# Patient Record
Sex: FEMALE | Race: WHITE | Employment: OTHER | URBAN - METROPOLITAN AREA
[De-identification: names, ages, dates, MRNs, and addresses within clinical notes are randomized per-mention and may not be internally consistent; named-entity substitution may affect disease eponyms.]

---

## 2017-08-28 ENCOUNTER — HOSPITAL ENCOUNTER (OUTPATIENT)
Dept: CT IMAGING | Age: 72
Discharge: HOME | End: 2017-08-28
Attending: INTERNAL MEDICINE | Admitting: INTERNAL MEDICINE

## 2017-08-28 DIAGNOSIS — R10.84 ABDOMINAL PAIN, GENERALIZED: ICD-10-CM

## 2017-08-28 DIAGNOSIS — R10.84 GENERALIZED ABDOMINAL PAIN: ICD-10-CM

## 2017-09-09 PROBLEM — D39.12 LEFT OVARIAN TUMOR OF BORDERLINE MALIGNANCY: Status: ACTIVE | Noted: 2017-09-09

## 2017-09-09 PROBLEM — D62 ACUTE BLOOD LOSS AS CAUSE OF POSTOPERATIVE ANEMIA: Status: ACTIVE | Noted: 2017-09-09

## 2017-09-09 PROBLEM — G89.4 CHRONIC PAIN SYNDROME: Status: ACTIVE | Noted: 2017-09-09

## 2017-10-06 ENCOUNTER — OFFICE VISIT (OUTPATIENT)
Dept: FAMILY MEDICINE CLINIC | Age: 72
End: 2017-10-06

## 2017-10-06 VITALS
SYSTOLIC BLOOD PRESSURE: 120 MMHG | WEIGHT: 172 LBS | BODY MASS INDEX: 33.77 KG/M2 | DIASTOLIC BLOOD PRESSURE: 84 MMHG | HEIGHT: 60 IN

## 2017-10-06 DIAGNOSIS — E53.8 VITAMIN B12 DEFICIENCY: ICD-10-CM

## 2017-10-06 DIAGNOSIS — N95.1 POST MENOPAUSAL SYNDROME: ICD-10-CM

## 2017-10-06 DIAGNOSIS — I05.0 MITRAL VALVE STENOSIS, UNSPECIFIED ETIOLOGY: ICD-10-CM

## 2017-10-06 DIAGNOSIS — E55.9 VITAMIN D DEFICIENCY: ICD-10-CM

## 2017-10-06 DIAGNOSIS — E46 MALNUTRITION (HCC): Primary | ICD-10-CM

## 2017-10-06 DIAGNOSIS — R53.83 FATIGUE, UNSPECIFIED TYPE: ICD-10-CM

## 2017-10-06 DIAGNOSIS — G89.4 CHRONIC PAIN SYNDROME: ICD-10-CM

## 2017-10-06 DIAGNOSIS — I10 ESSENTIAL HYPERTENSION: ICD-10-CM

## 2017-10-06 DIAGNOSIS — Z13.220 LIPID SCREENING: ICD-10-CM

## 2017-10-06 DIAGNOSIS — F32.89 DEPRESSIVE DISORDER, NOT ELSEWHERE CLASSIFIED: ICD-10-CM

## 2017-10-06 PROCEDURE — 99204 OFFICE O/P NEW MOD 45 MIN: CPT | Performed by: FAMILY MEDICINE

## 2017-10-06 RX ORDER — BETHANECHOL CHLORIDE 25 MG/1
25 TABLET ORAL 3 TIMES DAILY
COMMUNITY
End: 2018-07-03 | Stop reason: SDUPTHER

## 2017-10-06 RX ORDER — SERTRALINE HYDROCHLORIDE 100 MG/1
100 TABLET, FILM COATED ORAL DAILY
COMMUNITY
End: 2018-01-30

## 2017-10-06 RX ORDER — GABAPENTIN 800 MG/1
800 TABLET ORAL 3 TIMES DAILY
COMMUNITY
End: 2019-01-04

## 2017-10-06 RX ORDER — ASPIRIN 81 MG/1
81 TABLET, CHEWABLE ORAL DAILY
COMMUNITY
End: 2019-05-08

## 2017-10-06 RX ORDER — TIZANIDINE 4 MG/1
4 TABLET ORAL 2 TIMES DAILY
Status: ON HOLD | COMMUNITY
End: 2019-08-31 | Stop reason: HOSPADM

## 2017-10-06 ASSESSMENT — ENCOUNTER SYMPTOMS
DIARRHEA: 0
CONSTIPATION: 0
VOMITING: 0
ABDOMINAL PAIN: 0

## 2017-10-06 NOTE — MR AVS SNAPSHOT
After Visit Summary             Kate Fulling   10/6/2017 9:00 AM   Office Visit    Description:  Female : 1945   Provider:  Ada Cabello MD   Department:  [place] [address] and Future Appointments         Below is a list of your follow-up and future appointments. This may not be a complete list as you may have made appointments directly with providers that we are not aware of or your providers may have made some for you. Please call your providers to confirm appointments. It is important to keep your appointments. Please bring your current insurance card, photo ID, co-pay, and all medication bottles to your appointment. If self-pay, payment is expected at the time of service. Your To-Do List     Future Orders Complete By Expires    Comprehensive Metabolic Panel [SAN43 Custom]  10/6/2017 10/6/2018    DEXA Bone Density Axial Skeleton [27366 Custom]  10/6/2017 10/6/2018    Lipid Panel [LAB18 Custom]  10/6/2017 10/6/2018    CBC with Differential [BYN5092 Custom]  2017 10/6/2018    TSH without Reflex [WHA134 Custom]  2017 10/6/2018    Vitamin B12 [LAB67 Custom]  2017 10/6/2018    Vitamin D 25 Hydroxy [UCB030 Custom]  2017 10/6/2018         Information from Your Visit        Department     Name Address Phone Fax    [address] [phone]      You Were Seen for:         Comments    Malnutrition [facility])   [[id number]]         Vital Signs     Blood Pressure Height Weight Body Mass Index Smoking Status       120/84 5' (1.524 m) 172 lb (78 kg) 33.59 kg/m2 Never Smoker       Additional Information about your Body Mass Index (BMI)           Your BMI as listed above is considered obese (30 or more). BMI is an estimate of body fat, calculated from your height and weight.   The higher your BMI, the greater your risk of heart disease, high blood pressure, type 2 diabetes, stroke, gallstones, arthritis, sleep apnea, and certain cancers. BMI is not perfect. It may overestimate body fat in athletes and people who are more muscular. Even a small weight loss (between 5 and 10 percent of your current weight) by decreasing your calorie intake and becoming more physically active will help lower your risk of developing or worsening diseases associated with obesity. Learn more at: [employer]             Today's Medication Changes          These changes are accurate as of: 10/6/17 10:01 AM.  If you have any questions, ask your nurse or doctor. START taking these medications           mupirocin 2 % ointment   Commonly known as:  BACTROBAN   Instructions:  Apply 3 times daily. Quantity:  22 g   Refills:  1   Started by:  Benjie Christina MD         CHANGE how you take these medications           furosemide 20 MG tablet   Commonly known as:  LASIX   Instructions: Take  by mouth daily. Refills:  0   What changed:  Another medication with the same name was removed. Continue taking this medication, and follow the directions you see here. Changed by:  Jas Drake PA-C       gabapentin 800 MG tablet   Commonly known as:  NEURONTIN   Instructions: Take 800 mg by mouth 3 times daily   Refills:  0   What changed:  Another medication with the same name was removed. Continue taking this medication, and follow the directions you see here.    Changed by:  Benjie Christina MD         STOP taking these medications           acetaminophen 325 MG tablet   Commonly known as:  TYLENOL   Stopped by:  Benjie Christina MD       amLODIPine 5 MG tablet   Commonly known as:  NORVASC   Stopped by:  Benjie Christina MD       ibuprofen 400 MG tablet   Commonly known as:  ADVIL;MOTRIN   Stopped by:  Benjie Christina MD       metoprolol succinate 200 MG extended release tablet   Commonly known as:  TOPROL XL Stopped by:  Khadar Cueto MD       rOPINIRole 0.25 MG tablet   Commonly known as:  REQUIP   Stopped by:  Khadar Cueto MD       senna 8.6 MG tablet   Commonly known as:  [address] by:  Khadar Cueto MD       simvastatin 20 MG tablet   Commonly known as:  ZOCOR   Stopped by:  Khadar Cueto MD       venlafaxine 150 MG extended release capsule   Commonly known as:  EFFEXOR XR   Stopped by:  Khadar Cueto MD            Where to Get Your Medications      These medications were sent to [facility] [address], [address] [phone] Ayden Jennings [phone]  [address] [facility], [address] [place] [address]     Phone:  [phone]     mupirocin 2 % ointment               Your Current Medications Are              bethanechol (URECHOLINE) 25 MG tablet Take 25 mg by mouth 3 times daily    tiZANidine (ZANAFLEX) 4 MG tablet Take 4 mg by mouth 2 times daily    sertraline (ZOLOFT) 100 MG tablet Take 100 mg by mouth daily    aspirin 81 MG chewable tablet Take 81 mg by mouth daily    gabapentin (NEURONTIN) 800 MG tablet Take 800 mg by mouth 3 times daily    mupirocin (BACTROBAN) 2 % ointment Apply 3 times daily. morphine (MSIR) 15 MG tablet Take 15 mg by mouth every 6 hours as needed for Pain . spironolactone (ALDACTONE) 50 MG tablet Take  by mouth daily. RANEXA 500 MG SR tablet Take  by mouth 2 times daily. furosemide (LASIX) 20 MG tablet Take  by mouth daily. buPROPion (WELLBUTRIN XL) 300 MG XL tablet Take 1 tablet by mouth every morning. Allergies           No Known Allergies      We Ordered/Performed the following           Anthony Paredes MD     Scheduling Instructions:    [facility] - Selma Keenan MD  [address] [place] [address], [facility] [facility], [address]  Ph: [phone]    One of the many advantages of the [address] [place] [address] is that we all work together closely to make healthcare easy for you.  We have at age 73. Based upon the results and risk factors for bone loss, your provider will recommend whether this needs to be repeated. 10/16/2011    Pneumococcal Vaccines (two) for all adults aged 72 and over (1 of 2 - PCV13) 10/16/2011    Yearly Flu Vaccine (1) 9/1/2017            MyChart Signup           [employer] allows you to send messages to your doctor, view your test results, renew your prescriptions, schedule appointments, view visit notes, and more. How Do I Sign Up? 1. In your Internet browser, go to [email or web address]  2. Click on the Sign Up Now link in the Sign In box. You will see the New Member Sign Up page. 3. Enter your [employer] Access Code exactly as it appears below. You will not need to use this code after youve completed the sign-up process. If you do not sign up before the expiration date, you must request a new code. [employer] Access Code: [email or web address]  Expires: 11/10/2017  5:06 PM    4. Enter your Social Security Number ([id number]) and Date of Birth (mm/dd/yyyy) as indicated and click Submit. You will be taken to the next sign-up page. 5. Create a [employer] ID. This will be your [employer] login ID and cannot be changed, so think of one that is secure and easy to remember. 6. Create a [employer] password. You can change your password at any time. 7. Enter your Password Reset Question and Answer. This can be used at a later time if you forget your password. 8. Enter your e-mail address. You will receive e-mail notification when new information is available in [address] 9. Click Sign Up. You can now view your medical record. Additional Information  If you have questions, please contact the physician practice where you receive care. Remember, [employer] is NOT to be used for urgent needs. For medical emergencies, dial 911. For questions regarding your [employer] account call [phone]. If you have a clinical question, please call your doctor's office.

## 2017-11-16 ENCOUNTER — OFFICE VISIT (OUTPATIENT)
Dept: CARDIOLOGY CLINIC | Age: 72
End: 2017-11-16

## 2017-11-16 VITALS
HEIGHT: 60 IN | HEART RATE: 80 BPM | WEIGHT: 175.12 LBS | DIASTOLIC BLOOD PRESSURE: 84 MMHG | BODY MASS INDEX: 34.38 KG/M2 | SYSTOLIC BLOOD PRESSURE: 132 MMHG

## 2017-11-16 DIAGNOSIS — R07.89 OTHER CHEST PAIN: Primary | ICD-10-CM

## 2017-11-16 DIAGNOSIS — I10 ESSENTIAL HYPERTENSION: ICD-10-CM

## 2017-11-16 DIAGNOSIS — I10 HYPERTENSION, MALIGNANT: ICD-10-CM

## 2017-11-16 DIAGNOSIS — I45.10 RIGHT BUNDLE BRANCH BLOCK (RBBB): ICD-10-CM

## 2017-11-16 PROCEDURE — 93000 ELECTROCARDIOGRAM COMPLETE: CPT | Performed by: INTERNAL MEDICINE

## 2017-11-16 PROCEDURE — 1123F ACP DISCUSS/DSCN MKR DOCD: CPT | Performed by: INTERNAL MEDICINE

## 2017-11-16 PROCEDURE — 3014F SCREEN MAMMO DOC REV: CPT | Performed by: INTERNAL MEDICINE

## 2017-11-16 PROCEDURE — 1036F TOBACCO NON-USER: CPT | Performed by: INTERNAL MEDICINE

## 2017-11-16 PROCEDURE — G8427 DOCREV CUR MEDS BY ELIG CLIN: HCPCS | Performed by: INTERNAL MEDICINE

## 2017-11-16 PROCEDURE — 99204 OFFICE O/P NEW MOD 45 MIN: CPT | Performed by: INTERNAL MEDICINE

## 2017-11-16 PROCEDURE — 4040F PNEUMOC VAC/ADMIN/RCVD: CPT | Performed by: INTERNAL MEDICINE

## 2017-11-16 PROCEDURE — 3017F COLORECTAL CA SCREEN DOC REV: CPT | Performed by: INTERNAL MEDICINE

## 2017-11-16 PROCEDURE — 1090F PRES/ABSN URINE INCON ASSESS: CPT | Performed by: INTERNAL MEDICINE

## 2017-11-16 PROCEDURE — G8400 PT W/DXA NO RESULTS DOC: HCPCS | Performed by: INTERNAL MEDICINE

## 2017-11-16 PROCEDURE — G8484 FLU IMMUNIZE NO ADMIN: HCPCS | Performed by: INTERNAL MEDICINE

## 2017-11-16 PROCEDURE — G8417 CALC BMI ABV UP PARAM F/U: HCPCS | Performed by: INTERNAL MEDICINE

## 2017-11-16 NOTE — PROGRESS NOTES
coordination  · No abnormalities of mood, affect, memory, mentation, or behavior are noted    Echo [address] Us 27 3/31/16:  Adenike Orozco     Left Ventricular ejection fraction is estimated at 65%.         No obvious regional wall motion abnormalities.     Left ventricular cavity size normal.      Left ventricular wall thickness is normal.      Calcified posterior leaflet and mitral annulus.      Carotid artery duplex [facility] 3/15/16:  CONCLUSIONS    Stenosis in Right Proximal ICA at 1-39%. RVA is antegrade. Stenosis in Left Proximal ICA at 1-39%.     LVA is antegrade. Nuclear stress test [facility] 4/8/14: IMPRESSIONS    No evidence of myocardial ischemia or prior myocardial infarction.     Normal left ventricular size and function.      Assessment:     1. Other chest pain    2. Essential hypertension    3. Hypertension, malignant    4. Right bundle branch block (RBBB)      EKG today 11/16/17> NSR, RBBB    Plan:  Would like to update testing including nuclear stress (GXT, but can switch to Lexiscan) and ECHO. Routine fasting lab orders already in EPIC per PCP. Encouraged to have them drawn soon. Return for follow up after testing with my CNP. Thank you for allowing me to participate in the care of this individual.      Sury Fournier.  Ann Marie Heath M.D., [facility]

## 2017-11-29 ENCOUNTER — HOSPITAL ENCOUNTER (OUTPATIENT)
Dept: NON INVASIVE DIAGNOSTICS | Age: 72
Discharge: HOME | End: 2017-11-29
Attending: INTERNAL MEDICINE | Admitting: INTERNAL MEDICINE

## 2017-11-29 DIAGNOSIS — R07.89 OTHER CHEST PAIN: ICD-10-CM

## 2017-11-29 LAB
LV EF: 60 %
LV EF: 76 %
LVEF MODALITY: NORMAL
LVEF MODALITY: NORMAL

## 2017-12-07 ENCOUNTER — TELEPHONE (OUTPATIENT)
Dept: CARDIOLOGY CLINIC | Age: 72
End: 2017-12-07

## 2018-01-09 ENCOUNTER — HOSPITAL ENCOUNTER (OUTPATIENT)
Dept: OTHER | Age: 73
Discharge: HOME | End: 2018-01-09
Attending: FAMILY MEDICINE | Admitting: FAMILY MEDICINE

## 2018-01-09 DIAGNOSIS — E55.9 VITAMIN D DEFICIENCY: ICD-10-CM

## 2018-01-09 DIAGNOSIS — E53.8 VITAMIN B12 DEFICIENCY: ICD-10-CM

## 2018-01-09 DIAGNOSIS — R53.83 FATIGUE, UNSPECIFIED TYPE: ICD-10-CM

## 2018-01-09 DIAGNOSIS — Z13.220 LIPID SCREENING: ICD-10-CM

## 2018-01-09 DIAGNOSIS — I10 ESSENTIAL HYPERTENSION: ICD-10-CM

## 2018-01-09 LAB
A/G RATIO: 1.4 (ref 1.1–2.2)
ALBUMIN SERPL-MCNC: 4.3 G/DL (ref 3.4–5)
ALP BLD-CCNC: 106 U/L (ref 40–129)
ALT SERPL-CCNC: 13 U/L (ref 10–40)
ANION GAP SERPL CALCULATED.3IONS-SCNC: 15 MMOL/L (ref 3–16)
AST SERPL-CCNC: 17 U/L (ref 15–37)
BASOPHILS ABSOLUTE: 0.1 K/UL (ref 0–0.2)
BASOPHILS RELATIVE PERCENT: 0.9 %
BILIRUB SERPL-MCNC: <0.2 MG/DL (ref 0–1)
BUN BLDV-MCNC: 24 MG/DL (ref 7–20)
CALCIUM SERPL-MCNC: 9.9 MG/DL (ref 8.3–10.6)
CHLORIDE BLD-SCNC: 101 MMOL/L (ref 99–110)
CHOLESTEROL, TOTAL: 221 MG/DL (ref 0–199)
CO2: 28 MMOL/L (ref 21–32)
CREAT SERPL-MCNC: 0.9 MG/DL (ref 0.6–1.2)
EOSINOPHILS ABSOLUTE: 0.3 K/UL (ref 0–0.6)
EOSINOPHILS RELATIVE PERCENT: 5.3 %
GFR AFRICAN AMERICAN: >60
GFR NON-AFRICAN AMERICAN: >60
GLOBULIN: 3.1 G/DL
GLUCOSE BLD-MCNC: 73 MG/DL (ref 70–99)
HCT VFR BLD CALC: 42.5 % (ref 36–48)
HDLC SERPL-MCNC: 68 MG/DL (ref 40–60)
HEMOGLOBIN: 13.8 G/DL (ref 12–16)
LDL CHOLESTEROL CALCULATED: 119 MG/DL
LYMPHOCYTES ABSOLUTE: 1.7 K/UL (ref 1–5.1)
LYMPHOCYTES RELATIVE PERCENT: 29.4 %
MCH RBC QN AUTO: 30.7 PG (ref 26–34)
MCHC RBC AUTO-ENTMCNC: 32.4 G/DL (ref 31–36)
MCV RBC AUTO: 94.7 FL (ref 80–100)
MONOCYTES ABSOLUTE: 0.5 K/UL (ref 0–1.3)
MONOCYTES RELATIVE PERCENT: 9 %
NEUTROPHILS ABSOLUTE: 3.2 K/UL (ref 1.7–7.7)
NEUTROPHILS RELATIVE PERCENT: 55.4 %
PDW BLD-RTO: 15.6 % (ref 12.4–15.4)
PLATELET # BLD: 204 K/UL (ref 135–450)
PMV BLD AUTO: 9.2 FL (ref 5–10.5)
POTASSIUM SERPL-SCNC: 4.7 MMOL/L (ref 3.5–5.1)
RBC # BLD: 4.49 M/UL (ref 4–5.2)
SODIUM BLD-SCNC: 144 MMOL/L (ref 136–145)
TOTAL PROTEIN: 7.4 G/DL (ref 6.4–8.2)
TRIGL SERPL-MCNC: 169 MG/DL (ref 0–150)
TSH SERPL DL<=0.05 MIU/L-ACNC: 5.39 UIU/ML (ref 0.27–4.2)
VITAMIN B-12: 181 PG/ML (ref 211–911)
VITAMIN D 25-HYDROXY: 11.2 NG/ML
VLDLC SERPL CALC-MCNC: 34 MG/DL
WBC # BLD: 5.7 K/UL (ref 4–11)

## 2018-01-11 RX ORDER — ERGOCALCIFEROL 1.25 MG/1
50000 CAPSULE ORAL WEEKLY
Qty: 30 CAPSULE | Refills: 5 | Status: SHIPPED | OUTPATIENT
Start: 2018-01-11 | End: 2018-04-18

## 2018-01-16 ENCOUNTER — OFFICE VISIT (OUTPATIENT)
Dept: FAMILY MEDICINE CLINIC | Age: 73
End: 2018-01-16

## 2018-01-16 VITALS
HEART RATE: 86 BPM | OXYGEN SATURATION: 94 % | BODY MASS INDEX: 33.2 KG/M2 | WEIGHT: 170 LBS | DIASTOLIC BLOOD PRESSURE: 86 MMHG | SYSTOLIC BLOOD PRESSURE: 128 MMHG

## 2018-01-16 DIAGNOSIS — Z23 NEED FOR INFLUENZA VACCINATION: Primary | ICD-10-CM

## 2018-01-16 DIAGNOSIS — E53.8 VITAMIN B12 DEFICIENCY: ICD-10-CM

## 2018-01-16 DIAGNOSIS — E03.9 ACQUIRED HYPOTHYROIDISM: ICD-10-CM

## 2018-01-16 PROCEDURE — 4040F PNEUMOC VAC/ADMIN/RCVD: CPT | Performed by: FAMILY MEDICINE

## 2018-01-16 PROCEDURE — 96372 THER/PROPH/DIAG INJ SC/IM: CPT | Performed by: FAMILY MEDICINE

## 2018-01-16 PROCEDURE — G8400 PT W/DXA NO RESULTS DOC: HCPCS | Performed by: FAMILY MEDICINE

## 2018-01-16 PROCEDURE — 99213 OFFICE O/P EST LOW 20 MIN: CPT | Performed by: FAMILY MEDICINE

## 2018-01-16 PROCEDURE — G8417 CALC BMI ABV UP PARAM F/U: HCPCS | Performed by: FAMILY MEDICINE

## 2018-01-16 PROCEDURE — 1036F TOBACCO NON-USER: CPT | Performed by: FAMILY MEDICINE

## 2018-01-16 PROCEDURE — 1123F ACP DISCUSS/DSCN MKR DOCD: CPT | Performed by: FAMILY MEDICINE

## 2018-01-16 PROCEDURE — 1090F PRES/ABSN URINE INCON ASSESS: CPT | Performed by: FAMILY MEDICINE

## 2018-01-16 PROCEDURE — 3017F COLORECTAL CA SCREEN DOC REV: CPT | Performed by: FAMILY MEDICINE

## 2018-01-16 PROCEDURE — G8484 FLU IMMUNIZE NO ADMIN: HCPCS | Performed by: FAMILY MEDICINE

## 2018-01-16 PROCEDURE — 3014F SCREEN MAMMO DOC REV: CPT | Performed by: FAMILY MEDICINE

## 2018-01-16 PROCEDURE — G8427 DOCREV CUR MEDS BY ELIG CLIN: HCPCS | Performed by: FAMILY MEDICINE

## 2018-01-16 RX ORDER — ROPINIROLE 0.25 MG/1
0.25 TABLET, FILM COATED ORAL
COMMUNITY
Start: 2017-06-29 | End: 2018-01-16 | Stop reason: SDUPTHER

## 2018-01-16 RX ORDER — LEVOTHYROXINE SODIUM 0.03 MG/1
25 TABLET ORAL DAILY
Qty: 30 TABLET | Refills: 2 | Status: SHIPPED | OUTPATIENT
Start: 2018-01-16 | End: 2018-03-19 | Stop reason: SDUPTHER

## 2018-01-16 RX ORDER — ROPINIROLE 0.25 MG/1
0.25 TABLET, FILM COATED ORAL NIGHTLY
Qty: 90 TABLET | Refills: 1 | Status: SHIPPED | OUTPATIENT
Start: 2018-01-16 | End: 2018-06-10 | Stop reason: SDUPTHER

## 2018-01-16 RX ORDER — CYANOCOBALAMIN 1000 UG/ML
1000 INJECTION INTRAMUSCULAR; SUBCUTANEOUS ONCE
Status: COMPLETED | OUTPATIENT
Start: 2018-01-16 | End: 2018-01-16

## 2018-01-16 RX ADMIN — CYANOCOBALAMIN 1000 MCG: 1000 INJECTION INTRAMUSCULAR; SUBCUTANEOUS at 11:39

## 2018-01-16 ASSESSMENT — ENCOUNTER SYMPTOMS
SHORTNESS OF BREATH: 0
COUGH: 0

## 2018-01-16 NOTE — PROGRESS NOTES
Blaine White  : 1945  Encounter date: 2018    This is a 71 y.o. female who presents with  Chief Complaint   Patient presents with   [address] [place] [address]       History of present illness:    Here to review bloodwork. There were a few abnormalities so she decided to come in to review in person. No Known Allergies  Outpatient Prescriptions Marked as Taking for the 18 encounter (Office Visit) with Melissa Londono MD   Medication Sig Dispense Refill    rOPINIRole (REQUIP) 0.25 MG tablet Take 1 tablet by mouth nightly 90 tablet 1    levothyroxine (SYNTHROID) 25 MCG tablet Take 1 tablet by mouth Daily 30 tablet 2    vitamin D (ERGOCALCIFEROL) 26976 units CAPS capsule Take 1 capsule by mouth once a week 30 capsule 5    bethanechol (URECHOLINE) 25 MG tablet Take 25 mg by mouth 3 times daily      tiZANidine (ZANAFLEX) 4 MG tablet Take 4 mg by mouth 2 times daily      sertraline (ZOLOFT) 100 MG tablet Take 100 mg by mouth daily      aspirin 81 MG chewable tablet Take 81 mg by mouth daily      gabapentin (NEURONTIN) 800 MG tablet Take 800 mg by mouth 3 times daily      morphine (MSIR) 15 MG tablet Take 15 mg by mouth every 6 hours as needed for Pain .  spironolactone (ALDACTONE) 50 MG tablet Take  by mouth daily.  furosemide (LASIX) 20 MG tablet Take  by mouth daily.  buPROPion (WELLBUTRIN XL) 300 MG XL tablet Take 1 tablet by mouth every morning. 30 tablet 2       Review of Systems   Constitutional: Positive for fatigue (feeling lack of energy. Hard to feel motivated. Feels more energy than depression. ). Respiratory: Negative for cough and shortness of breath. Cardiovascular: Negative for chest pain and leg swelling.        Objective:    /86   Pulse 86   Wt 170 lb (77.1 kg)   SpO2 94%   BMI 33.20 kg/m²   Weight: 170 lb (77.1 kg)     BP Readings from Last 3 Encounters:   18 128/86   17 132/84   10/06/17 120/84     Wt Readings from Last 3 Encounters:

## 2018-01-16 NOTE — PATIENT INSTRUCTIONS
Take the prescription vitamin D as directed; then you can take 2000 units daily after prescription is done. Return in 3 weeks for another B12 injection    Look over the high iron diet to help replenish iron stores. If you are not feeling significant improvement in fatigue when we recheck blood in 6 weeks; call me and we will add on bloodwork including iron levels.

## 2018-01-16 NOTE — PROGRESS NOTES
Vaccine Information Sheet, \"Influenza - Inactivated\"  given to Beryle Bath, or parent/legal guardian of  Beryle Bath and verbalized understanding. Patient responses:    Have you ever had a reaction to a flu vaccine? No  Are you able to eat eggs without adverse effects? Yes  Do you have any current illness? No  Have you ever had Guillian Chester Syndrome? No    Flu vaccine given per order. Please see immunization tab.

## 2018-01-30 ENCOUNTER — OFFICE VISIT (OUTPATIENT)
Dept: FAMILY MEDICINE CLINIC | Age: 73
End: 2018-01-30

## 2018-01-30 VITALS
HEART RATE: 99 BPM | DIASTOLIC BLOOD PRESSURE: 81 MMHG | WEIGHT: 168 LBS | OXYGEN SATURATION: 95 % | SYSTOLIC BLOOD PRESSURE: 103 MMHG | BODY MASS INDEX: 32.81 KG/M2

## 2018-01-30 DIAGNOSIS — I45.10 RIGHT BUNDLE BRANCH BLOCK (RBBB): ICD-10-CM

## 2018-01-30 DIAGNOSIS — E53.8 B12 DEFICIENCY: ICD-10-CM

## 2018-01-30 DIAGNOSIS — R42 VERTIGO: Primary | ICD-10-CM

## 2018-01-30 DIAGNOSIS — I10 ESSENTIAL HYPERTENSION: ICD-10-CM

## 2018-01-30 DIAGNOSIS — E03.9 ACQUIRED HYPOTHYROIDISM: ICD-10-CM

## 2018-01-30 PROCEDURE — G8417 CALC BMI ABV UP PARAM F/U: HCPCS | Performed by: FAMILY MEDICINE

## 2018-01-30 PROCEDURE — 4040F PNEUMOC VAC/ADMIN/RCVD: CPT | Performed by: FAMILY MEDICINE

## 2018-01-30 PROCEDURE — 1036F TOBACCO NON-USER: CPT | Performed by: FAMILY MEDICINE

## 2018-01-30 PROCEDURE — 1090F PRES/ABSN URINE INCON ASSESS: CPT | Performed by: FAMILY MEDICINE

## 2018-01-30 PROCEDURE — 99214 OFFICE O/P EST MOD 30 MIN: CPT | Performed by: FAMILY MEDICINE

## 2018-01-30 PROCEDURE — G8427 DOCREV CUR MEDS BY ELIG CLIN: HCPCS | Performed by: FAMILY MEDICINE

## 2018-01-30 PROCEDURE — G8484 FLU IMMUNIZE NO ADMIN: HCPCS | Performed by: FAMILY MEDICINE

## 2018-01-30 PROCEDURE — 3017F COLORECTAL CA SCREEN DOC REV: CPT | Performed by: FAMILY MEDICINE

## 2018-01-30 PROCEDURE — G8400 PT W/DXA NO RESULTS DOC: HCPCS | Performed by: FAMILY MEDICINE

## 2018-01-30 PROCEDURE — 3014F SCREEN MAMMO DOC REV: CPT | Performed by: FAMILY MEDICINE

## 2018-01-30 PROCEDURE — G0444 DEPRESSION SCREEN ANNUAL: HCPCS | Performed by: FAMILY MEDICINE

## 2018-01-30 PROCEDURE — 1123F ACP DISCUSS/DSCN MKR DOCD: CPT | Performed by: FAMILY MEDICINE

## 2018-01-30 RX ORDER — DULOXETIN HYDROCHLORIDE 30 MG/1
CAPSULE, DELAYED RELEASE ORAL
Qty: 60 CAPSULE | Refills: 2 | Status: SHIPPED | OUTPATIENT
Start: 2018-01-30 | End: 2018-05-11 | Stop reason: SDUPTHER

## 2018-01-30 RX ORDER — DULOXETIN HYDROCHLORIDE 30 MG/1
CAPSULE, DELAYED RELEASE ORAL
Qty: 30 CAPSULE | Refills: 2 | Status: CANCELLED | OUTPATIENT
Start: 2018-01-30

## 2018-01-30 ASSESSMENT — ENCOUNTER SYMPTOMS
CONSTIPATION: 0
ABDOMINAL PAIN: 0
SHORTNESS OF BREATH: 0
SORE THROAT: 0
SINUS PRESSURE: 0
COUGH: 0
DIARRHEA: 0
SINUS PAIN: 0
BACK PAIN: 1

## 2018-01-30 ASSESSMENT — PATIENT HEALTH QUESTIONNAIRE - PHQ9
SUM OF ALL RESPONSES TO PHQ9 QUESTIONS 1 & 2: 6
9. THOUGHTS THAT YOU WOULD BE BETTER OFF DEAD, OR OF HURTING YOURSELF: 0
6. FEELING BAD ABOUT YOURSELF - OR THAT YOU ARE A FAILURE OR HAVE LET YOURSELF OR YOUR FAMILY DOWN: 3
5. POOR APPETITE OR OVEREATING: 2
8. MOVING OR SPEAKING SO SLOWLY THAT OTHER PEOPLE COULD HAVE NOTICED. OR THE OPPOSITE, BEING SO FIGETY OR RESTLESS THAT YOU HAVE BEEN MOVING AROUND A LOT MORE THAN USUAL: 0
7. TROUBLE CONCENTRATING ON THINGS, SUCH AS READING THE NEWSPAPER OR WATCHING TELEVISION: 0
SUM OF ALL RESPONSES TO PHQ QUESTIONS 1-9: 17
1. LITTLE INTEREST OR PLEASURE IN DOING THINGS: 3
10. IF YOU CHECKED OFF ANY PROBLEMS, HOW DIFFICULT HAVE THESE PROBLEMS MADE IT FOR YOU TO DO YOUR WORK, TAKE CARE OF THINGS AT HOME, OR GET ALONG WITH OTHER PEOPLE: 2
2. FEELING DOWN, DEPRESSED OR HOPELESS: 3
3. TROUBLE FALLING OR STAYING ASLEEP: 3
4. FEELING TIRED OR HAVING LITTLE ENERGY: 3

## 2018-01-30 NOTE — PROGRESS NOTES
[facility]  : 1945  Encounter date: 2018    This is a 71 y.o. female who presents with  Chief Complaint   Patient presents with    Dizziness     Patient complaines of dizziness, followed by a burning feeling that starts in her stomach and moves up into throat. Patient states her face turns red and she feels like she will pass out. Has been occurring for past year. History of present illness:    Started over a year ago - heating sensation, gets very dizzy, dry heaves. Feels like burning in stomach and moves up. No ear ache. Was occasional, but now happening more - 4 times in last 6 weeks. Makes her afraid to go out because of having episode when she is out. From time of start to when she can function again lasts 20 minutes. Breaks in cold sweat; face goes from red to white. Fatigued, has to lay down. Feels tired, but better. Not noting any specific triggers for this. First time it occurred she was driving but episode was not as severe. Not short of breath, no palpitations. No ringing in ears. Gets a little \"blurry eyed\"; does feel like she will pass out. Puts head down; does see some spots. Lays head down. Cold water on neck seems to shorten episode. Nothing similar prior to the start of this. Not related to changes in position. Hasn't woken with symptoms in past.     Supposed to have stimulator in back replaced next week. Back chronic source of pain. Has some neuropathy that has been present for years which she attributes to back; more left sided/leg in nature. She completed f/u with cardiology and had normal stress testing in December. Recent bloodwork was reviewed at previous visit. She has not noted improvement with B12, thyroid medication she has just started. Feels like mood isn't quite where it was before. Did better on cymbalta; has been on the zoloft and the wellbutrin but just not doing quite as well with this. Not sure what previous dose of the cymbalta was. No energy.  No motivation. Not wanting to get up or do anything. No Known Allergies  Outpatient Prescriptions Marked as Taking for the 1/30/18 encounter (Office Visit) with Nehemiah Almonte MD   Medication Sig Dispense Refill    rOPINIRole (REQUIP) 0.25 MG tablet Take 1 tablet by mouth nightly 90 tablet 1    levothyroxine (SYNTHROID) 25 MCG tablet Take 1 tablet by mouth Daily 30 tablet 2    vitamin D (ERGOCALCIFEROL) 75275 units CAPS capsule Take 1 capsule by mouth once a week 30 capsule 5    bethanechol (URECHOLINE) 25 MG tablet Take 25 mg by mouth 3 times daily      tiZANidine (ZANAFLEX) 4 MG tablet Take 4 mg by mouth 2 times daily      sertraline (ZOLOFT) 100 MG tablet Take 100 mg by mouth daily      aspirin 81 MG chewable tablet Take 81 mg by mouth daily      gabapentin (NEURONTIN) 800 MG tablet Take 800 mg by mouth 3 times daily      morphine (MSIR) 15 MG tablet Take 15 mg by mouth every 6 hours as needed for Pain .  spironolactone (ALDACTONE) 50 MG tablet Take  by mouth daily.  furosemide (LASIX) 20 MG tablet Take  by mouth daily.  buPROPion (WELLBUTRIN XL) 300 MG XL tablet Take 1 tablet by mouth every morning. 30 tablet 2       Review of Systems   Constitutional: Negative for chills and fever. HENT: Negative for congestion, ear discharge, ear pain, sinus pain, sinus pressure and sore throat. Respiratory: Negative for cough and shortness of breath. Cardiovascular: Negative for chest pain and palpitations. Gastrointestinal: Negative for abdominal pain, constipation and diarrhea. Musculoskeletal: Positive for back pain. Psychiatric/Behavioral: Negative for suicidal ideas.         Depressed; see above         Objective:    /81 (Position: Standing)   Pulse 99   Wt 168 lb (76.2 kg)   SpO2 95%   BMI 32.81 kg/m²   Weight: 168 lb (76.2 kg)     BP Readings from Last 3 Encounters:   01/30/18 103/81   01/16/18 128/86   11/16/17 132/84     Wt Readings from Last 3 Encounters: 01/30/18 168 lb (76.2 kg)   01/16/18 170 lb (77.1 kg)   11/16/17 175 lb 1.9 oz (79.4 kg)       Physical Exam   Constitutional: She is oriented to person, place, and time. She appears well-developed and well-nourished. No distress. HENT:   Right Ear: Tympanic membrane and ear canal normal.   Left Ear: Tympanic membrane and ear canal normal.   Nose: Mucosal edema present. Mouth/Throat: Uvula is midline and oropharynx is clear and moist. No posterior oropharyngeal erythema. Eyes: EOM are normal. Pupils are equal, round, and reactive to light. Neck: Neck supple. Cardiovascular: Normal rate, regular rhythm and normal heart sounds. Pulmonary/Chest: Effort normal and breath sounds normal.   Abdominal: Soft. Bowel sounds are normal. She exhibits no distension. Musculoskeletal: Normal range of motion. Neurological: She is alert and oriented to person, place, and time. She has normal strength and normal reflexes. No cranial nerve deficit. She displays a negative Romberg sign. Coordination normal.   Reflex Scores:       Bicep reflexes are 2+ on the left side. Brachioradialis reflexes are 2+ on the right side and 2+ on the left side. Patellar reflexes are 2+ on the right side and 2+ on the left side. Achilles reflexes are 2+ on the left side. Psychiatric: Her speech is normal and behavior is normal. Thought content normal. Cognition and memory are normal. She exhibits a depressed mood. PHQ-9 Total Score: 17 (1/30/2018 11:49 AM)      Assessment/Plan    1. Vertigo  Unable to reproduce sx in office. Would like ENT evaluation in case inner ear causing problems. Has discussed with cardiology and they did not feel cardiac in nature, which is reassuring. Normal stress testing that did not cause any of concerning sx. Jade Resendez MD    2. Essential hypertension  stable    3. Acquired hypothyroidism  Will recheck tsh in march    4.  B12 deficiency  Continuing with B12

## 2018-02-06 ENCOUNTER — NURSE ONLY (OUTPATIENT)
Dept: FAMILY MEDICINE CLINIC | Age: 73
End: 2018-02-06

## 2018-02-06 DIAGNOSIS — E53.8 B12 DEFICIENCY: Primary | ICD-10-CM

## 2018-02-06 PROCEDURE — 96372 THER/PROPH/DIAG INJ SC/IM: CPT | Performed by: FAMILY MEDICINE

## 2018-02-06 RX ORDER — CYANOCOBALAMIN 1000 UG/ML
1000 INJECTION INTRAMUSCULAR; SUBCUTANEOUS ONCE
Status: COMPLETED | OUTPATIENT
Start: 2018-02-06 | End: 2018-02-06

## 2018-02-06 RX ADMIN — CYANOCOBALAMIN 1000 MCG: 1000 INJECTION INTRAMUSCULAR; SUBCUTANEOUS at 11:21

## 2018-02-27 ENCOUNTER — OFFICE VISIT (OUTPATIENT)
Dept: FAMILY MEDICINE CLINIC | Age: 73
End: 2018-02-27

## 2018-02-27 VITALS
OXYGEN SATURATION: 94 % | BODY MASS INDEX: 34.37 KG/M2 | HEART RATE: 82 BPM | DIASTOLIC BLOOD PRESSURE: 108 MMHG | SYSTOLIC BLOOD PRESSURE: 148 MMHG | WEIGHT: 176 LBS

## 2018-02-27 DIAGNOSIS — M51.37 DDD (DEGENERATIVE DISC DISEASE), LUMBOSACRAL: ICD-10-CM

## 2018-02-27 DIAGNOSIS — E03.9 ACQUIRED HYPOTHYROIDISM: ICD-10-CM

## 2018-02-27 DIAGNOSIS — F33.1 MAJOR DEPRESSIVE DISORDER, RECURRENT, MODERATE (HCC): ICD-10-CM

## 2018-02-27 DIAGNOSIS — E53.8 B12 DEFICIENCY: Primary | ICD-10-CM

## 2018-02-27 DIAGNOSIS — Z01.818 PREOPERATIVE EXAMINATION: ICD-10-CM

## 2018-02-27 DIAGNOSIS — F33.1 MODERATE EPISODE OF RECURRENT MAJOR DEPRESSIVE DISORDER (HCC): ICD-10-CM

## 2018-02-27 DIAGNOSIS — I10 ESSENTIAL HYPERTENSION: ICD-10-CM

## 2018-02-27 DIAGNOSIS — M96.1 POST LAMINECTOMY SYNDROME: ICD-10-CM

## 2018-02-27 PROCEDURE — 93000 ELECTROCARDIOGRAM COMPLETE: CPT | Performed by: FAMILY MEDICINE

## 2018-02-27 PROCEDURE — 96372 THER/PROPH/DIAG INJ SC/IM: CPT | Performed by: FAMILY MEDICINE

## 2018-02-27 PROCEDURE — 3017F COLORECTAL CA SCREEN DOC REV: CPT | Performed by: FAMILY MEDICINE

## 2018-02-27 PROCEDURE — 1090F PRES/ABSN URINE INCON ASSESS: CPT | Performed by: FAMILY MEDICINE

## 2018-02-27 PROCEDURE — 4040F PNEUMOC VAC/ADMIN/RCVD: CPT | Performed by: FAMILY MEDICINE

## 2018-02-27 PROCEDURE — G0444 DEPRESSION SCREEN ANNUAL: HCPCS | Performed by: FAMILY MEDICINE

## 2018-02-27 PROCEDURE — 3014F SCREEN MAMMO DOC REV: CPT | Performed by: FAMILY MEDICINE

## 2018-02-27 PROCEDURE — G8427 DOCREV CUR MEDS BY ELIG CLIN: HCPCS | Performed by: FAMILY MEDICINE

## 2018-02-27 PROCEDURE — 99214 OFFICE O/P EST MOD 30 MIN: CPT | Performed by: FAMILY MEDICINE

## 2018-02-27 PROCEDURE — G8484 FLU IMMUNIZE NO ADMIN: HCPCS | Performed by: FAMILY MEDICINE

## 2018-02-27 PROCEDURE — G8417 CALC BMI ABV UP PARAM F/U: HCPCS | Performed by: FAMILY MEDICINE

## 2018-02-27 RX ORDER — BUPROPION HYDROCHLORIDE 300 MG/1
300 TABLET ORAL EVERY MORNING
Qty: 90 TABLET | Refills: 1 | Status: SHIPPED | OUTPATIENT
Start: 2018-02-27 | End: 2018-08-27 | Stop reason: SDUPTHER

## 2018-02-27 RX ORDER — CYANOCOBALAMIN 1000 UG/ML
1000 INJECTION INTRAMUSCULAR; SUBCUTANEOUS ONCE
Status: COMPLETED | OUTPATIENT
Start: 2018-02-27 | End: 2018-02-27

## 2018-02-27 RX ORDER — FUROSEMIDE 20 MG/1
20 TABLET ORAL DAILY
Qty: 90 TABLET | Refills: 1 | Status: SHIPPED | OUTPATIENT
Start: 2018-02-27 | End: 2018-09-04 | Stop reason: SDUPTHER

## 2018-02-27 RX ADMIN — CYANOCOBALAMIN 1000 MCG: 1000 INJECTION INTRAMUSCULAR; SUBCUTANEOUS at 17:45

## 2018-02-27 ASSESSMENT — PATIENT HEALTH QUESTIONNAIRE - PHQ9
SUM OF ALL RESPONSES TO PHQ9 QUESTIONS 1 & 2: 5
9. THOUGHTS THAT YOU WOULD BE BETTER OFF DEAD, OR OF HURTING YOURSELF: 0
7. TROUBLE CONCENTRATING ON THINGS, SUCH AS READING THE NEWSPAPER OR WATCHING TELEVISION: 3
6. FEELING BAD ABOUT YOURSELF - OR THAT YOU ARE A FAILURE OR HAVE LET YOURSELF OR YOUR FAMILY DOWN: 3
5. POOR APPETITE OR OVEREATING: 0
SUM OF ALL RESPONSES TO PHQ QUESTIONS 1-9: 14
8. MOVING OR SPEAKING SO SLOWLY THAT OTHER PEOPLE COULD HAVE NOTICED. OR THE OPPOSITE, BEING SO FIGETY OR RESTLESS THAT YOU HAVE BEEN MOVING AROUND A LOT MORE THAN USUAL: 0
4. FEELING TIRED OR HAVING LITTLE ENERGY: 3
3. TROUBLE FALLING OR STAYING ASLEEP: 0
2. FEELING DOWN, DEPRESSED OR HOPELESS: 2
10. IF YOU CHECKED OFF ANY PROBLEMS, HOW DIFFICULT HAVE THESE PROBLEMS MADE IT FOR YOU TO DO YOUR WORK, TAKE CARE OF THINGS AT HOME, OR GET ALONG WITH OTHER PEOPLE: 1
1. LITTLE INTEREST OR PLEASURE IN DOING THINGS: 3

## 2018-02-28 NOTE — PROGRESS NOTES
Weight: 176 lb (79.8 kg)     BP Readings from Last 3 Encounters:   02/27/18 (!) 138/94   01/30/18 103/81   01/16/18 128/86     Wt Readings from Last 3 Encounters:   02/27/18 176 lb (79.8 kg)   01/30/18 168 lb (76.2 kg)   01/16/18 170 lb (77.1 kg)       Physical Exam  PHQ-9 Total Score: 14 (2/27/2018  4:52 PM)    Assessment/Plan    1. B12 deficiency  ***  - cyanocobalamin injection 1,000 mcg; Inject 1 mL into the muscle once    2. Moderate episode of recurrent major depressive disorder (HCC)  ***      No Follow-up on file.
recommendations as well as CT scan results. Note electronically signed by provider.

## 2018-03-06 ENCOUNTER — OFFICE VISIT (OUTPATIENT)
Dept: ENT CLINIC | Age: 73
End: 2018-03-06

## 2018-03-06 VITALS
DIASTOLIC BLOOD PRESSURE: 88 MMHG | SYSTOLIC BLOOD PRESSURE: 128 MMHG | BODY MASS INDEX: 34.34 KG/M2 | HEART RATE: 89 BPM | HEIGHT: 60 IN | WEIGHT: 174.9 LBS

## 2018-03-06 DIAGNOSIS — R42 VERTIGO: Primary | ICD-10-CM

## 2018-03-06 PROCEDURE — G8427 DOCREV CUR MEDS BY ELIG CLIN: HCPCS | Performed by: OTOLARYNGOLOGY

## 2018-03-06 PROCEDURE — 1090F PRES/ABSN URINE INCON ASSESS: CPT | Performed by: OTOLARYNGOLOGY

## 2018-03-06 PROCEDURE — G8400 PT W/DXA NO RESULTS DOC: HCPCS | Performed by: OTOLARYNGOLOGY

## 2018-03-06 PROCEDURE — G8417 CALC BMI ABV UP PARAM F/U: HCPCS | Performed by: OTOLARYNGOLOGY

## 2018-03-06 PROCEDURE — 1036F TOBACCO NON-USER: CPT | Performed by: OTOLARYNGOLOGY

## 2018-03-06 PROCEDURE — 3017F COLORECTAL CA SCREEN DOC REV: CPT | Performed by: OTOLARYNGOLOGY

## 2018-03-06 PROCEDURE — 4040F PNEUMOC VAC/ADMIN/RCVD: CPT | Performed by: OTOLARYNGOLOGY

## 2018-03-06 PROCEDURE — G8482 FLU IMMUNIZE ORDER/ADMIN: HCPCS | Performed by: OTOLARYNGOLOGY

## 2018-03-06 PROCEDURE — 99204 OFFICE O/P NEW MOD 45 MIN: CPT | Performed by: OTOLARYNGOLOGY

## 2018-03-06 PROCEDURE — 3014F SCREEN MAMMO DOC REV: CPT | Performed by: OTOLARYNGOLOGY

## 2018-03-06 PROCEDURE — 1123F ACP DISCUSS/DSCN MKR DOCD: CPT | Performed by: OTOLARYNGOLOGY

## 2018-03-06 NOTE — PROGRESS NOTES
Smokeless tobacco: Never Used    Alcohol use No    Drug use: No    Sexual activity: Not on file     Other Topics Concern    Not on file     Social History Narrative    No narrative on file          Current Outpatient Prescriptions   Medication Sig Dispense Refill    furosemide (LASIX) 20 MG tablet Take 1 tablet by mouth daily 90 tablet 1    buPROPion (WELLBUTRIN XL) 300 MG extended release tablet Take 1 tablet by mouth every morning 90 tablet 1    DULoxetine (CYMBALTA) 30 MG extended release capsule Take 1 capsule daily x 5 days, then increase to 2 capsules daily. 60 capsule 2    rOPINIRole (REQUIP) 0.25 MG tablet Take 1 tablet by mouth nightly 90 tablet 1    vitamin D (ERGOCALCIFEROL) 31477 units CAPS capsule Take 1 capsule by mouth once a week 30 capsule 5    bethanechol (URECHOLINE) 25 MG tablet Take 25 mg by mouth 3 times daily      tiZANidine (ZANAFLEX) 4 MG tablet Take 4 mg by mouth 2 times daily      aspirin 81 MG chewable tablet Take 81 mg by mouth daily      gabapentin (NEURONTIN) 800 MG tablet Take 800 mg by mouth 3 times daily      morphine (MSIR) 15 MG tablet Take 15 mg by mouth every 6 hours as needed for Pain .  spironolactone (ALDACTONE) 50 MG tablet Take  by mouth daily.  levothyroxine (SYNTHROID) 25 MCG tablet TAKE ONE TABLET BY MOUTH DAILY 30 tablet 1     No current facility-administered medications for this visit. EXAMINATION, COMPREHENSIVE:       Constitutional:  · VITALS SIGNS reviewed. Vitals:    03/06/18 1342 03/06/18 1347   BP: (!) 129/92 128/88   Site: Left Arm Left Arm   Position: Sitting Sitting   Cuff Size: Medium Adult Medium Adult   Pulse: 89 89   Weight: 174 lb 14.4 oz (79.3 kg)    Height: 5' (1.524 m)       · GENERAL APPEARANCE:  Well developed, well nourished, no apparent distress, no apparent deformities. · ABILITY TO COMMUNICATE/QUALITY OF VOICE:  Communicated without difficulty. Normal voice.      Head and Face:  · INSPECTION:

## 2018-03-07 ENCOUNTER — HOSPITAL ENCOUNTER (OUTPATIENT)
Dept: CT IMAGING | Age: 73
Discharge: HOME | End: 2018-03-07
Attending: OBSTETRICS & GYNECOLOGY | Admitting: OBSTETRICS & GYNECOLOGY

## 2018-03-07 DIAGNOSIS — R11.0 NAUSEA: ICD-10-CM

## 2018-03-07 DIAGNOSIS — D39.12 NEOPLASM OF UNCERTAIN BEHAVIOR OF LEFT OVARY: ICD-10-CM

## 2018-03-15 ENCOUNTER — OFFICE VISIT (OUTPATIENT)
Dept: ENT CLINIC | Age: 73
End: 2018-03-15

## 2018-03-15 DIAGNOSIS — R42 DIZZINESS AND GIDDINESS: Primary | ICD-10-CM

## 2018-03-15 PROCEDURE — 92534 OPTOKINETIC NYSTAGMUS TEST: CPT | Performed by: AUDIOLOGIST

## 2018-03-15 PROCEDURE — 92547 SUPPLEMENTAL ELECTRICAL TEST: CPT | Performed by: AUDIOLOGIST

## 2018-03-15 PROCEDURE — 92550 TYMPANOMETRY & REFLEX THRESH: CPT | Performed by: AUDIOLOGIST

## 2018-03-15 PROCEDURE — 92557 COMPREHENSIVE HEARING TEST: CPT | Performed by: AUDIOLOGIST

## 2018-03-15 PROCEDURE — 92540 BASIC VESTIBULAR EVALUATION: CPT | Performed by: AUDIOLOGIST

## 2018-03-19 DIAGNOSIS — E03.9 ACQUIRED HYPOTHYROIDISM: ICD-10-CM

## 2018-03-20 RX ORDER — LEVOTHYROXINE SODIUM 0.03 MG/1
TABLET ORAL
Qty: 30 TABLET | Refills: 1 | Status: SHIPPED | OUTPATIENT
Start: 2018-03-20 | End: 2019-05-08

## 2018-04-18 ENCOUNTER — OFFICE VISIT (OUTPATIENT)
Dept: ENT CLINIC | Age: 73
End: 2018-04-18

## 2018-04-18 VITALS — HEART RATE: 85 BPM | SYSTOLIC BLOOD PRESSURE: 138 MMHG | DIASTOLIC BLOOD PRESSURE: 94 MMHG

## 2018-04-18 DIAGNOSIS — R94.113 ABNORMAL ENG (ELECTRONYSTAGMOGRAM): ICD-10-CM

## 2018-04-18 DIAGNOSIS — R42 VERTIGO: Primary | ICD-10-CM

## 2018-04-18 DIAGNOSIS — H90.3 BILATERAL HIGH FREQUENCY SENSORINEURAL HEARING LOSS: ICD-10-CM

## 2018-04-18 PROCEDURE — G8417 CALC BMI ABV UP PARAM F/U: HCPCS | Performed by: OTOLARYNGOLOGY

## 2018-04-18 PROCEDURE — G8427 DOCREV CUR MEDS BY ELIG CLIN: HCPCS | Performed by: OTOLARYNGOLOGY

## 2018-04-18 PROCEDURE — 1036F TOBACCO NON-USER: CPT | Performed by: OTOLARYNGOLOGY

## 2018-04-18 PROCEDURE — 3017F COLORECTAL CA SCREEN DOC REV: CPT | Performed by: OTOLARYNGOLOGY

## 2018-04-18 PROCEDURE — 4040F PNEUMOC VAC/ADMIN/RCVD: CPT | Performed by: OTOLARYNGOLOGY

## 2018-04-18 PROCEDURE — 99214 OFFICE O/P EST MOD 30 MIN: CPT | Performed by: OTOLARYNGOLOGY

## 2018-04-18 PROCEDURE — G8400 PT W/DXA NO RESULTS DOC: HCPCS | Performed by: OTOLARYNGOLOGY

## 2018-04-18 PROCEDURE — 1090F PRES/ABSN URINE INCON ASSESS: CPT | Performed by: OTOLARYNGOLOGY

## 2018-04-18 PROCEDURE — 1123F ACP DISCUSS/DSCN MKR DOCD: CPT | Performed by: OTOLARYNGOLOGY

## 2018-04-20 ENCOUNTER — OFFICE VISIT (OUTPATIENT)
Dept: FAMILY MEDICINE CLINIC | Age: 73
End: 2018-04-20

## 2018-04-20 VITALS
WEIGHT: 182 LBS | HEART RATE: 101 BPM | DIASTOLIC BLOOD PRESSURE: 84 MMHG | OXYGEN SATURATION: 98 % | HEIGHT: 59 IN | TEMPERATURE: 97 F | SYSTOLIC BLOOD PRESSURE: 122 MMHG | BODY MASS INDEX: 36.69 KG/M2

## 2018-04-20 DIAGNOSIS — M51.37 DDD (DEGENERATIVE DISC DISEASE), LUMBOSACRAL: ICD-10-CM

## 2018-04-20 DIAGNOSIS — Z01.818 PREOP EXAMINATION: Primary | ICD-10-CM

## 2018-04-20 DIAGNOSIS — I10 ESSENTIAL HYPERTENSION: ICD-10-CM

## 2018-04-20 PROCEDURE — 1123F ACP DISCUSS/DSCN MKR DOCD: CPT | Performed by: PHYSICIAN ASSISTANT

## 2018-04-20 PROCEDURE — G8427 DOCREV CUR MEDS BY ELIG CLIN: HCPCS | Performed by: PHYSICIAN ASSISTANT

## 2018-04-20 PROCEDURE — 1090F PRES/ABSN URINE INCON ASSESS: CPT | Performed by: PHYSICIAN ASSISTANT

## 2018-04-20 PROCEDURE — G8400 PT W/DXA NO RESULTS DOC: HCPCS | Performed by: PHYSICIAN ASSISTANT

## 2018-04-20 PROCEDURE — 3014F SCREEN MAMMO DOC REV: CPT | Performed by: PHYSICIAN ASSISTANT

## 2018-04-20 PROCEDURE — 4040F PNEUMOC VAC/ADMIN/RCVD: CPT | Performed by: PHYSICIAN ASSISTANT

## 2018-04-20 PROCEDURE — 99214 OFFICE O/P EST MOD 30 MIN: CPT | Performed by: PHYSICIAN ASSISTANT

## 2018-04-20 PROCEDURE — G8417 CALC BMI ABV UP PARAM F/U: HCPCS | Performed by: PHYSICIAN ASSISTANT

## 2018-04-20 PROCEDURE — 1036F TOBACCO NON-USER: CPT | Performed by: PHYSICIAN ASSISTANT

## 2018-04-20 PROCEDURE — 93000 ELECTROCARDIOGRAM COMPLETE: CPT | Performed by: PHYSICIAN ASSISTANT

## 2018-04-20 PROCEDURE — 3017F COLORECTAL CA SCREEN DOC REV: CPT | Performed by: PHYSICIAN ASSISTANT

## 2018-04-24 ENCOUNTER — TELEPHONE (OUTPATIENT)
Dept: FAMILY MEDICINE CLINIC | Age: 73
End: 2018-04-24

## 2018-04-24 ENCOUNTER — HOSPITAL ENCOUNTER (OUTPATIENT)
Dept: OTHER | Age: 73
Discharge: HOME | End: 2018-04-24
Attending: NEUROLOGICAL SURGERY | Admitting: NEUROLOGICAL SURGERY

## 2018-04-24 LAB
ANION GAP SERPL CALCULATED.3IONS-SCNC: 13 MMOL/L (ref 3–16)
BUN BLDV-MCNC: 11 MG/DL (ref 7–20)
CALCIUM SERPL-MCNC: 9.7 MG/DL (ref 8.3–10.6)
CHLORIDE BLD-SCNC: 105 MMOL/L (ref 99–110)
CO2: 28 MMOL/L (ref 21–32)
CREAT SERPL-MCNC: 0.7 MG/DL (ref 0.6–1.2)
GFR AFRICAN AMERICAN: >60
GFR NON-AFRICAN AMERICAN: >60
GLUCOSE BLD-MCNC: 91 MG/DL (ref 70–99)
HCT VFR BLD CALC: 40.7 % (ref 36–48)
HEMOGLOBIN: 13.2 G/DL (ref 12–16)
MCH RBC QN AUTO: 30.8 PG (ref 26–34)
MCHC RBC AUTO-ENTMCNC: 32.5 G/DL (ref 31–36)
MCV RBC AUTO: 94.9 FL (ref 80–100)
PDW BLD-RTO: 12.7 % (ref 12.4–15.4)
PLATELET # BLD: 258 K/UL (ref 135–450)
PMV BLD AUTO: 9.1 FL (ref 5–10.5)
POTASSIUM SERPL-SCNC: 4.5 MMOL/L (ref 3.5–5.1)
RBC # BLD: 4.29 M/UL (ref 4–5.2)
SODIUM BLD-SCNC: 146 MMOL/L (ref 136–145)
WBC # BLD: 3.5 K/UL (ref 4–11)

## 2018-04-25 ENCOUNTER — TELEPHONE (OUTPATIENT)
Dept: FAMILY MEDICINE CLINIC | Age: 73
End: 2018-04-25

## 2018-05-11 RX ORDER — DULOXETIN HYDROCHLORIDE 30 MG/1
CAPSULE, DELAYED RELEASE ORAL
Qty: 60 CAPSULE | Refills: 1 | Status: SHIPPED | OUTPATIENT
Start: 2018-05-11 | End: 2018-07-07 | Stop reason: SDUPTHER

## 2018-05-22 ENCOUNTER — TELEPHONE (OUTPATIENT)
Dept: ENT CLINIC | Age: 73
End: 2018-05-22

## 2018-06-06 ENCOUNTER — HOSPITAL ENCOUNTER (OUTPATIENT)
Dept: CT IMAGING | Age: 73
Discharge: HOME | End: 2018-06-06
Attending: OBSTETRICS & GYNECOLOGY | Admitting: OBSTETRICS & GYNECOLOGY

## 2018-06-06 DIAGNOSIS — R11.0 NAUSEA: ICD-10-CM

## 2018-06-06 DIAGNOSIS — D39.12 NEOPLASM OF UNCERTAIN BEHAVIOR OF LEFT OVARY: ICD-10-CM

## 2018-06-09 ENCOUNTER — HOSPITAL ENCOUNTER (OUTPATIENT)
Dept: MRI IMAGING | Age: 73
Discharge: HOME | End: 2018-06-09
Attending: OTOLARYNGOLOGY | Admitting: OTOLARYNGOLOGY

## 2018-06-09 DIAGNOSIS — R42 DIZZINESS AND GIDDINESS: ICD-10-CM

## 2018-06-09 DIAGNOSIS — R42 VERTIGO: ICD-10-CM

## 2018-06-09 DIAGNOSIS — R94.113 ABNORMAL ENG (ELECTRONYSTAGMOGRAM): ICD-10-CM

## 2018-06-12 RX ORDER — ROPINIROLE 0.25 MG/1
TABLET, FILM COATED ORAL
Qty: 90 TABLET | Refills: 0 | Status: SHIPPED | OUTPATIENT
Start: 2018-06-12 | End: 2018-09-19 | Stop reason: SDUPTHER

## 2018-06-26 ENCOUNTER — HOSPITAL ENCOUNTER (OUTPATIENT)
Dept: MRI IMAGING | Age: 73
Discharge: HOME | End: 2018-06-26
Attending: OTOLARYNGOLOGY | Admitting: OTOLARYNGOLOGY

## 2018-06-26 DIAGNOSIS — R42 DIZZINESS AND GIDDINESS: ICD-10-CM

## 2018-06-26 RX ORDER — SODIUM CHLORIDE 0.9 % (FLUSH) 0.9 %
10 SYRINGE (ML) INJECTION ONCE
Status: COMPLETED | OUTPATIENT
Start: 2018-06-26 | End: 2018-06-26

## 2018-06-26 RX ADMIN — Medication 10 ML: at 13:13

## 2018-07-03 RX ORDER — ROPINIROLE 0.25 MG/1
TABLET, FILM COATED ORAL
Qty: 90 TABLET | Refills: 0 | OUTPATIENT
Start: 2018-07-03

## 2018-07-03 RX ORDER — BETHANECHOL CHLORIDE 25 MG/1
25 TABLET ORAL 3 TIMES DAILY
Qty: 90 TABLET | Refills: 1 | Status: SHIPPED | OUTPATIENT
Start: 2018-07-03 | End: 2018-09-04 | Stop reason: SDUPTHER

## 2018-07-07 RX ORDER — DULOXETIN HYDROCHLORIDE 30 MG/1
CAPSULE, DELAYED RELEASE ORAL
Qty: 60 CAPSULE | Refills: 0 | Status: SHIPPED | OUTPATIENT
Start: 2018-07-07 | End: 2018-08-05 | Stop reason: SDUPTHER

## 2018-07-09 ENCOUNTER — OFFICE VISIT (OUTPATIENT)
Dept: ENT CLINIC | Age: 73
End: 2018-07-09

## 2018-07-09 VITALS
WEIGHT: 180 LBS | BODY MASS INDEX: 35.34 KG/M2 | HEIGHT: 60 IN | SYSTOLIC BLOOD PRESSURE: 120 MMHG | DIASTOLIC BLOOD PRESSURE: 90 MMHG | HEART RATE: 93 BPM

## 2018-07-09 DIAGNOSIS — R42 VERTIGO: Primary | ICD-10-CM

## 2018-07-09 DIAGNOSIS — H90.3 BILATERAL HIGH FREQUENCY SENSORINEURAL HEARING LOSS: ICD-10-CM

## 2018-07-09 PROCEDURE — 1036F TOBACCO NON-USER: CPT | Performed by: OTOLARYNGOLOGY

## 2018-07-09 PROCEDURE — G8400 PT W/DXA NO RESULTS DOC: HCPCS | Performed by: OTOLARYNGOLOGY

## 2018-07-09 PROCEDURE — 1090F PRES/ABSN URINE INCON ASSESS: CPT | Performed by: OTOLARYNGOLOGY

## 2018-07-09 PROCEDURE — G8427 DOCREV CUR MEDS BY ELIG CLIN: HCPCS | Performed by: OTOLARYNGOLOGY

## 2018-07-09 PROCEDURE — 4040F PNEUMOC VAC/ADMIN/RCVD: CPT | Performed by: OTOLARYNGOLOGY

## 2018-07-09 PROCEDURE — 1101F PT FALLS ASSESS-DOCD LE1/YR: CPT | Performed by: OTOLARYNGOLOGY

## 2018-07-09 PROCEDURE — G8417 CALC BMI ABV UP PARAM F/U: HCPCS | Performed by: OTOLARYNGOLOGY

## 2018-07-09 PROCEDURE — 1123F ACP DISCUSS/DSCN MKR DOCD: CPT | Performed by: OTOLARYNGOLOGY

## 2018-07-09 PROCEDURE — 99214 OFFICE O/P EST MOD 30 MIN: CPT | Performed by: OTOLARYNGOLOGY

## 2018-07-09 PROCEDURE — 3017F COLORECTAL CA SCREEN DOC REV: CPT | Performed by: OTOLARYNGOLOGY

## 2018-07-09 RX ORDER — ALPRAZOLAM 0.25 MG/1
0.25 TABLET ORAL 3 TIMES DAILY PRN
Qty: 28 TABLET | Refills: 0 | Status: SHIPPED | OUTPATIENT
Start: 2018-07-09 | End: 2018-08-04 | Stop reason: SDUPTHER

## 2018-07-09 NOTE — PROGRESS NOTES
Pulse: 93      GENERAL APPEARANCE:  Well developed, well nourished, no apparent distress. EXTERNAL EAR/NOSE:  Normal pinnae and mastoids. Normal external nose. EARS, OTOSCOPY: The TMs and EACs appeared to be normal bilaterally. NOSE:  The nasal septum was midline. The inferior turbinates were normal.  The nasal mucosa and secretions were normal.  No pus or polyps were seen. OROPHARYNX/ORAL CAVITY:  Oral mucosa, hard and soft palates, tongue, and pharynx were normal.      NECK:  No masses or tenderness. The laryngeal cartilages and hyoid bone were normal.  No abnormal appearance, asymmetry or abnormal tracheal position. PALPATION OF LYMPH NODES, CERVICAL, FACIAL AND SUPRACLAVICULAR:  No lymphadenopathy. Narrative   EXAMINATION:   MRI OF THE BRAIN AND INTERNAL AUDITORY CANALS WITH AND WITHOUT CONTRAST   6/26/2018 1:14 pm       TECHNIQUE:   Multiplanar multisequence MRI of the brain focused on the internal auditory   canals was performed with and without the administration of intravenous   contrast.       COMPARISON:   None.       HISTORY:   ORDERING PHYSICIAN PROVIDED HISTORY: Vertigo   TECHNOLOGIST PROVIDED HISTORY:       Acute.  Initial evaluation.       FINDINGS:   INTERNAL AUDITORY CANALS: The 7th and 8th cranial nerves are normal in   appearance.  The cochlea, vestibule, and semicircular canals are   unremarkable.  The trigeminal nerves are normal in appearance. No areas of   abnormal contrast enhancement are identified.  The cavernous sinuses are   unremarkable.       INTRACRANIAL STRUCTURES/VENTRICLES: Aviva Canavan are no areas of restricted   diffusion to suggest an acute infarct. The cerebral and cerebellar parenchyma   demonstrate normal volume for age. Aviva Canavan are several scattered areas of   increased T2 signal noted supratentorially which are compatible with moderate   chronic microvascular white matter ischemic disease. Aviva Canavan is a chronic   lacunar infarct noted in the right putamen.  Small chronic infarcts are also   noted in the left cerebellar hemisphere.       There are no areas of blooming artifact noted on the gradient echo sequences   to suggest sequela of acute or chronic hemorrhage.       There are no areas of abnormal contrast enhancement in the cerebral or   cerebellar parenchyma to suggest an underlying mass.       This is a motion degraded examination.       ORBITS: Lens implants from prior cataract surgery are noted.  The orbits are   otherwise unremarkable.       SINUSES: There is scattered trace paranasal sinus disease.  The mastoid air   cells are clear.       BONES/SOFT TISSUES: Postsurgical changes are noted in the upper cervical   spine.  The bone marrow signal intensity and craniocervical junction are   unremarkable.           Impression   1. Unremarkable MRI imaging of the internal auditory canals. 2. 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 3. Moderate chronic microvascular white matter ischemic disease is noted   supratentorially.           COUNSELING:  Wally Amador  was counseled for dizziness and there result of the MRI scan of the brain and internal auditory canals, showing no evidence of acoustic neuroma tumor or other intracranial abnormalities which would be etiologic for dizziness. I further discussed dizziness and activity precautions and restrictions related to dizziness. Reviewed the vestibular exercises. I suggested Xanax for symptomatic treatment. Discussed need for continued follow-up. I discussed possible need for vestibular rehabilitation/physical therapy. IMPRESSION / La Block / Earl Velazquezie / PROCEDURES:       Wally Amador was seen today for dizziness. Diagnoses and all orders for this visit:    Vertigo  -     ALPRAZolam (XANAX) 0.25 MG tablet; Take 1 tablet by mouth 3 times daily as needed (for dizziness) for up to 60 days. .    Bilateral high frequency sensorineural hearing loss         RECOMMENDATIONS /

## 2018-08-04 DIAGNOSIS — R42 VERTIGO: ICD-10-CM

## 2018-08-06 RX ORDER — DULOXETIN HYDROCHLORIDE 30 MG/1
CAPSULE, DELAYED RELEASE ORAL
Qty: 60 CAPSULE | Refills: 0 | Status: SHIPPED | OUTPATIENT
Start: 2018-08-06 | End: 2018-09-04 | Stop reason: SDUPTHER

## 2018-08-09 ENCOUNTER — TELEPHONE (OUTPATIENT)
Dept: RHEUMATOLOGY | Age: 73
End: 2018-08-09

## 2018-08-10 RX ORDER — ALPRAZOLAM 0.25 MG/1
TABLET ORAL
Qty: 28 TABLET | Refills: 0 | OUTPATIENT
Start: 2018-08-10 | End: 2018-09-20 | Stop reason: SDUPTHER

## 2018-08-14 ENCOUNTER — HOSPITAL ENCOUNTER (OUTPATIENT)
Dept: OTHER | Age: 73
Discharge: HOME | End: 2018-08-14
Attending: PHYSICIAN ASSISTANT | Admitting: PHYSICIAN ASSISTANT

## 2018-08-14 DIAGNOSIS — M25.551 RIGHT HIP PAIN: ICD-10-CM

## 2018-08-14 NOTE — TELEPHONE ENCOUNTER
Received a fax back from [facility] [employer] stating that this medication is covered for a quantity of 60 for 30 days. But the pharmacy is running it for 60 for 20 days. Fax attached. Please advise pharmacy with the correct quantity and dosage then advise the patient.

## 2018-08-15 ENCOUNTER — OFFICE VISIT (OUTPATIENT)
Dept: FAMILY MEDICINE CLINIC | Age: 73
End: 2018-08-15

## 2018-08-15 VITALS
DIASTOLIC BLOOD PRESSURE: 89 MMHG | SYSTOLIC BLOOD PRESSURE: 133 MMHG | WEIGHT: 186.2 LBS | HEART RATE: 88 BPM | OXYGEN SATURATION: 92 % | BODY MASS INDEX: 36.36 KG/M2

## 2018-08-15 DIAGNOSIS — M54.16 LUMBAR BACK PAIN WITH RADICULOPATHY AFFECTING RIGHT LOWER EXTREMITY: Primary | ICD-10-CM

## 2018-08-15 PROCEDURE — 3017F COLORECTAL CA SCREEN DOC REV: CPT | Performed by: FAMILY MEDICINE

## 2018-08-15 PROCEDURE — 1101F PT FALLS ASSESS-DOCD LE1/YR: CPT | Performed by: FAMILY MEDICINE

## 2018-08-15 PROCEDURE — 1090F PRES/ABSN URINE INCON ASSESS: CPT | Performed by: FAMILY MEDICINE

## 2018-08-15 PROCEDURE — G8400 PT W/DXA NO RESULTS DOC: HCPCS | Performed by: FAMILY MEDICINE

## 2018-08-15 PROCEDURE — 99213 OFFICE O/P EST LOW 20 MIN: CPT | Performed by: FAMILY MEDICINE

## 2018-08-15 PROCEDURE — 4040F PNEUMOC VAC/ADMIN/RCVD: CPT | Performed by: FAMILY MEDICINE

## 2018-08-15 PROCEDURE — G8427 DOCREV CUR MEDS BY ELIG CLIN: HCPCS | Performed by: FAMILY MEDICINE

## 2018-08-15 PROCEDURE — G8417 CALC BMI ABV UP PARAM F/U: HCPCS | Performed by: FAMILY MEDICINE

## 2018-08-15 PROCEDURE — 1123F ACP DISCUSS/DSCN MKR DOCD: CPT | Performed by: FAMILY MEDICINE

## 2018-08-15 PROCEDURE — 1036F TOBACCO NON-USER: CPT | Performed by: FAMILY MEDICINE

## 2018-08-15 RX ORDER — PREDNISONE 20 MG/1
TABLET ORAL
Qty: 16 TABLET | Refills: 0 | Status: SHIPPED | OUTPATIENT
Start: 2018-08-15 | End: 2019-01-04

## 2018-08-15 NOTE — PATIENT INSTRUCTIONS
click on the \"Sign Up Now\" link. Current as of: November 29, 2017  Content Version: 11.7  © 0173-2783 [employer]. Care instructions adapted under license by [facility] ([facility]). If you have questions about a medical condition or this instruction, always ask your healthcare professional. [address] any warranty or liability for your use of this information.

## 2018-08-27 ENCOUNTER — TELEPHONE (OUTPATIENT)
Dept: FAMILY MEDICINE CLINIC | Age: 73
End: 2018-08-27

## 2018-08-27 DIAGNOSIS — F33.1 MODERATE EPISODE OF RECURRENT MAJOR DEPRESSIVE DISORDER (HCC): ICD-10-CM

## 2018-08-27 DIAGNOSIS — M54.16 LUMBAR BACK PAIN WITH RADICULOPATHY AFFECTING RIGHT LOWER EXTREMITY: Primary | ICD-10-CM

## 2018-08-27 NOTE — TELEPHONE ENCOUNTER
Patient notified referral to PT has been placed in EPIC and faxed to [facility], they will call the patient to schedule.

## 2018-08-28 RX ORDER — BUPROPION HYDROCHLORIDE 300 MG/1
TABLET ORAL
Qty: 90 TABLET | Refills: 0 | Status: SHIPPED | OUTPATIENT
Start: 2018-08-28 | End: 2018-12-05 | Stop reason: SDUPTHER

## 2018-09-04 DIAGNOSIS — I10 ESSENTIAL HYPERTENSION: ICD-10-CM

## 2018-09-04 RX ORDER — DULOXETIN HYDROCHLORIDE 30 MG/1
60 CAPSULE, DELAYED RELEASE ORAL DAILY
Qty: 60 CAPSULE | Refills: 1 | Status: SHIPPED | OUTPATIENT
Start: 2018-09-04 | End: 2018-11-12 | Stop reason: SDUPTHER

## 2018-09-04 RX ORDER — BETHANECHOL CHLORIDE 25 MG/1
TABLET ORAL
Qty: 90 TABLET | Refills: 1 | Status: SHIPPED | OUTPATIENT
Start: 2018-09-04 | End: 2018-12-20 | Stop reason: SDUPTHER

## 2018-09-04 RX ORDER — FUROSEMIDE 20 MG/1
TABLET ORAL
Qty: 90 TABLET | Refills: 1 | Status: SHIPPED | OUTPATIENT
Start: 2018-09-04 | End: 2019-04-22 | Stop reason: SDUPTHER

## 2018-09-19 RX ORDER — ROPINIROLE 0.25 MG/1
TABLET, FILM COATED ORAL
Qty: 90 TABLET | Refills: 0 | Status: SHIPPED | OUTPATIENT
Start: 2018-09-19 | End: 2018-12-20 | Stop reason: SDUPTHER

## 2018-09-20 DIAGNOSIS — R42 VERTIGO: ICD-10-CM

## 2018-09-25 RX ORDER — ALPRAZOLAM 0.25 MG/1
TABLET ORAL
Qty: 28 TABLET | Refills: 0 | OUTPATIENT
Start: 2018-09-25 | End: 2018-12-24

## 2018-11-12 DIAGNOSIS — R42 VERTIGO: ICD-10-CM

## 2018-11-12 RX ORDER — ALPRAZOLAM 0.25 MG/1
TABLET ORAL
Qty: 28 TABLET | Refills: 0 | OUTPATIENT
Start: 2018-11-12

## 2018-11-14 RX ORDER — DULOXETIN HYDROCHLORIDE 30 MG/1
CAPSULE, DELAYED RELEASE ORAL
Qty: 60 CAPSULE | Refills: 0 | Status: SHIPPED | OUTPATIENT
Start: 2018-11-14 | End: 2018-12-13 | Stop reason: SDUPTHER

## 2018-12-05 DIAGNOSIS — F33.1 MODERATE EPISODE OF RECURRENT MAJOR DEPRESSIVE DISORDER (HCC): ICD-10-CM

## 2018-12-05 RX ORDER — BUPROPION HYDROCHLORIDE 300 MG/1
TABLET ORAL
Qty: 90 TABLET | Refills: 0 | Status: SHIPPED | OUTPATIENT
Start: 2018-12-05 | End: 2019-03-08 | Stop reason: SDUPTHER

## 2018-12-11 ENCOUNTER — TELEPHONE (OUTPATIENT)
Dept: ENT CLINIC | Age: 73
End: 2018-12-11

## 2018-12-13 RX ORDER — DULOXETIN HYDROCHLORIDE 30 MG/1
CAPSULE, DELAYED RELEASE ORAL
Qty: 60 CAPSULE | Refills: 0 | Status: SHIPPED | OUTPATIENT
Start: 2018-12-13 | End: 2019-01-04

## 2018-12-24 RX ORDER — ROPINIROLE 0.25 MG/1
TABLET, FILM COATED ORAL
Qty: 30 TABLET | Refills: 0 | Status: SHIPPED | OUTPATIENT
Start: 2018-12-24 | End: 2019-02-01 | Stop reason: SDUPTHER

## 2018-12-24 RX ORDER — BETHANECHOL CHLORIDE 25 MG/1
TABLET ORAL
Qty: 90 TABLET | Refills: 0 | Status: SHIPPED | OUTPATIENT
Start: 2018-12-24 | End: 2019-02-01 | Stop reason: SDUPTHER

## 2019-01-04 ENCOUNTER — OFFICE VISIT (OUTPATIENT)
Dept: FAMILY MEDICINE CLINIC | Age: 74
End: 2019-01-04
Payer: MEDICARE

## 2019-01-04 VITALS
SYSTOLIC BLOOD PRESSURE: 124 MMHG | HEART RATE: 91 BPM | DIASTOLIC BLOOD PRESSURE: 86 MMHG | OXYGEN SATURATION: 97 % | BODY MASS INDEX: 36.52 KG/M2 | WEIGHT: 187 LBS

## 2019-01-04 DIAGNOSIS — D50.9 IRON DEFICIENCY ANEMIA, UNSPECIFIED IRON DEFICIENCY ANEMIA TYPE: ICD-10-CM

## 2019-01-04 DIAGNOSIS — R42 VERTIGO: ICD-10-CM

## 2019-01-04 DIAGNOSIS — E78.49 OTHER HYPERLIPIDEMIA: ICD-10-CM

## 2019-01-04 DIAGNOSIS — R30.9 PAINFUL URINATION: Primary | ICD-10-CM

## 2019-01-04 DIAGNOSIS — E53.8 B12 DEFICIENCY: ICD-10-CM

## 2019-01-04 DIAGNOSIS — Z82.49 FAMILY HISTORY OF ABDOMINAL AORTIC ANEURYSM: ICD-10-CM

## 2019-01-04 DIAGNOSIS — I10 ESSENTIAL HYPERTENSION: ICD-10-CM

## 2019-01-04 DIAGNOSIS — F33.1 MODERATE EPISODE OF RECURRENT MAJOR DEPRESSIVE DISORDER (HCC): ICD-10-CM

## 2019-01-04 DIAGNOSIS — Z23 FLU VACCINE NEED: ICD-10-CM

## 2019-01-04 DIAGNOSIS — E03.9 ACQUIRED HYPOTHYROIDISM: ICD-10-CM

## 2019-01-04 DIAGNOSIS — E55.9 VITAMIN D DEFICIENCY: ICD-10-CM

## 2019-01-04 LAB
BACTERIA URINE, POC: 0
BILIRUBIN URINE: 0 MG/DL
BLOOD, URINE: POSITIVE
CASTS URINE, POC: 0
CLARITY: CLEAR
COLOR: YELLOW
CRYSTALS URINE, POC: 0
EPI CELLS URINE, POC: 0
GLUCOSE URINE: NEGATIVE
KETONES, URINE: NEGATIVE
LEUKOCYTE EST, POC: ABNORMAL
NITRITE, URINE: NEGATIVE
PH UA: 5.5 (ref 4.5–8)
PROTEIN UA: POSITIVE
RBC URINE, POC: ABNORMAL
SPECIFIC GRAVITY UA: 1.02 (ref 1–1.03)
UROBILINOGEN, URINE: NORMAL
WBC URINE, POC: 0
YEAST URINE, POC: 0

## 2019-01-04 PROCEDURE — 81000 URINALYSIS NONAUTO W/SCOPE: CPT | Performed by: FAMILY MEDICINE

## 2019-01-04 PROCEDURE — 99214 OFFICE O/P EST MOD 30 MIN: CPT | Performed by: FAMILY MEDICINE

## 2019-01-04 PROCEDURE — G0444 DEPRESSION SCREEN ANNUAL: HCPCS | Performed by: FAMILY MEDICINE

## 2019-01-04 RX ORDER — SULFAMETHOXAZOLE AND TRIMETHOPRIM 800; 160 MG/1; MG/1
1 TABLET ORAL 2 TIMES DAILY
Qty: 6 TABLET | Refills: 0 | Status: SHIPPED | OUTPATIENT
Start: 2019-01-04 | End: 2019-01-07

## 2019-01-04 RX ORDER — DULOXETIN HYDROCHLORIDE 30 MG/1
90 CAPSULE, DELAYED RELEASE ORAL DAILY
Qty: 90 CAPSULE | Refills: 2 | Status: SHIPPED | OUTPATIENT
Start: 2019-01-04 | End: 2019-05-03 | Stop reason: SDUPTHER

## 2019-01-04 RX ORDER — MECLIZINE HYDROCHLORIDE 25 MG/1
25 TABLET ORAL 3 TIMES DAILY PRN
Qty: 90 TABLET | Refills: 1 | Status: SHIPPED | OUTPATIENT
Start: 2019-01-04 | End: 2019-02-03

## 2019-01-04 RX ORDER — PREGABALIN 100 MG/1
100 CAPSULE ORAL 2 TIMES DAILY
COMMUNITY
End: 2019-08-29 | Stop reason: DRUGHIGH

## 2019-01-04 ASSESSMENT — PATIENT HEALTH QUESTIONNAIRE - PHQ9
SUM OF ALL RESPONSES TO PHQ QUESTIONS 1-9: 18
5. POOR APPETITE OR OVEREATING: 1
1. LITTLE INTEREST OR PLEASURE IN DOING THINGS: 2
9. THOUGHTS THAT YOU WOULD BE BETTER OFF DEAD, OR OF HURTING YOURSELF: 0
7. TROUBLE CONCENTRATING ON THINGS, SUCH AS READING THE NEWSPAPER OR WATCHING TELEVISION: 3
2. FEELING DOWN, DEPRESSED OR HOPELESS: 3
3. TROUBLE FALLING OR STAYING ASLEEP: 3
8. MOVING OR SPEAKING SO SLOWLY THAT OTHER PEOPLE COULD HAVE NOTICED. OR THE OPPOSITE, BEING SO FIGETY OR RESTLESS THAT YOU HAVE BEEN MOVING AROUND A LOT MORE THAN USUAL: 0
4. FEELING TIRED OR HAVING LITTLE ENERGY: 3
6. FEELING BAD ABOUT YOURSELF - OR THAT YOU ARE A FAILURE OR HAVE LET YOURSELF OR YOUR FAMILY DOWN: 3
10. IF YOU CHECKED OFF ANY PROBLEMS, HOW DIFFICULT HAVE THESE PROBLEMS MADE IT FOR YOU TO DO YOUR WORK, TAKE CARE OF THINGS AT HOME, OR GET ALONG WITH OTHER PEOPLE: 0
SUM OF ALL RESPONSES TO PHQ9 QUESTIONS 1 & 2: 5
SUM OF ALL RESPONSES TO PHQ QUESTIONS 1-9: 18

## 2019-01-06 LAB
ORGANISM: ABNORMAL
URINE CULTURE, ROUTINE: ABNORMAL
URINE CULTURE, ROUTINE: ABNORMAL

## 2019-01-06 RX ORDER — CIPROFLOXACIN 500 MG/1
500 TABLET, FILM COATED ORAL 2 TIMES DAILY
Qty: 10 TABLET | Refills: 0 | Status: SHIPPED | OUTPATIENT
Start: 2019-01-06 | End: 2019-01-16

## 2019-01-06 ASSESSMENT — ENCOUNTER SYMPTOMS
SHORTNESS OF BREATH: 0
COUGH: 0
BACK PAIN: 1

## 2019-01-08 ENCOUNTER — TELEPHONE (OUTPATIENT)
Dept: FAMILY MEDICINE CLINIC | Age: 74
End: 2019-01-08

## 2019-01-24 ENCOUNTER — TELEPHONE (OUTPATIENT)
Dept: FAMILY MEDICINE CLINIC | Age: 74
End: 2019-01-24

## 2019-02-02 RX ORDER — BETHANECHOL CHLORIDE 25 MG/1
TABLET ORAL
Qty: 90 TABLET | Refills: 0 | Status: SHIPPED | OUTPATIENT
Start: 2019-02-02 | End: 2019-03-12 | Stop reason: SDUPTHER

## 2019-02-02 RX ORDER — ROPINIROLE 0.25 MG/1
TABLET, FILM COATED ORAL
Qty: 30 TABLET | Refills: 0 | Status: SHIPPED | OUTPATIENT
Start: 2019-02-02 | End: 2019-03-12 | Stop reason: SDUPTHER

## 2019-03-08 DIAGNOSIS — F33.1 MODERATE EPISODE OF RECURRENT MAJOR DEPRESSIVE DISORDER (HCC): ICD-10-CM

## 2019-03-08 RX ORDER — BUPROPION HYDROCHLORIDE 300 MG/1
TABLET ORAL
Qty: 90 TABLET | Refills: 0 | Status: SHIPPED | OUTPATIENT
Start: 2019-03-08 | End: 2019-06-13 | Stop reason: SDUPTHER

## 2019-03-11 ENCOUNTER — TELEPHONE (OUTPATIENT)
Dept: FAMILY MEDICINE CLINIC | Age: 74
End: 2019-03-11

## 2019-03-13 RX ORDER — ROPINIROLE 0.25 MG/1
TABLET, FILM COATED ORAL
Qty: 30 TABLET | Refills: 0 | Status: SHIPPED | OUTPATIENT
Start: 2019-03-13 | End: 2019-04-16 | Stop reason: SDUPTHER

## 2019-03-13 RX ORDER — BETHANECHOL CHLORIDE 25 MG/1
TABLET ORAL
Qty: 90 TABLET | Refills: 0 | Status: SHIPPED | OUTPATIENT
Start: 2019-03-13 | End: 2019-04-29 | Stop reason: SDUPTHER

## 2019-03-14 ENCOUNTER — OFFICE VISIT (OUTPATIENT)
Dept: FAMILY MEDICINE CLINIC | Age: 74
End: 2019-03-14
Payer: MEDICARE

## 2019-03-14 VITALS
TEMPERATURE: 96.7 F | OXYGEN SATURATION: 98 % | BODY MASS INDEX: 36.91 KG/M2 | DIASTOLIC BLOOD PRESSURE: 82 MMHG | WEIGHT: 189 LBS | HEART RATE: 102 BPM | SYSTOLIC BLOOD PRESSURE: 132 MMHG

## 2019-03-14 DIAGNOSIS — K04.7 DENTAL INFECTION: Primary | ICD-10-CM

## 2019-03-14 PROCEDURE — 99212 OFFICE O/P EST SF 10 MIN: CPT | Performed by: PHYSICIAN ASSISTANT

## 2019-03-14 ASSESSMENT — ENCOUNTER SYMPTOMS
ABDOMINAL PAIN: 0
BACK PAIN: 0
SHORTNESS OF BREATH: 0
COUGH: 0

## 2019-04-16 RX ORDER — ROPINIROLE 0.25 MG/1
TABLET, FILM COATED ORAL
Qty: 30 TABLET | Refills: 2 | Status: SHIPPED | OUTPATIENT
Start: 2019-04-16 | End: 2019-08-14 | Stop reason: SDUPTHER

## 2019-04-22 DIAGNOSIS — I10 ESSENTIAL HYPERTENSION: ICD-10-CM

## 2019-04-22 RX ORDER — FUROSEMIDE 20 MG/1
TABLET ORAL
Qty: 90 TABLET | Refills: 1 | Status: SHIPPED | OUTPATIENT
Start: 2019-04-22 | End: 2019-08-27 | Stop reason: SDUPTHER

## 2019-04-29 RX ORDER — BETHANECHOL CHLORIDE 25 MG/1
TABLET ORAL
Qty: 90 TABLET | Refills: 2 | Status: SHIPPED | OUTPATIENT
Start: 2019-04-29 | End: 2019-08-14 | Stop reason: SDUPTHER

## 2019-05-03 DIAGNOSIS — F33.1 MODERATE EPISODE OF RECURRENT MAJOR DEPRESSIVE DISORDER (HCC): ICD-10-CM

## 2019-05-03 RX ORDER — DULOXETIN HYDROCHLORIDE 30 MG/1
90 CAPSULE, DELAYED RELEASE ORAL DAILY
Qty: 30 CAPSULE | Refills: 0 | Status: SHIPPED | OUTPATIENT
Start: 2019-05-03 | End: 2019-05-22 | Stop reason: SDUPTHER

## 2019-05-08 ENCOUNTER — OFFICE VISIT (OUTPATIENT)
Dept: FAMILY MEDICINE CLINIC | Age: 74
End: 2019-05-08
Payer: MEDICARE

## 2019-05-08 ENCOUNTER — HOSPITAL ENCOUNTER (EMERGENCY)
Age: 74
Discharge: HOME | End: 2019-05-08
Attending: EMERGENCY MEDICINE
Payer: MEDICARE

## 2019-05-08 ENCOUNTER — APPOINTMENT (OUTPATIENT)
Dept: CT IMAGING | Age: 74
End: 2019-05-08
Payer: MEDICARE

## 2019-05-08 ENCOUNTER — NURSE TRIAGE (OUTPATIENT)
Dept: OTHER | Facility: CLINIC | Age: 74
End: 2019-05-08

## 2019-05-08 VITALS
HEART RATE: 87 BPM | HEIGHT: 60 IN | SYSTOLIC BLOOD PRESSURE: 163 MMHG | TEMPERATURE: 98.6 F | WEIGHT: 186 LBS | OXYGEN SATURATION: 94 % | BODY MASS INDEX: 36.52 KG/M2 | DIASTOLIC BLOOD PRESSURE: 101 MMHG | RESPIRATION RATE: 18 BRPM

## 2019-05-08 VITALS
DIASTOLIC BLOOD PRESSURE: 100 MMHG | HEART RATE: 93 BPM | BODY MASS INDEX: 36.72 KG/M2 | SYSTOLIC BLOOD PRESSURE: 160 MMHG | WEIGHT: 188 LBS | TEMPERATURE: 98.9 F | OXYGEN SATURATION: 95 %

## 2019-05-08 DIAGNOSIS — R10.9 ABDOMINAL CRAMPING: ICD-10-CM

## 2019-05-08 DIAGNOSIS — K57.32 DIVERTICULITIS OF COLON: ICD-10-CM

## 2019-05-08 DIAGNOSIS — R19.7 DIARRHEA OF PRESUMED INFECTIOUS ORIGIN: Primary | ICD-10-CM

## 2019-05-08 DIAGNOSIS — R19.7 DIARRHEA, UNSPECIFIED TYPE: Primary | ICD-10-CM

## 2019-05-08 DIAGNOSIS — I10 ESSENTIAL HYPERTENSION: ICD-10-CM

## 2019-05-08 LAB
A/G RATIO: 1.2 (ref 1.1–2.2)
ALBUMIN SERPL-MCNC: 3.9 G/DL (ref 3.4–5)
ALP BLD-CCNC: 115 U/L (ref 40–129)
ALT SERPL-CCNC: 15 U/L (ref 10–40)
ANION GAP SERPL CALCULATED.3IONS-SCNC: 12 MMOL/L (ref 3–16)
AST SERPL-CCNC: 19 U/L (ref 15–37)
BASOPHILS ABSOLUTE: 0 K/UL (ref 0–0.2)
BASOPHILS RELATIVE PERCENT: 0.3 %
BILIRUB SERPL-MCNC: 0.4 MG/DL (ref 0–1)
BILIRUBIN URINE: ABNORMAL
BLOOD, URINE: NEGATIVE
BUN BLDV-MCNC: 10 MG/DL (ref 7–20)
CALCIUM SERPL-MCNC: 10 MG/DL (ref 8.3–10.6)
CHLORIDE BLD-SCNC: 106 MMOL/L (ref 99–110)
CLARITY: ABNORMAL
CO2: 26 MMOL/L (ref 21–32)
COLOR: YELLOW
CREAT SERPL-MCNC: 0.8 MG/DL (ref 0.6–1.2)
EOSINOPHILS ABSOLUTE: 0.1 K/UL (ref 0–0.6)
EOSINOPHILS RELATIVE PERCENT: 1.5 %
EPITHELIAL CELLS, UA: 13 /HPF (ref 0–5)
GFR AFRICAN AMERICAN: >60
GFR NON-AFRICAN AMERICAN: >60
GLOBULIN: 3.3 G/DL
GLUCOSE BLD-MCNC: 103 MG/DL (ref 70–99)
GLUCOSE URINE: NEGATIVE MG/DL
HCT VFR BLD CALC: 38.6 % (ref 36–48)
HEMOGLOBIN: 12.5 G/DL (ref 12–16)
HYALINE CASTS: 11 /LPF (ref 0–8)
KETONES, URINE: 40 MG/DL
LACTIC ACID: 1.3 MMOL/L (ref 0.4–2)
LEUKOCYTE ESTERASE, URINE: ABNORMAL
LIPASE: 9 U/L (ref 13–60)
LYMPHOCYTES ABSOLUTE: 0.6 K/UL (ref 1–5.1)
LYMPHOCYTES RELATIVE PERCENT: 16.5 %
MCH RBC QN AUTO: 29.4 PG (ref 26–34)
MCHC RBC AUTO-ENTMCNC: 32.5 G/DL (ref 31–36)
MCV RBC AUTO: 90.6 FL (ref 80–100)
MICROSCOPIC EXAMINATION: YES
MONOCYTES ABSOLUTE: 0.3 K/UL (ref 0–1.3)
MONOCYTES RELATIVE PERCENT: 7 %
NEUTROPHILS ABSOLUTE: 2.7 K/UL (ref 1.7–7.7)
NEUTROPHILS RELATIVE PERCENT: 74.7 %
NITRITE, URINE: NEGATIVE
PDW BLD-RTO: 14.9 % (ref 12.4–15.4)
PH UA: 6 (ref 5–8)
PLATELET # BLD: 212 K/UL (ref 135–450)
PMV BLD AUTO: 8.5 FL (ref 5–10.5)
POTASSIUM SERPL-SCNC: 4.1 MMOL/L (ref 3.5–5.1)
PROTEIN UA: 30 MG/DL
RBC # BLD: 4.26 M/UL (ref 4–5.2)
RBC UA: 2 /HPF (ref 0–4)
SODIUM BLD-SCNC: 144 MMOL/L (ref 136–145)
SPECIFIC GRAVITY UA: 1.03 (ref 1–1.03)
TOTAL PROTEIN: 7.2 G/DL (ref 6.4–8.2)
URINE REFLEX TO CULTURE: YES
URINE TYPE: ABNORMAL
UROBILINOGEN, URINE: 1 E.U./DL
WBC # BLD: 3.7 K/UL (ref 4–11)
WBC UA: 12 /HPF (ref 0–5)

## 2019-05-08 PROCEDURE — 6360000002 HC RX W HCPCS: Performed by: PHYSICIAN ASSISTANT

## 2019-05-08 PROCEDURE — 80053 COMPREHEN METABOLIC PANEL: CPT

## 2019-05-08 PROCEDURE — 85025 COMPLETE CBC W/AUTO DIFF WBC: CPT

## 2019-05-08 PROCEDURE — 2580000003 HC RX 258: Performed by: PHYSICIAN ASSISTANT

## 2019-05-08 PROCEDURE — 87086 URINE CULTURE/COLONY COUNT: CPT

## 2019-05-08 PROCEDURE — 83690 ASSAY OF LIPASE: CPT

## 2019-05-08 PROCEDURE — 96361 HYDRATE IV INFUSION ADD-ON: CPT

## 2019-05-08 PROCEDURE — 83605 ASSAY OF LACTIC ACID: CPT

## 2019-05-08 PROCEDURE — 96375 TX/PRO/DX INJ NEW DRUG ADDON: CPT

## 2019-05-08 PROCEDURE — 36415 COLL VENOUS BLD VENIPUNCTURE: CPT

## 2019-05-08 PROCEDURE — 99284 EMERGENCY DEPT VISIT MOD MDM: CPT

## 2019-05-08 PROCEDURE — 87040 BLOOD CULTURE FOR BACTERIA: CPT

## 2019-05-08 PROCEDURE — 81001 URINALYSIS AUTO W/SCOPE: CPT

## 2019-05-08 PROCEDURE — 99212 OFFICE O/P EST SF 10 MIN: CPT | Performed by: INTERNAL MEDICINE

## 2019-05-08 PROCEDURE — 6360000004 HC RX CONTRAST MEDICATION: Performed by: EMERGENCY MEDICINE

## 2019-05-08 PROCEDURE — 87077 CULTURE AEROBIC IDENTIFY: CPT

## 2019-05-08 PROCEDURE — 74177 CT ABD & PELVIS W/CONTRAST: CPT

## 2019-05-08 PROCEDURE — 87186 SC STD MICRODIL/AGAR DIL: CPT

## 2019-05-08 PROCEDURE — 96374 THER/PROPH/DIAG INJ IV PUSH: CPT

## 2019-05-08 RX ORDER — MORPHINE SULFATE 4 MG/ML
4 INJECTION, SOLUTION INTRAMUSCULAR; INTRAVENOUS ONCE
Status: COMPLETED | OUTPATIENT
Start: 2019-05-08 | End: 2019-05-08

## 2019-05-08 RX ORDER — METRONIDAZOLE 500 MG/1
500 TABLET ORAL 3 TIMES DAILY
Qty: 21 TABLET | Refills: 0 | Status: SHIPPED | OUTPATIENT
Start: 2019-05-08 | End: 2019-05-09

## 2019-05-08 RX ORDER — CIPROFLOXACIN 500 MG/1
500 TABLET, FILM COATED ORAL 2 TIMES DAILY
Qty: 14 TABLET | Refills: 0 | Status: SHIPPED | OUTPATIENT
Start: 2019-05-08 | End: 2019-05-09

## 2019-05-08 RX ORDER — 0.9 % SODIUM CHLORIDE 0.9 %
1000 INTRAVENOUS SOLUTION INTRAVENOUS ONCE
Status: COMPLETED | OUTPATIENT
Start: 2019-05-08 | End: 2019-05-08

## 2019-05-08 RX ORDER — ONDANSETRON 2 MG/ML
4 INJECTION INTRAMUSCULAR; INTRAVENOUS ONCE
Status: COMPLETED | OUTPATIENT
Start: 2019-05-08 | End: 2019-05-08

## 2019-05-08 RX ADMIN — ONDANSETRON 4 MG: 2 INJECTION INTRAMUSCULAR; INTRAVENOUS at 19:18

## 2019-05-08 RX ADMIN — MORPHINE SULFATE 4 MG: 4 INJECTION INTRAVENOUS at 19:18

## 2019-05-08 RX ADMIN — SODIUM CHLORIDE 1000 ML: 9 INJECTION, SOLUTION INTRAVENOUS at 19:18

## 2019-05-08 RX ADMIN — IOPAMIDOL 75 ML: 755 INJECTION, SOLUTION INTRAVENOUS at 20:13

## 2019-05-08 ASSESSMENT — ENCOUNTER SYMPTOMS
COLOR CHANGE: 0
ABDOMINAL DISTENTION: 1
WHEEZING: 0
SHORTNESS OF BREATH: 0
CONSTIPATION: 0
SHORTNESS OF BREATH: 1
VOMITING: 0
CONSTIPATION: 0
FACIAL SWELLING: 0
COUGH: 0
ABDOMINAL DISTENTION: 0
ANAL BLEEDING: 0
STRIDOR: 0
BLOOD IN STOOL: 0
ALLERGIC/IMMUNOLOGIC NEGATIVE: 1
VOMITING: 1
ABDOMINAL PAIN: 1
ABDOMINAL PAIN: 1
COUGH: 0
DIARRHEA: 1
DIARRHEA: 0
BACK PAIN: 0
NAUSEA: 1
WHEEZING: 0
RECTAL PAIN: 0
CHOKING: 0
RECTAL PAIN: 1
NAUSEA: 1
BACK PAIN: 0

## 2019-05-08 ASSESSMENT — PAIN SCALES - GENERAL: PAINLEVEL_OUTOF10: 6

## 2019-05-08 NOTE — PROGRESS NOTES
Ramo Redd  : 1945  Encounter date: 2019    This palmer 68 y.o. female who presents with  Chief Complaint   Patient presents with    Diarrhea       History of present illness:    HPI Patient presents today for diarrhea x2 weeks. States bowel movements are watery and she is going to the bathroom about every 5 minutes. Patient states that she is having lower abdominal pain/cramping, worse with BMs in addition to rectal pain from wiping. She reports increasing frequency of near syncopal episodes. She reports decreased appetite, nausea and dry heaves but no bloody BMs. She reports she feels very tired. She denies medication changes. She reports most recent ABX use was March. Diarrhea does wake her from sleep. No Known Allergies  Current Outpatient Medications   Medication Sig Dispense Refill    DULoxetine (CYMBALTA) 30 MG extended release capsule Take 3 capsules by mouth daily 30 capsule 0    bethanechol (URECHOLINE) 25 MG tablet TAKE ONE TABLET BY MOUTH THREE TIMES A DAY 90 tablet 2    furosemide (LASIX) 20 MG tablet TAKE ONE TABLET BY MOUTH DAILY 90 tablet 1    rOPINIRole (REQUIP) 0.25 MG tablet TAKE ONE TABLET BY MOUTH ONCE NIGHTLY 30 tablet 2    pregabalin (LYRICA) 100 MG capsule Take 100 mg by mouth 2 times daily. Ayana Loredo  tiZANidine (ZANAFLEX) 4 MG tablet Take 4 mg by mouth 2 times daily      morphine (MSIR) 15 MG tablet Take 15 mg by mouth every 6 hours as needed for Pain .  spironolactone (ALDACTONE) 50 MG tablet Take  by mouth daily.  buPROPion (WELLBUTRIN XL) 300 MG extended release tablet TAKE ONE TABLET BY MOUTH EVERY MORNING 90 tablet 0     No current facility-administered medications for this visit. Review of Systems   Constitutional: Positive for activity change, appetite change, chills, diaphoresis, fatigue and fever. HENT: Negative for facial swelling. Respiratory: Positive for shortness of breath. Negative for cough, choking and wheezing. Cardiovascular: Positive for palpitations. Negative for leg swelling. Gastrointestinal: Positive for abdominal distention, abdominal pain, diarrhea, nausea, rectal pain and vomiting. Negative for anal bleeding, blood in stool and constipation. Endocrine: Positive for polyuria. Genitourinary: Positive for dysuria and frequency. Negative for flank pain. Musculoskeletal: Negative for back pain. Neurological: Positive for dizziness, tremors, weakness and light-headedness. Past medical, surgical, family and social history were reviewed and updated with the patient. Objective:    BP (!) 160/100 (Site: Left Upper Arm, Position: Sitting, Cuff Size: Large Adult)   Pulse 93   Temp 98.9 °F (37.2 °C) (Tympanic)   Wt 188 lb (85.3 kg)   SpO2 95%   BMI 36.72 kg/m²   Weight: 188 lb (85.3 kg)     BP Readings from Last 3 Encounters:   05/08/19 (!) 160/100   03/14/19 132/82   01/04/19 124/86     Wt Readings from Last 3 Encounters:   05/08/19 188 lb (85.3 kg)   03/14/19 189 lb (85.7 kg)   01/04/19 187 lb (84.8 kg)       Physical Exam   Constitutional: She is oriented to person, place, and time. She appears well-developed and well-nourished. She appears distressed. HENT:   Head: Normocephalic and atraumatic. Eyes: Pupils are equal, round, and reactive to light. Conjunctivae and EOM are normal.   Cardiovascular: Normal rate, regular rhythm, normal heart sounds and intact distal pulses. Exam reveals no gallop and no friction rub. No murmur heard. Pulmonary/Chest: Effort normal and breath sounds normal. No respiratory distress. She has no wheezes. She has no rales. She exhibits no tenderness. Abdominal: She exhibits distension. She exhibits no mass. There is tenderness. There is guarding. There is no rebound. No hernia. Hypoactive BS   Neurological: She is alert and oriented to person, place, and time. Skin: Skin is warm. She is diaphoretic. There is pallor.    Psychiatric: She has a normal mood and affect. Her behavior is normal. Judgment and thought content normal.   Vitals reviewed. Assessment/Plan    1. Diarrhea of presumed infectious origin  Duration of sx too long for viral etiology, and her most recent ABX use was in March so C diff is on differential, which also includes ischemic colitis given her comorbidities. However, the absence of blood raises the concern of a secretory process. Regardless, at this point she appears very volume depleted and would benefit from repletion and laboratory studies in a more urgent time frame than we are able to provide as an outpatient. Patient and her  will go to emergency room in their own vehicle after declining ambulance. No follow-ups on file.

## 2019-05-08 NOTE — TELEPHONE ENCOUNTER
Reason for Disposition   SEVERE diarrhea (e.g., 7 or more times / day more than normal) and present > 24 hours (1 day)    Protocols used: DIARRHEA-ADULT-OH        Call (VM) rec'd from [facility]  Diarrhea    Caller reports diarrhea x 2 weeks associated with fatigue  Denies sick contacts, fever but has had chills  Some abd cramping  Watery diarrhea    Hydrating and eating ok  Urinating ok    Hx of constipation and took senekot 2 tabs bid but has stopped when this started  imodium helped  For 24 hours but then symptoms restarted.     5 or more episodes a day    Transferred to [facility] for appointment

## 2019-05-08 NOTE — ED NOTES
PA bedside to evaluate pt and update on plan of care. Pt sitting up in bed with bed in low position, side rails up x 2, monitor in place, call light in reach. Pt IV established and pt updated on plan of care.       Keya Villalpando RN  05/08/19 7038

## 2019-05-09 RX ORDER — AMOXICILLIN AND CLAVULANATE POTASSIUM 875; 125 MG/1; MG/1
1 TABLET, FILM COATED ORAL 2 TIMES DAILY
Qty: 20 TABLET | Refills: 0
Start: 2019-05-09 | End: 2019-05-19

## 2019-05-09 NOTE — ED PROVIDER NOTES
[facility]        Pt Name: Denzel Seip  MRN: [id number]  [place] 1945  Date of evaluation: 5/8/2019  Provider: Troy Graham PA-C  PCP: Dick Owens MD    This patient was seen and evaluated by the attending physician Brian Botello MD.      [address] [place] [address]       Chief Complaint   Patient presents with    Diarrhea     diarrea for 2 weeks. Sent by Dr. Urszula Ricci   (Location/Symptom, Timing/Onset, Context/Setting, Quality, Duration, Modifying Factors, Severity)  Note limiting factors. Denzel Seip is a 68 y.o. female who presents to the emergency department complaining of watery diarrhea for 2 weeks. Reports nausea and dry heaving but no active vomiting, bloody or black tarry stool, constipation, fever or chills. She reports feeling very weak and fatigued and dehydrated. She is not producing much urine. When she does, denies dysuria, hematuria, frequency or urgency. She denies recent travel, surgery, hospitalization, sick exposures or recent antibiotic use. Has no history of C. diff colitis. She was sent here by primary care doctor for further evaluation. Nursing Notes were all reviewed and agreed with or any disagreements were addressed  in the HPI. REVIEW OF SYSTEMS    (2-9 systems for level 4, 10 or more for level 5)     Review of Systems   Constitutional: Positive for appetite change and fatigue. Negative for chills and fever. HENT: Negative. Eyes: Negative for visual disturbance. Respiratory: Negative for cough, shortness of breath, wheezing and stridor. Cardiovascular: Negative for chest pain, palpitations and leg swelling. Gastrointestinal: Positive for abdominal pain and nausea. Negative for abdominal distention, constipation, diarrhea, rectal pain and vomiting. Endocrine: Negative. Genitourinary: Positive for decreased urine volume. Negative for difficulty urinating, dysuria, flank pain, frequency, hematuria, pelvic pain, urgency, vaginal bleeding and vaginal discharge. Musculoskeletal: Negative for back pain, neck pain and neck stiffness. Skin: Negative for color change, pallor, rash and wound. Allergic/Immunologic: Negative. Neurological: Positive for weakness. Negative for dizziness, tremors, seizures, syncope, facial asymmetry, speech difficulty, light-headedness, numbness and headaches. Hematological: Negative. Psychiatric/Behavioral: Negative for confusion. All other systems reviewed and are negative. Positives and Pertinent negatives as per HPI. Except as noted abovein the ROS, all other systems were reviewed and negative.        PAST MEDICAL HISTORY     Past Medical History:   Diagnosis Date    AVM     Chronic back pain     Depression     Gastritis     History of blood transfusion     secondary to GI bleed    History of GI bleed     Hypertension     Personal history of MRSA (methicillin resistant Staphylococcus aureus)     recurrent, last 2-3 years ago finger (doesnt remember which one)         SURGICAL HISTORY     Past Surgical History:   Procedure Laterality Date    ABDOMINAL EXPLORATION SURGERY  09/07/2017    Lysis of adhesions, removal of pelvic mass, total abdominal hysterectomy with BSO, partial omentectomy, appendectomy, and pelvic lymphadenectomy    BACK SURGERY      multiple lumbar spine surgeries including laminectomy and fusion    CERVICAL FUSION      COLONOSCOPY      ENDOSCOPY, COLON, DIAGNOSTIC      HIATAL HERNIA REPAIR  1999    OTHER SURGICAL HISTORY Left [id number]    SPINAL CORD STIMULATOR BATTERY REPLACMENT         CURRENTMEDICATIONS       Previous Medications    BETHANECHOL (URECHOLINE) 25 MG TABLET    TAKE ONE TABLET BY MOUTH THREE TIMES A DAY    BUPROPION (WELLBUTRIN XL) 300 MG EXTENDED RELEASE TABLET    TAKE ONE TABLET BY MOUTH EVERY MORNING    DULOXETINE (CYMBALTA) 30 MG EXTENDED RELEASE CAPSULE    Take 3 capsules by mouth daily    FUROSEMIDE (LASIX) 20 MG TABLET    TAKE ONE TABLET BY MOUTH DAILY    MORPHINE (MSIR) 15 MG TABLET    Take 15 mg by mouth every 6 hours as needed for Pain . PREGABALIN (LYRICA) 100 MG CAPSULE    Take 100 mg by mouth 2 times daily. Ginny Walter ROPINIROLE (REQUIP) 0.25 MG TABLET    TAKE ONE TABLET BY MOUTH ONCE NIGHTLY    SPIRONOLACTONE (ALDACTONE) 50 MG TABLET    Take  by mouth daily. TIZANIDINE (ZANAFLEX) 4 MG TABLET    Take 4 mg by mouth 2 times daily         ALLERGIES     Patient has no known allergies.     FAMILYHISTORY       Family History   Problem Relation Age of Onset    Diabetes Mother     Heart Disease Mother         cad, aortic aneurysm    Diabetes Father     Heart Disease Father         heart attack, chf    Other Sister         abdominal aneurysm          SOCIAL HISTORY       Social History     Socioeconomic History    Marital status:      Spouse name: Not on file    Number of children: Not on file    Years of education: Not on file    Highest education level: Not on file   Occupational History    Not on file   Social Needs    Financial resource strain: Not on file    Food insecurity:     Worry: Not on file     Inability: Not on file    Transportation needs:     Medical: Not on file     Non-medical: Not on file   Tobacco Use    Smoking status: Never Smoker    Smokeless tobacco: Never Used   Substance and Sexual Activity    Alcohol use: No    Drug use: No    Sexual activity: Not on file   Lifestyle    Physical activity:     Days per week: Not on file     Minutes per session: Not on file    Stress: Not on file   Relationships    Social connections:     Talks on phone: Not on file     Gets together: Not on file     Attends Congregational service: Not on file     Active member of club or organization: Not on file     Attends meetings of clubs or organizations: Not on file     Relationship status: Not on file    Intimate partner violence: Fear of current or ex partner: Not on file     Emotionally abused: Not on file     Physically abused: Not on file     Forced sexual activity: Not on file   Other Topics Concern    Not on file   Social History Narrative    Not on file       SCREENINGS             PHYSICAL EXAM    (up to 7 for level 4, 8 or more for level 5)     ED Triage Vitals [05/08/19 1746]   BP Temp Temp Source Pulse Resp SpO2 Height Weight   (!) 181/114 98.5 °F (36.9 °C) Infrared 89 16 95 % 5' (1.524 m) 186 lb (84.4 kg)       Physical Exam   Constitutional: She is oriented to person, place, and time. She appears well-developed and well-nourished. No distress. HENT:   Head: Normocephalic and atraumatic. Right Ear: External ear normal.   Left Ear: External ear normal.   Nose: Nose normal.   Mouth/Throat: Oropharynx is clear and moist.   Eyes: Pupils are equal, round, and reactive to light. Conjunctivae and EOM are normal. Right eye exhibits no discharge. Left eye exhibits no discharge. No scleral icterus. Neck: Normal range of motion. No JVD present. No Brudzinski's sign and no Kernig's sign noted. Cardiovascular: Normal rate. Pulmonary/Chest: Effort normal and breath sounds normal.   Abdominal: Soft. Bowel sounds are normal. She exhibits no abdominal bruit and no pulsatile midline mass. There is generalized tenderness (minimal to deep palpation). There is no rigidity, no rebound, no guarding, no CVA tenderness, no tenderness at McBurney's point and negative Albright's sign. Musculoskeletal: Normal range of motion. Lymphadenopathy:     She has no cervical adenopathy. Neurological: She is alert and oriented to person, place, and time. Skin: Skin is warm and dry. Capillary refill takes less than 2 seconds. No rash noted. She is not diaphoretic. No erythema. No pallor. Psychiatric: She has a normal mood and affect. Her behavior is normal.   Nursing note and vitals reviewed.       DIAGNOSTIC RESULTS   LABS:    Labs Reviewed CBC WITH AUTO DIFFERENTIAL - Abnormal; Notable for the following components:       Result Value    WBC 3.7 (*)     Lymphocytes # 0.6 (*)     All other components within normal limits    Narrative:     Performed at:  [facility] [address] [facility] [employer]   Phone [phone]   COMPREHENSIVE METABOLIC PANEL - Abnormal; Notable for the following components:    Glucose 103 (*)     All other components within normal limits    Narrative:     Performed at:  [facility] [address] [facility] [employer]   Phone [phone]   LIPASE - Abnormal; Notable for the following components:    Lipase 9.0 (*)     All other components within normal limits    Narrative:     Performed at:  [facility] [address] [facility] [employer]   Phone [phone]   URINE RT REFLEX TO CULTURE - Abnormal; Notable for the following components:    Clarity, UA CLOUDY (*)     Bilirubin Urine SMALL (*)     Ketones, Urine 40 (*)     Protein, UA 30 (*)     Leukocyte Esterase, Urine MODERATE (*)     All other components within normal limits    Narrative:     Performed at:  [facility] [address] [facility] [place] [facility] [address] [address]to   Phone [phone]   MICROSCOPIC URINALYSIS - Abnormal; Notable for the following components:    Hyaline Casts, UA 11 (*)     WBC, UA 12 (*)     Epi Cells 13 (*)     All other components within normal limits    Narrative:     Performed at:  [facility] [address] [facility] [employer]   Phone [phone]   C DIFF TOXIN/ANTIGEN   GASTROINTESTINAL PANEL BY DNA   CULTURE BLOOD #2   CULTURE BLOOD #1   URINE CULTURE   LACTIC ACID, PLASMA    Narrative:     Performed at:  [facility] [address] [facility] [employer]to   Phone [phone]   LACTIC ACID, PLASMA       All other labs were within normal range or not returned as of this dictation. EKG: All EKG's are interpreted by the Emergency Department Physician who either signs orCo-signs this chart in the absence of a cardiologist.  Please see their note for interpretation of EKG. RADIOLOGY:   Non-plain film images such as CT, Ultrasound and MRI are read by the radiologist. Plain radiographic images are visualized andpreliminarily interpreted by the  ED Provider with the below findings:        Interpretation perthe Radiologist below, if available at the time of this note:    CT ABDOMEN PELVIS W IV CONTRAST Additional Contrast? None    (Results Pending)     See attending note for results      PROCEDURES   Unless otherwise noted below, none     Procedures    CRITICAL CARE TIME   N/A    CONSULTS:  None      EMERGENCY DEPARTMENT COURSE and DIFFERENTIALDIAGNOSIS/MDM:   Vitals:    Vitals:    05/08/19 1746 05/08/19 1910   BP: (!) 181/114 (!) 190/104   Pulse: 89    Resp: 16    Temp: 98.5 °F (36.9 °C)    TempSrc: Infrared    SpO2: 95%    Weight: 186 lb (84.4 kg)    Height: 5' (1.524 m)        Patient was given thefollowing medications:  Medications   morphine injection 4 mg (4 mg Intravenous Given 5/8/19 1918)   ondansetron (ZOFRAN) injection 4 mg (4 mg Intravenous Given 5/8/19 1918)   0.9 % sodium chloride bolus (1,000 mLs Intravenous New Bag 5/8/19 1918)   iopamidol (ISOVUE-370) 76 % injection 75 mL (75 mLs Intravenous Given 5/8/19 2013)       This patient presents complaining of intermittent abdominal cramping and watery diarrhea for 2 weeks. Urinalysis is likely contaminated. Patient has no acute urinary symptoms. Therefore, this will culture. As this is the end of my shift, my attending will be taking over further care, treatment, plan, disposition. Please see his note for CT results which are still pending.   We are attempting to rule out acute surgical abdomen, obstruction, perforation, abscess, mesenteric ischemia, AAA, dissection, cholecystitis,

## 2019-05-09 NOTE — ED NOTES
Reviewed discharge instructions with patient. No questions at this time. No sign of distress. AOx3. Pt ambulated to ER exit with steady gait.         Ghazal Kasper RN  05/08/19 9351

## 2019-05-09 NOTE — ED PROVIDER NOTES
shows wall thickening and adjacent stranding compatible with acute inflammation. Pelvis: The uterus is surgically absent. The urinary bladder is nondistended. Peritoneum/Retroperitoneum: The aorta is normal in caliber and course, showing calcifications. Bones/Soft Tissues: No acute bony abnormality. There is a left periumbilical hernia containing only fat, unchanged. Trace free pelvic fluid. No focal fluid collection, extraluminal gas, nor pathologically enlarged abdominopelvic nodes. Uncomplicated sigmoid colitis, likely acute diverticulitis.    Results for orders placed or performed during the hospital encounter of 05/08/19   CBC Auto Differential   Result Value Ref Range    WBC 3.7 (L) 4.0 - 11.0 K/uL    RBC 4.26 4.00 - 5.20 M/uL    Hemoglobin 12.5 12.0 - 16.0 g/dL    Hematocrit 38.6 36.0 - 48.0 %    MCV 90.6 80.0 - 100.0 fL    MCH 29.4 26.0 - 34.0 pg    MCHC 32.5 31.0 - 36.0 g/dL    RDW 14.9 12.4 - 15.4 %    Platelets 251 274 - 139 K/uL    MPV 8.5 5.0 - 10.5 fL    Neutrophils % 74.7 %    Lymphocytes % 16.5 %    Monocytes % 7.0 %    Eosinophils % 1.5 %    Basophils % 0.3 %    Neutrophils # 2.7 1.7 - 7.7 K/uL    Lymphocytes # 0.6 (L) 1.0 - 5.1 K/uL    Monocytes # 0.3 0.0 - 1.3 K/uL    Eosinophils # 0.1 0.0 - 0.6 K/uL    Basophils # 0.0 0.0 - 0.2 K/uL   Comprehensive Metabolic Panel   Result Value Ref Range    Sodium 144 136 - 145 mmol/L    Potassium 4.1 3.5 - 5.1 mmol/L    Chloride 106 99 - 110 mmol/L    CO2 26 21 - 32 mmol/L    Anion Gap 12 3 - 16    Glucose 103 (H) 70 - 99 mg/dL    BUN 10 7 - 20 mg/dL    CREATININE 0.8 0.6 - 1.2 mg/dL    GFR Non-African American >60 >60    GFR African American >60 >60    Calcium 10.0 8.3 - 10.6 mg/dL    Total Protein 7.2 6.4 - 8.2 g/dL    Alb 3.9 3.4 - 5.0 g/dL    Albumin/Globulin Ratio 1.2 1.1 - 2.2    Total Bilirubin 0.4 0.0 - 1.0 mg/dL    Alkaline Phosphatase 115 40 - 129 U/L    ALT 15 10 - 40 U/L    AST 19 15 - 37 U/L    Globulin 3.3 g/dL   Lipase   Result Value Ref Range    Lipase 9.0 (L) 13.0 - 60.0 U/L   Urinalysis Reflex to Culture   Result Value Ref Range    Color, UA YELLOW Straw/Yellow    Clarity, UA CLOUDY (A) Clear    Glucose, Ur Negative Negative mg/dL    Bilirubin Urine SMALL (A) Negative    Ketones, Urine 40 (A) Negative mg/dL    Specific Gravity, UA 1.026 1.005 - 1.030    Blood, Urine Negative Negative    pH, UA 6.0 5.0 - 8.0    Protein, UA 30 (A) Negative mg/dL    Urobilinogen, Urine 1.0 <2.0 E.U./dL    Nitrite, Urine Negative Negative    Leukocyte Esterase, Urine MODERATE (A) Negative    Microscopic Examination YES     Urine Reflex to Culture Yes     Urine Type Not Specified    Lactic Acid, Plasma   Result Value Ref Range    Lactic Acid 1.3 0.4 - 2.0 mmol/L   Microscopic Urinalysis   Result Value Ref Range    Hyaline Casts, UA 11 (H) 0 - 8 /LPF    WBC, UA 12 (H) 0 - 5 /HPF    RBC, UA 2 0 - 4 /HPF    Epi Cells 13 (H) 0 - 5 /HPF          Chuckie Ynag MD  05/08/19 0111

## 2019-05-09 NOTE — ED NOTES
Pt transferred to bedside commode without assistance. Pt unable to have BM at this time for collection. Pt back in bed safely and placed back on monitor. Will continue to monitor.      Shemar Alexandre RN  05/08/19 8332

## 2019-05-10 LAB
ORGANISM: ABNORMAL
URINE CULTURE, ROUTINE: ABNORMAL
URINE CULTURE, ROUTINE: ABNORMAL

## 2019-05-13 LAB — BLOOD CULTURE, ROUTINE: NORMAL

## 2019-05-22 DIAGNOSIS — F33.1 MODERATE EPISODE OF RECURRENT MAJOR DEPRESSIVE DISORDER (HCC): ICD-10-CM

## 2019-05-23 RX ORDER — DULOXETIN HYDROCHLORIDE 30 MG/1
CAPSULE, DELAYED RELEASE ORAL
Qty: 30 CAPSULE | Refills: 1 | Status: SHIPPED | OUTPATIENT
Start: 2019-05-23 | End: 2019-08-07 | Stop reason: SDUPTHER

## 2019-06-13 DIAGNOSIS — F33.1 MODERATE EPISODE OF RECURRENT MAJOR DEPRESSIVE DISORDER (HCC): ICD-10-CM

## 2019-06-13 RX ORDER — BUPROPION HYDROCHLORIDE 300 MG/1
TABLET ORAL
Qty: 90 TABLET | Refills: 0 | Status: SHIPPED | OUTPATIENT
Start: 2019-06-13 | End: 2019-09-19 | Stop reason: SDUPTHER

## 2019-07-02 ENCOUNTER — OFFICE VISIT (OUTPATIENT)
Dept: FAMILY MEDICINE CLINIC | Age: 74
End: 2019-07-02
Payer: MEDICARE

## 2019-07-02 VITALS
BODY MASS INDEX: 36.83 KG/M2 | WEIGHT: 188.6 LBS | HEART RATE: 69 BPM | SYSTOLIC BLOOD PRESSURE: 136 MMHG | OXYGEN SATURATION: 90 % | DIASTOLIC BLOOD PRESSURE: 86 MMHG

## 2019-07-02 DIAGNOSIS — R73.9 HYPERGLYCEMIA: ICD-10-CM

## 2019-07-02 DIAGNOSIS — I63.81 OTHER CEREBRAL INFARCTION DUE TO OCCLUSION OR STENOSIS OF SMALL ARTERY (HCC): ICD-10-CM

## 2019-07-02 DIAGNOSIS — E78.49 OTHER HYPERLIPIDEMIA: ICD-10-CM

## 2019-07-02 DIAGNOSIS — I63.9 ISCHEMIC STROKE (HCC): Primary | ICD-10-CM

## 2019-07-02 DIAGNOSIS — I10 ESSENTIAL HYPERTENSION: ICD-10-CM

## 2019-07-02 PROCEDURE — 99214 OFFICE O/P EST MOD 30 MIN: CPT | Performed by: INTERNAL MEDICINE

## 2019-07-02 PROCEDURE — 83036 HEMOGLOBIN GLYCOSYLATED A1C: CPT | Performed by: INTERNAL MEDICINE

## 2019-07-02 RX ORDER — MECLIZINE HYDROCHLORIDE 25 MG/1
25 TABLET ORAL 3 TIMES DAILY PRN
Qty: 90 TABLET | Refills: 2 | Status: SHIPPED | OUTPATIENT
Start: 2019-07-02 | End: 2020-01-21 | Stop reason: SDUPTHER

## 2019-07-02 RX ORDER — MECLIZINE HYDROCHLORIDE 25 MG/1
25 TABLET ORAL 3 TIMES DAILY PRN
COMMUNITY
End: 2019-07-02 | Stop reason: SDUPTHER

## 2019-07-02 ASSESSMENT — ENCOUNTER SYMPTOMS
SINUS PRESSURE: 0
DIARRHEA: 0
CONSTIPATION: 0
VOMITING: 0
ABDOMINAL PAIN: 0
NAUSEA: 0
CHEST TIGHTNESS: 0
COUGH: 0
SHORTNESS OF BREATH: 0

## 2019-07-02 NOTE — PROGRESS NOTES
2019    Ann Marie Yoder (:  1945) is a 68 y.o. female, here for evaluation of the following medical concerns:    HPI Patient presents today for transfer of care, former Dr. Jered Galvez patient. Patient states that she has been feeling dizzy- has hx of vertigo but feels like it is getting worse. She describes feeling like the room is spinning around her. She reports occasionally it is a/w position changes, occasionally it is spontaneous. No clear aggravating or alleviating factors. She works on hydration and her diarrhea has resolved. She denies associated CP/palpitations/SOB but does endorse diaphoresis. She has seen ENT and had a brain MRI in the past , and finds some relief with meclizine  She is also concerned about her forgetfulness. She reports she saw a psychiatrist years ago who started her current antidepressant regimen, but she doesn't think it's working anymore. Review of Systems   Constitutional: Negative for activity change, appetite change, chills, fatigue and fever. HENT: Negative for congestion and sinus pressure. Respiratory: Negative for cough, chest tightness and shortness of breath. Cardiovascular: Negative for chest pain and palpitations. Gastrointestinal: Negative for abdominal pain, constipation, diarrhea, nausea and vomiting. Genitourinary: Negative for difficulty urinating. Musculoskeletal: Negative for arthralgias. Skin: Negative for rash. Neurological: Negative for headaches.        Current Outpatient Medications on File Prior to Visit   Medication Sig Dispense Refill    buPROPion (WELLBUTRIN XL) 300 MG extended release tablet TAKE ONE TABLET BY MOUTH EVERY MORNING 90 tablet 0    DULoxetine (CYMBALTA) 30 MG extended release capsule TAKE THREE CAPSULES BY MOUTH DAILY 30 capsule 1    bethanechol (URECHOLINE) 25 MG tablet TAKE ONE TABLET BY MOUTH THREE TIMES A DAY 90 tablet 2    furosemide (LASIX) 20 MG tablet TAKE ONE TABLET BY MOUTH DAILY 90 tablet 1 deficit. Skin: Skin is warm and dry. She is not diaphoretic. Psychiatric: She has a normal mood and affect. Her behavior is normal. Judgment and thought content normal.   Vitals reviewed. ASSESSMENT/PLAN:    Dizziness- med review, gene sight, A1c  Hx ischemic stroke - Echo  Memory- med review, +/- neuro  Angi Lyle was seen today for new patient. Diagnoses and all orders for this visit:    Ischemic stroke ([address]  -     ECHO Complete 2D W Doppler W Color; Future   - Noted on review of previous MRI brain. Check Echo, discuss statin and ASA at f/u visit for RF modification    Other hyperlipidemia   - Check lipids    Essential hypertension  -     POCT glycosylated hemoglobin (Hb A1C)   - Cont current regimen    Hyperglycemia   -     POCT glycosylated hemoglobin (Hb A1C)    Dizziness   -     meclizine (ANTIVERT) 25 MG tablet; Take 1 tablet by mouth 3 times daily as needed for Dizziness   - Refill meclizine. Suspect sx are from polypharmacy -- will get Gene Sight testing to help modify antidepressant regimen, may need to enlist assistance of Psych    Memory loss: Simplify medication regimen, may consider Neuropsych testing in future    Current Outpatient Medications   Medication Sig Dispense Refill    meclizine (ANTIVERT) 25 MG tablet Take 1 tablet by mouth 3 times daily as needed for Dizziness 90 tablet 2    buPROPion (WELLBUTRIN XL) 300 MG extended release tablet TAKE ONE TABLET BY MOUTH EVERY MORNING 90 tablet 0    DULoxetine (CYMBALTA) 30 MG extended release capsule TAKE THREE CAPSULES BY MOUTH DAILY 30 capsule 1    bethanechol (URECHOLINE) 25 MG tablet TAKE ONE TABLET BY MOUTH THREE TIMES A DAY 90 tablet 2    furosemide (LASIX) 20 MG tablet TAKE ONE TABLET BY MOUTH DAILY 90 tablet 1    rOPINIRole (REQUIP) 0.25 MG tablet TAKE ONE TABLET BY MOUTH ONCE NIGHTLY 30 tablet 2    pregabalin (LYRICA) 100 MG capsule Take 100 mg by mouth 2 times daily. Marcello Opoka       tiZANidine (ZANAFLEX) 4 MG tablet Take 4 mg by mouth 2

## 2019-08-02 ENCOUNTER — OFFICE VISIT (OUTPATIENT)
Dept: FAMILY MEDICINE CLINIC | Age: 74
End: 2019-08-02
Payer: MEDICARE

## 2019-08-02 VITALS
SYSTOLIC BLOOD PRESSURE: 152 MMHG | HEART RATE: 104 BPM | OXYGEN SATURATION: 93 % | TEMPERATURE: 99.3 F | DIASTOLIC BLOOD PRESSURE: 120 MMHG | WEIGHT: 176 LBS | BODY MASS INDEX: 34.37 KG/M2

## 2019-08-02 DIAGNOSIS — R19.7 DIARRHEA OF PRESUMED INFECTIOUS ORIGIN: Primary | ICD-10-CM

## 2019-08-02 DIAGNOSIS — I10 HYPERTENSION, UNSPECIFIED TYPE: ICD-10-CM

## 2019-08-02 PROCEDURE — 99213 OFFICE O/P EST LOW 20 MIN: CPT | Performed by: FAMILY MEDICINE

## 2019-08-02 RX ORDER — ONDANSETRON 4 MG/1
4 TABLET, FILM COATED ORAL EVERY 8 HOURS PRN
Qty: 30 TABLET | Refills: 0 | Status: SHIPPED | OUTPATIENT
Start: 2019-08-02 | End: 2019-09-24

## 2019-08-02 NOTE — PATIENT INSTRUCTIONS
Patient Education        Oral Rehydration: Care Instructions  Your Care Instructions    Dehydration occurs when your body loses too much water. This can happen if you do not drink enough fluids or lose a lot of fluid due to diarrhea, vomiting, or sweating. Being dehydrated can cause health problems and can even be life-threatening. To replace lost fluids, you need to drink liquid that contains special chemicals called electrolytes. Electrolytes keep your body working well. Plain water does not have electrolytes. You also need to rest to prevent more fluid loss. Replacing water and electrolytes (oral rehydration) completely takes about 36 hours. But you should feel better within a few hours. Follow-up care is a key part of your treatment and safety. Be sure to make and go to all appointments, and call your doctor if you are having problems. It's also a good idea to know your test results and keep a list of the medicines you take. How can you care for yourself at home? · Take frequent sips of a drink such as Gatorade, Powerade, or other rehydration drinks that your doctor suggests. These replace both fluid and important chemicals (electrolytes) you need for balance in your blood. · Drink 2 quarts of cool liquid over 2 to 4 hours. You should have at least 10 glasses of liquid a day to replace lost fluid. If you have kidney, heart, or liver disease and have to limit fluids, talk with your doctor before you increase the amount of fluids you drink. · Make your own drink. Measure everything carefully. The drink may not work well or may even be harmful if the amounts are off. ? 1 quart water  ? ½ teaspoon salt  ? 6 teaspoons sugar  · Do not drink liquid with caffeine, such as coffee and micha. · Do not drink any alcohol. It can make you dehydrated. · Drink plenty of fluids, enough so that your urine is light yellow or clear like water.  If you have kidney, heart, or liver disease and have to limit fluids, talk with

## 2019-08-07 DIAGNOSIS — F33.1 MODERATE EPISODE OF RECURRENT MAJOR DEPRESSIVE DISORDER (HCC): ICD-10-CM

## 2019-08-07 RX ORDER — DULOXETIN HYDROCHLORIDE 30 MG/1
CAPSULE, DELAYED RELEASE ORAL
Qty: 90 CAPSULE | Refills: 0 | Status: SHIPPED | OUTPATIENT
Start: 2019-08-07 | End: 2019-09-24

## 2019-08-14 RX ORDER — BETHANECHOL CHLORIDE 25 MG/1
TABLET ORAL
Qty: 90 TABLET | Refills: 1 | Status: SHIPPED | OUTPATIENT
Start: 2019-08-14 | End: 2019-11-05 | Stop reason: SDUPTHER

## 2019-08-14 RX ORDER — ROPINIROLE 0.25 MG/1
TABLET, FILM COATED ORAL
Qty: 90 TABLET | Refills: 0 | Status: ON HOLD | OUTPATIENT
Start: 2019-08-14 | End: 2019-09-27 | Stop reason: SDUPTHER

## 2019-08-16 ENCOUNTER — OFFICE VISIT (OUTPATIENT)
Dept: FAMILY MEDICINE CLINIC | Age: 74
End: 2019-08-16
Payer: MEDICARE

## 2019-08-16 ENCOUNTER — NURSE TRIAGE (OUTPATIENT)
Dept: OTHER | Facility: CLINIC | Age: 74
End: 2019-08-16

## 2019-08-16 VITALS
BODY MASS INDEX: 34.37 KG/M2 | OXYGEN SATURATION: 91 % | HEART RATE: 86 BPM | SYSTOLIC BLOOD PRESSURE: 142 MMHG | WEIGHT: 176 LBS | DIASTOLIC BLOOD PRESSURE: 98 MMHG

## 2019-08-16 DIAGNOSIS — I10 ESSENTIAL HYPERTENSION: Primary | ICD-10-CM

## 2019-08-16 PROBLEM — D62 ACUTE BLOOD LOSS AS CAUSE OF POSTOPERATIVE ANEMIA: Status: RESOLVED | Noted: 2017-09-09 | Resolved: 2019-08-16

## 2019-08-16 PROCEDURE — 99213 OFFICE O/P EST LOW 20 MIN: CPT | Performed by: FAMILY MEDICINE

## 2019-08-16 RX ORDER — VALSARTAN AND HYDROCHLOROTHIAZIDE 160; 25 MG/1; MG/1
1 TABLET ORAL DAILY
Qty: 90 TABLET | Refills: 1 | Status: ON HOLD | OUTPATIENT
Start: 2019-08-16 | End: 2019-08-21 | Stop reason: HOSPADM

## 2019-08-16 NOTE — TELEPHONE ENCOUNTER
Reason for Disposition   Patient wants to be seen    Protocols used: HIGH BLOOD PRESSURE-ADULT-OH    Pt calls with c/o continued high blood pressure. Pt state the most recent BP was last night at 154/102 and prior to that was 197/124. Pt did not take BP today. She states that she has been having headaches at the back of her head, but not presently. Denies other neurological symptoms. Pt states she did get out of bed today and fall, but no apparent injury noted. She states she takes her BP med as ordered. Caller with symptoms as documented above. Caller informed of disposition. Soft transfer to pre-service center to schedule apt as recommended. Care advice as documented.

## 2019-08-16 NOTE — PROGRESS NOTES
Subjective:      Patient ID: Ileana Denver is a 68 y.o. female. HPI  Patient presents for elevated blood pressure. Patient states her bp was 167/124 on Tuesday and Wednesday and on Thursday it was 154/109. Patient states she is moving slow feels dizzy and still feels nauseous, was seen 8/2/19 for diarrhea, states that is better, having normal BM's. Patient also gets a pain that runs up the back of her neck when she feels her bp is elevated. Will have a cramp or squeezing in her chest, has occurred  for the last 3-4 weeks, lasts a few minutes then goes away, does not get SOB with it, not related to activity. When walking around the block she does not have chest pain or feel SOB. Patient states she is taking Lasix. Was diagnosed with high blood pressure when she was 36, runs in her family. Has not been on bp medication for the last 4-5 years, Dr. Kimani Lei felt she no longer needed to be taking. Patient's medications, allergies, past medical, surgical, social and family histories were reviewed and updated in the EHR as appropriate. Review of Systems    Objective:   Physical Exam     Body mass index is 34.37 kg/m². Vitals:    08/16/19 1552 08/16/19 1608   BP: (!) 148/104 (!) 142/98   Site: Left Upper Arm Left Upper Arm   Position: Sitting Sitting   Cuff Size: Large Adult Large Adult   Pulse: 86    SpO2: 91%    Weight: 176 lb (79.8 kg)      Wt Readings from Last 3 Encounters:   08/16/19 176 lb (79.8 kg)   08/02/19 176 lb (79.8 kg)   07/02/19 188 lb 9.6 oz (85.5 kg)     PHQ score: PHQ-9 Total Score: 18 (1/4/2019  4:10 PM)      GENERAL:Alert and oriented x 4 NAD, affect appropriate and obese, well hydrated, well developed.   NECK:supple and non tender without mass, no thyromegaly or thyroid nodules, no cervical lymphadenopathy  LUNG:clear to auscultation bilaterally with normal respiratory effort  CV: Normal heart sounds, regular rate and rhythm without murmurs  EXTREMETY: no loss of hair, non

## 2019-08-19 ENCOUNTER — APPOINTMENT (OUTPATIENT)
Dept: GENERAL RADIOLOGY | Age: 74
End: 2019-08-19
Payer: MEDICARE

## 2019-08-19 ENCOUNTER — HOSPITAL ENCOUNTER (OUTPATIENT)
Age: 74
Setting detail: OBSERVATION
Discharge: HOME | End: 2019-08-21
Attending: EMERGENCY MEDICINE | Admitting: INTERNAL MEDICINE
Payer: MEDICARE

## 2019-08-19 ENCOUNTER — APPOINTMENT (OUTPATIENT)
Dept: CT IMAGING | Age: 74
End: 2019-08-19
Payer: MEDICARE

## 2019-08-19 ENCOUNTER — TELEPHONE (OUTPATIENT)
Dept: FAMILY MEDICINE CLINIC | Age: 74
End: 2019-08-19

## 2019-08-19 DIAGNOSIS — R42 DIZZINESS: Primary | ICD-10-CM

## 2019-08-19 DIAGNOSIS — R26.2 UNABLE TO AMBULATE: ICD-10-CM

## 2019-08-19 PROBLEM — R26.81 UNSTEADY GAIT: Status: ACTIVE | Noted: 2019-08-19

## 2019-08-19 LAB
A/G RATIO: 1.3 (ref 1.1–2.2)
ALBUMIN SERPL-MCNC: 4 G/DL (ref 3.4–5)
ALP BLD-CCNC: 105 U/L (ref 40–129)
ALT SERPL-CCNC: 14 U/L (ref 10–40)
ANION GAP SERPL CALCULATED.3IONS-SCNC: 10 MMOL/L (ref 3–16)
AST SERPL-CCNC: 17 U/L (ref 15–37)
BASOPHILS ABSOLUTE: 0 K/UL (ref 0–0.2)
BASOPHILS RELATIVE PERCENT: 0.8 %
BILIRUB SERPL-MCNC: 0.4 MG/DL (ref 0–1)
BILIRUBIN URINE: NEGATIVE
BLOOD, URINE: NEGATIVE
BUN BLDV-MCNC: 15 MG/DL (ref 7–20)
CALCIUM SERPL-MCNC: 9.2 MG/DL (ref 8.3–10.6)
CHLORIDE BLD-SCNC: 100 MMOL/L (ref 99–110)
CLARITY: CLEAR
CO2: 34 MMOL/L (ref 21–32)
COLOR: YELLOW
CREAT SERPL-MCNC: 1 MG/DL (ref 0.6–1.2)
EOSINOPHILS ABSOLUTE: 0.1 K/UL (ref 0–0.6)
EOSINOPHILS RELATIVE PERCENT: 3.6 %
GFR AFRICAN AMERICAN: >60
GFR NON-AFRICAN AMERICAN: 54
GLOBULIN: 3.1 G/DL
GLUCOSE BLD-MCNC: 86 MG/DL (ref 70–99)
GLUCOSE URINE: NEGATIVE MG/DL
HCT VFR BLD CALC: 41.7 % (ref 36–48)
HEMOGLOBIN: 13.4 G/DL (ref 12–16)
KETONES, URINE: NEGATIVE MG/DL
LEUKOCYTE ESTERASE, URINE: NEGATIVE
LYMPHOCYTES ABSOLUTE: 0.9 K/UL (ref 1–5.1)
LYMPHOCYTES RELATIVE PERCENT: 23.4 %
MCH RBC QN AUTO: 28.8 PG (ref 26–34)
MCHC RBC AUTO-ENTMCNC: 32.2 G/DL (ref 31–36)
MCV RBC AUTO: 89.6 FL (ref 80–100)
MICROSCOPIC EXAMINATION: NORMAL
MONOCYTES ABSOLUTE: 0.3 K/UL (ref 0–1.3)
MONOCYTES RELATIVE PERCENT: 7.7 %
NEUTROPHILS ABSOLUTE: 2.4 K/UL (ref 1.7–7.7)
NEUTROPHILS RELATIVE PERCENT: 64.5 %
NITRITE, URINE: NEGATIVE
PDW BLD-RTO: 13.6 % (ref 12.4–15.4)
PH UA: 7 (ref 5–8)
PLATELET # BLD: 175 K/UL (ref 135–450)
PMV BLD AUTO: 9.3 FL (ref 5–10.5)
POTASSIUM REFLEX MAGNESIUM: 3.6 MMOL/L (ref 3.5–5.1)
PRO-BNP: 43 PG/ML (ref 0–124)
PROTEIN UA: NEGATIVE MG/DL
RBC # BLD: 4.65 M/UL (ref 4–5.2)
SODIUM BLD-SCNC: 144 MMOL/L (ref 136–145)
SPECIFIC GRAVITY UA: 1.02 (ref 1–1.03)
TOTAL PROTEIN: 7.1 G/DL (ref 6.4–8.2)
TROPONIN: 0.01 NG/ML
URINE REFLEX TO CULTURE: NORMAL
URINE TYPE: NORMAL
UROBILINOGEN, URINE: 1 E.U./DL
WBC # BLD: 3.8 K/UL (ref 4–11)

## 2019-08-19 PROCEDURE — 84484 ASSAY OF TROPONIN QUANT: CPT

## 2019-08-19 PROCEDURE — 99285 EMERGENCY DEPT VISIT HI MDM: CPT

## 2019-08-19 PROCEDURE — 71045 X-RAY EXAM CHEST 1 VIEW: CPT

## 2019-08-19 PROCEDURE — 6370000000 HC RX 637 (ALT 250 FOR IP): Performed by: INTERNAL MEDICINE

## 2019-08-19 PROCEDURE — 72128 CT CHEST SPINE W/O DYE: CPT

## 2019-08-19 PROCEDURE — 93005 ELECTROCARDIOGRAM TRACING: CPT | Performed by: PHYSICIAN ASSISTANT

## 2019-08-19 PROCEDURE — 72125 CT NECK SPINE W/O DYE: CPT

## 2019-08-19 PROCEDURE — G0378 HOSPITAL OBSERVATION PER HR: HCPCS

## 2019-08-19 PROCEDURE — 94760 N-INVAS EAR/PLS OXIMETRY 1: CPT

## 2019-08-19 PROCEDURE — 85025 COMPLETE CBC W/AUTO DIFF WBC: CPT

## 2019-08-19 PROCEDURE — 70450 CT HEAD/BRAIN W/O DYE: CPT

## 2019-08-19 PROCEDURE — 82746 ASSAY OF FOLIC ACID SERUM: CPT

## 2019-08-19 PROCEDURE — 80053 COMPREHEN METABOLIC PANEL: CPT

## 2019-08-19 PROCEDURE — 84443 ASSAY THYROID STIM HORMONE: CPT

## 2019-08-19 PROCEDURE — 81003 URINALYSIS AUTO W/O SCOPE: CPT

## 2019-08-19 PROCEDURE — 73502 X-RAY EXAM HIP UNI 2-3 VIEWS: CPT

## 2019-08-19 PROCEDURE — 2580000003 HC RX 258: Performed by: INTERNAL MEDICINE

## 2019-08-19 PROCEDURE — 83880 ASSAY OF NATRIURETIC PEPTIDE: CPT

## 2019-08-19 PROCEDURE — 82607 VITAMIN B-12: CPT

## 2019-08-19 RX ORDER — ONDANSETRON 2 MG/ML
4 INJECTION INTRAMUSCULAR; INTRAVENOUS EVERY 6 HOURS PRN
Status: DISCONTINUED | OUTPATIENT
Start: 2019-08-19 | End: 2019-08-21 | Stop reason: HOSPADM

## 2019-08-19 RX ORDER — ACETAMINOPHEN 325 MG/1
650 TABLET ORAL EVERY 4 HOURS PRN
Status: DISCONTINUED | OUTPATIENT
Start: 2019-08-19 | End: 2019-08-21 | Stop reason: HOSPADM

## 2019-08-19 RX ORDER — SODIUM CHLORIDE 0.9 % (FLUSH) 0.9 %
10 SYRINGE (ML) INJECTION EVERY 12 HOURS SCHEDULED
Status: DISCONTINUED | OUTPATIENT
Start: 2019-08-19 | End: 2019-08-21 | Stop reason: HOSPADM

## 2019-08-19 RX ORDER — SODIUM CHLORIDE 0.9 % (FLUSH) 0.9 %
10 SYRINGE (ML) INJECTION PRN
Status: DISCONTINUED | OUTPATIENT
Start: 2019-08-19 | End: 2019-08-21 | Stop reason: HOSPADM

## 2019-08-19 RX ADMIN — Medication 10 ML: at 23:45

## 2019-08-19 RX ADMIN — ACETAMINOPHEN 650 MG: 325 TABLET, FILM COATED ORAL at 23:45

## 2019-08-19 ASSESSMENT — ENCOUNTER SYMPTOMS
SHORTNESS OF BREATH: 0
PHOTOPHOBIA: 0
ABDOMINAL PAIN: 0
VOMITING: 0
BACK PAIN: 1
DIARRHEA: 0
NAUSEA: 0
RESPIRATORY NEGATIVE: 1
COLOR CHANGE: 0
COUGH: 0
CONSTIPATION: 0

## 2019-08-19 ASSESSMENT — PAIN DESCRIPTION - PAIN TYPE: TYPE: ACUTE PAIN

## 2019-08-19 ASSESSMENT — PAIN DESCRIPTION - LOCATION: LOCATION: HEAD

## 2019-08-19 ASSESSMENT — PAIN DESCRIPTION - DESCRIPTORS: DESCRIPTORS: ACHING;HEADACHE

## 2019-08-19 ASSESSMENT — PAIN SCALES - GENERAL: PAINLEVEL_OUTOF10: 6

## 2019-08-19 NOTE — ED PROVIDER NOTES
time. She has normal strength. No cranial nerve deficit or sensory deficit. GCS eye subscore is 4. GCS verbal subscore is 5. GCS motor subscore is 6. Gait deferred at this time. Skin: Skin is warm and dry. No rash noted. She is not diaphoretic. No erythema. No pallor. Psychiatric: She has a normal mood and affect. Her behavior is normal.       DIAGNOSTIC RESULTS   LABS:    Labs Reviewed   CBC WITH AUTO DIFFERENTIAL - Abnormal; Notable for the following components:       Result Value    WBC 3.8 (*)     Lymphocytes # 0.9 (*)     All other components within normal limits    Narrative:     Performed at:  [facility] [address]   Phone [phone]   COMPREHENSIVE METABOLIC PANEL W/ REFLEX TO MG FOR LOW K - Abnormal; Notable for the following components:    CO2 34 (*)     GFR Non- 54 (*)     All other components within normal limits    Narrative:     Performed at:  [facility] [address]   Phone [phone]   TROPONIN    Narrative:     Performed at:  [facility] [address]   Phone [phone]   BRAIN NATRIURETIC PEPTIDE    Narrative:     Performed at:  [facility]  [employer]   Phone [phone]   URINE RT REFLEX TO CULTURE    Narrative:     Performed at:  [facility] [address]   Phone [phone]       All other labs were within normal range or not returned as of this dictation. EKG: All EKG's are interpreted by the Emergency Department Physician in the absence of a cardiologist.  Please see their note for interpretation of EKG.       RADIOLOGY:   Non-plain film images such as CT, Ultrasound and MRI are read by the radiologist. Plain radiographic images are visualized andpreliminarily interpreted by the  ED Provider with the below findings:        Interpretation [facility] Radiologist below, if available at the time of this note:    CT Thoracic Spine WO Contrast   Final Result   1. No acute osseous abnormality of the thoracic spine. 2. Mild degenerative changes of the lower thoracic spine. CT Cervical Spine WO Contrast   Final Result   No acute abnormality of the cervical spine. CT Head WO Contrast   Final Result   No acute intracranial hemorrhage or mass effect. XR CHEST PORTABLE   Final Result   No acute cardiopulmonary abnormality. Persistent elevation of the right   hemidiaphragm. XR HIP 2-3 VW W PELVIS LEFT   Final Result   No acute fracture or subluxation. Mild osteoarthritis of the left hip. No results found.         PROCEDURES   Unless otherwise noted below, none     Procedures    CRITICAL CARE TIME   N/A    CONSULTS:  IP CONSULT TO HOSPITALIST      EMERGENCY DEPARTMENT COURSE and DIFFERENTIAL DIAGNOSIS/MDM:   Vitals:    Vitals:    08/19/19 2015 08/19/19 2030 08/19/19 2045 08/19/19 2118   BP: (!) 176/109 (!) 159/107 (!) 155/101 (!) 161/94   Pulse:  85  86   Resp:  16  16   Temp:  98.1 °F (36.7 °C)  98.6 °F (37 °C)   TempSrc:  Oral  Oral   SpO2:    93%   Weight:       Height:           Patient was given thefollowing medications:  Medications   sodium chloride flush 0.9 % injection 10 mL (has no administration in time range)   sodium chloride flush 0.9 % injection 10 mL (has no administration in time range)   magnesium hydroxide (MILK OF MAGNESIA) 400 MG/5ML suspension 30 mL (has no administration in time range)   ondansetron (ZOFRAN) injection 4 mg (has no administration in time range)   enoxaparin (LOVENOX) injection 40 mg (has no administration in time range)   acetaminophen (TYLENOL) tablet 650 mg (has no administration in time range)       Patient is a 67-year-old female who presents to the ED with complaint of dizziness and inability to

## 2019-08-19 NOTE — TELEPHONE ENCOUNTER
Patient states her BP's have been running high, the only reading she has is last Monday 195/124. She did not lose consciousness when she hit her head. States today she is still feeling bad. She is not able to get up without falling or her  helping her.

## 2019-08-19 NOTE — TELEPHONE ENCOUNTER
Made follow up phone call to patient - informed she needed to call 911 and go by squad to ED. Report called to [facility] Scarlet MENDEZ

## 2019-08-20 PROBLEM — F32.A DEPRESSION: Status: ACTIVE | Noted: 2019-08-20

## 2019-08-20 LAB
EKG ATRIAL RATE: 82 BPM
EKG DIAGNOSIS: NORMAL
EKG P AXIS: 16 DEGREES
EKG P-R INTERVAL: 216 MS
EKG Q-T INTERVAL: 416 MS
EKG QRS DURATION: 148 MS
EKG QTC CALCULATION (BAZETT): 486 MS
EKG R AXIS: 52 DEGREES
EKG T AXIS: -9 DEGREES
EKG VENTRICULAR RATE: 82 BPM
FOLATE: 5.93 NG/ML (ref 4.78–24.2)
GLUCOSE BLD-MCNC: 93 MG/DL (ref 70–99)
PERFORMED ON: NORMAL
TSH REFLEX: 0.69 UIU/ML (ref 0.27–4.2)
VITAMIN B-12: 299 PG/ML (ref 211–911)

## 2019-08-20 PROCEDURE — 6370000000 HC RX 637 (ALT 250 FOR IP): Performed by: INTERNAL MEDICINE

## 2019-08-20 PROCEDURE — 6360000002 HC RX W HCPCS: Performed by: INTERNAL MEDICINE

## 2019-08-20 PROCEDURE — 2580000003 HC RX 258: Performed by: INTERNAL MEDICINE

## 2019-08-20 PROCEDURE — 96374 THER/PROPH/DIAG INJ IV PUSH: CPT

## 2019-08-20 PROCEDURE — G0378 HOSPITAL OBSERVATION PER HR: HCPCS

## 2019-08-20 PROCEDURE — 94760 N-INVAS EAR/PLS OXIMETRY 1: CPT

## 2019-08-20 PROCEDURE — 97161 PT EVAL LOW COMPLEX 20 MIN: CPT

## 2019-08-20 PROCEDURE — 96372 THER/PROPH/DIAG INJ SC/IM: CPT

## 2019-08-20 PROCEDURE — 93010 ELECTROCARDIOGRAM REPORT: CPT | Performed by: INTERNAL MEDICINE

## 2019-08-20 PROCEDURE — 99220 PR INITIAL OBSERVATION CARE/DAY 70 MINUTES: CPT | Performed by: PSYCHIATRY & NEUROLOGY

## 2019-08-20 RX ORDER — ASPIRIN 81 MG/1
81 TABLET, CHEWABLE ORAL DAILY
Status: DISCONTINUED | OUTPATIENT
Start: 2019-08-20 | End: 2019-08-21 | Stop reason: HOSPADM

## 2019-08-20 RX ORDER — ROPINIROLE 0.25 MG/1
0.25 TABLET, FILM COATED ORAL NIGHTLY
Status: DISCONTINUED | OUTPATIENT
Start: 2019-08-20 | End: 2019-08-21 | Stop reason: HOSPADM

## 2019-08-20 RX ORDER — BUPROPION HYDROCHLORIDE 300 MG/1
300 TABLET ORAL DAILY
Status: DISCONTINUED | OUTPATIENT
Start: 2019-08-20 | End: 2019-08-21 | Stop reason: HOSPADM

## 2019-08-20 RX ORDER — TIZANIDINE 4 MG/1
4 TABLET ORAL 2 TIMES DAILY
Status: DISCONTINUED | OUTPATIENT
Start: 2019-08-20 | End: 2019-08-21 | Stop reason: HOSPADM

## 2019-08-20 RX ORDER — MORPHINE SULFATE 15 MG/1
15 TABLET ORAL EVERY 6 HOURS PRN
Status: DISCONTINUED | OUTPATIENT
Start: 2019-08-20 | End: 2019-08-21 | Stop reason: HOSPADM

## 2019-08-20 RX ORDER — PREGABALIN 100 MG/1
100 CAPSULE ORAL 2 TIMES DAILY
Status: DISCONTINUED | OUTPATIENT
Start: 2019-08-20 | End: 2019-08-21 | Stop reason: HOSPADM

## 2019-08-20 RX ORDER — BETHANECHOL CHLORIDE 10 MG/1
25 TABLET ORAL 3 TIMES DAILY
Status: DISCONTINUED | OUTPATIENT
Start: 2019-08-20 | End: 2019-08-21 | Stop reason: HOSPADM

## 2019-08-20 RX ORDER — DULOXETIN HYDROCHLORIDE 30 MG/1
30 CAPSULE, DELAYED RELEASE ORAL DAILY
Status: DISCONTINUED | OUTPATIENT
Start: 2019-08-20 | End: 2019-08-21 | Stop reason: HOSPADM

## 2019-08-20 RX ORDER — MORPHINE SULFATE 4 MG/ML
4 INJECTION, SOLUTION INTRAMUSCULAR; INTRAVENOUS EVERY 4 HOURS PRN
Status: DISCONTINUED | OUTPATIENT
Start: 2019-08-20 | End: 2019-08-21 | Stop reason: HOSPADM

## 2019-08-20 RX ORDER — AMLODIPINE BESYLATE 5 MG/1
5 TABLET ORAL DAILY
Status: DISCONTINUED | OUTPATIENT
Start: 2019-08-20 | End: 2019-08-21

## 2019-08-20 RX ORDER — MECLIZINE HCL 12.5 MG/1
25 TABLET ORAL 3 TIMES DAILY PRN
Status: DISCONTINUED | OUTPATIENT
Start: 2019-08-20 | End: 2019-08-21 | Stop reason: HOSPADM

## 2019-08-20 RX ADMIN — ENOXAPARIN SODIUM 40 MG: 40 INJECTION SUBCUTANEOUS at 08:59

## 2019-08-20 RX ADMIN — BUPROPION HYDROCHLORIDE 300 MG: 300 TABLET, EXTENDED RELEASE ORAL at 08:56

## 2019-08-20 RX ADMIN — BETHANECHOL CHLORIDE 25 MG: 10 TABLET ORAL at 13:49

## 2019-08-20 RX ADMIN — Medication 10 ML: at 22:42

## 2019-08-20 RX ADMIN — PREGABALIN 100 MG: 100 CAPSULE ORAL at 22:38

## 2019-08-20 RX ADMIN — Medication 10 ML: at 08:59

## 2019-08-20 RX ADMIN — BETHANECHOL CHLORIDE 25 MG: 10 TABLET ORAL at 22:38

## 2019-08-20 RX ADMIN — MORPHINE SULFATE 4 MG: 4 INJECTION INTRAVENOUS at 05:24

## 2019-08-20 RX ADMIN — MORPHINE SULFATE 4 MG: 4 INJECTION INTRAVENOUS at 22:40

## 2019-08-20 RX ADMIN — DULOXETINE HYDROCHLORIDE 30 MG: 30 CAPSULE, DELAYED RELEASE ORAL at 08:56

## 2019-08-20 RX ADMIN — ASPIRIN 81 MG 81 MG: 81 TABLET ORAL at 13:48

## 2019-08-20 RX ADMIN — PREGABALIN 100 MG: 100 CAPSULE ORAL at 08:56

## 2019-08-20 RX ADMIN — BETHANECHOL CHLORIDE 25 MG: 10 TABLET ORAL at 08:57

## 2019-08-20 RX ADMIN — ROPINIROLE HYDROCHLORIDE 0.25 MG: 0.25 TABLET, FILM COATED ORAL at 22:38

## 2019-08-20 RX ADMIN — Medication 10 ML: at 05:24

## 2019-08-20 RX ADMIN — AMLODIPINE BESYLATE 5 MG: 5 TABLET ORAL at 13:48

## 2019-08-20 RX ADMIN — TIZANIDINE 4 MG: 4 TABLET ORAL at 08:57

## 2019-08-20 RX ADMIN — TIZANIDINE 4 MG: 4 TABLET ORAL at 22:38

## 2019-08-20 ASSESSMENT — PAIN DESCRIPTION - PAIN TYPE
TYPE: CHRONIC PAIN

## 2019-08-20 ASSESSMENT — PAIN DESCRIPTION - LOCATION
LOCATION: BACK

## 2019-08-20 ASSESSMENT — PAIN SCALES - GENERAL
PAINLEVEL_OUTOF10: 8
PAINLEVEL_OUTOF10: 8
PAINLEVEL_OUTOF10: 5
PAINLEVEL_OUTOF10: 0
PAINLEVEL_OUTOF10: 0
PAINLEVEL_OUTOF10: 5
PAINLEVEL_OUTOF10: 4

## 2019-08-20 ASSESSMENT — PAIN DESCRIPTION - PROGRESSION: CLINICAL_PROGRESSION: GRADUALLY IMPROVING

## 2019-08-20 ASSESSMENT — PAIN DESCRIPTION - DESCRIPTORS: DESCRIPTORS: ACHING

## 2019-08-20 NOTE — PROGRESS NOTES
Pt's home med rec completed with pt, pt currently asking for her prn morphine. NIH completed for syncope/dizziness, currently scoring a 2. Perfect serve message sent to Dr. Francois Barroso to notify him and to review home med rec and re-order any meds as needed and to see if we needed to complete a stroke work up for pt as well. Will continue to monitor.   Carson Sheth

## 2019-08-20 NOTE — ED NOTES
Pt assisted to stand for orthostatic BP's, pt unable to stand without almost falling. Pts legs start to wobble and nurse had to sit her down. MD aware.       December ANDREA Bunch  08/19/19 5013

## 2019-08-20 NOTE — PROGRESS NOTES
Admission completed, see doc flowsheets. New orders received for prn Morphine for pt. Will medicate per pt's request and per [facility]. Call light within reach, will continue to monitor.   Luna Rodriguez

## 2019-08-20 NOTE — PROGRESS NOTES
Nutrition Assessment    Type and Reason for Visit: Initial, Positive Nutrition Screen    Nutrition Recommendations:   1. Continue diet  2. Encourage po    Nutrition Assessment: +IP for poor appetite and wt loss. Pt states that pt was feeling nauseated and only consuming 1 meal a day. Pt reports a 10lb wt loss. However per wt hx pt has only lost 2 lbs over the given time frame. Pt denies any n/v currently. She declines a supplement at this time. Pt po has improved with po 51-75%. Pt is at risk for nutritional compromise d/t poor po pta. Malnutrition Assessment:  · Malnutrition Status: No malnutrition  · Context: Chronic illness  · Findings of the 6 clinical characteristics of malnutrition (Minimum of 2 out of 6 clinical characteristics is required to make the diagnosis of moderate or severe Protein Calorie Malnutrition based on AND/ASPEN Guidelines):  1. Energy Intake-Less than or equal to 75% of estimated energy requirement, Greater than or equal to 7 days    2. Weight Loss-No significant weight loss,    3. Fat Loss-No significant subcutaneous fat loss,    4. Muscle Loss-No significant muscle mass loss,    5. Fluid Accumulation-No significant fluid accumulation,    6.  Strength-Not measured    Nutrition Risk Level:  Moderate    Nutrient Needs:  · Estimated Daily Total Kcal: 8607-3110  · Estimated Daily Protein (g): 54-68 gms(1.2-1.5 gms)  · Estimated Daily Total Fluid (ml/day): 1 ml/kcal    Nutrition Diagnosis:   · Problem: Inadequate oral intake  · Etiology: related to Insufficient energy/nutrient consumption     Signs and symptoms:  as evidenced by Diet history of poor intake    Objective Information:  · Nutrition-Focused Physical Findings: No edema  · Wound Type: None  · Current Nutrition Therapies:  · Oral Diet Orders: General   · Oral Diet intake: 51-75%  · Oral Nutrition Supplement (ONS) Orders: None  · Anthropometric Measures:  · Ht: 5' (152.4 cm)   · Current Body Wt: 178 lb (80.7 kg)  · Ideal

## 2019-08-20 NOTE — PROGRESS NOTES
Call received from MRI stated ' can't do MRI d/t spinal cord stimulator, pt desn't have remote control for it.  Message sent to hospitalist

## 2019-08-20 NOTE — ED PROVIDER NOTES
[address] [facility] Emergency Department      Pt Name: Rosmery Berg  MRN: [id number]  [place] 1945  Date of evaluation: 8/19/2019  Provider: Harleen Porras MD  I independently performed a history and physical on Rosmery Berg. All diagnostic, treatment, and disposition decisions were made by myself in conjunction with the advanced practice provider. HPI: Rosmery Berg presented with   Chief Complaint   Patient presents with    Extremity Weakness     pt states since starting new BP medication on Friday she has been off balance     Rosmery Berg has a past medical history of AVM, Chronic back pain, Depression, Gastritis, History of blood transfusion, History of GI bleed, Hypertension, and Personal history of MRSA (methicillin resistant Staphylococcus aureus). She has a past surgical history that includes hiatal hernia repair (1999); Endoscopy, colon, diagnostic; Colonoscopy; cervical fusion; back surgery; other surgical history (Left, 232768); and Abdominal exploration surgery (09/07/2017). No current facility-administered medications on file prior to encounter.       Current Outpatient Medications on File Prior to Encounter   Medication Sig Dispense Refill    valsartan-hydrochlorothiazide (DIOVAN-HCT) 160-25 MG per tablet Take 1 tablet by mouth daily 90 tablet 1    bethanechol (URECHOLINE) 25 MG tablet TAKE ONE TABLET BY MOUTH THREE TIMES A DAY 90 tablet 1    rOPINIRole (REQUIP) 0.25 MG tablet TAKE ONE TABLET BY MOUTH ONCE NIGHTLY 90 tablet 0    DULoxetine (CYMBALTA) 30 MG extended release capsule TAKE THREE CAPSULES BY MOUTH DAILY 90 capsule 0    ondansetron (ZOFRAN) 4 MG tablet Take 1 tablet by mouth every 8 hours as needed for Nausea or Vomiting 30 tablet 0    meclizine (ANTIVERT) 25 MG tablet Take 1 tablet by mouth 3 times daily as needed for Dizziness 90 tablet 2    buPROPion (WELLBUTRIN XL) 300 MG extended release tablet TAKE ONE TABLET BY MOUTH EVERY MORNING 90 tablet 0    furosemide (LASIX) 20 MG tablet TAKE ONE TABLET BY MOUTH DAILY 90 tablet 1    pregabalin (LYRICA) 100 MG capsule Take 100 mg by mouth 2 times daily. Marcello Opoka  tiZANidine (ZANAFLEX) 4 MG tablet Take 4 mg by mouth 2 times daily      morphine (MSIR) 15 MG tablet Take 15 mg by mouth every 6 hours as needed for Pain . PHYSICAL EXAM  Vitals: BP (!) 161/94   Pulse 86   Temp 98.6 °F (37 °C) (Oral)   Resp 16   Ht 5' (1.524 m)   Wt 176 lb (79.8 kg)   SpO2 93%   BMI 34.37 kg/m²   Constitutional:  68 y.o. female alert  HENT:  Atraumatic, oral mucosa moist  Neck:  No visible JVD, supple  Chest/Lungs:  Respiratory effort normal, clear  Abdomen:  Non-distended  Back:  No gross deformity  Extremities:  Normal tone and perfusion    Medical Decision Making and Plan: Briefly, this is an 68 y. o.female who presented with weakness, frequent falls. Diagnostics stable but unable to ambulate safely independently and needs admit for further evaluation and care. For further details of [facility] Emergency Department encounter, please see documentation by advanced practice provider ANURADHA Mcgee.     Labs Reviewed   CBC WITH AUTO DIFFERENTIAL - Abnormal; Notable for the following components:       Result Value    WBC 3.8 (*)     Lymphocytes # 0.9 (*)     All other components within normal limits    Narrative:     Performed at:  [facility] [address], [place], [facility] [employer]   Phone [phone]   COMPREHENSIVE METABOLIC PANEL W/ REFLEX TO MG FOR LOW K - Abnormal; Notable for the following components:    CO2 34 (*)     GFR Non- 54 (*)     All other components within normal limits    Narrative:     Performed at:  [facility]  [address] [facility]  [place] [employer]   Phone [phone]   TROPONIN    Narrative:     Performed at:  [facility]  [address] [facility] [address] [employer]   Phone [phone]

## 2019-08-20 NOTE — ED NOTES
Pt assisted by RN and Tech to bedside commode, pt states she cannot use a bedpan. Pt asked to sit for a few minutes.       August Bunch RN  08/19/19 5285

## 2019-08-21 VITALS
HEART RATE: 89 BPM | DIASTOLIC BLOOD PRESSURE: 90 MMHG | TEMPERATURE: 98 F | RESPIRATION RATE: 16 BRPM | WEIGHT: 177.03 LBS | OXYGEN SATURATION: 90 % | BODY MASS INDEX: 34.76 KG/M2 | HEIGHT: 60 IN | SYSTOLIC BLOOD PRESSURE: 127 MMHG

## 2019-08-21 PROCEDURE — 2580000003 HC RX 258: Performed by: INTERNAL MEDICINE

## 2019-08-21 PROCEDURE — 96372 THER/PROPH/DIAG INJ SC/IM: CPT

## 2019-08-21 PROCEDURE — 6360000002 HC RX W HCPCS: Performed by: INTERNAL MEDICINE

## 2019-08-21 PROCEDURE — 6370000000 HC RX 637 (ALT 250 FOR IP): Performed by: INTERNAL MEDICINE

## 2019-08-21 PROCEDURE — G0378 HOSPITAL OBSERVATION PER HR: HCPCS

## 2019-08-21 RX ORDER — ASPIRIN 81 MG/1
81 TABLET, CHEWABLE ORAL DAILY
Qty: 30 TABLET | Refills: 0 | Status: SHIPPED | OUTPATIENT
Start: 2019-08-22 | End: 2022-05-26

## 2019-08-21 RX ORDER — AMLODIPINE BESYLATE 5 MG/1
5 TABLET ORAL DAILY
Qty: 30 TABLET | Refills: 3 | Status: SHIPPED | OUTPATIENT
Start: 2019-08-22 | End: 2019-08-21 | Stop reason: HOSPADM

## 2019-08-21 RX ORDER — AMLODIPINE BESYLATE 5 MG/1
10 TABLET ORAL DAILY
Status: DISCONTINUED | OUTPATIENT
Start: 2019-08-22 | End: 2019-08-21

## 2019-08-21 RX ORDER — AMLODIPINE BESYLATE 5 MG/1
10 TABLET ORAL DAILY
Status: DISCONTINUED | OUTPATIENT
Start: 2019-08-21 | End: 2019-08-21 | Stop reason: HOSPADM

## 2019-08-21 RX ORDER — AMLODIPINE BESYLATE 10 MG/1
10 TABLET ORAL DAILY
Qty: 90 TABLET | Refills: 0 | Status: SHIPPED | OUTPATIENT
Start: 2019-08-21 | End: 2020-02-18 | Stop reason: SDUPTHER

## 2019-08-21 RX ADMIN — PREGABALIN 100 MG: 100 CAPSULE ORAL at 08:47

## 2019-08-21 RX ADMIN — DULOXETINE HYDROCHLORIDE 30 MG: 30 CAPSULE, DELAYED RELEASE ORAL at 08:48

## 2019-08-21 RX ADMIN — Medication 10 ML: at 08:49

## 2019-08-21 RX ADMIN — BETHANECHOL CHLORIDE 25 MG: 10 TABLET ORAL at 08:48

## 2019-08-21 RX ADMIN — AMLODIPINE BESYLATE 10 MG: 5 TABLET ORAL at 12:10

## 2019-08-21 RX ADMIN — ENOXAPARIN SODIUM 40 MG: 40 INJECTION SUBCUTANEOUS at 08:50

## 2019-08-21 RX ADMIN — BUPROPION HYDROCHLORIDE 300 MG: 300 TABLET, EXTENDED RELEASE ORAL at 08:47

## 2019-08-21 RX ADMIN — TIZANIDINE 4 MG: 4 TABLET ORAL at 08:49

## 2019-08-21 RX ADMIN — ASPIRIN 81 MG 81 MG: 81 TABLET ORAL at 08:47

## 2019-08-21 RX ADMIN — AMLODIPINE BESYLATE 5 MG: 5 TABLET ORAL at 08:47

## 2019-08-21 ASSESSMENT — PAIN SCALES - GENERAL
PAINLEVEL_OUTOF10: 0
PAINLEVEL_OUTOF10: 0

## 2019-08-21 NOTE — CARE COORDINATION
HHC order obtained. Charge nurse messaging MD regarding DME order for rolling walker. Maria D Garza with [employer] updated.      Jeanette AlmonteDuke [facility]epoint Healthcare  [phone]

## 2019-08-21 NOTE — PROGRESS NOTES
Pt has discharge order. PIV and tele removed. Discharge instructions given.  Pt discharged home with all personal belongings

## 2019-08-21 NOTE — PROGRESS NOTES
Spoke to Dr. Luis Ruiz removed from discharge medications. Orders received for 10mg PO amlodipine to be given now and home dose increased. Also asked for DME order for rolling walker.

## 2019-08-21 NOTE — DISCHARGE SUMMARY
as: WELLBUTRIN XL  TAKE ONE TABLET BY MOUTH EVERY MORNING     DULoxetine 30 MG extended release capsule  Commonly known as:  CYMBALTA  TAKE THREE CAPSULES BY MOUTH DAILY     furosemide 20 MG tablet  Commonly known as:  LASIX  TAKE ONE TABLET BY MOUTH DAILY     meclizine 25 MG tablet  Commonly known as:  ANTIVERT  Take 1 tablet by mouth 3 times daily as needed for Dizziness     morphine 15 MG tablet  Commonly known as:  MSIR     ondansetron 4 MG tablet  Commonly known as:  ZOFRAN  Take 1 tablet by mouth every 8 hours as needed for Nausea or Vomiting     pregabalin 100 MG capsule  Commonly known as:  LYRICA     rOPINIRole 0.25 MG tablet  Commonly known as:  REQUIP  TAKE ONE TABLET BY MOUTH ONCE NIGHTLY     tiZANidine 4 MG tablet  Commonly known as:  Lee Rebel        STOP taking these medications    valsartan-hydrochlorothiazide 160-25 MG per tablet  Commonly known as:  DIOVAN-HCT           Where to Get Your Medications      These medications were sent to [facility] [address], [address] [facility] [phone] Yusra Savage [phone]  [address] [place] [address], [address] [place] [address]    Phone:  [phone]   · amLODIPine 10 MG tablet  · amLODIPine 5 MG tablet  · aspirin 81 MG chewable tablet         Discharge recommendations given to patient. Follow Up. PCP in 1 week   Disposition. home  Activity. activity as tolerated  Diet: DIET GENERAL;      Spent 34 minutes in discharge process.     Signed:  Cara Bojorquez MD     8/21/2019 11:14 AM

## 2019-08-23 DIAGNOSIS — I10 ESSENTIAL HYPERTENSION: ICD-10-CM

## 2019-08-23 NOTE — PROGRESS NOTES
Physical Therapy  Physical Therapy Discharge Summary    Name: Rosmery Berg  : 1945    The pt was evaluated by PT on  and seen for 0 treatment sessions prior to DC to home on  per MD order. The pt's acute therapy goals were:  Short term goals  Time Frame for Short term goals: discharge  Short term goal 1: pt will complete bed mobility MOD I  Short term goal 2: pt will complete sit to/from stand MOD I  Short term goal 3: pt will amb 200' with LRAD MOD I  Long term goals  Time Frame for Long term goals : LTG=STG    Patient met 0 goals during stay. Number of Refusals:0  Number of Holds: 0  During this hospitalization, the patient was educated on:       DC pt from PT caseload at this time. Thank you!     Felice Goltz, PT, DPT, [id number]

## 2019-08-26 ENCOUNTER — TELEPHONE (OUTPATIENT)
Dept: FAMILY MEDICINE CLINIC | Age: 74
End: 2019-08-26

## 2019-08-27 RX ORDER — FUROSEMIDE 20 MG/1
TABLET ORAL
Qty: 90 TABLET | Refills: 1 | Status: ON HOLD | OUTPATIENT
Start: 2019-08-27 | End: 2019-08-31 | Stop reason: SDUPTHER

## 2019-08-29 ENCOUNTER — APPOINTMENT (OUTPATIENT)
Dept: CT IMAGING | Age: 74
DRG: 682 | End: 2019-08-29
Payer: MEDICARE

## 2019-08-29 ENCOUNTER — APPOINTMENT (OUTPATIENT)
Dept: ULTRASOUND IMAGING | Age: 74
DRG: 682 | End: 2019-08-29
Payer: MEDICARE

## 2019-08-29 ENCOUNTER — HOSPITAL ENCOUNTER (INPATIENT)
Age: 74
LOS: 2 days | Discharge: HOME | DRG: 682 | End: 2019-08-31
Attending: EMERGENCY MEDICINE | Admitting: FAMILY MEDICINE
Payer: MEDICARE

## 2019-08-29 ENCOUNTER — APPOINTMENT (OUTPATIENT)
Dept: GENERAL RADIOLOGY | Age: 74
DRG: 682 | End: 2019-08-29
Payer: MEDICARE

## 2019-08-29 ENCOUNTER — TELEPHONE (OUTPATIENT)
Dept: FAMILY MEDICINE CLINIC | Age: 74
End: 2019-08-29

## 2019-08-29 ENCOUNTER — APPOINTMENT (OUTPATIENT)
Dept: NUCLEAR MEDICINE | Age: 74
DRG: 682 | End: 2019-08-29
Payer: MEDICARE

## 2019-08-29 DIAGNOSIS — J96.01 ACUTE RESPIRATORY FAILURE WITH HYPOXIA (HCC): Primary | ICD-10-CM

## 2019-08-29 DIAGNOSIS — I10 ESSENTIAL HYPERTENSION: ICD-10-CM

## 2019-08-29 DIAGNOSIS — N17.9 AKI (ACUTE KIDNEY INJURY) (HCC): ICD-10-CM

## 2019-08-29 DIAGNOSIS — J18.9 PNEUMONIA DUE TO ORGANISM: ICD-10-CM

## 2019-08-29 DIAGNOSIS — R77.8 ELEVATED TROPONIN: ICD-10-CM

## 2019-08-29 LAB
A/G RATIO: 1.6 (ref 1.1–2.2)
ALBUMIN SERPL-MCNC: 4.1 G/DL (ref 3.4–5)
ALP BLD-CCNC: 116 U/L (ref 40–129)
ALT SERPL-CCNC: 12 U/L (ref 10–40)
ANION GAP SERPL CALCULATED.3IONS-SCNC: 12 MMOL/L (ref 3–16)
APTT: 51.7 SEC (ref 26–36)
AST SERPL-CCNC: 13 U/L (ref 15–37)
BACTERIA: ABNORMAL /HPF
BASOPHILS ABSOLUTE: 0 K/UL (ref 0–0.2)
BASOPHILS RELATIVE PERCENT: 0.3 %
BILIRUB SERPL-MCNC: 0.5 MG/DL (ref 0–1)
BILIRUBIN URINE: NEGATIVE
BLOOD, URINE: NEGATIVE
BUN BLDV-MCNC: 38 MG/DL (ref 7–20)
CALCIUM SERPL-MCNC: 8.7 MG/DL (ref 8.3–10.6)
CHLORIDE BLD-SCNC: 100 MMOL/L (ref 99–110)
CLARITY: ABNORMAL
CO2: 25 MMOL/L (ref 21–32)
COLOR: YELLOW
CREAT SERPL-MCNC: 1.8 MG/DL (ref 0.6–1.2)
EOSINOPHILS ABSOLUTE: 0.1 K/UL (ref 0–0.6)
EOSINOPHILS RELATIVE PERCENT: 1.6 %
EPITHELIAL CELLS, UA: 1 /HPF (ref 0–5)
GFR AFRICAN AMERICAN: 33
GFR NON-AFRICAN AMERICAN: 28
GLOBULIN: 2.6 G/DL
GLUCOSE BLD-MCNC: 97 MG/DL (ref 70–99)
GLUCOSE URINE: NEGATIVE MG/DL
HCT VFR BLD CALC: 38.4 % (ref 36–48)
HEMOGLOBIN: 12.5 G/DL (ref 12–16)
HYALINE CASTS: 2 /LPF (ref 0–8)
INR BLD: 0.9 (ref 0.86–1.14)
KETONES, URINE: NEGATIVE MG/DL
LACTIC ACID, SEPSIS: 0.9 MMOL/L (ref 0.4–1.9)
LEUKOCYTE ESTERASE, URINE: ABNORMAL
LYMPHOCYTES ABSOLUTE: 0.4 K/UL (ref 1–5.1)
LYMPHOCYTES RELATIVE PERCENT: 6.3 %
MCH RBC QN AUTO: 29.4 PG (ref 26–34)
MCHC RBC AUTO-ENTMCNC: 32.7 G/DL (ref 31–36)
MCV RBC AUTO: 89.9 FL (ref 80–100)
MICROSCOPIC EXAMINATION: YES
MONOCYTES ABSOLUTE: 0.4 K/UL (ref 0–1.3)
MONOCYTES RELATIVE PERCENT: 5.8 %
NEUTROPHILS ABSOLUTE: 5.2 K/UL (ref 1.7–7.7)
NEUTROPHILS RELATIVE PERCENT: 86 %
NITRITE, URINE: POSITIVE
PDW BLD-RTO: 14 % (ref 12.4–15.4)
PH UA: 5.5 (ref 5–8)
PLATELET # BLD: 174 K/UL (ref 135–450)
PMV BLD AUTO: 9.3 FL (ref 5–10.5)
POTASSIUM REFLEX MAGNESIUM: 5.2 MMOL/L (ref 3.5–5.1)
PRO-BNP: 36 PG/ML (ref 0–124)
PROTEIN UA: NEGATIVE MG/DL
PROTHROMBIN TIME: 10.3 SEC (ref 9.8–13)
RBC # BLD: 4.27 M/UL (ref 4–5.2)
RBC UA: 3 /HPF (ref 0–4)
SODIUM BLD-SCNC: 137 MMOL/L (ref 136–145)
SPECIFIC GRAVITY UA: 1.01 (ref 1–1.03)
TOTAL PROTEIN: 6.7 G/DL (ref 6.4–8.2)
TROPONIN: 0.03 NG/ML
TROPONIN: 0.06 NG/ML
URINE REFLEX TO CULTURE: YES
URINE TYPE: ABNORMAL
UROBILINOGEN, URINE: 0.2 E.U./DL
WBC # BLD: 6 K/UL (ref 4–11)
WBC UA: 10 /HPF (ref 0–5)

## 2019-08-29 PROCEDURE — 83605 ASSAY OF LACTIC ACID: CPT

## 2019-08-29 PROCEDURE — 87186 SC STD MICRODIL/AGAR DIL: CPT

## 2019-08-29 PROCEDURE — 6360000002 HC RX W HCPCS: Performed by: PHYSICIAN ASSISTANT

## 2019-08-29 PROCEDURE — 87449 NOS EACH ORGANISM AG IA: CPT

## 2019-08-29 PROCEDURE — 85730 THROMBOPLASTIN TIME PARTIAL: CPT

## 2019-08-29 PROCEDURE — 71250 CT THORAX DX C-: CPT

## 2019-08-29 PROCEDURE — 6370000000 HC RX 637 (ALT 250 FOR IP): Performed by: FAMILY MEDICINE

## 2019-08-29 PROCEDURE — 78582 LUNG VENTILAT&PERFUS IMAGING: CPT

## 2019-08-29 PROCEDURE — 96367 TX/PROPH/DG ADDL SEQ IV INF: CPT

## 2019-08-29 PROCEDURE — G0378 HOSPITAL OBSERVATION PER HR: HCPCS

## 2019-08-29 PROCEDURE — 72131 CT LUMBAR SPINE W/O DYE: CPT

## 2019-08-29 PROCEDURE — 84484 ASSAY OF TROPONIN QUANT: CPT

## 2019-08-29 PROCEDURE — 70450 CT HEAD/BRAIN W/O DYE: CPT

## 2019-08-29 PROCEDURE — 96361 HYDRATE IV INFUSION ADD-ON: CPT

## 2019-08-29 PROCEDURE — 36415 COLL VENOUS BLD VENIPUNCTURE: CPT

## 2019-08-29 PROCEDURE — 87086 URINE CULTURE/COLONY COUNT: CPT

## 2019-08-29 PROCEDURE — 87040 BLOOD CULTURE FOR BACTERIA: CPT

## 2019-08-29 PROCEDURE — 6370000000 HC RX 637 (ALT 250 FOR IP): Performed by: PHYSICIAN ASSISTANT

## 2019-08-29 PROCEDURE — 99285 EMERGENCY DEPT VISIT HI MDM: CPT

## 2019-08-29 PROCEDURE — 76770 US EXAM ABDO BACK WALL COMP: CPT

## 2019-08-29 PROCEDURE — 6360000002 HC RX W HCPCS: Performed by: FAMILY MEDICINE

## 2019-08-29 PROCEDURE — 2580000003 HC RX 258: Performed by: FAMILY MEDICINE

## 2019-08-29 PROCEDURE — 2580000003 HC RX 258: Performed by: INTERNAL MEDICINE

## 2019-08-29 PROCEDURE — 96366 THER/PROPH/DIAG IV INF ADDON: CPT

## 2019-08-29 PROCEDURE — A9540 TC99M MAA: HCPCS | Performed by: PHYSICIAN ASSISTANT

## 2019-08-29 PROCEDURE — 85610 PROTHROMBIN TIME: CPT

## 2019-08-29 PROCEDURE — 3430000000 HC RX DIAGNOSTIC RADIOPHARMACEUTICAL: Performed by: PHYSICIAN ASSISTANT

## 2019-08-29 PROCEDURE — 93005 ELECTROCARDIOGRAM TRACING: CPT | Performed by: EMERGENCY MEDICINE

## 2019-08-29 PROCEDURE — 71045 X-RAY EXAM CHEST 1 VIEW: CPT

## 2019-08-29 PROCEDURE — 85025 COMPLETE CBC W/AUTO DIFF WBC: CPT

## 2019-08-29 PROCEDURE — 73502 X-RAY EXAM HIP UNI 2-3 VIEWS: CPT

## 2019-08-29 PROCEDURE — 80053 COMPREHEN METABOLIC PANEL: CPT

## 2019-08-29 PROCEDURE — 81001 URINALYSIS AUTO W/SCOPE: CPT

## 2019-08-29 PROCEDURE — A9558 XE133 XENON 10MCI: HCPCS | Performed by: PHYSICIAN ASSISTANT

## 2019-08-29 PROCEDURE — 1200000000 HC SEMI PRIVATE

## 2019-08-29 PROCEDURE — 96365 THER/PROPH/DIAG IV INF INIT: CPT

## 2019-08-29 PROCEDURE — 87077 CULTURE AEROBIC IDENTIFY: CPT

## 2019-08-29 PROCEDURE — 2580000003 HC RX 258: Performed by: PHYSICIAN ASSISTANT

## 2019-08-29 PROCEDURE — 83880 ASSAY OF NATRIURETIC PEPTIDE: CPT

## 2019-08-29 PROCEDURE — 72125 CT NECK SPINE W/O DYE: CPT

## 2019-08-29 RX ORDER — BUPROPION HYDROCHLORIDE 300 MG/1
300 TABLET ORAL DAILY
Status: DISCONTINUED | OUTPATIENT
Start: 2019-08-30 | End: 2019-08-31 | Stop reason: HOSPADM

## 2019-08-29 RX ORDER — SODIUM CHLORIDE 0.9 % (FLUSH) 0.9 %
10 SYRINGE (ML) INJECTION PRN
Status: DISCONTINUED | OUTPATIENT
Start: 2019-08-29 | End: 2019-08-31 | Stop reason: HOSPADM

## 2019-08-29 RX ORDER — MORPHINE SULFATE 15 MG/1
15 TABLET ORAL ONCE
Status: COMPLETED | OUTPATIENT
Start: 2019-08-29 | End: 2019-08-30

## 2019-08-29 RX ORDER — ASPIRIN 81 MG/1
81 TABLET, CHEWABLE ORAL DAILY
Status: DISCONTINUED | OUTPATIENT
Start: 2019-08-29 | End: 2019-08-31 | Stop reason: HOSPADM

## 2019-08-29 RX ORDER — ACETAMINOPHEN 325 MG/1
650 TABLET ORAL EVERY 4 HOURS PRN
Status: DISCONTINUED | OUTPATIENT
Start: 2019-08-29 | End: 2019-08-31 | Stop reason: HOSPADM

## 2019-08-29 RX ORDER — VANCOMYCIN HYDROCHLORIDE 1 G/200ML
15 INJECTION, SOLUTION INTRAVENOUS ONCE
Status: COMPLETED | OUTPATIENT
Start: 2019-08-29 | End: 2019-08-29

## 2019-08-29 RX ORDER — XENON XE-133 10 MCI/1
9.22 GAS RESPIRATORY (INHALATION)
Status: COMPLETED | OUTPATIENT
Start: 2019-08-29 | End: 2019-08-29

## 2019-08-29 RX ORDER — SODIUM CHLORIDE 9 MG/ML
INJECTION, SOLUTION INTRAVENOUS CONTINUOUS
Status: DISCONTINUED | OUTPATIENT
Start: 2019-08-29 | End: 2019-08-30

## 2019-08-29 RX ORDER — 0.9 % SODIUM CHLORIDE 0.9 %
1000 INTRAVENOUS SOLUTION INTRAVENOUS ONCE
Status: COMPLETED | OUTPATIENT
Start: 2019-08-29 | End: 2019-08-29

## 2019-08-29 RX ORDER — ROPINIROLE 0.25 MG/1
0.25 TABLET, FILM COATED ORAL 3 TIMES DAILY
Status: DISCONTINUED | OUTPATIENT
Start: 2019-08-29 | End: 2019-08-31 | Stop reason: HOSPADM

## 2019-08-29 RX ORDER — ONDANSETRON 2 MG/ML
4 INJECTION INTRAMUSCULAR; INTRAVENOUS EVERY 6 HOURS PRN
Status: DISCONTINUED | OUTPATIENT
Start: 2019-08-29 | End: 2019-08-31 | Stop reason: HOSPADM

## 2019-08-29 RX ORDER — SODIUM CHLORIDE 9 MG/ML
INJECTION, SOLUTION INTRAVENOUS CONTINUOUS
Status: DISCONTINUED | OUTPATIENT
Start: 2019-08-29 | End: 2019-08-31

## 2019-08-29 RX ORDER — ASPIRIN 81 MG/1
324 TABLET, CHEWABLE ORAL ONCE
Status: COMPLETED | OUTPATIENT
Start: 2019-08-29 | End: 2019-08-29

## 2019-08-29 RX ORDER — BETHANECHOL CHLORIDE 25 MG/1
25 TABLET ORAL 3 TIMES DAILY
Status: DISCONTINUED | OUTPATIENT
Start: 2019-08-29 | End: 2019-08-31 | Stop reason: HOSPADM

## 2019-08-29 RX ORDER — VALSARTAN AND HYDROCHLOROTHIAZIDE 160; 25 MG/1; MG/1
1 TABLET ORAL DAILY
Status: ON HOLD | COMMUNITY
End: 2019-08-31 | Stop reason: HOSPADM

## 2019-08-29 RX ORDER — MECLIZINE HCL 12.5 MG/1
25 TABLET ORAL 3 TIMES DAILY PRN
Status: DISCONTINUED | OUTPATIENT
Start: 2019-08-29 | End: 2019-08-31 | Stop reason: HOSPADM

## 2019-08-29 RX ORDER — PREGABALIN 150 MG/1
150 CAPSULE ORAL 3 TIMES DAILY
Status: ON HOLD | COMMUNITY
End: 2019-08-31 | Stop reason: HOSPADM

## 2019-08-29 RX ORDER — SODIUM CHLORIDE 0.9 % (FLUSH) 0.9 %
10 SYRINGE (ML) INJECTION EVERY 12 HOURS SCHEDULED
Status: DISCONTINUED | OUTPATIENT
Start: 2019-08-29 | End: 2019-08-31 | Stop reason: HOSPADM

## 2019-08-29 RX ADMIN — ASPIRIN 81 MG 324 MG: 81 TABLET ORAL at 12:31

## 2019-08-29 RX ADMIN — ROPINIROLE HYDROCHLORIDE 0.25 MG: 0.25 TABLET, FILM COATED ORAL at 20:52

## 2019-08-29 RX ADMIN — Medication 5.42 MILLICURIE: at 14:27

## 2019-08-29 RX ADMIN — SODIUM CHLORIDE 100 ML/HR: 9 INJECTION, SOLUTION INTRAVENOUS at 16:14

## 2019-08-29 RX ADMIN — AZITHROMYCIN MONOHYDRATE 500 MG: 500 INJECTION, POWDER, LYOPHILIZED, FOR SOLUTION INTRAVENOUS at 20:52

## 2019-08-29 RX ADMIN — CEFEPIME HYDROCHLORIDE 2 G: 2 INJECTION, POWDER, FOR SOLUTION INTRAVENOUS at 16:14

## 2019-08-29 RX ADMIN — XENON XE-133 9.22 MILLICURIE: 10 GAS RESPIRATORY (INHALATION) at 14:27

## 2019-08-29 RX ADMIN — BETHANECHOL CHLORIDE 25 MG: 25 TABLET ORAL at 20:52

## 2019-08-29 RX ADMIN — Medication 10 ML: at 14:28

## 2019-08-29 RX ADMIN — ASPIRIN 81 MG 81 MG: 81 TABLET ORAL at 19:07

## 2019-08-29 RX ADMIN — SODIUM CHLORIDE 1000 ML: 9 INJECTION, SOLUTION INTRAVENOUS at 12:31

## 2019-08-29 RX ADMIN — SODIUM CHLORIDE: 9 INJECTION, SOLUTION INTRAVENOUS at 17:50

## 2019-08-29 RX ADMIN — VANCOMYCIN HYDROCHLORIDE 1000 MG: 1 INJECTION, SOLUTION INTRAVENOUS at 17:50

## 2019-08-29 ASSESSMENT — ENCOUNTER SYMPTOMS
NAUSEA: 0
COUGH: 0
COLOR CHANGE: 0
DIARRHEA: 0
EYE DISCHARGE: 0
CONSTIPATION: 0
SORE THROAT: 0
EYE PAIN: 0
BLOOD IN STOOL: 0
VOMITING: 0
COUGH: 1
BACK PAIN: 1
WHEEZING: 0
PHOTOPHOBIA: 0
RESPIRATORY NEGATIVE: 1
CHEST TIGHTNESS: 0
ABDOMINAL PAIN: 0
SHORTNESS OF BREATH: 0
SINUS PAIN: 0

## 2019-08-29 ASSESSMENT — HEART SCORE: ECG: 1

## 2019-08-29 ASSESSMENT — PAIN SCALES - GENERAL
PAINLEVEL_OUTOF10: 0
PAINLEVEL_OUTOF10: 0

## 2019-08-29 NOTE — H&P
HOSPITALISTS HISTORY AND PHYSICAL    8/29/2019 6:41 PM    Patient Information:  Emmanuel Méndez is a 68 y.o. female [id number]  PCP:  Mike Luciano MD (Tel: [phone] )    Chief complaint:    Chief Complaint   Patient presents with    Fall     Pt. states that she fell this am and her home health Nurse wanted her to get checked out. Pt. with recent Hospital admission this week for dizziness. Pt. states she hit her head but no LOC. History of Present Illness:  Oksana Garcia is a 68 y.o. female presents after sustaining a fall this am . She was getting out of bed to go to the bathroom. She reports hitting her head denies LOC  The pt has foul smelling urine and burning with urination for about couple of weeks. She also had diarrhea a few weeks ago that has resolved now. She states she retains urine and has been advised by urology to self catheterize. She takes urecholine and states does not self catheterize. She has UTI. She has been started on IV abx. She also has renal failure . BP is on the low side    No cough , fever, chills, n/v/d.c    REVIEW OF SYSTEMS:   Constitutional: Negative for fever,chills or night sweats  ENT: Negative for rhinorrhea, epistaxis, hoarseness, sore throat. Respiratory: Negative for shortness of breath,wheezing  Cardiovascular: Negative for chest pain, palpitations   Gastrointestinal: Negative for nausea, vomiting, diarrhea  Genitourinary: +ve for polyuria, dysuria   Hematologic/Lymphatic: Negative for bleeding tendency, easy bruising  Musculoskeletal: Negative for myalgias and arthralgias  Neurologic: Negative for confusion,dysarthria. Skin: Negative for itching,rash  Psychiatric: Negative for depression,anxiety, agitation. Endocrine: Negative for polydipsia,polyuria,heat /cold intolerance.     Past Medical History:   has a past medical history of AVM, Chronic back pain, Depression, Gastritis, History of she does not drink alcohol or use drugs. Family History:  family history includes Diabetes in her father and mother; Heart Disease in her father and mother; Other in her sister. ,     Physical Exam:  /80   Pulse 87   Temp 98.6 °F (37 °C) (Temporal)   Resp 18   Ht 5' (1.524 m)   Wt 177 lb (80.3 kg)   SpO2 93%   BMI 34.57 kg/m²     General appearance:  Appears comfortable. Well nourished  Eyes: Sclera clear, pupils equal  ENT: Moist mucus membranes, no thrush. Trachea midline. Cardiovascular: Regular rhythm, normal S1, S2. No murmur, gallop, rub. No edema in lower extremities  Respiratory: Clear to auscultation bilaterally, no wheeze, good inspiratory effort  Gastrointestinal: Abdomen soft, non-tender, not distended, normal bowel sounds  Musculoskeletal: No cyanosis in digits, neck supple  Neurology: Cranial nerves grossly intact. Alert and oriented in time, place and person. No speech or motor deficits  Psychiatry: Appropriate affect. Not agitated  Skin: Warm, dry, normal turgor, no rash    Labs:  CBC:   Lab Results   Component Value Date    WBC 6.0 08/29/2019    RBC 4.27 08/29/2019    HGB 12.5 08/29/2019    HCT 38.4 08/29/2019    MCV 89.9 08/29/2019    MCH 29.4 08/29/2019    MCHC 32.7 08/29/2019    RDW 14.0 08/29/2019     08/29/2019    MPV 9.3 08/29/2019     BMP:    Lab Results   Component Value Date     08/29/2019    K 5.2 08/29/2019     08/29/2019    CO2 25 08/29/2019    BUN 38 08/29/2019    CREATININE 1.8 08/29/2019    CALCIUM 8.7 08/29/2019    GFRAA 33 08/29/2019    GFRAA >60 07/21/2011    LABGLOM 28 08/29/2019    GLUCOSE 97 08/29/2019       Chest Xray:   EKG:        Problem List  Active Problems:    SHITAL (acute kidney injury) ([facility] [address]  Resolved Problems:    * No resolved hospital problems. *        Assessment/Plan:         1.  SHITAL   BUN /cr 38/1.8  Likely d/t dehydration   Hydrate   Holding HCTZ  Nephrology following  Renal US pending    UTI   Cultures pending   Treat with IV

## 2019-08-29 NOTE — ED PROVIDER NOTES
Mother     Heart Disease Mother         cad, aortic aneurysm    Diabetes Father     Heart Disease Father         heart attack, chf    Other Sister         abdominal aneurysm          SOCIAL HISTORY       Social History     Socioeconomic History    Marital status:      Spouse name: None    Number of children: None    Years of education: None    Highest education level: None   Occupational History    None   Social Needs    Financial resource strain: None    Food insecurity:     Worry: None     Inability: None    Transportation needs:     Medical: None     Non-medical: None   Tobacco Use    Smoking status: Never Smoker    Smokeless tobacco: Never Used   Substance and Sexual Activity    Alcohol use: No    Drug use: No    Sexual activity: None   Lifestyle    Physical activity:     Days per week: None     Minutes per session: None    Stress: None   Relationships    Social connections:     Talks on phone: None     Gets together: None     Attends Hinduism service: None     Active member of club or organization: None     Attends meetings of clubs or organizations: None     Relationship status: None    Intimate partner violence:     Fear of current or ex partner: None     Emotionally abused: None     Physically abused: None     Forced sexual activity: None   Other Topics Concern    None   Social History Narrative    None       SCREENINGS      Heart Score for chest pain patients  History:  Moderately Suspicious  ECG: Non-Specifc repolarization disturbance/LBTB/PM  Patient Age: > 65 years  *Risk factors for Atherosclerotic disease: Hypertension, Obesity  Risk Factors: 1 or 2 risk factors  Troponin: > 3X normal limit  Heart Score Total: 7      PHYSICAL EXAM    (up to 7 for level 4, 8 or more for level 5)     ED Triage Vitals [08/29/19 1053]   BP Temp Temp Source Pulse Resp SpO2 Height Weight   112/72 99 °F (37.2 °C) Oral 93 18 (!) 89 % 5' (1.524 m) 177 lb (80.3 kg)       Physical Exam Constitutional: She is oriented to person, place, and time. She appears well-developed and well-nourished. No distress. HENT:   Head: Normocephalic and atraumatic. Right Ear: External ear normal.   Left Ear: External ear normal.   Atraumatic. No raccoon eyes or mccoy sign. No epistaxis. No septal hematoma. No trismus or trauma occlusion. Eyes: Pupils are equal, round, and reactive to light. Conjunctivae and EOM are normal. Right eye exhibits no discharge. Left eye exhibits no discharge. Neck: Normal range of motion. Neck supple. No JVD present. Cardiovascular: Normal rate, regular rhythm, normal heart sounds and intact distal pulses. Exam reveals no gallop and no friction rub. No murmur heard. 2+ radial pulses bilaterally. No pedal edema. No calf tenderness. No JVD. Pulmonary/Chest: Effort normal and breath sounds normal. No stridor. No respiratory distress. She has no wheezes. She has no rales. She exhibits no tenderness. Lungs clear to auscultation. No respiratory distress noted. Patient noted to be 87 to 89% on room air with good pleth. Placed on 2 L nasal cannula oxygen and oxygen improved to 95%. Abdominal: Soft. She exhibits no distension and no mass. There is no tenderness. There is no rebound and no guarding. Musculoskeletal: Normal range of motion. No tenderness to palpation of the midline cervical, thoracic or lumbar spine. No anterior chest wall tenderness. No pelvis instability. Tenderness to the left hip with palpation. No shortening or rotation. Full range of motion and strength to the bilateral upper and lower extremity throughout. Distal neurovascular intact. Gait deferred. Lymphadenopathy:     She has no cervical adenopathy. Neurological: She is alert and oriented to person, place, and time. She has normal strength. No cranial nerve deficit or sensory deficit. GCS eye subscore is 4. GCS verbal subscore is 5. GCS motor subscore is 6. Gait deferred. millicuries Intravenous Given 8/29/19 1664)       Patient is a 78-year-old female who presents the ED with complaint of a fall. Had a fall where she states she felt off balance earlier today. Was recently admitted for dizziness and thought to potentially had peripheral etiology to her dizziness. Patient has a spinal cord stimulator and was not able to have MRI. Patient states was feeling better until today when she stood up and felt off balance and fell. States she did hit her head. Denies any other complaints at this time. Patient noted to be orthostatic with hypotension upon standing. Given fluids. IV established and blood work obtained. Patient incidentally was noted to be hypoxic upon arrival.  Patient denies chest pain at this time or shortness of breath. EKG interpreted by attending but showed no acute abnormality. Urinalysis currently pending at this time. CMP showed creatinine of 1.8 with GFR of 28 and potassium 5.2. Appears to be suffering from new acute kidney injury at this time which may potentially be due to some dehydration and given acute kidney injury may be causing patient's orthostatic hypotension and potential fall. Either way patient hypoxic and had recent travel and cannot rule out PE at this time. Unfortunately because of patient's kidney injury cannot obtain CTA of the chest at this time. Therefore did discuss with radiologist who recommended Noncon CT of the chest and VQ scan. Troponin 0 0.06 without EKG changes and believe secondary to patient's acute kidney injury at this time. BMP unremarkable. CBC showed normal white count, hemoglobin and platelets. Coags obtained. Lactic acid 0.9. Blood cultures pending. X-ray of the left hip showed osteopenia with no displaced fracture. Patient has good range of motion strength and low suspicion for occult fracture. Do not believe CT indicated.   Chest x-ray showed low lung volumes with bibasilar opacity likely representing

## 2019-08-29 NOTE — TELEPHONE ENCOUNTER
Alana Husbands    Received a call from Norris with [facility]. She was there with the patient for eval and patient was by herself due to her  at work. Per [facility] patient has fallen  every day for the past week and yesterday and today when she has fallen she hit her head. Patient is reporting some confusion. B/P 92/67. [facility] is wanting to know what to do. Advise with falls, hitting her head, confusion, and blood pressure on the lower end, (low for patient per VS flow sheet) advised patient to go to Emergency room for eval. Per [facility] patient reluctant to go back to the hospital but [facility] will call patient's son to advise. Did advise [facility] that if patient refuses to go to the hospital to give a call back.

## 2019-08-30 LAB
ANION GAP SERPL CALCULATED.3IONS-SCNC: 8 MMOL/L (ref 3–16)
BUN BLDV-MCNC: 21 MG/DL (ref 7–20)
CALCIUM SERPL-MCNC: 8.8 MG/DL (ref 8.3–10.6)
CHLORIDE BLD-SCNC: 105 MMOL/L (ref 99–110)
CO2: 29 MMOL/L (ref 21–32)
CREAT SERPL-MCNC: 1 MG/DL (ref 0.6–1.2)
EKG ATRIAL RATE: 95 BPM
EKG DIAGNOSIS: NORMAL
EKG P-R INTERVAL: 194 MS
EKG Q-T INTERVAL: 378 MS
EKG QRS DURATION: 164 MS
EKG QTC CALCULATION (BAZETT): 475 MS
EKG R AXIS: 66 DEGREES
EKG T AXIS: 16 DEGREES
EKG VENTRICULAR RATE: 95 BPM
GFR AFRICAN AMERICAN: >60
GFR NON-AFRICAN AMERICAN: 54
GLUCOSE BLD-MCNC: 95 MG/DL (ref 70–99)
L. PNEUMOPHILA SEROGP 1 UR AG: NORMAL
POTASSIUM SERPL-SCNC: 4.7 MMOL/L (ref 3.5–5.1)
SODIUM BLD-SCNC: 142 MMOL/L (ref 136–145)
TROPONIN: 0.03 NG/ML

## 2019-08-30 PROCEDURE — 93010 ELECTROCARDIOGRAM REPORT: CPT | Performed by: INTERNAL MEDICINE

## 2019-08-30 PROCEDURE — 6370000000 HC RX 637 (ALT 250 FOR IP): Performed by: FAMILY MEDICINE

## 2019-08-30 PROCEDURE — G0009 ADMIN PNEUMOCOCCAL VACCINE: HCPCS | Performed by: FAMILY MEDICINE

## 2019-08-30 PROCEDURE — 6370000000 HC RX 637 (ALT 250 FOR IP): Performed by: NURSE PRACTITIONER

## 2019-08-30 PROCEDURE — 2580000003 HC RX 258: Performed by: FAMILY MEDICINE

## 2019-08-30 PROCEDURE — 6370000000 HC RX 637 (ALT 250 FOR IP): Performed by: INTERNAL MEDICINE

## 2019-08-30 PROCEDURE — 36415 COLL VENOUS BLD VENIPUNCTURE: CPT

## 2019-08-30 PROCEDURE — G0378 HOSPITAL OBSERVATION PER HR: HCPCS

## 2019-08-30 PROCEDURE — 6360000002 HC RX W HCPCS: Performed by: FAMILY MEDICINE

## 2019-08-30 PROCEDURE — 90670 PCV13 VACCINE IM: CPT | Performed by: FAMILY MEDICINE

## 2019-08-30 PROCEDURE — 97165 OT EVAL LOW COMPLEX 30 MIN: CPT

## 2019-08-30 PROCEDURE — 97161 PT EVAL LOW COMPLEX 20 MIN: CPT

## 2019-08-30 PROCEDURE — 97530 THERAPEUTIC ACTIVITIES: CPT

## 2019-08-30 PROCEDURE — 96372 THER/PROPH/DIAG INJ SC/IM: CPT

## 2019-08-30 PROCEDURE — 96375 TX/PRO/DX INJ NEW DRUG ADDON: CPT

## 2019-08-30 PROCEDURE — 94760 N-INVAS EAR/PLS OXIMETRY 1: CPT

## 2019-08-30 PROCEDURE — 80048 BASIC METABOLIC PNL TOTAL CA: CPT

## 2019-08-30 PROCEDURE — 1200000000 HC SEMI PRIVATE

## 2019-08-30 PROCEDURE — 84484 ASSAY OF TROPONIN QUANT: CPT

## 2019-08-30 PROCEDURE — 96366 THER/PROPH/DIAG IV INF ADDON: CPT

## 2019-08-30 PROCEDURE — 96361 HYDRATE IV INFUSION ADD-ON: CPT

## 2019-08-30 PROCEDURE — 97116 GAIT TRAINING THERAPY: CPT

## 2019-08-30 RX ORDER — DULOXETIN HYDROCHLORIDE 60 MG/1
60 CAPSULE, DELAYED RELEASE ORAL DAILY
Status: DISCONTINUED | OUTPATIENT
Start: 2019-08-30 | End: 2019-08-31 | Stop reason: HOSPADM

## 2019-08-30 RX ORDER — MORPHINE SULFATE 15 MG/1
15 TABLET ORAL EVERY 6 HOURS PRN
Status: DISCONTINUED | OUTPATIENT
Start: 2019-08-30 | End: 2019-08-31 | Stop reason: HOSPADM

## 2019-08-30 RX ADMIN — ROPINIROLE HYDROCHLORIDE 0.25 MG: 0.25 TABLET, FILM COATED ORAL at 13:21

## 2019-08-30 RX ADMIN — DULOXETINE HYDROCHLORIDE 60 MG: 60 CAPSULE, DELAYED RELEASE ORAL at 12:16

## 2019-08-30 RX ADMIN — AZITHROMYCIN MONOHYDRATE 500 MG: 500 INJECTION, POWDER, LYOPHILIZED, FOR SOLUTION INTRAVENOUS at 17:26

## 2019-08-30 RX ADMIN — BETHANECHOL CHLORIDE 25 MG: 25 TABLET ORAL at 20:58

## 2019-08-30 RX ADMIN — BETHANECHOL CHLORIDE 25 MG: 25 TABLET ORAL at 13:21

## 2019-08-30 RX ADMIN — ENOXAPARIN SODIUM 30 MG: 30 INJECTION SUBCUTANEOUS at 09:32

## 2019-08-30 RX ADMIN — ACETAMINOPHEN 650 MG: 325 TABLET, FILM COATED ORAL at 21:00

## 2019-08-30 RX ADMIN — Medication 1 G: at 00:59

## 2019-08-30 RX ADMIN — MAGNESIUM HYDROXIDE 30 ML: 400 SUSPENSION ORAL at 13:21

## 2019-08-30 RX ADMIN — PNEUMOCOCCAL 13-VALENT CONJUGATE VACCINE 0.5 ML: 2.2; 2.2; 2.2; 2.2; 2.2; 4.4; 2.2; 2.2; 2.2; 2.2; 2.2; 2.2; 2.2 INJECTION, SUSPENSION INTRAMUSCULAR at 09:33

## 2019-08-30 RX ADMIN — ROPINIROLE HYDROCHLORIDE 0.25 MG: 0.25 TABLET, FILM COATED ORAL at 20:58

## 2019-08-30 RX ADMIN — ROPINIROLE HYDROCHLORIDE 0.25 MG: 0.25 TABLET, FILM COATED ORAL at 09:33

## 2019-08-30 RX ADMIN — BETHANECHOL CHLORIDE 25 MG: 25 TABLET ORAL at 09:32

## 2019-08-30 RX ADMIN — ASPIRIN 81 MG 81 MG: 81 TABLET ORAL at 09:32

## 2019-08-30 RX ADMIN — BUPROPION HYDROCHLORIDE 300 MG: 300 TABLET, EXTENDED RELEASE ORAL at 09:32

## 2019-08-30 RX ADMIN — MORPHINE SULFATE 15 MG: 15 TABLET ORAL at 00:59

## 2019-08-30 RX ADMIN — ONDANSETRON 4 MG: 2 INJECTION INTRAMUSCULAR; INTRAVENOUS at 15:39

## 2019-08-30 RX ADMIN — SODIUM CHLORIDE: 9 INJECTION, SOLUTION INTRAVENOUS at 06:06

## 2019-08-30 ASSESSMENT — PAIN SCALES - GENERAL
PAINLEVEL_OUTOF10: 0
PAINLEVEL_OUTOF10: 5
PAINLEVEL_OUTOF10: 8

## 2019-08-30 NOTE — PROGRESS NOTES
Currently in Pain: Denies       Orientation  Orientation  Overall Orientation Status: Within Functional Limits     Social/Functional History  Social/Functional History  Lives With: Spouse  Type of Home: House  Home Layout: One level  Home Access: Level entry  Bathroom Shower/Tub: Tub/Shower unit  Bathroom Toilet: Standard  Bathroom Accessibility: Not accessible  Home Equipment: [place]  ADL Assistance: Independent  Homemaking Assistance: Needs assistance( does most)  Ambulation Assistance: Independent  Transfer Assistance: Independent(difficulty getting up )  Occupation: Retired  Leisure & Hobbies: sewing  Additional Comments: one fall since last admission 8/20. pt started on meclizine for vertigo symptoms       Objective  AROM RLE (degrees)  RLE AROM: WFL  AROM LLE (degrees)  LLE AROM : WFL  Strength RLE  Strength RLE: WFL  Strength LLE  Strength LLE: WFL  Tone RLE  RLE Tone: Normotonic  Tone LLE  LLE Tone: Normotonic  Motor Control  Gross Motor?: WFL  Sensation  Overall Sensation Status: Impaired(reports history of neuropathy in BLE, states no recent change in sensation)  Bed mobility  Comment: Not addressed, pt seated in chair at beginning and end of session. Transfers  Sit to Stand: Modified independent  Stand to sit: Modified independent  Comment: Pt performed transfers with SPC. Ambulation  Ambulation?: Yes  Ambulation 1  Surface: level tile  Device: Single point cane  Assistance: Modified Independent  Quality of Gait: decreased radha, slight sway noted, narrow Brigitte  Distance: 79'  Comments: Pt able to negotiate obstacles without assist. Pt educated on using RW at home for safe mobility upon d/c from hospital.  Stairs/Curb  Stairs?: No     Balance  Posture: Good  Sitting - Static: Good  Sitting - Dynamic: Good  Standing - Static: Good  Standing - Dynamic: Good      Vestibular Testing  No nystagmus noted during scanning/tracking activities, VOR activities, and normal convergence noted.    Pt reporting no symptoms with the above testing and no symptoms with positional changes throughout session. Plan   Plan  Times per week: d/c  Safety Devices  Type of devices:  All fall risk precautions in place, Left in chair, Call light within reach, Chair alarm in place, Patient at risk for falls, Nurse notified(ANDREA Montalvo, aware)  Restraints  Initially in place: No    G-Code       OutComes Score                                                  AM-PAC Score  AM-PAC Inpatient Mobility Raw Score : 24 (08/30/19 1135)  AM-PAC Inpatient T-Scale Score : 61.14 (08/30/19 1135)  Mobility Inpatient CMS 0-100% Score: 0 (08/30/19 1135)  Mobility Inpatient CMS G-Code Modifier : 509 [address] (08/30/19 1135)          Goals  Short term goals  Time Frame for Short term goals: none, d/c       Therapy Time   Individual Concurrent Group Co-treatment   Time In 1104         Time Out 1127         Minutes 23              Timed Code Treatment Minutes:    8  Total Treatment Minutes:  [address], [address] [facility], [address] [facility]

## 2019-08-30 NOTE — PROGRESS NOTES
[facility] home care referral. Spoke with pt  re: home care plan of care/services. Agreeable, wishes to resume home care. Will follow for home care.

## 2019-08-30 NOTE — PROGRESS NOTES
Occupational Therapy   Occupational Therapy Initial Assessment  Date: 2019   Patient Name: Hollie Williamson  MRN: [id number]     : 1945    Date of Service: 2019    Discharge Recommendations:  Hollie Williamson scored a 24/24 on the AM-PAC ADL Inpatient form. At this time, no further OT is recommended upon discharge due to pt being at baseline. Recommend patient returns to prior setting with prior services. OT Equipment Recommendations  Equipment Needed: Yes  Mobility Devices: ADL Assistive Devices  ADL Assistive Devices: Transfer Tub Bench  Other: discussed shower chair vs tub transfer bench this date    Assessment   Assessment: pt at baseline, no further skilled OT services needed at this time. discussed use of shower chair and importance of hydration, taking time with positional changes. negative for any vestibular testing done my PT during evaluation  Treatment Diagnosis: SHITAL-UTI  Prognosis: Good  Decision Making: Low Complexity  History: pt lives at home with . independent with ADL tasks. has assist for IADL tasks. uses cane at baseline   Assistance / Modification: mod I  Patient Education: OT eval, POC, discharge, bathing with safety in tub  REQUIRES OT FOLLOW UP: Yes  Activity Tolerance  Activity Tolerance: Patient Tolerated treatment well  Safety Devices  Safety Devices in place: Yes  Type of devices: All fall risk precautions in place; Patient at risk for falls;Call light within reach; Left in chair;Nurse notified; Chair alarm in place  Restraints  Initially in place: No           Patient Diagnosis(es): The primary encounter diagnosis was Acute respiratory failure with hypoxia ([facility] [address] Diagnoses of Pneumonia due to organism, SHITAL (acute kidney injury) ([address] and Elevated troponin were also pertinent to this visit.      has a past medical history of AVM, Chronic back pain, Depression, Gastritis, History of blood transfusion, History of GI bleed, Hypertension, and Personal history of convergence  Cognition  Overall Cognitive Status: WFL  Perception  Overall Perceptual Status: WFL     Sensation  Overall Sensation Status: Impaired(B LE d/t)        LUE AROM (degrees)  LUE AROM : WFL  RUE AROM (degrees)  RUE AROM : WFL  LUE Strength  Gross LUE Strength: WFL  RUE Strength  Gross RUE Strength: WFL                   Plan   Plan  Times per week: eval and d/c    G-Code     OutComes Score                                                  AM-PAC Score        AM-PAC Inpatient Daily Activity Raw Score: 24 (08/30/19 1136)  AM-PAC Inpatient ADL T-Scale Score : 57.54 (08/30/19 1136)  ADL Inpatient CMS 0-100% Score: 0 (08/30/19 1136)  ADL Inpatient CMS G-Code Modifier : 509 [address] (08/30/19 1136)    Goals  Patient Goals   Patient goals : pt at baseline, no further skilled OT services needed at this time        Therapy Time   Individual Concurrent Group Co-treatment   Time In 1104         Time Out 1127         Minutes 23           Timed Code Treatment Minutes:   8 minutes    Total Treatment Minutes:  23 minutes      Rishi Lowe OTR/L [id number]      Rishi Lowe OT

## 2019-08-30 NOTE — PROGRESS NOTES
II-XII intact, grossly non-focal.  Psychiatric: Alert and oriented, thought content appropriate, normal insight  Capillary Refill: Brisk,< 3 seconds   Peripheral Pulses: +2 palpable, equal bilaterally       Labs:   Recent Labs     08/29/19  1135   WBC 6.0   HGB 12.5   HCT 38.4        Recent Labs     08/29/19  1136 08/30/19  1043    142   K 5.2* 4.7    105   CO2 25 29   BUN 38* 21*   CREATININE 1.8* 1.0   CALCIUM 8.7 8.8     Recent Labs     08/29/19  1136   AST 13*   ALT 12   BILITOT 0.5   ALKPHOS 116     Recent Labs     08/29/19  1135   INR 0.90     Recent Labs     08/29/19  1136 08/29/19  2207 08/30/19  0159   TROPONINI 0.06* 0.03* 0.03*       Urinalysis:      Lab Results   Component Value Date    NITRU POSITIVE 08/29/2019    WBCUA 10 08/29/2019    BACTERIA 2+ 08/29/2019    RBCUA 3 08/29/2019    BLOODU Negative 08/29/2019    SPECGRAV 1.009 08/29/2019    GLUCOSEU Negative 08/29/2019       Radiology:  US RENAL COMPLETE   Final Result   No hydronephrosis noted      Nonspecific bladder distention. The patient had difficulty voiding         NM LUNG VENT/PERFUSION (VQ)   Final Result   Low probability for pulmonary embolus. CT CHEST WO CONTRAST   Final Result   Subtle tree-in-bud opacities in the right lower lobe, likely   postinflammatory-infectious. There is mild fusiform bronchiectasis in the   lower lobe airways. There is chronic atelectasis of the right lung,   secondary to elevated hemidiaphragm on the right. CT Lumbar Spine WO Contrast   Preliminary Result   1. No radiographic findings to suggest presence of acute fracture of the   lumbar spine. 2. Multilevel degenerative changes of lumbar spine as well as stable   postoperative changes related to prior fusion procedures at the L3-L4 and   L4-L5 levels as described above. CT Cervical Spine WO Contrast   Final Result   1. Prior spinal fusion anteriorly with a plate and screws at Z1-R1. 2. Diffuse osteopenia.

## 2019-08-30 NOTE — PROGRESS NOTES
Pt alert and oriented in bed. Family at bedside. Encouraged pt to drink fluids. Pt denies any further needs. Will continue to monitor.

## 2019-08-30 NOTE — PROGRESS NOTES
Shift assessment completed. Medication administered. The care plan and education has been reviewed and mutually agreed upon with the patient. White board updated. VSS. Pt is in bed with eyes closed. Call light within reach.

## 2019-08-31 VITALS
WEIGHT: 174.25 LBS | HEIGHT: 60 IN | BODY MASS INDEX: 34.21 KG/M2 | TEMPERATURE: 98.5 F | RESPIRATION RATE: 16 BRPM | DIASTOLIC BLOOD PRESSURE: 88 MMHG | OXYGEN SATURATION: 92 % | HEART RATE: 99 BPM | SYSTOLIC BLOOD PRESSURE: 136 MMHG

## 2019-08-31 LAB
ANION GAP SERPL CALCULATED.3IONS-SCNC: 9 MMOL/L (ref 3–16)
BUN BLDV-MCNC: 14 MG/DL (ref 7–20)
C DIFF TOXIN/ANTIGEN: NORMAL
CALCIUM SERPL-MCNC: 9.3 MG/DL (ref 8.3–10.6)
CHLORIDE BLD-SCNC: 106 MMOL/L (ref 99–110)
CO2: 25 MMOL/L (ref 21–32)
CREAT SERPL-MCNC: 0.8 MG/DL (ref 0.6–1.2)
GFR AFRICAN AMERICAN: >60
GFR NON-AFRICAN AMERICAN: >60
GLUCOSE BLD-MCNC: 95 MG/DL (ref 70–99)
LEFT VENTRICULAR EJECTION FRACTION HIGH VALUE: 65 %
LEFT VENTRICULAR EJECTION FRACTION MODE: NORMAL
LV EF: 65 %
LVEF MODALITY: NORMAL
ORGANISM: ABNORMAL
POTASSIUM SERPL-SCNC: 4.1 MMOL/L (ref 3.5–5.1)
SODIUM BLD-SCNC: 140 MMOL/L (ref 136–145)
URINE CULTURE, ROUTINE: ABNORMAL

## 2019-08-31 PROCEDURE — 87324 CLOSTRIDIUM AG IA: CPT

## 2019-08-31 PROCEDURE — 2580000003 HC RX 258: Performed by: FAMILY MEDICINE

## 2019-08-31 PROCEDURE — 93306 TTE W/DOPPLER COMPLETE: CPT

## 2019-08-31 PROCEDURE — G0378 HOSPITAL OBSERVATION PER HR: HCPCS

## 2019-08-31 PROCEDURE — 6360000002 HC RX W HCPCS: Performed by: FAMILY MEDICINE

## 2019-08-31 PROCEDURE — 6370000000 HC RX 637 (ALT 250 FOR IP): Performed by: FAMILY MEDICINE

## 2019-08-31 PROCEDURE — 96361 HYDRATE IV INFUSION ADD-ON: CPT

## 2019-08-31 PROCEDURE — 6370000000 HC RX 637 (ALT 250 FOR IP): Performed by: INTERNAL MEDICINE

## 2019-08-31 PROCEDURE — 6360000002 HC RX W HCPCS: Performed by: INTERNAL MEDICINE

## 2019-08-31 PROCEDURE — 96375 TX/PRO/DX INJ NEW DRUG ADDON: CPT

## 2019-08-31 PROCEDURE — 80048 BASIC METABOLIC PNL TOTAL CA: CPT

## 2019-08-31 PROCEDURE — 96372 THER/PROPH/DIAG INJ SC/IM: CPT

## 2019-08-31 PROCEDURE — 87449 NOS EACH ORGANISM AG IA: CPT

## 2019-08-31 PROCEDURE — 2580000003 HC RX 258: Performed by: INTERNAL MEDICINE

## 2019-08-31 PROCEDURE — 96376 TX/PRO/DX INJ SAME DRUG ADON: CPT

## 2019-08-31 PROCEDURE — 94760 N-INVAS EAR/PLS OXIMETRY 1: CPT

## 2019-08-31 RX ORDER — MORPHINE SULFATE 2 MG/ML
2 INJECTION, SOLUTION INTRAMUSCULAR; INTRAVENOUS ONCE
Status: COMPLETED | OUTPATIENT
Start: 2019-08-31 | End: 2019-08-31

## 2019-08-31 RX ORDER — LOPERAMIDE HYDROCHLORIDE 2 MG/1
2 CAPSULE ORAL 4 TIMES DAILY PRN
Status: DISCONTINUED | OUTPATIENT
Start: 2019-08-31 | End: 2019-08-31 | Stop reason: HOSPADM

## 2019-08-31 RX ORDER — FUROSEMIDE 20 MG/1
TABLET ORAL
Qty: 90 TABLET | Refills: 1 | Status: SHIPPED | OUTPATIENT
Start: 2019-08-31 | End: 2020-04-23 | Stop reason: SDUPTHER

## 2019-08-31 RX ORDER — GREEN TEA/HOODIA GORDONII 315-12.5MG
1 CAPSULE ORAL 2 TIMES DAILY
Qty: 20 TABLET | Refills: 0 | Status: SHIPPED | OUTPATIENT
Start: 2019-08-31 | End: 2019-09-05

## 2019-08-31 RX ORDER — AMLODIPINE BESYLATE 5 MG/1
5 TABLET ORAL DAILY
Status: DISCONTINUED | OUTPATIENT
Start: 2019-08-31 | End: 2019-08-31 | Stop reason: HOSPADM

## 2019-08-31 RX ORDER — CIPROFLOXACIN 500 MG/1
500 TABLET, FILM COATED ORAL EVERY 12 HOURS SCHEDULED
Qty: 10 TABLET | Refills: 0 | Status: SHIPPED | OUTPATIENT
Start: 2019-08-31 | End: 2019-09-05

## 2019-08-31 RX ORDER — CIPROFLOXACIN 500 MG/1
500 TABLET, FILM COATED ORAL EVERY 12 HOURS SCHEDULED
Status: DISCONTINUED | OUTPATIENT
Start: 2019-08-31 | End: 2019-08-31 | Stop reason: HOSPADM

## 2019-08-31 RX ORDER — LACTOBACILLUS RHAMNOSUS GG 10B CELL
1 CAPSULE ORAL
Status: DISCONTINUED | OUTPATIENT
Start: 2019-09-01 | End: 2019-08-31 | Stop reason: HOSPADM

## 2019-08-31 RX ADMIN — Medication 1 G: at 01:05

## 2019-08-31 RX ADMIN — BETHANECHOL CHLORIDE 25 MG: 25 TABLET ORAL at 09:40

## 2019-08-31 RX ADMIN — BETHANECHOL CHLORIDE 25 MG: 25 TABLET ORAL at 13:30

## 2019-08-31 RX ADMIN — SODIUM CHLORIDE: 9 INJECTION, SOLUTION INTRAVENOUS at 01:01

## 2019-08-31 RX ADMIN — MORPHINE SULFATE 15 MG: 15 TABLET ORAL at 13:24

## 2019-08-31 RX ADMIN — ONDANSETRON 4 MG: 2 INJECTION INTRAMUSCULAR; INTRAVENOUS at 04:47

## 2019-08-31 RX ADMIN — ASPIRIN 81 MG 81 MG: 81 TABLET ORAL at 09:40

## 2019-08-31 RX ADMIN — LOPERAMIDE HYDROCHLORIDE 2 MG: 2 CAPSULE ORAL at 16:14

## 2019-08-31 RX ADMIN — ENOXAPARIN SODIUM 30 MG: 30 INJECTION SUBCUTANEOUS at 09:41

## 2019-08-31 RX ADMIN — Medication 10 ML: at 11:37

## 2019-08-31 RX ADMIN — ONDANSETRON 4 MG: 2 INJECTION INTRAMUSCULAR; INTRAVENOUS at 11:37

## 2019-08-31 RX ADMIN — ROPINIROLE HYDROCHLORIDE 0.25 MG: 0.25 TABLET, FILM COATED ORAL at 09:40

## 2019-08-31 RX ADMIN — AMLODIPINE BESYLATE 5 MG: 5 TABLET ORAL at 11:36

## 2019-08-31 RX ADMIN — MORPHINE SULFATE 2 MG: 2 INJECTION, SOLUTION INTRAMUSCULAR; INTRAVENOUS at 12:02

## 2019-08-31 RX ADMIN — BUPROPION HYDROCHLORIDE 300 MG: 300 TABLET, EXTENDED RELEASE ORAL at 09:40

## 2019-08-31 RX ADMIN — DULOXETINE HYDROCHLORIDE 60 MG: 60 CAPSULE, DELAYED RELEASE ORAL at 09:41

## 2019-08-31 RX ADMIN — ROPINIROLE HYDROCHLORIDE 0.25 MG: 0.25 TABLET, FILM COATED ORAL at 13:30

## 2019-08-31 ASSESSMENT — PAIN SCALES - GENERAL
PAINLEVEL_OUTOF10: 7
PAINLEVEL_OUTOF10: 7

## 2019-08-31 NOTE — PROGRESS NOTES
Nephrology Progress Note  [phone]  [phone]   [email or web address]    Patient:  Arjun [place]   : 1945    HPI:            The patient is a 68 y. o.female with significant past medical history of Hypertension, Depression, chronic back pain/multiple spinal surgeries presented after a fall. CT of the head and spine with no acute changes. Reportedly was disoriented after the fall per family as noticed by home health RN. She does have h/o falls and was recently discharged on 19 after admission for a fall. Work at that time included a CT of the head and spine which did not reveal any acute changes. She was discharged with a diagnosis of vestibular neuronitis on meclizine. She had an echocardiogram in 2017 which revealed normal LVEF, no RWMAs and mild AR. Labs revealed SHITAL with a BUN/Cr of 38/1.8 with mild hyperkalemia of 5.2  Her base-line creatinine appears to be around 1, last creatinine prior to admission was 1. On 19. Of note, takes furosemide 20 mg po qd, valsartan-HCTZ 160. BP in the ER was as low as 24V systolic      Subjective:  -pt seen and examined  -PMSHx and meds reviewed  -Family at bedside    No acute events ON  Bp higher today     ROS:   Neg chest pain/SOB        Meds:  Reviewed     Vitals:  BP (!) 145/90   Pulse 92   Temp 97.2 °F (36.2 °C) (Temporal)   Resp 16   Ht 5' (1.524 m)   Wt 174 lb 4 oz (79 kg)   SpO2 92%   BMI 34.03 kg/m²     Physical Exam:  General : AAOx3, not in pain or respiratory distress, resting in bed  HEENT : mucosa dry. PERRL  CVS: S1 S2 normal, regular rhythm, no murmurs or rubs. Lungs: Clear, no wheezing or crackles. Abd: Soft, bowel sounds normal, non-tender. Ext: No edema, no cyanosis  Skin: Warm. No rashes appreciated. : bladder non-distended, no tenderness over the bladder  Neuro: Alert and oriented x 3, nonfocal.  Joints: No erythema noted over joints.     Labs:  CBC:   Lab Results   Component Value Date    WBC 6.0 2019    RBC

## 2019-08-31 NOTE — DISCHARGE SUMMARY
Wt 174 lb 4 oz (79 kg)   SpO2 92%   BMI 34.03 kg/m²       General appearance:  No apparent distress, appears stated age and cooperative. HEENT:  Normal cephalic, atraumatic without obvious deformity. Pupils equal, round, and reactive to light. Extra ocular muscles intact. Conjunctivae/corneas clear. Neck: Supple, with full range of motion. No jugular venous distention. Trachea midline. Respiratory:  Normal respiratory effort. Clear to auscultation, bilaterally without Rales/Wheezes/Rhonchi. Cardiovascular:  Regular rate and rhythm with normal S1/S2 without murmurs, rubs or gallops. Abdomen: Soft, non-tender, non-distended with normal bowel sounds. Musculoskeletal:  No clubbing, cyanosis or edema bilaterally. Full range of motion without deformity. Skin: Skin color, texture, turgor normal.  No rashes or lesions. Neurologic:  Neurovascularly intact without any focal sensory/motor deficits. Cranial nerves: II-XII intact, grossly non-focal.  Psychiatric:  Alert and oriented, thought content appropriate, normal insight  Capillary Refill: Brisk,< 3 seconds   Peripheral Pulses: +2 palpable, equal bilaterally       Labs: For convenience and continuity at follow-up the following most recent labs are provided:      CBC:    Lab Results   Component Value Date    WBC 6.0 08/29/2019    HGB 12.5 08/29/2019    HCT 38.4 08/29/2019     08/29/2019       Renal:    Lab Results   Component Value Date     08/31/2019    K 4.1 08/31/2019    K 5.2 08/29/2019     08/31/2019    CO2 25 08/31/2019    BUN 14 08/31/2019    CREATININE 0.8 08/31/2019    CALCIUM 9.3 08/31/2019    PHOS 3.7 09/08/2017         Significant Diagnostic Studies    Radiology:   US RENAL COMPLETE   Final Result   No hydronephrosis noted      Nonspecific bladder distention. The patient had difficulty voiding         NM LUNG VENT/PERFUSION (VQ)   Final Result   Low probability for pulmonary embolus.          CT CHEST WO CONTRAST   Final Result Subtle tree-in-bud opacities in the right lower lobe, likely   postinflammatory-infectious. There is mild fusiform bronchiectasis in the   lower lobe airways. There is chronic atelectasis of the right lung,   secondary to elevated hemidiaphragm on the right. CT Lumbar Spine WO Contrast   Preliminary Result   1. No radiographic findings to suggest presence of acute fracture of the   lumbar spine. 2. Multilevel degenerative changes of lumbar spine as well as stable   postoperative changes related to prior fusion procedures at the L3-L4 and   L4-L5 levels as described above. CT Cervical Spine WO Contrast   Final Result   1. Prior spinal fusion anteriorly with a plate and screws at J8-Q3. 2. Diffuse osteopenia. Degenerative changes in the cervical spine. 3. Atherosclerotic calcification of the vasculature. 4. No acute vertebral body height loss within the cervical spine. CT Head WO Contrast   Final Result   No acute intracranial abnormality. Nonspecific white matter disease, likely due to chronic small vessel ischemia. XR CHEST PORTABLE   Final Result   Low lung volumes. Mild bibasilar opacity, likely atelectasis or scarring. XR HIP 2-3 VW W PELVIS LEFT   Final Result   Osteopenia. No displaced fracture. Given the degree of osteopenia, nondisplaced fractures may be   radiographically occult. If pain or concern for fracture persists, consider   MR imaging.                 Consults:     IP CONSULT TO NEPHROLOGY  IP CONSULT TO HOSPITALIST    Disposition:  Home      Condition at Discharge: Stable    Discharge Instructions/Follow-up:  PCP in 1 week     Code Status:  Full Code     Activity: activity as tolerated    Diet: regular diet      Discharge Medications:     Current Discharge Medication List           Details   ciprofloxacin (CIPRO) 500 MG tablet Take 1 tablet by mouth every 12 hours for 5 days  Qty: 10 tablet, Refills: 0      Lactobacillus (PROBIOTIC

## 2019-09-03 LAB
BLOOD CULTURE, ROUTINE: NORMAL
CULTURE, BLOOD 2: NORMAL

## 2019-09-05 ENCOUNTER — OFFICE VISIT (OUTPATIENT)
Dept: PRIMARY CARE CLINIC | Age: 74
End: 2019-09-05
Payer: MEDICARE

## 2019-09-05 VITALS
SYSTOLIC BLOOD PRESSURE: 132 MMHG | HEART RATE: 85 BPM | BODY MASS INDEX: 34.35 KG/M2 | OXYGEN SATURATION: 98 % | DIASTOLIC BLOOD PRESSURE: 84 MMHG | WEIGHT: 175.9 LBS

## 2019-09-05 DIAGNOSIS — F32.A DEPRESSION, UNSPECIFIED DEPRESSION TYPE: ICD-10-CM

## 2019-09-05 DIAGNOSIS — N17.9 AKI (ACUTE KIDNEY INJURY) (HCC): Primary | ICD-10-CM

## 2019-09-05 DIAGNOSIS — I10 HYPERTENSION, MALIGNANT: ICD-10-CM

## 2019-09-05 DIAGNOSIS — R33.9 URINARY RETENTION: ICD-10-CM

## 2019-09-05 PROCEDURE — 99213 OFFICE O/P EST LOW 20 MIN: CPT | Performed by: INTERNAL MEDICINE

## 2019-09-05 PROCEDURE — 1111F DSCHRG MED/CURRENT MED MERGE: CPT | Performed by: INTERNAL MEDICINE

## 2019-09-05 RX ORDER — ONDANSETRON 4 MG/1
4 TABLET, FILM COATED ORAL DAILY PRN
Qty: 30 TABLET | Refills: 0 | Status: SHIPPED | OUTPATIENT
Start: 2019-09-05 | End: 2022-05-19 | Stop reason: SDUPTHER

## 2019-09-05 NOTE — PROGRESS NOTES
EVERY MORNING 90 tablet 0    morphine (MSIR) 15 MG tablet Take 15 mg by mouth every 6 hours as needed for Pain . Medications patient taking as of now reconciled against medications ordered at time of hospital discharge: Yes    Chief Complaint   Patient presents with   [address] from Hospital       History of Present illness - Follow up of Hospital diagnosis(es): UTI    Inpatient course: Discharge summary reviewed- see chart. Interval history/Current status: Pt reports she is doing well overall w/o any problems or complaints at this time. She reports she does not have any dysuria or pain or blood she urinates. She does not self catheterize as directed. She denies any diarrhea. She denies any trouble breathing, fevers, chills. She has not been checking her BP at home. A comprehensive review of systems was negative except for what was noted in the HPI. Vitals:    09/05/19 1701   BP: 132/84   Site: Right Upper Arm   Position: Sitting   Cuff Size: Large Adult   Pulse: 85   SpO2: 98%   Weight: 175 lb 14.4 oz (79.8 kg)     Body mass index is 34.35 kg/m².    Wt Readings from Last 3 Encounters:   09/05/19 175 lb 14.4 oz (79.8 kg)   08/31/19 174 lb 4 oz (79 kg)   08/21/19 177 lb 0.4 oz (80.3 kg)     BP Readings from Last 3 Encounters:   09/05/19 132/84   08/31/19 136/88   08/21/19 (!) 127/90        Physical Exam:  General Appearance: alert and oriented to person, place and time, well developed and well- nourished, in no acute distress  Skin: warm and dry, no rash or erythema  Head: normocephalic and atraumatic  Eyes: pupils equal, round, and reactive to light, extraocular eye movements intact, conjunctivae normal  ENT: tympanic membrane, external ear and ear canal normal bilaterally, nose without deformity, nasal mucosa and turbinates normal without polyps  Neck: supple and non-tender without mass, no thyromegaly or thyroid nodules, no cervical lymphadenopathy  Pulmonary/Chest: clear to auscultation

## 2019-09-19 DIAGNOSIS — F33.1 MODERATE EPISODE OF RECURRENT MAJOR DEPRESSIVE DISORDER (HCC): ICD-10-CM

## 2019-09-19 RX ORDER — BUPROPION HYDROCHLORIDE 300 MG/1
TABLET ORAL
Qty: 90 TABLET | Refills: 0 | Status: SHIPPED | OUTPATIENT
Start: 2019-09-19 | End: 2019-12-26 | Stop reason: SDUPTHER

## 2019-09-24 ENCOUNTER — APPOINTMENT (OUTPATIENT)
Dept: MRI IMAGING | Age: 74
DRG: 917 | End: 2019-09-24
Payer: MEDICARE

## 2019-09-24 ENCOUNTER — APPOINTMENT (OUTPATIENT)
Dept: GENERAL RADIOLOGY | Age: 74
DRG: 917 | End: 2019-09-24
Payer: MEDICARE

## 2019-09-24 ENCOUNTER — APPOINTMENT (OUTPATIENT)
Dept: CT IMAGING | Age: 74
DRG: 917 | End: 2019-09-24
Payer: MEDICARE

## 2019-09-24 ENCOUNTER — TELEPHONE (OUTPATIENT)
Dept: OTHER | Facility: CLINIC | Age: 74
End: 2019-09-24

## 2019-09-24 ENCOUNTER — HOSPITAL ENCOUNTER (INPATIENT)
Age: 74
LOS: 6 days | Discharge: HOME | DRG: 917 | End: 2019-09-30
Attending: EMERGENCY MEDICINE | Admitting: INTERNAL MEDICINE
Payer: MEDICARE

## 2019-09-24 DIAGNOSIS — R47.01 APHASIA: ICD-10-CM

## 2019-09-24 DIAGNOSIS — G89.4 CHRONIC PAIN SYNDROME: ICD-10-CM

## 2019-09-24 DIAGNOSIS — I63.9 ACUTE EMBOLIC STROKE (HCC): ICD-10-CM

## 2019-09-24 DIAGNOSIS — I63.9 ACUTE CVA (CEREBROVASCULAR ACCIDENT) (HCC): Primary | ICD-10-CM

## 2019-09-24 LAB
A/G RATIO: 1.2 (ref 1.1–2.2)
ALBUMIN SERPL-MCNC: 3.1 G/DL (ref 3.4–5)
ALP BLD-CCNC: 109 U/L (ref 40–129)
ALT SERPL-CCNC: 18 U/L (ref 10–40)
ANION GAP SERPL CALCULATED.3IONS-SCNC: 11 MMOL/L (ref 3–16)
APTT: 46.7 SEC (ref 26–36)
AST SERPL-CCNC: 20 U/L (ref 15–37)
BASE EXCESS ARTERIAL: -3.6 MMOL/L (ref -3–3)
BASOPHILS ABSOLUTE: 0 K/UL (ref 0–0.2)
BASOPHILS RELATIVE PERCENT: 0.2 %
BILIRUB SERPL-MCNC: 0.5 MG/DL (ref 0–1)
BILIRUBIN URINE: NEGATIVE
BLOOD, URINE: NEGATIVE
BUN BLDV-MCNC: 33 MG/DL (ref 7–20)
CALCIUM SERPL-MCNC: 8.2 MG/DL (ref 8.3–10.6)
CARBOXYHEMOGLOBIN ARTERIAL: 0.9 % (ref 0–1.5)
CHLORIDE BLD-SCNC: 98 MMOL/L (ref 99–110)
CLARITY: CLEAR
CO2: 21 MMOL/L (ref 21–32)
COLOR: YELLOW
CREAT SERPL-MCNC: 1.4 MG/DL (ref 0.6–1.2)
EKG ATRIAL RATE: 77 BPM
EKG DIAGNOSIS: NORMAL
EKG P AXIS: 32 DEGREES
EKG P-R INTERVAL: 222 MS
EKG Q-T INTERVAL: 420 MS
EKG QRS DURATION: 140 MS
EKG QTC CALCULATION (BAZETT): 475 MS
EKG R AXIS: 60 DEGREES
EKG T AXIS: 30 DEGREES
EKG VENTRICULAR RATE: 77 BPM
EOSINOPHILS ABSOLUTE: 0.1 K/UL (ref 0–0.6)
EOSINOPHILS RELATIVE PERCENT: 1.1 %
GFR AFRICAN AMERICAN: 45
GFR NON-AFRICAN AMERICAN: 37
GLOBULIN: 2.6 G/DL
GLUCOSE BLD-MCNC: 139 MG/DL (ref 70–99)
GLUCOSE BLD-MCNC: 145 MG/DL (ref 70–99)
GLUCOSE URINE: NEGATIVE MG/DL
HCO3 ARTERIAL: 21.2 MMOL/L (ref 21–29)
HCT VFR BLD CALC: 28.2 % (ref 36–48)
HEMOGLOBIN, ART, EXTENDED: 8.9 G/DL (ref 12–16)
HEMOGLOBIN: 9.1 G/DL (ref 12–16)
INR BLD: 1.04 (ref 0.86–1.14)
KETONES, URINE: NEGATIVE MG/DL
LEUKOCYTE ESTERASE, URINE: NEGATIVE
LYMPHOCYTES ABSOLUTE: 0.5 K/UL (ref 1–5.1)
LYMPHOCYTES RELATIVE PERCENT: 7.2 %
MCH RBC QN AUTO: 29.5 PG (ref 26–34)
MCHC RBC AUTO-ENTMCNC: 32.3 G/DL (ref 31–36)
MCV RBC AUTO: 91.2 FL (ref 80–100)
METHEMOGLOBIN ARTERIAL: 0.9 %
MICROSCOPIC EXAMINATION: NORMAL
MONOCYTES ABSOLUTE: 0.5 K/UL (ref 0–1.3)
MONOCYTES RELATIVE PERCENT: 8.4 %
NEUTROPHILS ABSOLUTE: 5.4 K/UL (ref 1.7–7.7)
NEUTROPHILS RELATIVE PERCENT: 83.1 %
NITRITE, URINE: NEGATIVE
O2 CONTENT ARTERIAL: 13 ML/DL
O2 SAT, ARTERIAL: 99.4 %
O2 THERAPY: ABNORMAL
PCO2 ARTERIAL: 39.6 MMHG (ref 35–45)
PDW BLD-RTO: 15.4 % (ref 12.4–15.4)
PERFORMED ON: ABNORMAL
PH ARTERIAL: 7.35 (ref 7.35–7.45)
PH UA: 5.5 (ref 5–8)
PLATELET # BLD: 166 K/UL (ref 135–450)
PMV BLD AUTO: 8.2 FL (ref 5–10.5)
PO2 ARTERIAL: 255 MMHG (ref 75–108)
POTASSIUM REFLEX MAGNESIUM: 4.3 MMOL/L (ref 3.5–5.1)
PROTEIN UA: NEGATIVE MG/DL
PROTHROMBIN TIME: 11.9 SEC (ref 9.8–13)
RBC # BLD: 3.1 M/UL (ref 4–5.2)
SODIUM BLD-SCNC: 130 MMOL/L (ref 136–145)
SPECIFIC GRAVITY UA: 1.03 (ref 1–1.03)
TCO2 ARTERIAL: 22.4 MMOL/L
TOTAL PROTEIN: 5.7 G/DL (ref 6.4–8.2)
TROPONIN: <0.01 NG/ML
URINE REFLEX TO CULTURE: NORMAL
URINE TYPE: NORMAL
UROBILINOGEN, URINE: 0.2 E.U./DL
WBC # BLD: 6.5 K/UL (ref 4–11)

## 2019-09-24 PROCEDURE — 70496 CT ANGIOGRAPHY HEAD: CPT

## 2019-09-24 PROCEDURE — 93010 ELECTROCARDIOGRAM REPORT: CPT | Performed by: INTERNAL MEDICINE

## 2019-09-24 PROCEDURE — C9113 INJ PANTOPRAZOLE SODIUM, VIA: HCPCS | Performed by: NURSE PRACTITIONER

## 2019-09-24 PROCEDURE — 94761 N-INVAS EAR/PLS OXIMETRY MLT: CPT

## 2019-09-24 PROCEDURE — 71045 X-RAY EXAM CHEST 1 VIEW: CPT

## 2019-09-24 PROCEDURE — 2580000003 HC RX 258: Performed by: PHYSICIAN ASSISTANT

## 2019-09-24 PROCEDURE — 2500000003 HC RX 250 WO HCPCS: Performed by: INTERNAL MEDICINE

## 2019-09-24 PROCEDURE — 36600 WITHDRAWAL OF ARTERIAL BLOOD: CPT

## 2019-09-24 PROCEDURE — 2580000003 HC RX 258: Performed by: INTERNAL MEDICINE

## 2019-09-24 PROCEDURE — 81003 URINALYSIS AUTO W/O SCOPE: CPT

## 2019-09-24 PROCEDURE — 85730 THROMBOPLASTIN TIME PARTIAL: CPT

## 2019-09-24 PROCEDURE — 96361 HYDRATE IV INFUSION ADD-ON: CPT

## 2019-09-24 PROCEDURE — 6370000000 HC RX 637 (ALT 250 FOR IP): Performed by: NURSE PRACTITIONER

## 2019-09-24 PROCEDURE — 2000000000 HC ICU R&B

## 2019-09-24 PROCEDURE — 85670 THROMBIN TIME PLASMA: CPT

## 2019-09-24 PROCEDURE — 6360000002 HC RX W HCPCS: Performed by: EMERGENCY MEDICINE

## 2019-09-24 PROCEDURE — 99291 CRITICAL CARE FIRST HOUR: CPT | Performed by: INTERNAL MEDICINE

## 2019-09-24 PROCEDURE — 6360000002 HC RX W HCPCS: Performed by: PHYSICIAN ASSISTANT

## 2019-09-24 PROCEDURE — 6370000000 HC RX 637 (ALT 250 FOR IP): Performed by: INTERNAL MEDICINE

## 2019-09-24 PROCEDURE — 51702 INSERT TEMP BLADDER CATH: CPT

## 2019-09-24 PROCEDURE — 84484 ASSAY OF TROPONIN QUANT: CPT

## 2019-09-24 PROCEDURE — 70551 MRI BRAIN STEM W/O DYE: CPT

## 2019-09-24 PROCEDURE — 85610 PROTHROMBIN TIME: CPT

## 2019-09-24 PROCEDURE — 2500000003 HC RX 250 WO HCPCS: Performed by: NURSE PRACTITIONER

## 2019-09-24 PROCEDURE — 96366 THER/PROPH/DIAG IV INF ADDON: CPT

## 2019-09-24 PROCEDURE — 6360000004 HC RX CONTRAST MEDICATION: Performed by: EMERGENCY MEDICINE

## 2019-09-24 PROCEDURE — 87449 NOS EACH ORGANISM AG IA: CPT

## 2019-09-24 PROCEDURE — 85732 THROMBOPLASTIN TIME PARTIAL: CPT

## 2019-09-24 PROCEDURE — 80053 COMPREHEN METABOLIC PANEL: CPT

## 2019-09-24 PROCEDURE — 36415 COLL VENOUS BLD VENIPUNCTURE: CPT

## 2019-09-24 PROCEDURE — 93005 ELECTROCARDIOGRAM TRACING: CPT | Performed by: PHYSICIAN ASSISTANT

## 2019-09-24 PROCEDURE — 85025 COMPLETE CBC W/AUTO DIFF WBC: CPT

## 2019-09-24 PROCEDURE — 94002 VENT MGMT INPAT INIT DAY: CPT

## 2019-09-24 PROCEDURE — 2580000003 HC RX 258: Performed by: EMERGENCY MEDICINE

## 2019-09-24 PROCEDURE — 96365 THER/PROPH/DIAG IV INF INIT: CPT

## 2019-09-24 PROCEDURE — 0BH17EZ INSERTION OF ENDOTRACHEAL AIRWAY INTO TRACHEA, VIA NATURAL OR ARTIFICIAL OPENING: ICD-10-PCS | Performed by: INTERNAL MEDICINE

## 2019-09-24 PROCEDURE — 02HV33Z INSERTION OF INFUSION DEVICE INTO SUPERIOR VENA CAVA, PERCUTANEOUS APPROACH: ICD-10-PCS | Performed by: EMERGENCY MEDICINE

## 2019-09-24 PROCEDURE — 6360000002 HC RX W HCPCS: Performed by: NURSE PRACTITIONER

## 2019-09-24 PROCEDURE — 82803 BLOOD GASES ANY COMBINATION: CPT

## 2019-09-24 PROCEDURE — 2700000000 HC OXYGEN THERAPY PER DAY

## 2019-09-24 PROCEDURE — 6360000002 HC RX W HCPCS: Performed by: INTERNAL MEDICINE

## 2019-09-24 PROCEDURE — 5A1935Z RESPIRATORY VENTILATION, LESS THAN 24 CONSECUTIVE HOURS: ICD-10-PCS | Performed by: INTERNAL MEDICINE

## 2019-09-24 PROCEDURE — 99291 CRITICAL CARE FIRST HOUR: CPT

## 2019-09-24 PROCEDURE — 87040 BLOOD CULTURE FOR BACTERIA: CPT

## 2019-09-24 PROCEDURE — 4500000026 HC ED CRITICAL CARE PROCEDURE

## 2019-09-24 PROCEDURE — 96375 TX/PRO/DX INJ NEW DRUG ADDON: CPT

## 2019-09-24 PROCEDURE — 87641 MR-STAPH DNA AMP PROBE: CPT

## 2019-09-24 PROCEDURE — 70450 CT HEAD/BRAIN W/O DYE: CPT

## 2019-09-24 PROCEDURE — 2580000003 HC RX 258: Performed by: NURSE PRACTITIONER

## 2019-09-24 PROCEDURE — 2500000003 HC RX 250 WO HCPCS: Performed by: EMERGENCY MEDICINE

## 2019-09-24 RX ORDER — PREGABALIN 150 MG/1
150 CAPSULE ORAL 3 TIMES DAILY
COMMUNITY

## 2019-09-24 RX ORDER — NALOXONE HYDROCHLORIDE 0.4 MG/ML
2 INJECTION, SOLUTION INTRAMUSCULAR; INTRAVENOUS; SUBCUTANEOUS ONCE
Status: DISCONTINUED | OUTPATIENT
Start: 2019-09-24 | End: 2019-09-24

## 2019-09-24 RX ORDER — KETOROLAC TROMETHAMINE 15 MG/ML
15 INJECTION, SOLUTION INTRAMUSCULAR; INTRAVENOUS ONCE
Status: COMPLETED | OUTPATIENT
Start: 2019-09-24 | End: 2019-09-24

## 2019-09-24 RX ORDER — DEXTROSE MONOHYDRATE 25 G/50ML
12.5 INJECTION, SOLUTION INTRAVENOUS
Status: DISCONTINUED | OUTPATIENT
Start: 2019-09-24 | End: 2019-09-24

## 2019-09-24 RX ORDER — PROPOFOL 10 MG/ML
10 INJECTION, EMULSION INTRAVENOUS
Status: DISCONTINUED | OUTPATIENT
Start: 2019-09-24 | End: 2019-09-25

## 2019-09-24 RX ORDER — 0.9 % SODIUM CHLORIDE 0.9 %
1000 INTRAVENOUS SOLUTION INTRAVENOUS ONCE
Status: COMPLETED | OUTPATIENT
Start: 2019-09-24 | End: 2019-09-24

## 2019-09-24 RX ORDER — MIDAZOLAM HYDROCHLORIDE 1 MG/ML
5 INJECTION INTRAMUSCULAR; INTRAVENOUS ONCE
Status: COMPLETED | OUTPATIENT
Start: 2019-09-24 | End: 2019-09-24

## 2019-09-24 RX ORDER — 0.9 % SODIUM CHLORIDE 0.9 %
500 INTRAVENOUS SOLUTION INTRAVENOUS ONCE
Status: COMPLETED | OUTPATIENT
Start: 2019-09-24 | End: 2019-09-24

## 2019-09-24 RX ORDER — SODIUM CHLORIDE 0.9 % (FLUSH) 0.9 %
10 SYRINGE (ML) INJECTION EVERY 12 HOURS SCHEDULED
Status: DISCONTINUED | OUTPATIENT
Start: 2019-09-24 | End: 2019-09-30 | Stop reason: HOSPADM

## 2019-09-24 RX ORDER — 0.9 % SODIUM CHLORIDE 0.9 %
50 INTRAVENOUS SOLUTION INTRAVENOUS ONCE
Status: DISCONTINUED | OUTPATIENT
Start: 2019-09-24 | End: 2019-09-24

## 2019-09-24 RX ORDER — TIZANIDINE 4 MG/1
4 TABLET ORAL 2 TIMES DAILY
COMMUNITY

## 2019-09-24 RX ORDER — 0.9 % SODIUM CHLORIDE 0.9 %
10 VIAL (ML) INJECTION DAILY
Status: DISCONTINUED | OUTPATIENT
Start: 2019-09-24 | End: 2019-09-25

## 2019-09-24 RX ORDER — ONDANSETRON 2 MG/ML
4 INJECTION INTRAMUSCULAR; INTRAVENOUS EVERY 6 HOURS PRN
Status: DISCONTINUED | OUTPATIENT
Start: 2019-09-24 | End: 2019-09-30 | Stop reason: HOSPADM

## 2019-09-24 RX ORDER — PROPOFOL 10 MG/ML
10 INJECTION, EMULSION INTRAVENOUS ONCE
Status: COMPLETED | OUTPATIENT
Start: 2019-09-24 | End: 2019-09-24

## 2019-09-24 RX ORDER — CHLORHEXIDINE GLUCONATE 0.12 MG/ML
15 RINSE ORAL 2 TIMES DAILY
Status: DISCONTINUED | OUTPATIENT
Start: 2019-09-24 | End: 2019-09-25

## 2019-09-24 RX ORDER — LABETALOL HYDROCHLORIDE 5 MG/ML
10 INJECTION, SOLUTION INTRAVENOUS EVERY 10 MIN PRN
Status: DISCONTINUED | OUTPATIENT
Start: 2019-09-24 | End: 2019-09-30 | Stop reason: HOSPADM

## 2019-09-24 RX ORDER — SUCCINYLCHOLINE CHLORIDE 20 MG/ML
INJECTION INTRAMUSCULAR; INTRAVENOUS DAILY PRN
Status: COMPLETED | OUTPATIENT
Start: 2019-09-24 | End: 2019-09-24

## 2019-09-24 RX ORDER — SODIUM CHLORIDE 0.9 % (FLUSH) 0.9 %
10 SYRINGE (ML) INJECTION EVERY 12 HOURS SCHEDULED
Status: DISCONTINUED | OUTPATIENT
Start: 2019-09-24 | End: 2019-09-24

## 2019-09-24 RX ORDER — ETOMIDATE 2 MG/ML
INJECTION INTRAVENOUS DAILY PRN
Status: COMPLETED | OUTPATIENT
Start: 2019-09-24 | End: 2019-09-24

## 2019-09-24 RX ORDER — ASPIRIN 81 MG/1
81 TABLET ORAL DAILY
Status: DISCONTINUED | OUTPATIENT
Start: 2019-09-24 | End: 2019-09-30 | Stop reason: HOSPADM

## 2019-09-24 RX ORDER — PANTOPRAZOLE SODIUM 40 MG/10ML
40 INJECTION, POWDER, LYOPHILIZED, FOR SOLUTION INTRAVENOUS DAILY
Status: DISCONTINUED | OUTPATIENT
Start: 2019-09-24 | End: 2019-09-25

## 2019-09-24 RX ORDER — ATORVASTATIN CALCIUM 80 MG/1
80 TABLET, FILM COATED ORAL NIGHTLY
Status: DISCONTINUED | OUTPATIENT
Start: 2019-09-24 | End: 2019-09-30 | Stop reason: HOSPADM

## 2019-09-24 RX ORDER — ASPIRIN 300 MG/1
300 SUPPOSITORY RECTAL DAILY
Status: DISCONTINUED | OUTPATIENT
Start: 2019-09-24 | End: 2019-09-26

## 2019-09-24 RX ORDER — FENTANYL CITRATE 50 UG/ML
25 INJECTION, SOLUTION INTRAMUSCULAR; INTRAVENOUS
Status: DISCONTINUED | OUTPATIENT
Start: 2019-09-24 | End: 2019-09-24

## 2019-09-24 RX ORDER — SODIUM CHLORIDE 0.9 % (FLUSH) 0.9 %
10 SYRINGE (ML) INJECTION PRN
Status: DISCONTINUED | OUTPATIENT
Start: 2019-09-24 | End: 2019-09-24

## 2019-09-24 RX ORDER — SODIUM CHLORIDE 0.9 % (FLUSH) 0.9 %
10 SYRINGE (ML) INJECTION PRN
Status: DISCONTINUED | OUTPATIENT
Start: 2019-09-24 | End: 2019-09-30 | Stop reason: HOSPADM

## 2019-09-24 RX ADMIN — SUCCINYLCHOLINE CHLORIDE 150 MG: 20 INJECTION, SOLUTION INTRAMUSCULAR; INTRAVENOUS at 12:24

## 2019-09-24 RX ADMIN — ENOXAPARIN SODIUM 40 MG: 40 INJECTION SUBCUTANEOUS at 21:09

## 2019-09-24 RX ADMIN — SODIUM CHLORIDE, PRESERVATIVE FREE 10 ML: 5 INJECTION INTRAVENOUS at 21:14

## 2019-09-24 RX ADMIN — PANTOPRAZOLE SODIUM 40 MG: 40 INJECTION, POWDER, FOR SOLUTION INTRAVENOUS at 18:32

## 2019-09-24 RX ADMIN — PROPOFOL 10 MCG/KG/MIN: 10 INJECTION, EMULSION INTRAVENOUS at 12:41

## 2019-09-24 RX ADMIN — ASPIRIN 300 MG: 300 SUPPOSITORY RECTAL at 19:16

## 2019-09-24 RX ADMIN — IOPAMIDOL 75 ML: 755 INJECTION, SOLUTION INTRAVENOUS at 12:04

## 2019-09-24 RX ADMIN — NOREPINEPHRINE BITARTRATE: 1 INJECTION INTRAVENOUS at 20:02

## 2019-09-24 RX ADMIN — ETOMIDATE 20 MG: 2 INJECTION INTRAVENOUS at 12:21

## 2019-09-24 RX ADMIN — KETOROLAC TROMETHAMINE 15 MG: 15 INJECTION, SOLUTION INTRAMUSCULAR; INTRAVENOUS at 22:36

## 2019-09-24 RX ADMIN — Medication 2 MCG/MIN: at 18:13

## 2019-09-24 RX ADMIN — SODIUM CHLORIDE 1000 ML: 9 INJECTION, SOLUTION INTRAVENOUS at 13:15

## 2019-09-24 RX ADMIN — Medication 10 ML: at 17:09

## 2019-09-24 RX ADMIN — PROPOFOL 5 MCG/KG/MIN: 10 INJECTION, EMULSION INTRAVENOUS at 17:07

## 2019-09-24 RX ADMIN — CHLORHEXIDINE GLUCONATE 15 ML: 1.2 RINSE ORAL at 21:09

## 2019-09-24 RX ADMIN — SODIUM CHLORIDE 1000 ML: 9 INJECTION, SOLUTION INTRAVENOUS at 12:20

## 2019-09-24 RX ADMIN — SODIUM CHLORIDE 500 ML: 9 INJECTION, SOLUTION INTRAVENOUS at 14:45

## 2019-09-24 RX ADMIN — MIDAZOLAM 5 MG: 1 INJECTION INTRAMUSCULAR; INTRAVENOUS at 12:50

## 2019-09-24 RX ADMIN — PROPOFOL 15 MCG/KG/MIN: 10 INJECTION, EMULSION INTRAVENOUS at 21:11

## 2019-09-24 ASSESSMENT — PAIN - FUNCTIONAL ASSESSMENT
PAIN_FUNCTIONAL_ASSESSMENT: PREVENTS OR INTERFERES SOME ACTIVE ACTIVITIES AND ADLS

## 2019-09-24 ASSESSMENT — PAIN DESCRIPTION - ONSET
ONSET: AWAKENED FROM SLEEP
ONSET: PROGRESSIVE

## 2019-09-24 ASSESSMENT — PULMONARY FUNCTION TESTS
PIF_VALUE: 23
PIF_VALUE: 22
PIF_VALUE: 25
PIF_VALUE: 32
PIF_VALUE: 24
PIF_VALUE: 20
PIF_VALUE: 21
PIF_VALUE: 21
PIF_VALUE: 30
PIF_VALUE: 23
PIF_VALUE: 21
PIF_VALUE: 22

## 2019-09-24 ASSESSMENT — PAIN DESCRIPTION - PROGRESSION
CLINICAL_PROGRESSION: GRADUALLY IMPROVING
CLINICAL_PROGRESSION: GRADUALLY IMPROVING

## 2019-09-24 ASSESSMENT — PAIN SCALES - GENERAL
PAINLEVEL_OUTOF10: 0
PAINLEVEL_OUTOF10: 7
PAINLEVEL_OUTOF10: 10
PAINLEVEL_OUTOF10: 7
PAINLEVEL_OUTOF10: 0

## 2019-09-24 ASSESSMENT — PAIN DESCRIPTION - PAIN TYPE
TYPE: CHRONIC PAIN
TYPE: ACUTE PAIN
TYPE: CHRONIC PAIN

## 2019-09-24 ASSESSMENT — PAIN DESCRIPTION - LOCATION
LOCATION: BACK
LOCATION: THROAT
LOCATION: BACK

## 2019-09-24 ASSESSMENT — PAIN DESCRIPTION - ORIENTATION
ORIENTATION: LEFT
ORIENTATION: LEFT

## 2019-09-25 ENCOUNTER — APPOINTMENT (OUTPATIENT)
Dept: MRI IMAGING | Age: 74
DRG: 917 | End: 2019-09-25
Payer: MEDICARE

## 2019-09-25 ENCOUNTER — APPOINTMENT (OUTPATIENT)
Dept: GENERAL RADIOLOGY | Age: 74
DRG: 917 | End: 2019-09-25
Payer: MEDICARE

## 2019-09-25 LAB
ANION GAP SERPL CALCULATED.3IONS-SCNC: 15 MMOL/L (ref 3–16)
APTT: 51.4 SEC (ref 26–36)
BASE EXCESS ARTERIAL: -1.8 MMOL/L (ref -3–3)
BASOPHILS ABSOLUTE: 0 K/UL (ref 0–0.2)
BASOPHILS RELATIVE PERCENT: 0.3 %
BUN BLDV-MCNC: 20 MG/DL (ref 7–20)
CALCIUM SERPL-MCNC: 8.2 MG/DL (ref 8.3–10.6)
CARBOXYHEMOGLOBIN ARTERIAL: 0.9 % (ref 0–1.5)
CHLORIDE BLD-SCNC: 107 MMOL/L (ref 99–110)
CHOLESTEROL, TOTAL: 131 MG/DL (ref 0–199)
CO2: 20 MMOL/L (ref 21–32)
COAG INTERPRETATION: NORMAL
CREAT SERPL-MCNC: 1.1 MG/DL (ref 0.6–1.2)
EOSINOPHILS ABSOLUTE: 0.1 K/UL (ref 0–0.6)
EOSINOPHILS RELATIVE PERCENT: 2.8 %
ESTIMATED AVERAGE GLUCOSE: 105.4 MG/DL
GFR AFRICAN AMERICAN: 59
GFR NON-AFRICAN AMERICAN: 49
GLUCOSE BLD-MCNC: 91 MG/DL (ref 70–99)
HBA1C MFR BLD: 5.3 %
HCO3 ARTERIAL: 22.4 MMOL/L (ref 21–29)
HCT VFR BLD CALC: 27.7 % (ref 36–48)
HDLC SERPL-MCNC: 41 MG/DL (ref 40–60)
HEMOGLOBIN, ART, EXTENDED: 10.2 G/DL (ref 12–16)
HEMOGLOBIN: 9 G/DL (ref 12–16)
INR BLD: 1.03 (ref 0.86–1.14)
L. PNEUMOPHILA SEROGP 1 UR AG: NORMAL
LDL CHOLESTEROL CALCULATED: 56 MG/DL
LYMPHOCYTES ABSOLUTE: 0.8 K/UL (ref 1–5.1)
LYMPHOCYTES RELATIVE PERCENT: 16.2 %
MAGNESIUM: 2.1 MG/DL (ref 1.8–2.4)
MCH RBC QN AUTO: 29.7 PG (ref 26–34)
MCHC RBC AUTO-ENTMCNC: 32.5 G/DL (ref 31–36)
MCV RBC AUTO: 91.3 FL (ref 80–100)
METHEMOGLOBIN ARTERIAL: 1.2 %
MONOCYTES ABSOLUTE: 0.6 K/UL (ref 0–1.3)
MONOCYTES RELATIVE PERCENT: 11.5 %
MRSA SCREEN RT-PCR: NORMAL
NEUTROPHILS ABSOLUTE: 3.5 K/UL (ref 1.7–7.7)
NEUTROPHILS RELATIVE PERCENT: 69.2 %
O2 CONTENT ARTERIAL: 13 ML/DL
O2 SAT, ARTERIAL: 94.5 %
O2 THERAPY: ABNORMAL
PCO2 ARTERIAL: 38.3 MMHG (ref 35–45)
PDW BLD-RTO: 15.5 % (ref 12.4–15.4)
PH ARTERIAL: 7.38 (ref 7.35–7.45)
PHOSPHORUS: 3.7 MG/DL (ref 2.5–4.9)
PLATELET # BLD: 193 K/UL (ref 135–450)
PMV BLD AUTO: 8.3 FL (ref 5–10.5)
PO2 ARTERIAL: 67.4 MMHG (ref 75–108)
POTASSIUM SERPL-SCNC: 3.4 MMOL/L (ref 3.5–5.1)
PROTHROMBIN TIME: 11.7 SEC (ref 9.8–13)
PTT 1-HOUR, 1:1 MIX: 37.3 SEC (ref 24.1–34.9)
PTT 1:1 MIX IMMEDIATE: 37.4 SEC (ref 24.1–34.9)
PTT MIXING STUDY INDICATED: YES
RBC # BLD: 3.03 M/UL (ref 4–5.2)
SODIUM BLD-SCNC: 142 MMOL/L (ref 136–145)
STREP PNEUMONIAE ANTIGEN, URINE: NORMAL
TCO2 ARTERIAL: 23.6 MMOL/L
THROMBIN TIME: 14.4 SECS (ref 12.1–17.5)
TRIGL SERPL-MCNC: 170 MG/DL (ref 0–150)
VLDLC SERPL CALC-MCNC: 34 MG/DL
WBC # BLD: 5 K/UL (ref 4–11)

## 2019-09-25 PROCEDURE — APPNB15 APP NON BILLABLE TIME 0-15 MINS: Performed by: NURSE PRACTITIONER

## 2019-09-25 PROCEDURE — 71045 X-RAY EXAM CHEST 1 VIEW: CPT

## 2019-09-25 PROCEDURE — 70551 MRI BRAIN STEM W/O DYE: CPT

## 2019-09-25 PROCEDURE — 6370000000 HC RX 637 (ALT 250 FOR IP): Performed by: INTERNAL MEDICINE

## 2019-09-25 PROCEDURE — 89220 SPUTUM SPECIMEN COLLECTION: CPT

## 2019-09-25 PROCEDURE — 80048 BASIC METABOLIC PNL TOTAL CA: CPT

## 2019-09-25 PROCEDURE — 94750 HC PULMONARY COMPLIANCE STUDY: CPT

## 2019-09-25 PROCEDURE — 87070 CULTURE OTHR SPECIMN AEROBIC: CPT

## 2019-09-25 PROCEDURE — 2580000003 HC RX 258: Performed by: INTERNAL MEDICINE

## 2019-09-25 PROCEDURE — 80061 LIPID PANEL: CPT

## 2019-09-25 PROCEDURE — 94003 VENT MGMT INPAT SUBQ DAY: CPT

## 2019-09-25 PROCEDURE — 6360000002 HC RX W HCPCS: Performed by: NURSE PRACTITIONER

## 2019-09-25 PROCEDURE — 6360000002 HC RX W HCPCS: Performed by: INTERNAL MEDICINE

## 2019-09-25 PROCEDURE — 92610 EVALUATE SWALLOWING FUNCTION: CPT

## 2019-09-25 PROCEDURE — 2580000003 HC RX 258: Performed by: NURSE PRACTITIONER

## 2019-09-25 PROCEDURE — 83735 ASSAY OF MAGNESIUM: CPT

## 2019-09-25 PROCEDURE — 82803 BLOOD GASES ANY COMBINATION: CPT

## 2019-09-25 PROCEDURE — 83036 HEMOGLOBIN GLYCOSYLATED A1C: CPT

## 2019-09-25 PROCEDURE — 84100 ASSAY OF PHOSPHORUS: CPT

## 2019-09-25 PROCEDURE — 2000000000 HC ICU R&B

## 2019-09-25 PROCEDURE — 2700000000 HC OXYGEN THERAPY PER DAY

## 2019-09-25 PROCEDURE — 87205 SMEAR GRAM STAIN: CPT

## 2019-09-25 PROCEDURE — 93308 TTE F-UP OR LMTD: CPT

## 2019-09-25 PROCEDURE — C9113 INJ PANTOPRAZOLE SODIUM, VIA: HCPCS | Performed by: NURSE PRACTITIONER

## 2019-09-25 PROCEDURE — 99291 CRITICAL CARE FIRST HOUR: CPT | Performed by: INTERNAL MEDICINE

## 2019-09-25 PROCEDURE — 9990000010 HC NO CHARGE VISIT

## 2019-09-25 PROCEDURE — 85025 COMPLETE CBC W/AUTO DIFF WBC: CPT

## 2019-09-25 PROCEDURE — 94760 N-INVAS EAR/PLS OXIMETRY 1: CPT

## 2019-09-25 PROCEDURE — 6370000000 HC RX 637 (ALT 250 FOR IP): Performed by: NURSE PRACTITIONER

## 2019-09-25 RX ORDER — POTASSIUM CHLORIDE 29.8 MG/ML
20 INJECTION INTRAVENOUS ONCE
Status: COMPLETED | OUTPATIENT
Start: 2019-09-25 | End: 2019-09-25

## 2019-09-25 RX ORDER — ACETAMINOPHEN 325 MG/1
650 TABLET ORAL EVERY 4 HOURS PRN
Status: DISCONTINUED | OUTPATIENT
Start: 2019-09-25 | End: 2019-09-30 | Stop reason: HOSPADM

## 2019-09-25 RX ORDER — MORPHINE SULFATE 15 MG/1
15 TABLET ORAL EVERY 6 HOURS PRN
Status: DISCONTINUED | OUTPATIENT
Start: 2019-09-25 | End: 2019-09-30 | Stop reason: HOSPADM

## 2019-09-25 RX ORDER — SODIUM CHLORIDE 9 MG/ML
INJECTION, SOLUTION INTRAVENOUS CONTINUOUS
Status: ACTIVE | OUTPATIENT
Start: 2019-09-25 | End: 2019-09-25

## 2019-09-25 RX ORDER — ZOLPIDEM TARTRATE 5 MG/1
5 TABLET ORAL NIGHTLY PRN
Status: DISCONTINUED | OUTPATIENT
Start: 2019-09-25 | End: 2019-09-30 | Stop reason: HOSPADM

## 2019-09-25 RX ORDER — PREGABALIN 75 MG/1
150 CAPSULE ORAL 3 TIMES DAILY
Status: DISCONTINUED | OUTPATIENT
Start: 2019-09-25 | End: 2019-09-30 | Stop reason: HOSPADM

## 2019-09-25 RX ADMIN — PROPOFOL 25 MCG/KG/MIN: 10 INJECTION, EMULSION INTRAVENOUS at 02:30

## 2019-09-25 RX ADMIN — CEFEPIME HYDROCHLORIDE 2 G: 2 INJECTION, POWDER, FOR SOLUTION INTRAVENOUS at 12:43

## 2019-09-25 RX ADMIN — SODIUM CHLORIDE: 9 INJECTION, SOLUTION INTRAVENOUS at 10:21

## 2019-09-25 RX ADMIN — ENOXAPARIN SODIUM 40 MG: 40 INJECTION SUBCUTANEOUS at 21:49

## 2019-09-25 RX ADMIN — SODIUM CHLORIDE, PRESERVATIVE FREE 10 ML: 5 INJECTION INTRAVENOUS at 21:00

## 2019-09-25 RX ADMIN — ACETAMINOPHEN 650 MG: 325 TABLET ORAL at 12:32

## 2019-09-25 RX ADMIN — ASPIRIN 81 MG: 81 TABLET, COATED ORAL at 12:32

## 2019-09-25 RX ADMIN — SODIUM CHLORIDE, PRESERVATIVE FREE 10 ML: 5 INJECTION INTRAVENOUS at 10:32

## 2019-09-25 RX ADMIN — CHLORHEXIDINE GLUCONATE 15 ML: 1.2 RINSE ORAL at 08:07

## 2019-09-25 RX ADMIN — Medication 10 ML: at 10:30

## 2019-09-25 RX ADMIN — POTASSIUM CHLORIDE 20 MEQ: 29.8 INJECTION, SOLUTION INTRAVENOUS at 10:21

## 2019-09-25 RX ADMIN — ACETAMINOPHEN 650 MG: 325 TABLET ORAL at 21:49

## 2019-09-25 RX ADMIN — MORPHINE SULFATE 15 MG: 15 TABLET ORAL at 22:10

## 2019-09-25 RX ADMIN — PANTOPRAZOLE SODIUM 40 MG: 40 INJECTION, POWDER, FOR SOLUTION INTRAVENOUS at 10:22

## 2019-09-25 RX ADMIN — ATORVASTATIN CALCIUM 80 MG: 80 TABLET, FILM COATED ORAL at 21:48

## 2019-09-25 RX ADMIN — PREGABALIN 150 MG: 75 CAPSULE ORAL at 22:10

## 2019-09-25 ASSESSMENT — PULMONARY FUNCTION TESTS
PIF_VALUE: 21
PIF_VALUE: 24
PIF_VALUE: 28
PIF_VALUE: 31
PIF_VALUE: 12
PIF_VALUE: 25
PIF_VALUE: 27
PIF_VALUE: 27
PIF_VALUE: 26
PIF_VALUE: 33
PIF_VALUE: 29
PIF_VALUE: 12
PIF_VALUE: 23
PIF_VALUE: 25

## 2019-09-25 ASSESSMENT — PAIN SCALES - GENERAL
PAINLEVEL_OUTOF10: 10
PAINLEVEL_OUTOF10: 4
PAINLEVEL_OUTOF10: 10
PAINLEVEL_OUTOF10: 0
PAINLEVEL_OUTOF10: 10
PAINLEVEL_OUTOF10: 4

## 2019-09-25 ASSESSMENT — PAIN DESCRIPTION - PAIN TYPE
TYPE: CHRONIC PAIN
TYPE: CHRONIC PAIN

## 2019-09-25 ASSESSMENT — PAIN - FUNCTIONAL ASSESSMENT
PAIN_FUNCTIONAL_ASSESSMENT: PREVENTS OR INTERFERES SOME ACTIVE ACTIVITIES AND ADLS
PAIN_FUNCTIONAL_ASSESSMENT: PREVENTS OR INTERFERES SOME ACTIVE ACTIVITIES AND ADLS

## 2019-09-25 ASSESSMENT — PAIN DESCRIPTION - PROGRESSION
CLINICAL_PROGRESSION: NOT CHANGED
CLINICAL_PROGRESSION: NOT CHANGED

## 2019-09-25 ASSESSMENT — PAIN DESCRIPTION - LOCATION
LOCATION: BACK
LOCATION: BACK

## 2019-09-25 ASSESSMENT — PAIN DESCRIPTION - ORIENTATION: ORIENTATION: LEFT

## 2019-09-25 ASSESSMENT — PAIN DESCRIPTION - FREQUENCY
FREQUENCY: CONTINUOUS
FREQUENCY: CONTINUOUS

## 2019-09-25 ASSESSMENT — PAIN DESCRIPTION - ONSET
ONSET: ON-GOING
ONSET: ON-GOING

## 2019-09-25 ASSESSMENT — PAIN DESCRIPTION - DESCRIPTORS
DESCRIPTORS: ACHING
DESCRIPTORS: ACHING

## 2019-09-26 LAB
ANION GAP SERPL CALCULATED.3IONS-SCNC: 13 MMOL/L (ref 3–16)
BASOPHILS ABSOLUTE: 0 K/UL (ref 0–0.2)
BASOPHILS RELATIVE PERCENT: 0.2 %
BUN BLDV-MCNC: 12 MG/DL (ref 7–20)
CALCIUM SERPL-MCNC: 8.5 MG/DL (ref 8.3–10.6)
CHLORIDE BLD-SCNC: 109 MMOL/L (ref 99–110)
CO2: 21 MMOL/L (ref 21–32)
CREAT SERPL-MCNC: 0.7 MG/DL (ref 0.6–1.2)
EKG ATRIAL RATE: 97 BPM
EKG DIAGNOSIS: NORMAL
EKG P AXIS: 34 DEGREES
EKG P-R INTERVAL: 202 MS
EKG Q-T INTERVAL: 384 MS
EKG QRS DURATION: 138 MS
EKG QTC CALCULATION (BAZETT): 487 MS
EKG R AXIS: 16 DEGREES
EKG T AXIS: -10 DEGREES
EKG VENTRICULAR RATE: 97 BPM
EOSINOPHILS ABSOLUTE: 0.1 K/UL (ref 0–0.6)
EOSINOPHILS RELATIVE PERCENT: 0.8 %
FERRITIN: 244 NG/ML (ref 15–150)
FOLATE: 14.32 NG/ML (ref 4.78–24.2)
GFR AFRICAN AMERICAN: >60
GFR NON-AFRICAN AMERICAN: >60
GLUCOSE BLD-MCNC: 87 MG/DL (ref 70–99)
HAPTOGLOBIN: 262 MG/DL (ref 30–200)
HCT VFR BLD CALC: 26.9 % (ref 36–48)
HEMOGLOBIN: 9 G/DL (ref 12–16)
IRON SATURATION: 11 % (ref 15–50)
IRON: 21 UG/DL (ref 37–145)
LYMPHOCYTES ABSOLUTE: 0.6 K/UL (ref 1–5.1)
LYMPHOCYTES RELATIVE PERCENT: 10.1 %
MAGNESIUM: 2.1 MG/DL (ref 1.8–2.4)
MCH RBC QN AUTO: 30.8 PG (ref 26–34)
MCHC RBC AUTO-ENTMCNC: 33.5 G/DL (ref 31–36)
MCV RBC AUTO: 91.8 FL (ref 80–100)
MONOCYTES ABSOLUTE: 0.7 K/UL (ref 0–1.3)
MONOCYTES RELATIVE PERCENT: 10.3 %
NEUTROPHILS ABSOLUTE: 5 K/UL (ref 1.7–7.7)
NEUTROPHILS RELATIVE PERCENT: 78.6 %
PDW BLD-RTO: 15.9 % (ref 12.4–15.4)
PHOSPHORUS: 3 MG/DL (ref 2.5–4.9)
PLATELET # BLD: 167 K/UL (ref 135–450)
PMV BLD AUTO: 8 FL (ref 5–10.5)
POTASSIUM SERPL-SCNC: 3.6 MMOL/L (ref 3.5–5.1)
RBC # BLD: 2.93 M/UL (ref 4–5.2)
SODIUM BLD-SCNC: 143 MMOL/L (ref 136–145)
TOTAL IRON BINDING CAPACITY: 191 UG/DL (ref 260–445)
TROPONIN: <0.01 NG/ML
VITAMIN B-12: 484 PG/ML (ref 211–911)
WBC # BLD: 6.4 K/UL (ref 4–11)

## 2019-09-26 PROCEDURE — 86146 BETA-2 GLYCOPROTEIN ANTIBODY: CPT

## 2019-09-26 PROCEDURE — 85613 RUSSELL VIPER VENOM DILUTED: CPT

## 2019-09-26 PROCEDURE — 84484 ASSAY OF TROPONIN QUANT: CPT

## 2019-09-26 PROCEDURE — 97166 OT EVAL MOD COMPLEX 45 MIN: CPT

## 2019-09-26 PROCEDURE — 83540 ASSAY OF IRON: CPT

## 2019-09-26 PROCEDURE — 93010 ELECTROCARDIOGRAM REPORT: CPT | Performed by: INTERNAL MEDICINE

## 2019-09-26 PROCEDURE — 85597 PHOSPHOLIPID PLTLT NEUTRALIZ: CPT

## 2019-09-26 PROCEDURE — 94760 N-INVAS EAR/PLS OXIMETRY 1: CPT

## 2019-09-26 PROCEDURE — 2700000000 HC OXYGEN THERAPY PER DAY

## 2019-09-26 PROCEDURE — 84100 ASSAY OF PHOSPHORUS: CPT

## 2019-09-26 PROCEDURE — 85732 THROMBOPLASTIN TIME PARTIAL: CPT

## 2019-09-26 PROCEDURE — 6370000000 HC RX 637 (ALT 250 FOR IP): Performed by: INTERNAL MEDICINE

## 2019-09-26 PROCEDURE — 85025 COMPLETE CBC W/AUTO DIFF WBC: CPT

## 2019-09-26 PROCEDURE — 93005 ELECTROCARDIOGRAM TRACING: CPT | Performed by: NURSE PRACTITIONER

## 2019-09-26 PROCEDURE — 85670 THROMBIN TIME PLASMA: CPT

## 2019-09-26 PROCEDURE — 82728 ASSAY OF FERRITIN: CPT

## 2019-09-26 PROCEDURE — 85730 THROMBOPLASTIN TIME PARTIAL: CPT

## 2019-09-26 PROCEDURE — 84165 PROTEIN E-PHORESIS SERUM: CPT

## 2019-09-26 PROCEDURE — 6370000000 HC RX 637 (ALT 250 FOR IP): Performed by: NURSE PRACTITIONER

## 2019-09-26 PROCEDURE — 2580000003 HC RX 258: Performed by: NURSE PRACTITIONER

## 2019-09-26 PROCEDURE — 95819 EEG AWAKE AND ASLEEP: CPT

## 2019-09-26 PROCEDURE — 80048 BASIC METABOLIC PNL TOTAL CA: CPT

## 2019-09-26 PROCEDURE — 6360000002 HC RX W HCPCS: Performed by: NURSE PRACTITIONER

## 2019-09-26 PROCEDURE — 86147 CARDIOLIPIN ANTIBODY EA IG: CPT

## 2019-09-26 PROCEDURE — 2000000000 HC ICU R&B

## 2019-09-26 PROCEDURE — 83735 ASSAY OF MAGNESIUM: CPT

## 2019-09-26 PROCEDURE — 99232 SBSQ HOSP IP/OBS MODERATE 35: CPT | Performed by: INTERNAL MEDICINE

## 2019-09-26 PROCEDURE — 97162 PT EVAL MOD COMPLEX 30 MIN: CPT

## 2019-09-26 PROCEDURE — 97530 THERAPEUTIC ACTIVITIES: CPT

## 2019-09-26 PROCEDURE — 36415 COLL VENOUS BLD VENIPUNCTURE: CPT

## 2019-09-26 PROCEDURE — 82607 VITAMIN B-12: CPT

## 2019-09-26 PROCEDURE — APPNB15 APP NON BILLABLE TIME 0-15 MINS: Performed by: NURSE PRACTITIONER

## 2019-09-26 PROCEDURE — 2580000003 HC RX 258: Performed by: INTERNAL MEDICINE

## 2019-09-26 PROCEDURE — 6360000002 HC RX W HCPCS: Performed by: INTERNAL MEDICINE

## 2019-09-26 PROCEDURE — 92526 ORAL FUNCTION THERAPY: CPT

## 2019-09-26 PROCEDURE — 82746 ASSAY OF FOLIC ACID SERUM: CPT

## 2019-09-26 PROCEDURE — 94669 MECHANICAL CHEST WALL OSCILL: CPT

## 2019-09-26 PROCEDURE — 85610 PROTHROMBIN TIME: CPT

## 2019-09-26 PROCEDURE — 84155 ASSAY OF PROTEIN SERUM: CPT

## 2019-09-26 PROCEDURE — 83010 ASSAY OF HAPTOGLOBIN QUANT: CPT

## 2019-09-26 PROCEDURE — 83550 IRON BINDING TEST: CPT

## 2019-09-26 RX ORDER — CALCIUM CARBONATE 200(500)MG
500 TABLET,CHEWABLE ORAL 3 TIMES DAILY PRN
Status: DISCONTINUED | OUTPATIENT
Start: 2019-09-26 | End: 2019-09-30 | Stop reason: HOSPADM

## 2019-09-26 RX ORDER — SENNA PLUS 8.6 MG/1
2 TABLET ORAL NIGHTLY
Status: DISCONTINUED | OUTPATIENT
Start: 2019-09-26 | End: 2019-09-30 | Stop reason: HOSPADM

## 2019-09-26 RX ADMIN — ZOLPIDEM TARTRATE 5 MG: 5 TABLET ORAL at 23:48

## 2019-09-26 RX ADMIN — PREGABALIN 150 MG: 75 CAPSULE ORAL at 20:27

## 2019-09-26 RX ADMIN — CEFEPIME HYDROCHLORIDE 2 G: 2 INJECTION, POWDER, FOR SOLUTION INTRAVENOUS at 13:37

## 2019-09-26 RX ADMIN — ANTACID TABLETS 500 MG: 500 TABLET, CHEWABLE ORAL at 13:37

## 2019-09-26 RX ADMIN — SODIUM CHLORIDE, PRESERVATIVE FREE 10 ML: 5 INJECTION INTRAVENOUS at 22:23

## 2019-09-26 RX ADMIN — ASPIRIN 81 MG: 81 TABLET, COATED ORAL at 08:28

## 2019-09-26 RX ADMIN — PREGABALIN 150 MG: 75 CAPSULE ORAL at 08:28

## 2019-09-26 RX ADMIN — MORPHINE SULFATE 15 MG: 15 TABLET ORAL at 20:27

## 2019-09-26 RX ADMIN — ACETAMINOPHEN 650 MG: 325 TABLET ORAL at 13:37

## 2019-09-26 RX ADMIN — ENOXAPARIN SODIUM 40 MG: 40 INJECTION SUBCUTANEOUS at 20:26

## 2019-09-26 RX ADMIN — CEFEPIME HYDROCHLORIDE 2 G: 2 INJECTION, POWDER, FOR SOLUTION INTRAVENOUS at 01:29

## 2019-09-26 RX ADMIN — ANTACID TABLETS 500 MG: 500 TABLET, CHEWABLE ORAL at 19:16

## 2019-09-26 RX ADMIN — ATORVASTATIN CALCIUM 80 MG: 80 TABLET, FILM COATED ORAL at 20:27

## 2019-09-26 RX ADMIN — MORPHINE SULFATE 15 MG: 15 TABLET ORAL at 11:13

## 2019-09-26 RX ADMIN — PREGABALIN 150 MG: 75 CAPSULE ORAL at 13:37

## 2019-09-26 RX ADMIN — SODIUM CHLORIDE, PRESERVATIVE FREE 10 ML: 5 INJECTION INTRAVENOUS at 08:26

## 2019-09-26 RX ADMIN — ACETAMINOPHEN 650 MG: 325 TABLET ORAL at 22:12

## 2019-09-26 RX ADMIN — SENNOSIDES 17.2 MG: 8.6 TABLET, FILM COATED ORAL at 22:12

## 2019-09-26 ASSESSMENT — PAIN DESCRIPTION - PROGRESSION

## 2019-09-26 ASSESSMENT — PAIN DESCRIPTION - PAIN TYPE
TYPE: ACUTE PAIN
TYPE: ACUTE PAIN
TYPE: CHRONIC PAIN
TYPE: CHRONIC PAIN
TYPE: ACUTE PAIN
TYPE: CHRONIC PAIN
TYPE: CHRONIC PAIN

## 2019-09-26 ASSESSMENT — PAIN DESCRIPTION - ORIENTATION
ORIENTATION: MID
ORIENTATION: LEFT
ORIENTATION: ANTERIOR
ORIENTATION: LEFT

## 2019-09-26 ASSESSMENT — PAIN - FUNCTIONAL ASSESSMENT
PAIN_FUNCTIONAL_ASSESSMENT: PREVENTS OR INTERFERES SOME ACTIVE ACTIVITIES AND ADLS
PAIN_FUNCTIONAL_ASSESSMENT: PREVENTS OR INTERFERES WITH MANY ACTIVE NOT PASSIVE ACTIVITIES
PAIN_FUNCTIONAL_ASSESSMENT: PREVENTS OR INTERFERES SOME ACTIVE ACTIVITIES AND ADLS
PAIN_FUNCTIONAL_ASSESSMENT: PREVENTS OR INTERFERES SOME ACTIVE ACTIVITIES AND ADLS
PAIN_FUNCTIONAL_ASSESSMENT: PREVENTS OR INTERFERES WITH MANY ACTIVE NOT PASSIVE ACTIVITIES
PAIN_FUNCTIONAL_ASSESSMENT: PREVENTS OR INTERFERES SOME ACTIVE ACTIVITIES AND ADLS

## 2019-09-26 ASSESSMENT — PAIN SCALES - GENERAL
PAINLEVEL_OUTOF10: 4
PAINLEVEL_OUTOF10: 4
PAINLEVEL_OUTOF10: 5
PAINLEVEL_OUTOF10: 10
PAINLEVEL_OUTOF10: 4
PAINLEVEL_OUTOF10: 10
PAINLEVEL_OUTOF10: 4
PAINLEVEL_OUTOF10: 6

## 2019-09-26 ASSESSMENT — PAIN DESCRIPTION - ONSET
ONSET: ON-GOING

## 2019-09-26 ASSESSMENT — PAIN DESCRIPTION - FREQUENCY
FREQUENCY: INTERMITTENT
FREQUENCY: CONTINUOUS

## 2019-09-26 ASSESSMENT — PAIN DESCRIPTION - DESCRIPTORS
DESCRIPTORS: ACHING
DESCRIPTORS: THROBBING
DESCRIPTORS: ACHING

## 2019-09-26 ASSESSMENT — PAIN DESCRIPTION - LOCATION
LOCATION: HEAD
LOCATION: HEAD
LOCATION: BACK
LOCATION: BACK
LOCATION: HEAD
LOCATION: BACK
LOCATION: BACK;CHEST

## 2019-09-27 LAB
ALBUMIN SERPL-MCNC: 2.5 G/DL (ref 3.1–4.9)
ALPHA-1-GLOBULIN: 0.4 G/DL (ref 0.2–0.4)
ALPHA-2-GLOBULIN: 1 G/DL (ref 0.4–1.1)
ANION GAP SERPL CALCULATED.3IONS-SCNC: 10 MMOL/L (ref 3–16)
BASOPHILS ABSOLUTE: 0 K/UL (ref 0–0.2)
BASOPHILS RELATIVE PERCENT: 0.5 %
BETA GLOBULIN: 1 G/DL (ref 0.9–1.6)
BUN BLDV-MCNC: 9 MG/DL (ref 7–20)
CALCIUM SERPL-MCNC: 8.9 MG/DL (ref 8.3–10.6)
CHLORIDE BLD-SCNC: 109 MMOL/L (ref 99–110)
CO2: 24 MMOL/L (ref 21–32)
CREAT SERPL-MCNC: 0.6 MG/DL (ref 0.6–1.2)
CULTURE, RESPIRATORY: NORMAL
EOSINOPHILS ABSOLUTE: 0.2 K/UL (ref 0–0.6)
EOSINOPHILS RELATIVE PERCENT: 4.1 %
GAMMA GLOBULIN: 0.7 G/DL (ref 0.6–1.8)
GFR AFRICAN AMERICAN: >60
GFR NON-AFRICAN AMERICAN: >60
GLUCOSE BLD-MCNC: 94 MG/DL (ref 70–99)
GRAM STAIN RESULT: NORMAL
HCT VFR BLD CALC: 28.2 % (ref 36–48)
HEMOGLOBIN: 9.3 G/DL (ref 12–16)
LYMPHOCYTES ABSOLUTE: 0.9 K/UL (ref 1–5.1)
LYMPHOCYTES RELATIVE PERCENT: 18 %
MAGNESIUM: 2.1 MG/DL (ref 1.8–2.4)
MCH RBC QN AUTO: 30.5 PG (ref 26–34)
MCHC RBC AUTO-ENTMCNC: 33.1 G/DL (ref 31–36)
MCV RBC AUTO: 92 FL (ref 80–100)
MONOCYTES ABSOLUTE: 0.6 K/UL (ref 0–1.3)
MONOCYTES RELATIVE PERCENT: 10.7 %
NEUTROPHILS ABSOLUTE: 3.4 K/UL (ref 1.7–7.7)
NEUTROPHILS RELATIVE PERCENT: 66.7 %
PDW BLD-RTO: 15.9 % (ref 12.4–15.4)
PHOSPHORUS: 2.9 MG/DL (ref 2.5–4.9)
PLATELET # BLD: 197 K/UL (ref 135–450)
PMV BLD AUTO: 8 FL (ref 5–10.5)
POTASSIUM SERPL-SCNC: 3.6 MMOL/L (ref 3.5–5.1)
RBC # BLD: 3.06 M/UL (ref 4–5.2)
SODIUM BLD-SCNC: 143 MMOL/L (ref 136–145)
SPE/IFE INTERPRETATION: NORMAL
TOTAL PROTEIN: 5.7 G/DL (ref 6.4–8.2)
WBC # BLD: 5.2 K/UL (ref 4–11)

## 2019-09-27 PROCEDURE — 1200000000 HC SEMI PRIVATE

## 2019-09-27 PROCEDURE — 6360000002 HC RX W HCPCS: Performed by: NURSE PRACTITIONER

## 2019-09-27 PROCEDURE — 97530 THERAPEUTIC ACTIVITIES: CPT

## 2019-09-27 PROCEDURE — 6360000002 HC RX W HCPCS: Performed by: INTERNAL MEDICINE

## 2019-09-27 PROCEDURE — 2580000003 HC RX 258: Performed by: INTERNAL MEDICINE

## 2019-09-27 PROCEDURE — 85025 COMPLETE CBC W/AUTO DIFF WBC: CPT

## 2019-09-27 PROCEDURE — 92523 SPEECH SOUND LANG COMPREHEN: CPT

## 2019-09-27 PROCEDURE — 2580000003 HC RX 258: Performed by: NURSE PRACTITIONER

## 2019-09-27 PROCEDURE — 92526 ORAL FUNCTION THERAPY: CPT

## 2019-09-27 PROCEDURE — 6370000000 HC RX 637 (ALT 250 FOR IP): Performed by: INTERNAL MEDICINE

## 2019-09-27 PROCEDURE — 36592 COLLECT BLOOD FROM PICC: CPT

## 2019-09-27 PROCEDURE — 80048 BASIC METABOLIC PNL TOTAL CA: CPT

## 2019-09-27 PROCEDURE — 97116 GAIT TRAINING THERAPY: CPT

## 2019-09-27 PROCEDURE — 83735 ASSAY OF MAGNESIUM: CPT

## 2019-09-27 PROCEDURE — 6370000000 HC RX 637 (ALT 250 FOR IP): Performed by: NURSE PRACTITIONER

## 2019-09-27 PROCEDURE — 97535 SELF CARE MNGMENT TRAINING: CPT

## 2019-09-27 PROCEDURE — 84100 ASSAY OF PHOSPHORUS: CPT

## 2019-09-27 RX ORDER — ROPINIROLE 0.25 MG/1
0.25 TABLET, FILM COATED ORAL NIGHTLY
Qty: 3 TABLET | Refills: 0 | Status: SHIPPED | OUTPATIENT
Start: 2019-09-27 | End: 2019-11-20 | Stop reason: SDUPTHER

## 2019-09-27 RX ORDER — LEVOFLOXACIN 500 MG/1
500 TABLET, FILM COATED ORAL DAILY
Status: DISCONTINUED | OUTPATIENT
Start: 2019-09-27 | End: 2019-09-30 | Stop reason: HOSPADM

## 2019-09-27 RX ORDER — ATORVASTATIN CALCIUM 80 MG/1
80 TABLET, FILM COATED ORAL NIGHTLY
Qty: 30 TABLET | Refills: 3 | DISCHARGE
Start: 2019-09-27 | End: 2020-07-10

## 2019-09-27 RX ORDER — LEVOFLOXACIN 500 MG/1
500 TABLET, FILM COATED ORAL DAILY
Qty: 4 TABLET | Refills: 0 | DISCHARGE
Start: 2019-09-27 | End: 2019-10-01

## 2019-09-27 RX ORDER — MORPHINE SULFATE 15 MG/1
15 TABLET ORAL 3 TIMES DAILY PRN
Qty: 9 TABLET | Refills: 0 | Status: SHIPPED | OUTPATIENT
Start: 2019-09-27 | End: 2019-09-30

## 2019-09-27 RX ADMIN — ACETAMINOPHEN 650 MG: 325 TABLET ORAL at 09:17

## 2019-09-27 RX ADMIN — MORPHINE SULFATE 15 MG: 15 TABLET ORAL at 20:38

## 2019-09-27 RX ADMIN — SODIUM CHLORIDE, PRESERVATIVE FREE 10 ML: 5 INJECTION INTRAVENOUS at 09:16

## 2019-09-27 RX ADMIN — ENOXAPARIN SODIUM 40 MG: 40 INJECTION SUBCUTANEOUS at 20:26

## 2019-09-27 RX ADMIN — PREGABALIN 150 MG: 75 CAPSULE ORAL at 20:26

## 2019-09-27 RX ADMIN — ASPIRIN 81 MG: 81 TABLET, COATED ORAL at 09:15

## 2019-09-27 RX ADMIN — SODIUM CHLORIDE, PRESERVATIVE FREE 10 ML: 5 INJECTION INTRAVENOUS at 20:27

## 2019-09-27 RX ADMIN — ONDANSETRON 4 MG: 2 INJECTION INTRAMUSCULAR; INTRAVENOUS at 08:19

## 2019-09-27 RX ADMIN — ACETAMINOPHEN 650 MG: 325 TABLET ORAL at 22:49

## 2019-09-27 RX ADMIN — SENNOSIDES 17.2 MG: 8.6 TABLET, FILM COATED ORAL at 20:26

## 2019-09-27 RX ADMIN — CEFEPIME HYDROCHLORIDE 2 G: 2 INJECTION, POWDER, FOR SOLUTION INTRAVENOUS at 01:05

## 2019-09-27 RX ADMIN — PREGABALIN 150 MG: 75 CAPSULE ORAL at 14:21

## 2019-09-27 RX ADMIN — LEVOFLOXACIN 500 MG: 500 TABLET, FILM COATED ORAL at 14:21

## 2019-09-27 RX ADMIN — PREGABALIN 150 MG: 75 CAPSULE ORAL at 09:15

## 2019-09-27 RX ADMIN — ATORVASTATIN CALCIUM 80 MG: 80 TABLET, FILM COATED ORAL at 20:26

## 2019-09-27 ASSESSMENT — PAIN SCALES - GENERAL
PAINLEVEL_OUTOF10: 0
PAINLEVEL_OUTOF10: 9
PAINLEVEL_OUTOF10: 9
PAINLEVEL_OUTOF10: 4
PAINLEVEL_OUTOF10: 0
PAINLEVEL_OUTOF10: 8
PAINLEVEL_OUTOF10: 9

## 2019-09-27 ASSESSMENT — PAIN DESCRIPTION - LOCATION
LOCATION: HEAD
LOCATION: HEAD
LOCATION: BACK

## 2019-09-27 ASSESSMENT — PAIN DESCRIPTION - DESCRIPTORS
DESCRIPTORS: ACHING
DESCRIPTORS: ACHING
DESCRIPTORS: ACHING;STABBING;THROBBING

## 2019-09-27 ASSESSMENT — PAIN - FUNCTIONAL ASSESSMENT
PAIN_FUNCTIONAL_ASSESSMENT: PREVENTS OR INTERFERES SOME ACTIVE ACTIVITIES AND ADLS

## 2019-09-27 ASSESSMENT — PAIN DESCRIPTION - FREQUENCY
FREQUENCY: CONTINUOUS
FREQUENCY: INTERMITTENT
FREQUENCY: INTERMITTENT

## 2019-09-27 ASSESSMENT — PAIN DESCRIPTION - ONSET
ONSET: PROGRESSIVE
ONSET: ON-GOING
ONSET: ON-GOING

## 2019-09-27 ASSESSMENT — PAIN DESCRIPTION - PAIN TYPE
TYPE: CHRONIC PAIN
TYPE: ACUTE PAIN
TYPE: ACUTE PAIN

## 2019-09-27 ASSESSMENT — PAIN DESCRIPTION - PROGRESSION
CLINICAL_PROGRESSION: NOT CHANGED

## 2019-09-27 ASSESSMENT — PAIN DESCRIPTION - ORIENTATION
ORIENTATION: MID
ORIENTATION: MID

## 2019-09-28 PROCEDURE — 6370000000 HC RX 637 (ALT 250 FOR IP): Performed by: NURSE PRACTITIONER

## 2019-09-28 PROCEDURE — 1200000000 HC SEMI PRIVATE

## 2019-09-28 PROCEDURE — 6370000000 HC RX 637 (ALT 250 FOR IP): Performed by: INTERNAL MEDICINE

## 2019-09-28 PROCEDURE — 2700000000 HC OXYGEN THERAPY PER DAY

## 2019-09-28 PROCEDURE — 6360000002 HC RX W HCPCS: Performed by: INTERNAL MEDICINE

## 2019-09-28 RX ADMIN — PREGABALIN 150 MG: 75 CAPSULE ORAL at 08:40

## 2019-09-28 RX ADMIN — ACETAMINOPHEN 650 MG: 325 TABLET ORAL at 16:31

## 2019-09-28 RX ADMIN — ATORVASTATIN CALCIUM 80 MG: 80 TABLET, FILM COATED ORAL at 21:01

## 2019-09-28 RX ADMIN — ENOXAPARIN SODIUM 40 MG: 40 INJECTION SUBCUTANEOUS at 21:01

## 2019-09-28 RX ADMIN — LEVOFLOXACIN 500 MG: 500 TABLET, FILM COATED ORAL at 08:40

## 2019-09-28 RX ADMIN — PREGABALIN 150 MG: 75 CAPSULE ORAL at 21:01

## 2019-09-28 RX ADMIN — SENNOSIDES 17.2 MG: 8.6 TABLET, FILM COATED ORAL at 21:26

## 2019-09-28 RX ADMIN — ACETAMINOPHEN 650 MG: 325 TABLET ORAL at 12:09

## 2019-09-28 RX ADMIN — PREGABALIN 150 MG: 75 CAPSULE ORAL at 13:59

## 2019-09-28 RX ADMIN — ASPIRIN 81 MG: 81 TABLET, COATED ORAL at 08:40

## 2019-09-28 RX ADMIN — MORPHINE SULFATE 15 MG: 15 TABLET ORAL at 08:40

## 2019-09-28 ASSESSMENT — PAIN SCALES - GENERAL
PAINLEVEL_OUTOF10: 5
PAINLEVEL_OUTOF10: 10
PAINLEVEL_OUTOF10: 0
PAINLEVEL_OUTOF10: 7
PAINLEVEL_OUTOF10: 4
PAINLEVEL_OUTOF10: 4
PAINLEVEL_OUTOF10: 5
PAINLEVEL_OUTOF10: 9
PAINLEVEL_OUTOF10: 9
PAINLEVEL_OUTOF10: 7
PAINLEVEL_OUTOF10: 0
PAINLEVEL_OUTOF10: 8
PAINLEVEL_OUTOF10: 10

## 2019-09-28 ASSESSMENT — PAIN DESCRIPTION - ORIENTATION
ORIENTATION: MID

## 2019-09-28 ASSESSMENT — PAIN DESCRIPTION - ONSET
ONSET: ON-GOING

## 2019-09-28 ASSESSMENT — PAIN DESCRIPTION - PAIN TYPE
TYPE: ACUTE PAIN

## 2019-09-28 ASSESSMENT — PAIN DESCRIPTION - DESCRIPTORS
DESCRIPTORS: ACHING

## 2019-09-28 ASSESSMENT — PAIN DESCRIPTION - PROGRESSION
CLINICAL_PROGRESSION: NOT CHANGED

## 2019-09-28 ASSESSMENT — PAIN - FUNCTIONAL ASSESSMENT
PAIN_FUNCTIONAL_ASSESSMENT: ACTIVITIES ARE NOT PREVENTED

## 2019-09-28 ASSESSMENT — PAIN DESCRIPTION - FREQUENCY
FREQUENCY: INTERMITTENT

## 2019-09-28 ASSESSMENT — PAIN DESCRIPTION - LOCATION
LOCATION: BACK
LOCATION: HEAD

## 2019-09-28 ASSESSMENT — PAIN DESCRIPTION - DIRECTION: RADIATING_TOWARDS: NO

## 2019-09-29 LAB
ANION GAP SERPL CALCULATED.3IONS-SCNC: 11 MMOL/L (ref 3–16)
ANTICARDIOLIPIN IGG ANTIBODY: 6 GPL (ref 0–14)
BASOPHILS ABSOLUTE: 0 K/UL (ref 0–0.2)
BASOPHILS RELATIVE PERCENT: 0.5 %
BETA-2 GLYCOPROTEIN 1 IGG ANTIBODY: 0 SGU (ref 0–20)
BETA-2 GLYCOPROTEIN 1 IGM ANTIBODY: 1 SMU (ref 0–20)
BLOOD CULTURE, ROUTINE: NORMAL
BUN BLDV-MCNC: 5 MG/DL (ref 7–20)
CALCIUM SERPL-MCNC: 9.2 MG/DL (ref 8.3–10.6)
CARDIOLIPIN AB IGM: 0 MPL (ref 0–12)
CHLORIDE BLD-SCNC: 107 MMOL/L (ref 99–110)
CO2: 28 MMOL/L (ref 21–32)
CREAT SERPL-MCNC: 0.6 MG/DL (ref 0.6–1.2)
CULTURE, BLOOD 2: NORMAL
EOSINOPHILS ABSOLUTE: 0.2 K/UL (ref 0–0.6)
EOSINOPHILS RELATIVE PERCENT: 6.7 %
GFR AFRICAN AMERICAN: >60
GFR NON-AFRICAN AMERICAN: >60
GLUCOSE BLD-MCNC: 92 MG/DL (ref 70–99)
HCT VFR BLD CALC: 32.7 % (ref 36–48)
HEMOGLOBIN: 10.7 G/DL (ref 12–16)
LYMPHOCYTES ABSOLUTE: 0.6 K/UL (ref 1–5.1)
LYMPHOCYTES RELATIVE PERCENT: 18.3 %
MAGNESIUM: 2.2 MG/DL (ref 1.8–2.4)
MCH RBC QN AUTO: 29.9 PG (ref 26–34)
MCHC RBC AUTO-ENTMCNC: 32.7 G/DL (ref 31–36)
MCV RBC AUTO: 91.5 FL (ref 80–100)
MONOCYTES ABSOLUTE: 0.4 K/UL (ref 0–1.3)
MONOCYTES RELATIVE PERCENT: 10.6 %
NEUTROPHILS ABSOLUTE: 2.3 K/UL (ref 1.7–7.7)
NEUTROPHILS RELATIVE PERCENT: 63.9 %
PDW BLD-RTO: 15 % (ref 12.4–15.4)
PHOSPHORUS: 3.3 MG/DL (ref 2.5–4.9)
PLATELET # BLD: 253 K/UL (ref 135–450)
PMV BLD AUTO: 8.3 FL (ref 5–10.5)
POTASSIUM SERPL-SCNC: 4.1 MMOL/L (ref 3.5–5.1)
RBC # BLD: 3.58 M/UL (ref 4–5.2)
SODIUM BLD-SCNC: 146 MMOL/L (ref 136–145)
WBC # BLD: 3.5 K/UL (ref 4–11)

## 2019-09-29 PROCEDURE — 85025 COMPLETE CBC W/AUTO DIFF WBC: CPT

## 2019-09-29 PROCEDURE — 36415 COLL VENOUS BLD VENIPUNCTURE: CPT

## 2019-09-29 PROCEDURE — 6370000000 HC RX 637 (ALT 250 FOR IP): Performed by: INTERNAL MEDICINE

## 2019-09-29 PROCEDURE — 1200000000 HC SEMI PRIVATE

## 2019-09-29 PROCEDURE — 94761 N-INVAS EAR/PLS OXIMETRY MLT: CPT

## 2019-09-29 PROCEDURE — 80048 BASIC METABOLIC PNL TOTAL CA: CPT

## 2019-09-29 PROCEDURE — 83735 ASSAY OF MAGNESIUM: CPT

## 2019-09-29 PROCEDURE — 84100 ASSAY OF PHOSPHORUS: CPT

## 2019-09-29 PROCEDURE — 6370000000 HC RX 637 (ALT 250 FOR IP): Performed by: NURSE PRACTITIONER

## 2019-09-29 PROCEDURE — 6360000002 HC RX W HCPCS: Performed by: INTERNAL MEDICINE

## 2019-09-29 RX ADMIN — ATORVASTATIN CALCIUM 80 MG: 80 TABLET, FILM COATED ORAL at 21:29

## 2019-09-29 RX ADMIN — ASPIRIN 81 MG: 81 TABLET, COATED ORAL at 08:31

## 2019-09-29 RX ADMIN — PREGABALIN 150 MG: 75 CAPSULE ORAL at 14:23

## 2019-09-29 RX ADMIN — ENOXAPARIN SODIUM 40 MG: 40 INJECTION SUBCUTANEOUS at 21:26

## 2019-09-29 RX ADMIN — MORPHINE SULFATE 15 MG: 15 TABLET ORAL at 21:32

## 2019-09-29 RX ADMIN — ACETAMINOPHEN 650 MG: 325 TABLET ORAL at 16:58

## 2019-09-29 RX ADMIN — PREGABALIN 150 MG: 75 CAPSULE ORAL at 08:30

## 2019-09-29 RX ADMIN — LEVOFLOXACIN 500 MG: 500 TABLET, FILM COATED ORAL at 08:31

## 2019-09-29 RX ADMIN — SENNOSIDES 17.2 MG: 8.6 TABLET, FILM COATED ORAL at 21:26

## 2019-09-29 RX ADMIN — MORPHINE SULFATE 15 MG: 15 TABLET ORAL at 12:27

## 2019-09-29 RX ADMIN — PREGABALIN 150 MG: 75 CAPSULE ORAL at 19:50

## 2019-09-29 ASSESSMENT — PAIN SCALES - GENERAL
PAINLEVEL_OUTOF10: 10
PAINLEVEL_OUTOF10: 5
PAINLEVEL_OUTOF10: 0
PAINLEVEL_OUTOF10: 5
PAINLEVEL_OUTOF10: 0
PAINLEVEL_OUTOF10: 0
PAINLEVEL_OUTOF10: 4
PAINLEVEL_OUTOF10: 3
PAINLEVEL_OUTOF10: 4
PAINLEVEL_OUTOF10: 8
PAINLEVEL_OUTOF10: 0

## 2019-09-29 ASSESSMENT — PAIN DESCRIPTION - PROGRESSION
CLINICAL_PROGRESSION: GRADUALLY WORSENING
CLINICAL_PROGRESSION: NOT CHANGED
CLINICAL_PROGRESSION: GRADUALLY WORSENING

## 2019-09-29 ASSESSMENT — PAIN - FUNCTIONAL ASSESSMENT
PAIN_FUNCTIONAL_ASSESSMENT: PREVENTS OR INTERFERES SOME ACTIVE ACTIVITIES AND ADLS
PAIN_FUNCTIONAL_ASSESSMENT: ACTIVITIES ARE NOT PREVENTED
PAIN_FUNCTIONAL_ASSESSMENT: ACTIVITIES ARE NOT PREVENTED

## 2019-09-29 ASSESSMENT — PAIN DESCRIPTION - DIRECTION
RADIATING_TOWARDS: NO
RADIATING_TOWARDS: NO

## 2019-09-29 ASSESSMENT — PAIN DESCRIPTION - ORIENTATION
ORIENTATION: LOWER;MID;LEFT
ORIENTATION: MID

## 2019-09-29 ASSESSMENT — PAIN DESCRIPTION - ONSET
ONSET: ON-GOING

## 2019-09-29 ASSESSMENT — PAIN DESCRIPTION - LOCATION
LOCATION: HEAD
LOCATION: BACK
LOCATION: BACK;LEG

## 2019-09-29 ASSESSMENT — PAIN DESCRIPTION - FREQUENCY
FREQUENCY: INTERMITTENT

## 2019-09-29 ASSESSMENT — PAIN DESCRIPTION - DESCRIPTORS
DESCRIPTORS: ACHING

## 2019-09-29 ASSESSMENT — PAIN DESCRIPTION - PAIN TYPE
TYPE: CHRONIC PAIN
TYPE: ACUTE PAIN
TYPE: CHRONIC PAIN

## 2019-09-30 ENCOUNTER — TELEPHONE (OUTPATIENT)
Dept: CARDIOLOGY CLINIC | Age: 74
End: 2019-09-30

## 2019-09-30 VITALS
BODY MASS INDEX: 36.4 KG/M2 | DIASTOLIC BLOOD PRESSURE: 106 MMHG | TEMPERATURE: 98 F | HEIGHT: 60 IN | WEIGHT: 185.41 LBS | HEART RATE: 90 BPM | SYSTOLIC BLOOD PRESSURE: 164 MMHG | RESPIRATION RATE: 20 BRPM | OXYGEN SATURATION: 92 %

## 2019-09-30 LAB
ANION GAP SERPL CALCULATED.3IONS-SCNC: 13 MMOL/L (ref 3–16)
BASOPHILS ABSOLUTE: 0 K/UL (ref 0–0.2)
BASOPHILS RELATIVE PERCENT: 0.6 %
BUN BLDV-MCNC: 6 MG/DL (ref 7–20)
CALCIUM SERPL-MCNC: 8.9 MG/DL (ref 8.3–10.6)
CHLORIDE BLD-SCNC: 106 MMOL/L (ref 99–110)
CO2: 27 MMOL/L (ref 21–32)
CREAT SERPL-MCNC: 0.7 MG/DL (ref 0.6–1.2)
EOSINOPHILS ABSOLUTE: 0.2 K/UL (ref 0–0.6)
EOSINOPHILS RELATIVE PERCENT: 5.3 %
GFR AFRICAN AMERICAN: >60
GFR NON-AFRICAN AMERICAN: >60
GLUCOSE BLD-MCNC: 83 MG/DL (ref 70–99)
HCT VFR BLD CALC: 32.3 % (ref 36–48)
HEMOGLOBIN: 10.8 G/DL (ref 12–16)
LYMPHOCYTES ABSOLUTE: 0.9 K/UL (ref 1–5.1)
LYMPHOCYTES RELATIVE PERCENT: 24.3 %
MAGNESIUM: 2.1 MG/DL (ref 1.8–2.4)
MCH RBC QN AUTO: 30.5 PG (ref 26–34)
MCHC RBC AUTO-ENTMCNC: 33.4 G/DL (ref 31–36)
MCV RBC AUTO: 91.6 FL (ref 80–100)
MONOCYTES ABSOLUTE: 0.4 K/UL (ref 0–1.3)
MONOCYTES RELATIVE PERCENT: 10.6 %
NEUTROPHILS ABSOLUTE: 2.3 K/UL (ref 1.7–7.7)
NEUTROPHILS RELATIVE PERCENT: 59.2 %
PDW BLD-RTO: 15.2 % (ref 12.4–15.4)
PHOSPHORUS: 3.7 MG/DL (ref 2.5–4.9)
PLATELET # BLD: 268 K/UL (ref 135–450)
PMV BLD AUTO: 8 FL (ref 5–10.5)
POTASSIUM SERPL-SCNC: 3.5 MMOL/L (ref 3.5–5.1)
RBC # BLD: 3.52 M/UL (ref 4–5.2)
SODIUM BLD-SCNC: 146 MMOL/L (ref 136–145)
WBC # BLD: 3.8 K/UL (ref 4–11)

## 2019-09-30 PROCEDURE — 97535 SELF CARE MNGMENT TRAINING: CPT

## 2019-09-30 PROCEDURE — 94669 MECHANICAL CHEST WALL OSCILL: CPT

## 2019-09-30 PROCEDURE — 97530 THERAPEUTIC ACTIVITIES: CPT

## 2019-09-30 PROCEDURE — 83735 ASSAY OF MAGNESIUM: CPT

## 2019-09-30 PROCEDURE — 6370000000 HC RX 637 (ALT 250 FOR IP): Performed by: INTERNAL MEDICINE

## 2019-09-30 PROCEDURE — 36415 COLL VENOUS BLD VENIPUNCTURE: CPT

## 2019-09-30 PROCEDURE — 94760 N-INVAS EAR/PLS OXIMETRY 1: CPT

## 2019-09-30 PROCEDURE — 84100 ASSAY OF PHOSPHORUS: CPT

## 2019-09-30 PROCEDURE — 80048 BASIC METABOLIC PNL TOTAL CA: CPT

## 2019-09-30 PROCEDURE — 85025 COMPLETE CBC W/AUTO DIFF WBC: CPT

## 2019-09-30 PROCEDURE — 97116 GAIT TRAINING THERAPY: CPT

## 2019-09-30 RX ADMIN — PREGABALIN 150 MG: 75 CAPSULE ORAL at 08:11

## 2019-09-30 RX ADMIN — LEVOFLOXACIN 500 MG: 500 TABLET, FILM COATED ORAL at 08:11

## 2019-09-30 RX ADMIN — ASPIRIN 81 MG: 81 TABLET, COATED ORAL at 08:12

## 2019-09-30 RX ADMIN — MORPHINE SULFATE 15 MG: 15 TABLET ORAL at 10:23

## 2019-09-30 ASSESSMENT — PAIN SCALES - GENERAL
PAINLEVEL_OUTOF10: 0
PAINLEVEL_OUTOF10: 9
PAINLEVEL_OUTOF10: 0

## 2019-10-01 LAB
DRVVT CONFIRMATION TEST: POSITIVE
DRVVT SCREEN: 49 SEC (ref 33–44)
DRVVT,DIL: 47 SEC (ref 33–44)
HEXAGONAL PHOSPHOLIPID NEUTRALIZAT TEST: ABNORMAL
LUPUS ANTICOAG INTERP: ABNORMAL
PLT NEUTA: POSITIVE
PT D: 12.6 SEC (ref 12–15.5)
PTT D: 74 SEC (ref 32–48)
PTT-D CORR REFLEX: 61 SEC (ref 32–48)
PTT-HEPARIN NEUTRALIZED: ABNORMAL SEC (ref 32–48)
REPTILASE TIME: ABNORMAL SEC
THROMBIN TIME: 15.2 SEC (ref 14.7–19.5)

## 2019-10-04 ENCOUNTER — TELEPHONE (OUTPATIENT)
Dept: CARDIOLOGY CLINIC | Age: 74
End: 2019-10-04

## 2019-10-23 ENCOUNTER — OFFICE VISIT (OUTPATIENT)
Dept: FAMILY MEDICINE CLINIC | Age: 74
End: 2019-10-23
Payer: MEDICARE

## 2019-10-23 VITALS
HEART RATE: 87 BPM | OXYGEN SATURATION: 96 % | DIASTOLIC BLOOD PRESSURE: 82 MMHG | SYSTOLIC BLOOD PRESSURE: 128 MMHG | BODY MASS INDEX: 35.19 KG/M2 | WEIGHT: 180.2 LBS

## 2019-10-23 DIAGNOSIS — N30.00 ACUTE CYSTITIS WITHOUT HEMATURIA: ICD-10-CM

## 2019-10-23 DIAGNOSIS — I63.9 ACUTE CVA (CEREBROVASCULAR ACCIDENT) (HCC): Primary | ICD-10-CM

## 2019-10-23 DIAGNOSIS — R47.01 MILD APHASIA: ICD-10-CM

## 2019-10-23 PROCEDURE — 99214 OFFICE O/P EST MOD 30 MIN: CPT | Performed by: NURSE PRACTITIONER

## 2019-10-23 PROCEDURE — G0008 ADMIN INFLUENZA VIRUS VAC: HCPCS | Performed by: NURSE PRACTITIONER

## 2019-10-23 PROCEDURE — 90653 IIV ADJUVANT VACCINE IM: CPT | Performed by: NURSE PRACTITIONER

## 2019-10-23 RX ORDER — LEVOFLOXACIN 750 MG/1
750 TABLET ORAL DAILY
Qty: 10 TABLET | Refills: 0 | Status: SHIPPED | OUTPATIENT
Start: 2019-10-23 | End: 2020-07-10

## 2019-11-05 RX ORDER — BETHANECHOL CHLORIDE 25 MG/1
TABLET ORAL
Qty: 90 TABLET | Refills: 1 | Status: SHIPPED | OUTPATIENT
Start: 2019-11-05 | End: 2020-02-03

## 2019-11-06 PROCEDURE — 93228 REMOTE 30 DAY ECG REV/REPORT: CPT | Performed by: INTERNAL MEDICINE

## 2019-11-08 DIAGNOSIS — I63.9 ACUTE CVA (CEREBROVASCULAR ACCIDENT) (HCC): ICD-10-CM

## 2019-11-08 DIAGNOSIS — I63.9 ACUTE EMBOLIC STROKE (HCC): ICD-10-CM

## 2019-11-14 ENCOUNTER — HOSPITAL ENCOUNTER (OUTPATIENT)
Dept: WOMENS IMAGING | Age: 74
Discharge: HOME | End: 2019-11-14
Payer: MEDICARE

## 2019-11-14 ENCOUNTER — HOSPITAL ENCOUNTER (OUTPATIENT)
Dept: GENERAL RADIOLOGY | Age: 74
Discharge: HOME | End: 2019-11-14
Payer: MEDICARE

## 2019-11-14 DIAGNOSIS — Z78.0 MENOPAUSE: ICD-10-CM

## 2019-11-14 DIAGNOSIS — Z12.31 ENCOUNTER FOR SCREENING MAMMOGRAM FOR BREAST CANCER: ICD-10-CM

## 2019-11-14 PROCEDURE — 77063 BREAST TOMOSYNTHESIS BI: CPT

## 2019-11-14 PROCEDURE — 77080 DXA BONE DENSITY AXIAL: CPT

## 2019-11-20 RX ORDER — ROPINIROLE 0.25 MG/1
0.25 TABLET, FILM COATED ORAL NIGHTLY
Qty: 90 TABLET | Refills: 0 | Status: SHIPPED | OUTPATIENT
Start: 2019-11-20 | End: 2020-02-17

## 2019-12-26 DIAGNOSIS — F33.1 MODERATE EPISODE OF RECURRENT MAJOR DEPRESSIVE DISORDER (HCC): ICD-10-CM

## 2019-12-26 RX ORDER — BUPROPION HYDROCHLORIDE 300 MG/1
TABLET ORAL
Qty: 90 TABLET | Refills: 0 | Status: SHIPPED | OUTPATIENT
Start: 2019-12-26 | End: 2020-03-25

## 2020-01-21 RX ORDER — MECLIZINE HYDROCHLORIDE 25 MG/1
25 TABLET ORAL 3 TIMES DAILY PRN
Qty: 90 TABLET | Refills: 2 | Status: SHIPPED | OUTPATIENT
Start: 2020-01-21 | End: 2020-07-21

## 2020-02-03 RX ORDER — BETHANECHOL CHLORIDE 25 MG/1
TABLET ORAL
Qty: 90 TABLET | Refills: 0 | Status: SHIPPED | OUTPATIENT
Start: 2020-02-03 | End: 2020-02-18 | Stop reason: SDUPTHER

## 2020-02-17 RX ORDER — ROPINIROLE 0.25 MG/1
TABLET, FILM COATED ORAL
Qty: 90 TABLET | Refills: 0 | Status: SHIPPED | OUTPATIENT
Start: 2020-02-17 | End: 2020-05-19

## 2020-02-18 RX ORDER — BETHANECHOL CHLORIDE 25 MG/1
TABLET ORAL
Qty: 90 TABLET | Refills: 0 | Status: SHIPPED | OUTPATIENT
Start: 2020-02-18 | End: 2020-04-23

## 2020-02-18 RX ORDER — AMLODIPINE BESYLATE 10 MG/1
10 TABLET ORAL DAILY
Qty: 90 TABLET | Refills: 0 | Status: SHIPPED | OUTPATIENT
Start: 2020-02-18 | End: 2021-11-04 | Stop reason: SDUPTHER

## 2020-03-25 ENCOUNTER — TELEPHONE (OUTPATIENT)
Dept: FAMILY MEDICINE CLINIC | Age: 75
End: 2020-03-25

## 2020-03-25 RX ORDER — ONDANSETRON 4 MG/1
4 TABLET, FILM COATED ORAL 3 TIMES DAILY PRN
Qty: 15 TABLET | Refills: 0 | Status: SHIPPED | OUTPATIENT
Start: 2020-03-25 | End: 2020-07-10

## 2020-03-25 RX ORDER — BUPROPION HYDROCHLORIDE 300 MG/1
TABLET ORAL
Qty: 90 TABLET | Refills: 0 | Status: SHIPPED | OUTPATIENT
Start: 2020-03-25 | End: 2020-06-30

## 2020-04-23 RX ORDER — BETHANECHOL CHLORIDE 25 MG/1
TABLET ORAL
Qty: 90 TABLET | Refills: 2 | Status: SHIPPED | OUTPATIENT
Start: 2020-04-23 | End: 2020-06-02 | Stop reason: SDUPTHER

## 2020-04-23 RX ORDER — FUROSEMIDE 20 MG/1
TABLET ORAL
Qty: 90 TABLET | Refills: 0 | Status: SHIPPED | OUTPATIENT
Start: 2020-04-23 | End: 2021-02-11

## 2020-05-08 RX ORDER — ONDANSETRON 4 MG/1
4 TABLET, FILM COATED ORAL EVERY 8 HOURS PRN
Qty: 20 TABLET | Refills: 0 | Status: SHIPPED | OUTPATIENT
Start: 2020-05-08 | End: 2020-07-10

## 2020-05-19 RX ORDER — ROPINIROLE 0.25 MG/1
TABLET, FILM COATED ORAL
Qty: 90 TABLET | Refills: 0 | Status: SHIPPED | OUTPATIENT
Start: 2020-05-19 | End: 2020-08-20

## 2020-05-27 ENCOUNTER — TELEPHONE (OUTPATIENT)
Dept: FAMILY MEDICINE CLINIC | Age: 75
End: 2020-05-27

## 2020-06-02 RX ORDER — BETHANECHOL CHLORIDE 25 MG/1
TABLET ORAL
Qty: 90 TABLET | Refills: 2 | Status: SHIPPED | OUTPATIENT
Start: 2020-06-02 | End: 2020-06-29 | Stop reason: SDUPTHER

## 2020-06-02 NOTE — TELEPHONE ENCOUNTER
Medication:   Requested Prescriptions     Pending Prescriptions Disp Refills    bethanechol (URECHOLINE) 25 MG tablet 90 tablet 2     Sig: TAKE ONE TABLET BY MOUTH THREE TIMES A DAY      Last Filled:  4/23/2020    Patient Phone Number: [phone] (home)     Last appt: 10/23/2019   Next appt: 7/6/2020    Last OARRS:   RX Monitoring 9/27/2019   Attestation -   Periodic Controlled Substance Monitoring Possible medication side effects, risk of tolerance/dependence & alternative treatments discussed. Chronic Pain > 50 MEDD Re-evaluated the status of the patient's underlying condition causing pain.        Preferred Pharmacy:   [facility] [address] [place] [address]  Phone: [phone] Fax: [phone]

## 2020-06-09 ENCOUNTER — TELEPHONE (OUTPATIENT)
Dept: ADMINISTRATIVE | Age: 75
End: 2020-06-09

## 2020-06-29 RX ORDER — BETHANECHOL CHLORIDE 25 MG/1
TABLET ORAL
Qty: 270 TABLET | Refills: 1 | Status: SHIPPED | OUTPATIENT
Start: 2020-06-29 | End: 2021-04-07

## 2020-06-30 RX ORDER — BUPROPION HYDROCHLORIDE 300 MG/1
TABLET ORAL
Qty: 90 TABLET | Refills: 0 | Status: SHIPPED | OUTPATIENT
Start: 2020-06-30 | End: 2020-10-05

## 2020-07-10 ENCOUNTER — OFFICE VISIT (OUTPATIENT)
Dept: FAMILY MEDICINE CLINIC | Age: 75
End: 2020-07-10
Payer: MEDICARE

## 2020-07-10 VITALS
SYSTOLIC BLOOD PRESSURE: 150 MMHG | DIASTOLIC BLOOD PRESSURE: 100 MMHG | BODY MASS INDEX: 35.54 KG/M2 | WEIGHT: 182 LBS | TEMPERATURE: 97.2 F

## 2020-07-10 PROCEDURE — 99214 OFFICE O/P EST MOD 30 MIN: CPT | Performed by: FAMILY MEDICINE

## 2020-07-10 ASSESSMENT — ENCOUNTER SYMPTOMS
ANAL BLEEDING: 0
EYE ITCHING: 1
WHEEZING: 0
NAUSEA: 1
BLOOD IN STOOL: 0
VOMITING: 1
BACK PAIN: 1
CHEST TIGHTNESS: 0
ABDOMINAL PAIN: 0
SHORTNESS OF BREATH: 1
RHINORRHEA: 0

## 2020-07-10 NOTE — PROGRESS NOTES
SUBJECTIVE:    Butch Skiff is a 69 y.o. female who presents for a follow up visit. Chief Complaint   Patient presents with    Follow-up     Patient is here for a follow up. Dizzy/nausea on and off. Rash on chest and arms happens sometimes from the sun, but worse this year. Back Pain   This is a chronic problem. The current episode started more than 1 year ago. The problem has been waxing and waning since onset. The pain is present in the lumbar spine. The quality of the pain is described as aching. The pain radiates to the left thigh, left foot and left knee. The pain is moderate. The symptoms are aggravated by bending, standing and sitting. Associated symptoms include chest pain. Pertinent negatives include no abdominal pain, numbness or weakness. Risk factors include history of cancer, menopause, lack of exercise and sedentary lifestyle. She has tried muscle relaxant (Lyrica, Morphine) for the symptoms. The treatment provided mild relief. Hypertension   This is a chronic problem. The current episode started more than 1 year ago. The problem is controlled. Associated symptoms include chest pain and shortness of breath. Pertinent negatives include no palpitations. Risk factors for coronary artery disease include obesity, sedentary lifestyle and stress. Past treatments include calcium channel blockers and diuretics. The current treatment provides significant improvement. Hypertensive end-organ damage includes CVA. Rash   This is a chronic problem. The current episode started more than 1 year ago. The problem has been waxing and waning since onset. The affected locations include the left arm, right arm and chest. Associated with: Sun exposure. Associated symptoms include shortness of breath and vomiting. Pertinent negatives include no congestion or rhinorrhea. Past treatments include anti-itch cream and antibiotic cream. The treatment provided no relief.         Patient's medications, allergies, past medical,surgical, social and family histories were reviewed and updated as appropriate.      Past Medical History:   Diagnosis Date    AVM     Chronic back pain     Depression     Gastritis     History of blood transfusion     secondary to GI bleed    History of GI bleed     Hypertension     Personal history of MRSA (methicillin resistant Staphylococcus aureus)     recurrent, last 2-3 years ago finger (doesnt remember which one)     Past Surgical History:   Procedure Laterality Date    ABDOMINAL EXPLORATION SURGERY  09/07/2017    Lysis of adhesions, removal of pelvic mass, total abdominal hysterectomy with BSO, partial omentectomy, appendectomy, and pelvic lymphadenectomy    BACK SURGERY      multiple lumbar spine surgeries including laminectomy and fusion    CERVICAL FUSION      COLONOSCOPY      ENDOSCOPY, COLON, DIAGNOSTIC      HIATAL HERNIA REPAIR  1999    OTHER SURGICAL HISTORY Left [id number]    SPINAL CORD STIMULATOR BATTERY REPLACMENT     Family History   Problem Relation Age of Onset    Diabetes Mother     Heart Disease Mother         cad, aortic aneurysm    Diabetes Father     Heart Disease Father         heart attack, chf    Other Sister         abdominal aneurysm     Social History     Tobacco Use    Smoking status: Never Smoker    Smokeless tobacco: Never Used   Substance Use Topics    Alcohol use: No      No Known Allergies  Current Outpatient Medications on File Prior to Visit   Medication Sig Dispense Refill    rivaroxaban (XARELTO) 20 MG TABS tablet Take 20 mg by mouth Daily with supper      buPROPion (WELLBUTRIN XL) 300 MG extended release tablet TAKE ONE TABLET BY MOUTH EVERY MORNING 90 tablet 0    bethanechol (URECHOLINE) 25 MG tablet TAKE ONE TABLET BY MOUTH THREE TIMES A  tablet 1    rOPINIRole (REQUIP) 0.25 MG tablet TAKE ONE TABLET BY MOUTH ONCE NIGHTLY 90 tablet 0    furosemide (LASIX) 20 MG tablet TAKE ONE TABLET BY MOUTH as needed for wt gain of 2 pounds or leg edema 90 tablet 0    amLODIPine (NORVASC) 10 MG tablet Take 1 tablet by mouth daily 90 tablet 0    meclizine (ANTIVERT) 25 MG tablet Take 1 tablet by mouth 3 times daily as needed for Dizziness 90 tablet 2    tiZANidine (ZANAFLEX) 4 MG tablet Take 4 mg by mouth 2 times daily       pregabalin (LYRICA) 150 MG capsule Take 150 mg by mouth three times daily.  ondansetron (ZOFRAN) 4 MG tablet Take 1 tablet by mouth daily as needed for Nausea or Vomiting 30 tablet 0    aspirin 81 MG chewable tablet Take 1 tablet by mouth daily 30 tablet 0     No current facility-administered medications on file prior to visit. Review of Systems   Constitutional: Negative for activity change, diaphoresis and unexpected weight change. HENT: Negative for congestion, postnasal drip and rhinorrhea. Eyes: Positive for itching. Respiratory: Positive for shortness of breath. Negative for chest tightness and wheezing. Cardiovascular: Positive for chest pain and leg swelling. Negative for palpitations. Gastrointestinal: Positive for nausea and vomiting. Negative for abdominal pain, anal bleeding and blood in stool. Genitourinary: Positive for difficulty urinating. Musculoskeletal: Positive for arthralgias and back pain. Skin: Positive for rash. Neurological: Positive for dizziness. Negative for weakness and numbness. Psychiatric/Behavioral: Positive for dysphoric mood. Negative for sleep disturbance. The patient is nervous/anxious. OBJECTIVE:    BP (!) 150/100   Temp 97.2 °F (36.2 °C)   Wt 182 lb (82.6 kg)   BMI 35.54 kg/m²    Vitals:    07/10/20 1424 07/10/20 1516   BP: (!) 130/90 (!) 150/100   Temp: 97.2 °F (36.2 °C)    Weight: 182 lb (82.6 kg)        Physical Exam  Constitutional:       General: She is not in acute distress. Appearance: Normal appearance. HENT:      Head: Normocephalic and atraumatic.       Right Ear: Tympanic membrane normal.      Left Ear: Tympanic membrane normal. Nose: Nose normal.      Mouth/Throat:      Mouth: Mucous membranes are moist.      Pharynx: Posterior oropharyngeal erythema present. Cardiovascular:      Rate and Rhythm: Normal rate and regular rhythm. Pulses: Normal pulses. Heart sounds: Normal heart sounds. Pulmonary:      Effort: Pulmonary effort is normal.      Breath sounds: Normal breath sounds. Musculoskeletal:      Right lower leg: No edema. Left lower leg: No edema. Skin:     General: Skin is warm and dry. Findings: Rash ( Raised erythematous lesions located on her anterior chest and both upper extremities) present. Neurological:      General: No focal deficit present. Mental Status: She is alert and oriented to person, place, and time. Psychiatric:         Mood and Affect: Mood normal.         Behavior: Behavior normal.         ASSESSMENT/PLAN:    Yojana Flores was seen today for follow-up. Diagnoses and all orders for this visit:    Vertigo  -     [facility] [place]    Skin lesions, generalized  -     External Referral To Dermatology    DDD (degenerative disc disease), lumbosacral  Followed by pain management    Essential hypertension  Blood pressure is elevated in the office today. She is to start checking her blood pressures at home and will see what her pressures are in 1 month    Depression, unspecified depression type  Stable on current medication    Other hyperlipidemia  -     Comprehensive Metabolic Panel, Fasting; Future  -     CBC Auto Differential; Future  -     Lipid, Fasting; Future  -     TSH with Reflex; Future    Hyperglycemia  -     Hemoglobin A1C; Future        Return in about 4 weeks (around 8/7/2020). Please note portions of this note were completed with a voicerecognition program.  Efforts were made to edit the dictations but occasionally words are mis-transcribed.

## 2020-07-21 RX ORDER — MECLIZINE HYDROCHLORIDE 25 MG/1
TABLET ORAL
Qty: 90 TABLET | Refills: 1 | Status: SHIPPED | OUTPATIENT
Start: 2020-07-21 | End: 2020-12-16

## 2020-07-29 ENCOUNTER — HOSPITAL ENCOUNTER (OUTPATIENT)
Age: 75
Discharge: HOME | End: 2020-07-29
Payer: MEDICARE

## 2020-07-29 LAB
A/G RATIO: 1.5 (ref 1.1–2.2)
ALBUMIN SERPL-MCNC: 4.1 G/DL (ref 3.4–5)
ALP BLD-CCNC: 111 U/L (ref 40–129)
ALT SERPL-CCNC: 17 U/L (ref 10–40)
ANION GAP SERPL CALCULATED.3IONS-SCNC: 12 MMOL/L (ref 3–16)
AST SERPL-CCNC: 21 U/L (ref 15–37)
BASOPHILS ABSOLUTE: 0 K/UL (ref 0–0.2)
BASOPHILS RELATIVE PERCENT: 0.6 %
BILIRUB SERPL-MCNC: 0.3 MG/DL (ref 0–1)
BUN BLDV-MCNC: 14 MG/DL (ref 7–20)
CALCIUM SERPL-MCNC: 9.1 MG/DL (ref 8.3–10.6)
CHLORIDE BLD-SCNC: 102 MMOL/L (ref 99–110)
CHOLESTEROL, FASTING: 172 MG/DL (ref 0–199)
CO2: 29 MMOL/L (ref 21–32)
CREAT SERPL-MCNC: 0.9 MG/DL (ref 0.6–1.2)
EOSINOPHILS ABSOLUTE: 0.2 K/UL (ref 0–0.6)
EOSINOPHILS RELATIVE PERCENT: 4.5 %
GFR AFRICAN AMERICAN: >60
GFR NON-AFRICAN AMERICAN: >60
GLOBULIN: 2.8 G/DL
GLUCOSE FASTING: 91 MG/DL (ref 70–99)
HCT VFR BLD CALC: 38.9 % (ref 36–48)
HDLC SERPL-MCNC: 69 MG/DL (ref 40–60)
HEMOGLOBIN: 12.9 G/DL (ref 12–16)
LDL CHOLESTEROL CALCULATED: 88 MG/DL
LYMPHOCYTES ABSOLUTE: 1.1 K/UL (ref 1–5.1)
LYMPHOCYTES RELATIVE PERCENT: 25.4 %
MCH RBC QN AUTO: 30.8 PG (ref 26–34)
MCHC RBC AUTO-ENTMCNC: 33.1 G/DL (ref 31–36)
MCV RBC AUTO: 92.9 FL (ref 80–100)
MONOCYTES ABSOLUTE: 0.5 K/UL (ref 0–1.3)
MONOCYTES RELATIVE PERCENT: 10.6 %
NEUTROPHILS ABSOLUTE: 2.6 K/UL (ref 1.7–7.7)
NEUTROPHILS RELATIVE PERCENT: 58.9 %
PDW BLD-RTO: 14.8 % (ref 12.4–15.4)
PLATELET # BLD: 206 K/UL (ref 135–450)
PMV BLD AUTO: 9.2 FL (ref 5–10.5)
POTASSIUM SERPL-SCNC: 5.1 MMOL/L (ref 3.5–5.1)
RBC # BLD: 4.18 M/UL (ref 4–5.2)
SODIUM BLD-SCNC: 143 MMOL/L (ref 136–145)
TOTAL PROTEIN: 6.9 G/DL (ref 6.4–8.2)
TRIGLYCERIDE, FASTING: 75 MG/DL (ref 0–150)
TSH REFLEX: 2.18 UIU/ML (ref 0.27–4.2)
VLDLC SERPL CALC-MCNC: 15 MG/DL
WBC # BLD: 4.4 K/UL (ref 4–11)

## 2020-07-29 PROCEDURE — 80053 COMPREHEN METABOLIC PANEL: CPT

## 2020-07-29 PROCEDURE — 84443 ASSAY THYROID STIM HORMONE: CPT

## 2020-07-29 PROCEDURE — 83036 HEMOGLOBIN GLYCOSYLATED A1C: CPT

## 2020-07-29 PROCEDURE — 80061 LIPID PANEL: CPT

## 2020-07-29 PROCEDURE — 36415 COLL VENOUS BLD VENIPUNCTURE: CPT

## 2020-07-29 PROCEDURE — 85025 COMPLETE CBC W/AUTO DIFF WBC: CPT

## 2020-07-30 LAB
ESTIMATED AVERAGE GLUCOSE: 116.9 MG/DL
HBA1C MFR BLD: 5.7 %

## 2020-08-11 ENCOUNTER — OFFICE VISIT (OUTPATIENT)
Dept: FAMILY MEDICINE CLINIC | Age: 75
End: 2020-08-11
Payer: MEDICARE

## 2020-08-11 VITALS
BODY MASS INDEX: 36.33 KG/M2 | TEMPERATURE: 97.8 F | WEIGHT: 186 LBS | DIASTOLIC BLOOD PRESSURE: 90 MMHG | SYSTOLIC BLOOD PRESSURE: 138 MMHG

## 2020-08-11 PROCEDURE — 99214 OFFICE O/P EST MOD 30 MIN: CPT | Performed by: FAMILY MEDICINE

## 2020-08-11 RX ORDER — DULOXETIN HYDROCHLORIDE 30 MG/1
30 CAPSULE, DELAYED RELEASE ORAL DAILY
Qty: 30 CAPSULE | Refills: 5 | Status: SHIPPED | OUTPATIENT
Start: 2020-08-11 | End: 2020-12-16

## 2020-08-11 ASSESSMENT — ENCOUNTER SYMPTOMS
WHEEZING: 0
CONSTIPATION: 0
RHINORRHEA: 0
BACK PAIN: 1
SHORTNESS OF BREATH: 0
DIARRHEA: 0
CHEST TIGHTNESS: 0

## 2020-08-11 NOTE — PROGRESS NOTES
SUBJECTIVE:    Michel Alas is a 69 y.o. female who presents for a follow up visit. Chief Complaint   Patient presents with    Follow-up     Discuss labs        Hypertension   This is a chronic problem. The current episode started more than 1 year ago. The problem is controlled. Associated symptoms include anxiety. Pertinent negatives include no chest pain or shortness of breath. Risk factors for coronary artery disease include sedentary lifestyle, obesity and post-menopausal state. Past treatments include calcium channel blockers and diuretics. The current treatment provides significant improvement. Compliance problems include exercise and diet. Back Pain   This is a chronic problem. The current episode started more than 1 year ago. The problem occurs intermittently. The pain is present in the lumbar spine. The quality of the pain is described as aching. The pain radiates to the left knee, left foot and left thigh. The pain is moderate. The symptoms are aggravated by bending and twisting. Pertinent negatives include no chest pain. Risk factors include lack of exercise, menopause, poor posture and sedentary lifestyle. She has tried analgesics and muscle relaxant (spinal stimulator) for the symptoms. The treatment provided mild relief. Depression  This is a chronic problem. She feels the Wellbutrin is not as effective. She has been on Cymbalta in the past and would like to restart it. She did asked that she not take as I had dose as previously prescribed. Patient's medications, allergies, past medical,surgical, social and family histories were reviewed and updated as appropriate.      Past Medical History:   Diagnosis Date    AVM     Chronic back pain     Depression     Gastritis     History of blood transfusion     secondary to GI bleed    History of GI bleed     Hypertension     Personal history of MRSA (methicillin resistant Staphylococcus aureus)     recurrent, last 2-3 years ago finger (doesnt remember which one)     Past Surgical History:   Procedure Laterality Date    ABDOMINAL EXPLORATION SURGERY  09/07/2017    Lysis of adhesions, removal of pelvic mass, total abdominal hysterectomy with BSO, partial omentectomy, appendectomy, and pelvic lymphadenectomy    BACK SURGERY      multiple lumbar spine surgeries including laminectomy and fusion    CERVICAL FUSION      COLONOSCOPY      ENDOSCOPY, COLON, DIAGNOSTIC      HIATAL HERNIA REPAIR  1999    OTHER SURGICAL HISTORY Left [id number]    SPINAL CORD STIMULATOR BATTERY REPLACMENT     Family History   Problem Relation Age of Onset    Diabetes Mother     Heart Disease Mother         cad, aortic aneurysm    Diabetes Father     Heart Disease Father         heart attack, chf    Other Sister         abdominal aneurysm     Social History     Tobacco Use    Smoking status: Never Smoker    Smokeless tobacco: Never Used   Substance Use Topics    Alcohol use: No      No Known Allergies  Current Outpatient Medications on File Prior to Visit   Medication Sig Dispense Refill    meclizine (ANTIVERT) 25 MG tablet TAKE ONE TABLET BY MOUTH THREE TIMES A DAY AS NEEDED FOR DIZZINESS 90 tablet 1    rivaroxaban (XARELTO) 20 MG TABS tablet Take 20 mg by mouth Daily with supper      buPROPion (WELLBUTRIN XL) 300 MG extended release tablet TAKE ONE TABLET BY MOUTH EVERY MORNING 90 tablet 0    bethanechol (URECHOLINE) 25 MG tablet TAKE ONE TABLET BY MOUTH THREE TIMES A  tablet 1    rOPINIRole (REQUIP) 0.25 MG tablet TAKE ONE TABLET BY MOUTH ONCE NIGHTLY 90 tablet 0    furosemide (LASIX) 20 MG tablet TAKE ONE TABLET BY MOUTH as needed for wt gain of 2 pounds or leg edema 90 tablet 0    amLODIPine (NORVASC) 10 MG tablet Take 1 tablet by mouth daily 90 tablet 0    tiZANidine (ZANAFLEX) 4 MG tablet Take 4 mg by mouth 2 times daily       pregabalin (LYRICA) 150 MG capsule Take 150 mg by mouth three times daily.       ondansetron (ZOFRAN) 4 MG tablet Take 1 tablet by mouth daily as needed for Nausea or Vomiting 30 tablet 0    aspirin 81 MG chewable tablet Take 1 tablet by mouth daily 30 tablet 0     No current facility-administered medications on file prior to visit. Review of Systems   Constitutional: Negative for activity change, fatigue and unexpected weight change. HENT: Negative for congestion, postnasal drip and rhinorrhea. Respiratory: Negative for chest tightness, shortness of breath and wheezing. Cardiovascular: Positive for leg swelling ( taking prn Lasix). Negative for chest pain. Gastrointestinal: Negative for constipation and diarrhea. Genitourinary: Negative for difficulty urinating. Musculoskeletal: Positive for back pain. Neurological: Negative for dizziness and light-headedness. Psychiatric/Behavioral: Positive for dysphoric mood. Negative for sleep disturbance. The patient is nervous/anxious. OBJECTIVE:    BP (!) 138/90   Temp 97.8 °F (36.6 °C)   Wt 186 lb (84.4 kg)   BMI 36.33 kg/m²    Physical Exam  Constitutional:       Appearance: She is well-developed. HENT:      Head: Normocephalic and atraumatic. Right Ear: External ear normal.      Left Ear: External ear normal.      Nose: Nose normal.   Neck:      Musculoskeletal: Normal range of motion and neck supple. Thyroid: No thyromegaly. Vascular: No JVD. Trachea: No tracheal deviation. Cardiovascular:      Rate and Rhythm: Normal rate and regular rhythm. Heart sounds: Normal heart sounds. Pulmonary:      Effort: Pulmonary effort is normal. No respiratory distress. Breath sounds: Normal breath sounds. No rales. Musculoskeletal:      Right lower leg: No edema. Left lower leg: No edema. Lymphadenopathy:      Cervical: No cervical adenopathy. Skin:     General: Skin is warm and dry. Neurological:      General: No focal deficit present.       Mental Status: She is alert and oriented to person, place, and time.   Psychiatric:         Mood and Affect: Mood normal.         Behavior: Behavior normal.         ASSESSMENT/PLAN:    Chance Thomas was seen today for follow-up. Diagnoses and all orders for this visit:    Recurrent major depressive disorder, in partial remission ([facility] [address]  Patient has been on Cymbalta in the past up to a total of 90 mg. She would like to restart Cymbalta and will started at a lower dose. Due to her chronic pain as well as anxiety and depression feel that the Cymbalta would be a good choice. -     DULoxetine (CYMBALTA) 30 MG extended release capsule; Take 1 capsule by mouth daily    DDD (degenerative disc disease), lumbosacral  Followed by Dr. Bill Prater, pain management    Essential hypertension  -     Comprehensive Metabolic Panel, Fasting; Future    Restless legs syndrome (RLS)  In good control as long she takes her medication      Return in about 6 months (around 2/11/2021). Please note portions of this note were completed with a voicerecognition program.  Efforts were made to edit the dictations but occasionally words are mis-transcribed.

## 2020-08-20 RX ORDER — ROPINIROLE 0.25 MG/1
TABLET, FILM COATED ORAL
Qty: 90 TABLET | Refills: 1 | OUTPATIENT
Start: 2020-08-20

## 2020-08-20 RX ORDER — ROPINIROLE 0.25 MG/1
TABLET, FILM COATED ORAL
Qty: 90 TABLET | Refills: 1 | Status: SHIPPED | OUTPATIENT
Start: 2020-08-20 | End: 2021-03-03

## 2020-09-08 ENCOUNTER — TELEPHONE (OUTPATIENT)
Dept: FAMILY MEDICINE CLINIC | Age: 75
End: 2020-09-08

## 2020-09-08 NOTE — TELEPHONE ENCOUNTER
----- Message from [address] [place] [address] sent at 9/8/2020 10:12 AM EDT -----  Subject: Message to Provider    QUESTIONS  Information for Provider? Patient had a severe headache last night but it   has now went away. She wants to know if there is something she can do or   if she should be worried about this.   ---------------------------------------------------------------------------  --------------  CALL BACK INFO  What is the best way for the office to contact you? OK to leave message on   voicemail  Preferred Call Back Phone Number? [phone]  ---------------------------------------------------------------------------  --------------  SCRIPT ANSWERS  Relationship to Patient?  Self

## 2020-09-09 NOTE — TELEPHONE ENCOUNTER
If the patient is feeling well at this time there is nothing further to do.   Be sure to keep her follow-up appointment

## 2020-10-05 RX ORDER — BUPROPION HYDROCHLORIDE 300 MG/1
TABLET ORAL
Qty: 90 TABLET | Refills: 1 | Status: SHIPPED | OUTPATIENT
Start: 2020-10-05 | End: 2021-05-03 | Stop reason: SDUPTHER

## 2020-12-16 RX ORDER — MECLIZINE HYDROCHLORIDE 25 MG/1
TABLET ORAL
Qty: 90 TABLET | Refills: 1 | Status: SHIPPED | OUTPATIENT
Start: 2020-12-16 | End: 2021-06-24

## 2020-12-16 RX ORDER — DULOXETIN HYDROCHLORIDE 30 MG/1
CAPSULE, DELAYED RELEASE ORAL
Qty: 90 CAPSULE | Refills: 4 | Status: SHIPPED | OUTPATIENT
Start: 2020-12-16 | End: 2021-04-07

## 2021-01-01 NOTE — PROGRESS NOTES
Dr. Aly Mata, SCN, and RT called for unscheduled delivery. Moncho Esposito  : 1945  Encounter date: 10/6/2017    This is a 80 y.o. female who presents to establish care. Chief Complaint   Patient presents with   [address]     Patient is here to establish care, chaning PCP's. No concerns at this time, just needs to establish care. History of present illness:    Had surgery about 4 weeks ago for tumor removal. 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 Also Had Complete Hysterectomy and Appendectomy As Well As Lymph Node Sampling and Lysis of Adhesions. Had follow up visit on Tuesday and was started on abx for incisional infection - bactrim. Just started yesterday. No fevers. Hasn't noted improvement in sx at this point. Takes the morphine for chronic back pain; had cage in . Sees pain mgmt for this; has stimulator in back but not working now; has appointment to get another put in. Depression hasn't been very well controlled. Tried therapy, taking wellbutrin and zoloft added 2 months ago. Hasn't noted improvement. Started with mother passing. Previously was on cymbalta which was working ok, but she was dozing off; and when she stopped med thost sx stopped. Did feel like mood might have been better controlled. Felt that effexor quit working. Not sure about elavil in past.     Ran out of spironolactone so hasn't been taking that. BP was running low in hospital. Still taking lasix. She had seen cardiology in the past for anginal episode. She was put on Ranexa at that time and had a cardiac evaluation Which included an echo and stress done in 2014. At that time testing showed no ischemia and no infarct with trivial mitral stenosis. She was supposed to follow up 1 month after last visit but has not seen cardiology since that time.     Past Medical History:   Diagnosis Date    AVM     Chronic back pain     Depression     Gastritis     History of blood transfusion     secondary to GI bleed    History of GI bleed    

## 2021-02-11 DIAGNOSIS — I10 ESSENTIAL HYPERTENSION: ICD-10-CM

## 2021-02-11 RX ORDER — FUROSEMIDE 20 MG/1
TABLET ORAL
Qty: 90 TABLET | Refills: 0 | Status: SHIPPED | OUTPATIENT
Start: 2021-02-11 | End: 2021-11-04 | Stop reason: SDUPTHER

## 2021-02-12 ENCOUNTER — OFFICE VISIT (OUTPATIENT)
Dept: FAMILY MEDICINE CLINIC | Age: 76
End: 2021-02-12
Payer: MEDICARE

## 2021-02-12 VITALS
WEIGHT: 194 LBS | BODY MASS INDEX: 37.89 KG/M2 | SYSTOLIC BLOOD PRESSURE: 118 MMHG | DIASTOLIC BLOOD PRESSURE: 86 MMHG | TEMPERATURE: 97.8 F

## 2021-02-12 DIAGNOSIS — R26.89 BALANCE PROBLEM: Primary | ICD-10-CM

## 2021-02-12 DIAGNOSIS — R42 DIZZINESS: ICD-10-CM

## 2021-02-12 DIAGNOSIS — R53.83 FATIGUE, UNSPECIFIED TYPE: ICD-10-CM

## 2021-02-12 PROCEDURE — 99213 OFFICE O/P EST LOW 20 MIN: CPT | Performed by: FAMILY MEDICINE

## 2021-02-12 RX ORDER — MORPHINE SULFATE 15 MG/1
15 TABLET ORAL 3 TIMES DAILY PRN
Status: ON HOLD | COMMUNITY
End: 2021-09-17 | Stop reason: SDUPTHER

## 2021-02-12 ASSESSMENT — ENCOUNTER SYMPTOMS: NAUSEA: 1

## 2021-02-18 ENCOUNTER — HOSPITAL ENCOUNTER (OUTPATIENT)
Dept: PHYSICAL THERAPY | Age: 76
Setting detail: THERAPIES SERIES
Discharge: HOME | End: 2021-02-18
Payer: MEDICARE

## 2021-02-18 NOTE — FLOWSHEET NOTE
Physical Therapy  Cancellation/No-show Note  Patient Name:  Ozzie Herrera  :  1945   Date:  2021  Cancelled visits to date: 0  No-shows to date: 1    Patient status for today's appointment patient:  []  Cancelled  []  Rescheduled appointment  [x]  No-show  to PT eval     Reason given by patient:  []  Patient ill  []  Conflicting appointment  []  No transportation    []  Conflict with work  []  No reason given  [x]  Other:     Comments:  NS to PT eval  Phone call information:   []  Phone call made today to patient at _ time at number provided:      []  Patient answered, conversation as follows:    []  Patient did not answer, message left as follows:  [x]  Phone call not made today    Electronically signed by:  Adenike Donaldson, PT

## 2021-03-02 ENCOUNTER — HOSPITAL ENCOUNTER (OUTPATIENT)
Dept: PHYSICAL THERAPY | Age: 76
Setting detail: THERAPIES SERIES
Discharge: HOME | End: 2021-03-02
Payer: MEDICARE

## 2021-03-02 PROCEDURE — 97110 THERAPEUTIC EXERCISES: CPT

## 2021-03-02 PROCEDURE — 97161 PT EVAL LOW COMPLEX 20 MIN: CPT

## 2021-03-02 PROCEDURE — 97530 THERAPEUTIC ACTIVITIES: CPT

## 2021-03-02 NOTE — FLOWSHEET NOTE
[address] [facility] Physical Therapy  Phone: [phone]   Fax: [phone]    Physical Therapy Daily Treatment Note  Date:  3/2/2021    Patient Name:  Joselito Bucio    :    MRN: [id number]  Medical/Treatment Diagnosis Information:  Diagnosis: R26.89 balance problem  Treatment Diagnosis: balance deficits /vestibular dysfunction, strength deficits B LE all put pt at high risk of falling. Insurance/Certification information:  PT Insurance Information: med mut/medicare advantage no deductible $40 copay, med nec  Physician Information:  Referring Practitioner: Dr. Sandor West, [address] [place] [address] signed (Y/N):     Date of Patient follow up with Physician:     Functional scale[de-identified]  DGI  raw score = 10, dysfunction = 40-59%, taken at initial eval; TU sec at initial eval      Progress Report: [x]  Yes  []  No     Date Range for reporting period:  Beginning 3/2  Ending    Progress report due (10 Rx/or 30 days whichever is less): visit #10 or 0/3(TUVG)     Recertification due (POC duration/ or 90 days whichever is less): visit #12 or  (date)     Visit # Insurance Allowable Auth required? Date Range    Med nec []  Yes  [x]  No na        Latex Allergy:  [x]NO      []YES  Preferred Language for Healthcare:   [x]English       []other:      Pain level: 5-6/10     SUBJECTIVE:  See eval    OBJECTIVE: See eval      RESTRICTIONS/PRECAUTIONS: stroke 2019,  Vertigo, jumana in her low back , jumana in neck in approx        Exercises/Interventions:     Therapeutic Exercises (43913) Resistance / level Sets/sec Reps Notes                               GAIT 3x100' with RW and CGA  Instructed pt to use RW at all times                                  Therapeutic Activities ([facility])       Sit to/from stand With RW. SBA; advised pt to stand and get her bearings before she walks to reduce fall risk      Stand to sit  With RW.   Pt educated to back up to chair and to feel chair behind her legs before she sits down                           Neuromuscular Re-ed (55601)       Balance        Vestibular rehab                                   Manual Intervention (55429)                                                     Modalities:     Pt. Education:  -3/2 patient educated on diagnosis, prognosis and expectations for rehab  -all patient questions were answered  Educated re hydration, importance of using RW to reduce fall risk    HEP instruction:  3/2 Instructed pt to use RW at all times, drink at least 64 oz water/day,    After sit to stand transition:  advised pt to stand and get her bearings before she walks to reduce fall risk    Therapeutic Exercise and NMR EXR  [] (95896) Provided verbal/tactile cueing for activities related to strengthening, flexibility, endurance, ROM for improvements in  [] LE / Lumbar: LE, proximal hip, and core control with self care, mobility, lifting, ambulation. [] UE / Cervical: cervical, postural, scapular, scapulothoracic and UE control with self care, reaching, carrying, lifting, house/yardwork, driving, computer work.  [] (93209) Provided verbal/tactile cueing for activities related to improving balance, coordination, kinesthetic sense, posture, motor skill, proprioception to assist with   [] LE / lumbar: LE, proximal hip, and core control in self care, mobility, lifting, ambulation and eccentric single leg control. [] UE / cervical: cervical, scapular, scapulothoracic and UE control with self care, reaching, carrying, lifting, house/yardwork, driving, computer work.   [] (14525) Therapist is in constant attendance of 2 or more patients providing skilled therapy interventions, but not providing any significant amount of measurable one-on-one time to either patient, for improvements in  [] LE / lumbar: LE, proximal hip, and core control in self care, mobility, lifting, ambulation and eccentric single leg control.    [] UE / cervical: cervical, scapular, scapulothoracic and UE control with self care, reaching, carrying, lifting, house/yardwork, driving, computer work. NMR and Therapeutic Activities:    [] (18194 or 27418) Provided verbal/tactile cueing for activities related to improving balance, coordination, kinesthetic sense, posture, motor skill, proprioception and motor activation to allow for proper function of   [] LE: / Lumbar core, proximal hip and LE with self care and ADLs  [] UE / Cervical: cervical, postural, scapular, scapulothoracic and UE control with self care, carrying, lifting, driving, computer work.   [] (52251) Gait Re-education- Provided training and instruction to the patient for proper LE, core and proximal hip recruitment and positioning and eccentric body weight control with ambulation re-education including up and down stairs     Home Management Training / Self Care:  [] (42561) Provided self-care/home management training related to activities of daily living and compensatory training, and/or use of adaptive equipment for improvement with: ADLs and compensatory training, meal preparation, safety procedures and instruction in use of adaptive equipment, including bathing, grooming, dressing, personal hygiene, basic household cleaning and chores.      Home Exercise Program:    [x] (67670) Reviewed/Progressed HEP activities related to strengthening, flexibility, endurance, ROM of   [] LE / Lumbar: core, proximal hip and LE for functional self-care, mobility, lifting and ambulation/stair navigation   [] UE / Cervical: cervical, postural, scapular, scapulothoracic and UE control with self care, reaching, carrying, lifting, house/yardwork, driving, computer work  [] (23563)Reviewed/Progressed HEP activities related to improving balance, coordination, kinesthetic sense, posture, motor skill, proprioception of   [] LE: core, proximal hip and LE for self care, mobility, lifting, and ambulation/stair navigation    [] UE / Cervical: cervical, postural,  scapular, scapulothoracic and UE control with self care, reaching, carrying, lifting, house/yardwork, driving, computer work    Manual Treatments:  PROM / STM / Oscillations-Mobs:  G-I, II, III, IV (PA's, Inf., Post.)  [] (19768) Provided manual therapy to mobilize LE, proximal hip and/or LS spine soft tissue/joints for the purpose of modulating pain, promoting relaxation,  increasing ROM, reducing/eliminating soft tissue swelling/inflammation/restriction, improving soft tissue extensibility and allowing for proper ROM for normal function with   [] LE / lumbar: self care, mobility, lifting and ambulation. [] UE / Cervical: self care, reaching, carrying, lifting, house/yardwork, driving, computer work. Modalities:  [] (75039) Vasopneumatic compression: Utilized vasopneumatic compression to decrease edema / swelling for the purpose of improving mobility and quad tone / recruitment which will allow for increased overall function including but not limited to self-care, transfers, ambulation, and ascending / descending stairs. Charges:  Timed Code Treatment Minutes: 40   Total Treatment Minutes: 60     [x] EVAL - LOW (57867)   [] EVAL - MOD (30019)  [] EVAL - HIGH (51269)  [] RE-EVAL (31237)  [] XW(26398) x       [] Ionto  [] NMR (43765) x       [] Vaso  [] Manual (27342) x       [] Ultrasound  [x] TA x 2       [] Mech Traction (53182)  [] Aquatic Therapy x     [] ES (un) (84059):   [] Home Management Training x  [] ES(attended) (35914)   [] Dry Needling 1-2 muscles (81116):  [] Dry Needling 3+ muscles (708787  [] Group:      [x] Other: gait x1      GOALS:  Patient stated goal: fall less, get less dizzy  []? Progressing: []? Met: []? Not Met: []? Adjusted     Therapist goals for Patient:   Short Term Goals: To be achieved in: 2 weeks  1. Independent in HEP and progression per patient tolerance, in order to prevent re-injury. []? Progressing: []? Met: []? Not Met: []? Adjusted  2. Patient will have a decrease in pain to facilitate improvement in movement, function, and ADLs as indicated by improvement with respect to Functional Deficits. []? Progressing: []? Met: []? Not Met: []? Adjusted     Long Term Goals: To be achieved in:6  weeks  1. Disability index score of 10 % or less on the DGI  to assist with reaching prior level of function. []? Progressing: []? Met: []? Not Met: []? Adjusted  2. Patient will demonstrate increased balance to  [facility],  so that pt can isaiah all ADLs/home chores without fear of falling/ c/o dizziness  []? Progressing: []? Met: []? Not Met: []? Adjusted  3. Patient will demonstrate increased Strength by 1 mmt grade and core activation to allow for proper functional mobility as indicated by patients Functional Deficits to allow pt to resume  Community ambulation, home chores including vacuuming, stairs without increase in symptoms. []? Progressing: []? Met: []? Not Met: []? Adjusted  4. Patient will return to functional activities including Shopping without increased symptoms or restriction. []? Progressing: []? Met: []? Not Met: []? Adjusted                      Overall Progression Towards Functional goals/ Treatment Progress Update:  [] Patient is progressing as expected towards functional goals listed. [] Progression is slowed due to complexities/Impairments listed. [] Progression has been slowed due to co-morbidities.   [x] Plan just implemented, too soon to assess goals progression <30days   [] Goals require adjustment due to lack of progress  [] Patient is not progressing as expected and requires additional follow up with physician  [] Other    Persisting Functional Limitations/Impairments:  []Sleeping []Sitting               [x]Standing [x]Transfers        [x]Walking [x]Kneeling               [x]Stairs [x]Squatting / bending   [x]ADLs [x]Reaching  [x]Lifting  [x]Housework  []Driving []Job related tasks  []Sports/Recreation []Other:        ASSESSMENT:  See eval  Treatment/Activity Tolerance:  [x] Patient able to complete tx [] Patient limited by fatigue  [] Patient limited by pain  [] Patient limited by other medical complications  [] Other:     Prognosis: [x] Good [] Fair  [] Poor    Patient Requires Follow-up: [x] Yes  [] No    Plan for next treatment session:  PLAN:  strength, ROM/flexibility, posture and body mechs, manual, MOC, HEP, pt education     Frequency/Duration:  2 days per week for  6Weeks:  Interventions:  [x]? Therapeutic exercise including:strength, ROM, flexibility  [x]? NMR activation and proprioception including postural re-education  [x]? Manual therapy as indicated to include: IASTM, STM, PROM, Gr I-IV mobilizations, manipulation. [x]? Modalities as needed that may include: thermal agents, E-stim, Biofeedback, US, iontophoresis as indicated  [x]? Patient education on joint protection, postural re-education, activity modification, progression of HEP. X vestibular rehab       PLAN: See eval. PT 2 x / week for 6 weeks. [] Continue per plan of care [] Alter current plan (see comments)  [x] Plan of care initiated [] Hold pending MD visit [] Discharge    Electronically signed by: Maximiliano Dan PT, DPT    Note: If patient does not return for scheduled/ recommended follow up visits, this note will serve as a discharge from care along with most recent update on progress.

## 2021-03-02 NOTE — PLAN OF CARE
[address] [address], [facility] [address]  Phone: [phone]   Fax: [phone]                                                       Physical Therapy Certification    Dear Referring Practitioner: Dr. Joaquina Jha,    We had the pleasure of evaluating the following patient for physical therapy services at [address] [place] [address]. A summary of our findings can be found in the initial assessment below. This includes our plan of care. If you have any questions or concerns regarding these findings, please do not hesitate to contact me at the office phone number checked above. Thank you for the referral.       Physician Signature:_______________________________Date:__________________  By signing above (or electronic signature), therapists plan is approved by physician      Patient: Valdemar Cochran   : 1945   MRN: [id number]  Referring Physician: Referring Practitioner: Dr. Carrington Chase      Evaluation Date: 3/2/2021      Medical Diagnosis Information:  Diagnosis: R26.89 balance problem   Treatment Diagnosis: balance deficits /vestibular dysfunction, strength deficits B LE all put pt at high risk of falling. Insurance information: PT Insurance Information: med mut/medicare advantage no deductible $40 copay, med nec      Preferred Language for Healthcare:   [x]English       []Other:    C-SSRS Triggered by Intake questionnaire (Past 2 wk assessment ):   [] No, Questionnaire did not trigger screening. [x] Yes, Patient intake triggered C-SSRS Screening     [x] Completed, no further action required. [] Completed, PCP notified via Epic    SUBJECTIVE: on and off dizzy spells since the  of the year. States she gets dizzy and falls. Falls daily or every other day. Often catches herself before she hits the floor. Happens most often when she first gets up in am.  Sometimes the room spins; the dizziness can come at any time. It is the worst when she bends down and comes back up   Takes meclazine - it helps    ONSET: 21  Current Level of Function:  Frequent dizzy spells which cause her to fall/almost fall  Prior Level of Function: Prior to this injury / incident, pt was independent with ADLs and IADLs    Living Status: lives with spouse in 1 story house  Occupation/School: retired from insurance claims management    PAIN:  Pain Scale:  5-6/10 in back. Takes morphine for it    Functional Outcome: DGI  raw score = 10, dysfunction = 40-59%, taken at initial eval; TU sec at initial eval    Precautions/ Contra-indications: stroke 2019,  Vertigo, jumana in her low back , jumana in neck in approx     Latex Allergy:  [x]NO      []YES  Relevant Medical History:    [x] Patient history, allergies, meds reviewed. Medical chart reviewed. See intake form. Review Of Systems (ROS):  [x]Performed Review of systems (Integumentary, CardioPulmonary, Neurological) by intake and observation. Intake form has been scanned into medical record. Patient has been instructed to contact their primary care physician regarding ROS issues if not already being addressed at this time.       Co-morbidities/Complexities (which will affect course of rehabilitation):  []None        []Hx of COVID   Arthritic conditions   []Rheumatoid arthritis (M05.9)  [x]Osteoarthritis (M19.91)  []Gout   Cardiovascular conditions   [x]Hypertension (I10)  []Hyperlipidemia (E78.5)  []Angina pectoris (I20)  []Atherosclerosis (I70)  []Pacemaker  []Hx of CABG/stent/  cardiac surgeries   Musculoskeletal conditions   []Disc pathology   []Congenital spine pathologies   []Osteoporosis (M81.8)  []Osteopenia (M85.8)  []Scoliosis       Endocrine conditions   []Hypothyroid (E03.9)  []Hyperthyroid Gastrointestinal conditions   []Constipation (K59.00)  X diahreah x 1 month Metabolic conditions   []Morbid obesity (E66.01)  []Diabetes type 1(E10.65) or 2 (E11.65)   []Neuropathy (G60.9)     Cardio/Pulmonary conditions   []Asthma (J45)  []Coughing   []COPD (J44.9)  []CHF  []A-fib  X SOB with activity   Psychological Disorders  []Anxiety (F41.9)  [x]Depression (F32.9)   []Other:   Developmental Disorders  []Autism (F84.0)  []CP (G80)  []Down Syndrome (Q90.9)  []Developmental delay     Neurological conditions  [x]Prior Stroke (I69.30)  []Parkinson's (G20)  []Encephalopathy (G93.40)  []MS (G35)  []Post-polio (G14)  []SCI  []TBI  []ALS Other conditions  []Fibromyalgia (M79.7)  [x]Vertigo  []Syncope  []Kidney Failure  []Cancer      []currently undergoing                treatment  []Pregnancy  []Incontinence   Prior surgeries  []involved limb  []previous spinal surgery  [] section birth  []hysterectomy  []bowel / bladder surgery  []other relevant surgeries   [x]Other:   Saul placed in her back  In  - at bottom 3 segments, vertigo, HA, SOB,            OBJECTIVE:   Observe: LE tremors with all MMT  Posture: forward flexed    Gait: slow radha.   No AD today but pt reports sometimes using cane and getting \"tripped up by it\"    BALANCE: poor    Transfers: modified independent - needs HR- dizzy/unsteady upon standing  1 near fall when pt attempted to walk too quickly after transitioning sit to stand without AD, LOB corrected with CGA and pt holding onto chair, sitting down     Bending over causes dizziness    DGI: 10  TUG 33 sec            ROM  Comments   Lumbar Flex     Lumbar Ext     Lumbar SB     Lumbar Rot          Cervical flex WFL \"feels like the room is spinning\"   Cervical ext [facility] \"feels like the room is spinning\"   Cervical SB WFL No c/o dizziness/spinning   Cervical Rot WFL \"Makes me feel dizzy\"               ROM RIGHT LEFT Comments         LE    WFL B LE WFL B LE    Hip Flexion      Hip Abd      Hip ER      Hip IR      Hip Extension      Knee Ext      Knee Flex      DF      PF Ankle INV      Ankle Ever            UE        Shoulder flex Reduced 25% Reduced 25%    Shoulder AB      Shoulder ER      Shoulder IR                        Flexibility      Hamstring (90/90)      Gluteus taryn      Piriformis      Rectus femoris      Hip flexors  ITB      gastroc      soleus      Pect major  Pect minor      Upper trapezius                        Strength / Myotomes RIGHT LEFT Comments   Multifidus      Transverse Ab      LE      Hip Flexors (L1-2) 4- 3+    Hip Abductors      Hip Extensors      Hip Internal Rotators 3+ 3+    Hip External Rotators 3+ 3+    Quads (L2-4) 4- 3+    Hamstrings  4 4-          Ankle Plantarflexion (S1-2) 3+ 3+    Ankle Dorsiflexion (L4-5) 3+ 3+    Ankle Inversion      Ankle Eversion (S1-2)      Great Toe Extension (L5)                              Strength / Myotomes RIGHT LEFT    Cervical Flexion (C1-2)      Cervical Side-bending (C3)      Shoulder Shrug (C4)      Shoulder Flex 4- in available ROM 4- in available ROM    Shoulder Scap      Shoulder Abduction (C5) 2+ 2+    Shoulder ER      Shoulder IR      Biceps (C6)      Triceps (C7)      Wrist Extension (C6)      Wrist Flexion (C7)           Thumb Abduction (C8)     Finger Abduction (T1)                  Barriers to/and or personal factors that will affect rehab potential:              [x]Age  []Sex    []Smoker              [x]Motivation/Lack of Motivation                        [x]Co-Morbidities              []Cognitive Function, education/learning barriers              [x]Environmental, home barriers              []profession/work barriers  []past PT/medical experience  []other:  Justification:    Falls Risk Assessment (30 days):   [] Falls Risk assessed and no intervention required.   [x] Falls Risk assessed and Patient requires intervention due to being higher risk   TUG score (>12s at risk):   33 sec  [] Falls education provided, including         ASSESSMENT: balance deficits /vestibular dysfunction, strength deficits B LE all put pt at high risk of falling. Pt will benefit from participating in skilled PT services to restore PLOF.       Functional Impairments:  Lumbar/lower quarter:     []Noted lumbar/proximal hip hypomobility   []Noted lumbosacral and/or generalized hypermobility   []Decreased Lumbosacral/hip/LE functional ROM   []Decreased core/proximal hip strength and neuromuscular control    []Decreased LE functional strength    []Abnormal reflexes/sensation/myotomal/dermatomal deficits  []Reduced ability to run, hop, cut or jump  [x]Reduced balance/proprioceptive control    [x]other:  reduced functional ROM of B LE   []other:  reduce functional strength of    []other: myofascial changes and pain at    [] Postural impairments:   [x]other: impaired gait/vestibular system/balance   Cervical/upper quarter:   []Noted cervical/thoracic/GHJ joint hypomobility   []Noted cervical/thoracic/GHJ joint hypermobility   []Decreased cervical/UE functional ROM   []Noted Headache pain aggravated by neck movements with/without dizziness   []Abnormal reflexes/sensation/myotomal/dermatomal deficits   []Decreased DCF control or ability to hold head up   []Decreased RC/scapular/core strength and neuromuscular control    []Decreased UE functional strength   [] Postural impairments:   []other:      Functional Activity Limitations (from functional questionnaire and intake)   []Reduced ability to tolerate prolonged functional positions   [x]Reduced ability or difficulty with changes of positions or transfers between positions   [x]Reduced ability to maintain good posture and demonstrate good body mechanics with sitting, bending, and lifting   []Reduced ability to sleep   [] Reduced ability or tolerance with driving and/or computer work   [x]Reduced ability to perform lifting, reaching, carrying tasks   []Reduced ability to squat   []Reduced ability to forward bend   [x]Reduced ability to ambulate prolonged functional periods/distances/surfaces   [x]Reduced ability to ascend/descend stairs   []Reduced ability to concentrate    []Reduced ability to tolerate any impact through UE or spine   []other:   []other:    []other:    []other:    []other:       Participation Restrictions   [x]Reduced participation in self care activities   [x]Reduced participation in home management activities   [x]Reduced participation in work activities   [x]Reduced participation in social activities. []Reduced participation in sport/recreational activities. Classification:  Lumbar/Lower quarter:   []Signs/symptoms consistent with Lumbar instability/stabilization subgroup. []Signs/symptoms consistent with Lumbar mobilization/manipulation subgroup, myotomes and dermatomes intact. Meets manipulation criteria. []Signs/symptoms consistent with Lumbar direction specific/centralization subgroup   []Signs/symptoms consistent with Lumbar traction subgroup     []Signs/symptoms consistent with lumbar facet dysfunction   []Signs/symptoms consistent with lumbar stenosis type dysfunction   []Signs/symptoms consistent with nerve root involvement including myotome & dermatome dysfunction   []Signs/symptoms consistent with post-surgical status including: decreased ROM, strength and function.    []signs/symptoms consistent with pathology which may benefit from Dry needling    []Signs/symptoms consistent with joint sprain/strain  []Signs/symptoms consistent with patella-femoral syndrome   []Signs/symptoms consistent with knee OA/hip OA   []Signs/symptoms consistent with internal derangement of knee/Hip   [x]Signs/symptoms consistent with functional hip weakness/NMR control      []Signs/symptoms consistent with tendinitis/tendinosis    []signs/symptoms consistent with pathology which may bene   [x]other:  signs/symptoms consistent with balance deficits /vestibular dysfunction/impaired gait    Cervical/ upper quarter  Classification/Subgrouping:   []signs/symptoms consistent with neck pain with mobility deficits     []signs/symptoms consistent with neck pain with movement coordinated impairments    []signs/symptoms consistent with neck pain with radiating pain    []signs/symptoms consistent with neck pain with headaches (cervicogenic)    []Signs/symptoms consistent with nerve root involvement including myotome & dermatome dysfunction   []sign/symptoms consistent with facet dysfunction of cervical and thoracic spine    []signs/symptoms consistent suggesting central cord compression/UMN syndromes   []signs/symptoms consistent with discogenic cervical pain   []signs/symptoms consistent with rib dysfunction   []signs/symptoms consistent with postural dysfunction   []signs/symptoms consistent with shoulder pathology    []signs/symptoms consistent with post-surgical status including decreased ROM, strength and function.    []signs/symptoms consistent with pathology which may benefit from Dry Needling   []signs/symptoms which may limit the use of advanced manual therapy techniques: (Hypertension, recent trauma, intolerance to end range positions, prior TIA, visual issues, UE myotomes loss )       Prognosis/Rehab Potential:      []Excellent   [x]Good    []Fair   []Poor    Tolerance of evaluation/treatment:    []Excellent   [x]Good    []Fair   []Poor     Physical Therapy Evaluation Complexity Justification  [x] A history of present problem with:  [] no personal factors and/or comorbidities that impact the plan of care;  [x]1-2 personal factors and/or comorbidities that impact the plan of care  []3 personal factors and/or comorbidities that impact the plan of care  [x] An examination of body systems using standardized tests and measures addressing any of the following: body structures and functions (impairments), activity limitations, and/or participation restrictions;:  [x] a total of 1-2 or more elements   [] a total of 3 or more elements   [] a total of 4 or more elements   [x] A function. [] Progressing: [] Met: [] Not Met: [] Adjusted  2. Patient will demonstrate increased balance to  [facility],  so that pt can isaiah all ADLs/home chores without fear of falling/ c/o dizziness  [] Progressing: [] Met: [] Not Met: [] Adjusted  3. Patient will demonstrate increased Strength by 1 mmt grade and core activation to allow for proper functional mobility as indicated by patients Functional Deficits to allow pt to resume  Community ambulation, home chores including vacuuming, stairs without increase in symptoms. [] Progressing: [] Met: [] Not Met: [] Adjusted  4. Patient will return to functional activities including Shopping without increased symptoms or restriction.    [] Progressing: [] Met: [] Not Met: [] Adjusted         Electronically signed by:  Aaron Lundberg, PT  [id number] DPT

## 2021-03-03 RX ORDER — ROPINIROLE 0.25 MG/1
TABLET, FILM COATED ORAL
Qty: 90 TABLET | Refills: 1 | Status: SHIPPED | OUTPATIENT
Start: 2021-03-03 | End: 2021-04-07 | Stop reason: SDUPTHER

## 2021-03-09 ENCOUNTER — HOSPITAL ENCOUNTER (OUTPATIENT)
Dept: PHYSICAL THERAPY | Age: 76
Setting detail: THERAPIES SERIES
Discharge: HOME | End: 2021-03-09
Payer: MEDICARE

## 2021-03-11 ENCOUNTER — HOSPITAL ENCOUNTER (OUTPATIENT)
Dept: PHYSICAL THERAPY | Age: 76
Setting detail: THERAPIES SERIES
Discharge: HOME | End: 2021-03-11
Payer: MEDICARE

## 2021-03-11 PROCEDURE — 97116 GAIT TRAINING THERAPY: CPT

## 2021-03-11 PROCEDURE — 97110 THERAPEUTIC EXERCISES: CPT

## 2021-03-11 PROCEDURE — 97112 NEUROMUSCULAR REEDUCATION: CPT

## 2021-03-11 NOTE — FLOWSHEET NOTE
[facility]  Phone: [phone]   Fax: [phone]    Physical Therapy Daily Treatment Note  Date:  3/11/2021    Patient Name:  Blaine Mcneil    :    MRN: [id number]  Medical/Treatment Diagnosis Information:  Diagnosis: R26.89 balance problem  Treatment Diagnosis: balance deficits /vestibular dysfunction, strength deficits B LE all put pt at high risk of falling. Insurance/Certification information:  PT Insurance Information: med mut/medicare advantage no deductible $40 copay, med nec  Physician Information:  Referring Practitioner: Dr. Angela Lema, [address] [place] [address] signed (Y/N):     Date of Patient follow up with Physician:     Functional scale[de-identified]  DGI  raw score = 10, dysfunction = 40-59%, taken at initial eval; TU sec at initial eval      Progress Report: [x]  Yes  []  No     Date Range for reporting period:  Beginning 3/2  Ending    Progress report due (10 Rx/or 30 days whichever is less): visit #10 or 0/(DCUQ)     Recertification due (POC duration/ or 90 days whichever is less): visit #12 or  (date)     Visit # Insurance Allowable Auth required? Date Range    Med nec []  Yes  [x]  No na        Latex Allergy:  [x]NO      []YES  Preferred Language for Healthcare:   [x]English       []other:      Pain level: 7/10 L LE pain Pt attributes the pain to the rain    SUBJECTIVE:  Stats she was sick on Tuesday and forgot about appt until after her appt. Hasn't tried using the RW bc it makes her feel old. No falls. Trips over the cane when she uses the cane. Has 2-3 near falls/day - most often if she tries to go too fast.  Reports she is drinking more - 4-5 12oz bottles now    OBJECTIVE: 3/11 pt amb into PT clinic without AD. LOB when pt turned to R or L, required CGA of PT to correct.        RESTRICTIONS/PRECAUTIONS: stroke 2019,  Vertigo, jumana in her low back , jumana in neck in approx 2006       Exercises/Interventions: use gait belt    Therapeutic Exercises (58260) Resistance / level Sets/sec Reps Notes   sci fit nu step   5 min  L1 s=14 arms = 4      IB/HR/TR 1x30\"/1x10/1x10      Steps  frd step ups   stretches   4\", 1x10 B  Next session as isaiah   B UE support          Mat ex  TrA iso  bridge Pt needed to keep eyes closed while lying on mat table bc looking at ceiling made the room spin  10x5\"  10x                                  GAIT 1x220' with RW and CGA: including R and L turns, u-turns and pivots    Pt used RW t/o clinic during PT session with CGA/SBA    Instructed pt to use RW at all times to reduce fall risk                                  Therapeutic Activities (72449)       Sit to/from stand With RW. SBA; advised pt to stand and get her bearings before she walks to reduce fall risk  4x cues to push from chair CGA    At mat table 3x with CGA      Stand to sit  With RW. Pt educated to back up to chair and to feel chair behind her legs before she sits down                           Neuromuscular Re-ed (57327)       Balance ex airex  DLS [id number]\" no hands  DLS with R/L cerv rot: 10x ea way B UE support  Wt shifts frd/back 10x ea    On floor with B UE support   SLS 2x15\" B Marching 10x ea  SLR hip AB 10x ea      Tandem stance 2x30\" ea way intermitent UE support      Vestibular rehab                                   Manual Intervention (62796)                                                     Modalities:     Pt. Education:  3/11 review and progress HEP - encourage pt to use RW daily to reduce fall risk    -3/2 patient educated on diagnosis, prognosis and expectations for rehab  -all patient questions were answered  Educated re hydration, importance of using RW to reduce fall risk    HEP instruction:  3/11  Access Code: [email or web address]   URL: [email or web address]   Date: 03/11/2021   Prepared by: Nikolai Garcia     Exercises   Tandem Stance - 10 reps - 3 sets - 1x daily - 7x weekly Sit to Stand - 10 reps - 3 sets - 1x daily - 7x weekly   Standing Hip Abduction - 10 reps - 3 sets - 1x daily - 7x weekly     3/2 Instructed pt to use RW at all times, drink at least 64 oz water/day,    After sit to stand transition:  advised pt to stand and get her bearings before she walks to reduce fall risk    Therapeutic Exercise and NMR EXR  [] (67346) Provided verbal/tactile cueing for activities related to strengthening, flexibility, endurance, ROM for improvements in  [] LE / Lumbar: LE, proximal hip, and core control with self care, mobility, lifting, ambulation. [] UE / Cervical: cervical, postural, scapular, scapulothoracic and UE control with self care, reaching, carrying, lifting, house/yardwork, driving, computer work.  [] (87787) Provided verbal/tactile cueing for activities related to improving balance, coordination, kinesthetic sense, posture, motor skill, proprioception to assist with   [] LE / lumbar: LE, proximal hip, and core control in self care, mobility, lifting, ambulation and eccentric single leg control. [] UE / cervical: cervical, scapular, scapulothoracic and UE control with self care, reaching, carrying, lifting, house/yardwork, driving, computer work.   [] (56294) Therapist is in constant attendance of 2 or more patients providing skilled therapy interventions, but not providing any significant amount of measurable one-on-one time to either patient, for improvements in  [] LE / lumbar: LE, proximal hip, and core control in self care, mobility, lifting, ambulation and eccentric single leg control. [] UE / cervical: cervical, scapular, scapulothoracic and UE control with self care, reaching, carrying, lifting, house/yardwork, driving, computer work.      NMR and Therapeutic Activities:    [] (93149 or 63374) Provided verbal/tactile cueing for activities related to improving balance, coordination, kinesthetic sense, posture, motor skill, proprioception and motor activation to allow modulating pain, promoting relaxation,  increasing ROM, reducing/eliminating soft tissue swelling/inflammation/restriction, improving soft tissue extensibility and allowing for proper ROM for normal function with   [] LE / lumbar: self care, mobility, lifting and ambulation. [] UE / Cervical: self care, reaching, carrying, lifting, house/yardwork, driving, computer work. Modalities:  [] (97033) Vasopneumatic compression: Utilized vasopneumatic compression to decrease edema / swelling for the purpose of improving mobility and quad tone / recruitment which will allow for increased overall function including but not limited to self-care, transfers, ambulation, and ascending / descending stairs. Charges:  Timed Code Treatment Minutes: 44   Total Treatment Minutes: 44     [] EVAL - LOW (57165)   [] EVAL - MOD (32348)  [] EVAL - HIGH (62196)  [] RE-EVAL (16202)  [x] TJ(35936) x  1     [] Ionto  [x] NMR (95466) x  1     [] Vaso  [] Manual (63645) x       [] Ultrasound  [] TA x 2       [] Mech Traction (03675)  [] Aquatic Therapy x     [] ES (un) (45351):   [] Home Management Training x  [] ES(attended) (89026)   [] Dry Needling 1-2 muscles (59902):  [] Dry Needling 3+ muscles (110911  [] Group:      [x] Other: gait x1      GOALS:  Patient stated goal: fall less, get less dizzy  []? Progressing: []? Met: []? Not Met: []? Adjusted     Therapist goals for Patient:   Short Term Goals: To be achieved in: 2 weeks  1. Independent in HEP and progression per patient tolerance, in order to prevent re-injury. []? Progressing: []? Met: []? Not Met: []? Adjusted  2. Patient will have a decrease in pain to facilitate improvement in movement, function, and ADLs as indicated by improvement with respect to Functional Deficits. []? Progressing: []? Met: []? Not Met: []? Adjusted     Long Term Goals: To be achieved in:6  weeks  1.  Disability index score of 10 % or less on the DGI  to assist with reaching prior level of function. []? Progressing: []? Met: []? Not Met: []? Adjusted  2. Patient will demonstrate increased balance to  [facility],  so that pt can isaiah all ADLs/home chores without fear of falling/ c/o dizziness  []? Progressing: []? Met: []? Not Met: []? Adjusted  3. Patient will demonstrate increased Strength by 1 mmt grade and core activation to allow for proper functional mobility as indicated by patients Functional Deficits to allow pt to resume  Community ambulation, home chores including vacuuming, stairs without increase in symptoms. []? Progressing: []? Met: []? Not Met: []? Adjusted  4. Patient will return to functional activities including Shopping without increased symptoms or restriction. []? Progressing: []? Met: []? Not Met: []? Adjusted                      Overall Progression Towards Functional goals/ Treatment Progress Update:  [] Patient is progressing as expected towards functional goals listed. [] Progression is slowed due to complexities/Impairments listed. [] Progression has been slowed due to co-morbidities. [x] Plan just implemented, too soon to assess goals progression <30days   [] Goals require adjustment due to lack of progress  [] Patient is not progressing as expected and requires additional follow up with physician  [] Other    Persisting Functional Limitations/Impairments:  []Sleeping []Sitting               [x]Standing [x]Transfers        [x]Walking [x]Kneeling               [x]Stairs [x]Squatting / bending   [x]ADLs [x]Reaching  [x]Lifting  [x]Housework  []Driving []Job related tasks  []Sports/Recreation []Other:        ASSESSMENT:   Pt at significant risk for falling when she amb without appropriate AD.  She states she is not interested in using RW bc it makes her \"feel old\"    Treatment/Activity Tolerance:  [x] Patient able to complete tx [] Patient limited by fatigue  [] Patient limited by pain  [] Patient limited by other medical complications  [] Other:     Prognosis: [x] Good [] Fair  [] Poor    Patient Requires Follow-up: [x] Yes  [] No    Plan for next treatment session:  PLAN:  BALANCE/GAIT, vestibular rehab, strength, ROM/flexibility, posture and body mechs, manual, MOC, HEP, pt education     Frequency/Duration:  2 days per week for  6Weeks:  Interventions:  [x]? Therapeutic exercise including:strength, ROM, flexibility  [x]? NMR activation and proprioception including postural re-education  [x]? Manual therapy as indicated to include: IASTM, STM, PROM, Gr I-IV mobilizations, manipulation. [x]? Modalities as needed that may include: thermal agents, E-stim, Biofeedback, US, iontophoresis as indicated  [x]? Patient education on joint protection, postural re-education, activity modification, progression of HEP. X vestibular rehab       PLAN: See eval. PT 2 x / week for 6 weeks. [] Continue per plan of care [] Alter current plan (see comments)  [x] Plan of care initiated [] Hold pending MD visit [] Discharge    Electronically signed by: Alexei Montes PT, DPT    Note: If patient does not return for scheduled/ recommended follow up visits, this note will serve as a discharge from care along with most recent update on progress.

## 2021-03-13 ENCOUNTER — HOSPITAL ENCOUNTER (OUTPATIENT)
Age: 76
Discharge: HOME | End: 2021-03-13
Payer: MEDICARE

## 2021-03-13 DIAGNOSIS — R42 DIZZINESS: ICD-10-CM

## 2021-03-13 DIAGNOSIS — R53.83 FATIGUE, UNSPECIFIED TYPE: ICD-10-CM

## 2021-03-13 LAB
A/G RATIO: 1.3 (ref 1.1–2.2)
ALBUMIN SERPL-MCNC: 4 G/DL (ref 3.4–5)
ALP BLD-CCNC: 95 U/L (ref 40–129)
ALT SERPL-CCNC: 15 U/L (ref 10–40)
ANION GAP SERPL CALCULATED.3IONS-SCNC: 10 MMOL/L (ref 3–16)
AST SERPL-CCNC: 21 U/L (ref 15–37)
BASOPHILS ABSOLUTE: 0 K/UL (ref 0–0.2)
BASOPHILS RELATIVE PERCENT: 1.2 %
BILIRUB SERPL-MCNC: 0.3 MG/DL (ref 0–1)
BUN BLDV-MCNC: 12 MG/DL (ref 7–20)
CALCIUM SERPL-MCNC: 9 MG/DL (ref 8.3–10.6)
CHLORIDE BLD-SCNC: 103 MMOL/L (ref 99–110)
CO2: 30 MMOL/L (ref 21–32)
CREAT SERPL-MCNC: 1 MG/DL (ref 0.6–1.2)
EOSINOPHILS ABSOLUTE: 0.2 K/UL (ref 0–0.6)
EOSINOPHILS RELATIVE PERCENT: 4.7 %
GFR AFRICAN AMERICAN: >60
GFR NON-AFRICAN AMERICAN: 54
GLOBULIN: 3.1 G/DL
GLUCOSE FASTING: 87 MG/DL (ref 70–99)
HCT VFR BLD CALC: 35.2 % (ref 36–48)
HEMOGLOBIN: 11.5 G/DL (ref 12–16)
LYMPHOCYTES ABSOLUTE: 0.9 K/UL (ref 1–5.1)
LYMPHOCYTES RELATIVE PERCENT: 22.4 %
MCH RBC QN AUTO: 30.9 PG (ref 26–34)
MCHC RBC AUTO-ENTMCNC: 32.6 G/DL (ref 31–36)
MCV RBC AUTO: 95 FL (ref 80–100)
MONOCYTES ABSOLUTE: 0.4 K/UL (ref 0–1.3)
MONOCYTES RELATIVE PERCENT: 8.9 %
NEUTROPHILS ABSOLUTE: 2.6 K/UL (ref 1.7–7.7)
NEUTROPHILS RELATIVE PERCENT: 62.8 %
PDW BLD-RTO: 14.3 % (ref 12.4–15.4)
PLATELET # BLD: 171 K/UL (ref 135–450)
PMV BLD AUTO: 9.8 FL (ref 5–10.5)
POTASSIUM SERPL-SCNC: 4.9 MMOL/L (ref 3.5–5.1)
RBC # BLD: 3.7 M/UL (ref 4–5.2)
SODIUM BLD-SCNC: 143 MMOL/L (ref 136–145)
TOTAL PROTEIN: 7.1 G/DL (ref 6.4–8.2)
TSH REFLEX: 2.02 UIU/ML (ref 0.27–4.2)
WBC # BLD: 4.1 K/UL (ref 4–11)

## 2021-03-13 PROCEDURE — 84443 ASSAY THYROID STIM HORMONE: CPT

## 2021-03-13 PROCEDURE — 80053 COMPREHEN METABOLIC PANEL: CPT

## 2021-03-13 PROCEDURE — 36415 COLL VENOUS BLD VENIPUNCTURE: CPT

## 2021-03-13 PROCEDURE — 85025 COMPLETE CBC W/AUTO DIFF WBC: CPT

## 2021-03-16 ENCOUNTER — HOSPITAL ENCOUNTER (OUTPATIENT)
Dept: PHYSICAL THERAPY | Age: 76
Setting detail: THERAPIES SERIES
Discharge: HOME | End: 2021-03-16
Payer: MEDICARE

## 2021-03-18 ENCOUNTER — HOSPITAL ENCOUNTER (OUTPATIENT)
Dept: PHYSICAL THERAPY | Age: 76
Setting detail: THERAPIES SERIES
Discharge: HOME | End: 2021-03-18
Payer: MEDICARE

## 2021-03-23 ENCOUNTER — HOSPITAL ENCOUNTER (OUTPATIENT)
Dept: PHYSICAL THERAPY | Age: 76
Setting detail: THERAPIES SERIES
Discharge: HOME | End: 2021-03-23
Payer: MEDICARE

## 2021-03-23 PROCEDURE — 97530 THERAPEUTIC ACTIVITIES: CPT

## 2021-03-23 NOTE — FLOWSHEET NOTE
[address] [facility] Physical Therapy  Phone: [phone]   Fax: [phone]    Physical Therapy Daily Treatment Note  Date:  3/23/2021    Patient Name:  Isamar Pillai    :    MRN: [id number]  Medical/Treatment Diagnosis Information:  Diagnosis: R26.89 balance problem  Treatment Diagnosis: balance deficits /vestibular dysfunction, strength deficits B LE all put pt at high risk of falling. Insurance/Certification information:  PT Insurance Information: med mut/medicare advantage no deductible $40 copay, med nec  Physician Information:  Referring Practitioner: Dr. Ryan Can, [address] [place] [address] signed (Y/N):     Date of Patient follow up with Physician:     Functional scale[de-identified]  DGI  raw score = 10, dysfunction = 40-59%, taken at initial eval; TU sec at initial eval      Progress Report: [x]  Yes  []  No     Date Range for reporting period:  Beginning 3/2  Ending    Progress report due (10 Rx/or 30 days whichever is less): visit #10 or 5/3(RFTJ)     Recertification due (POC duration/ or 90 days whichever is less): visit #12 or  (date)     Visit # Insurance Allowable Auth required? Date Range   3/12 Med nec []  Yes  [x]  No na        Latex Allergy:  [x]NO      []YES  Preferred Language for Healthcare:   [x]English       []other:      Pain level: No c/o pain this date - just dizziness. SUBJECTIVE:  Pt has not been here for the past three appointments - was sick due to vertigo. Pt reports vomiting when it is bad. Pt states that it takes everything out of her, and she cannot move when it is that bad. She gets episodes of varying degrees. Last week, she was not vomiting, but she was severely dizzy. Pt states she can feel it coming on - her eyes get 'funny,' and she weaves when walking, and then she gets nauseous. Pt is trying to drink more water and is limiting her caffeine intake. She is taking meclizine medication which helps some.   Pt reports no recent falls - just near falls. She is afraid to go anywhere by herself because she might get dizzy. OBJECTIVE: 3/11 pt amb into PT clinic without AD. LOB when pt turned to R or L, required CGA of PT to correct. RESTRICTIONS/PRECAUTIONS: stroke March 2019,  Vertigo, jumana in her low back 2000, jumana in neck in approx 2006       Exercises/Interventions: use gait belt    Therapeutic Exercises (43070) Resistance / level Sets/sec Reps Notes    Hold 3/23 due to focusing on Warren Colla and Epley Maneuver today           B UE support                                         GAIT  Without asst device at PT this date without LOB noted. 3/23                                 Therapeutic Activities (48754)       Sit to/from stand At mat table 3x with supervision 3/23      Stand to sit  With UE usage without difficulty this date 3/23      Warren Colla test                     Neuromuscular Re-ed (87823)            Vestibular rehab:  Warren Colla Test            Education        Epley Maneuver   R negative, no nystagmus or dizziness  L mild nystagmus with head turn to L before extension - resolved within 30sec without intervention first time. No nystagmus or dizziness second time, moderate nystagmus and severe dizziness third time about 10sec - resolved without intervention  PT educ pt at length using inner ear model regarding semi-circular canals, otholith and semicircular canal fluid as these relate to vertigo. Epley Maneuver beginning with pt supine and head 45' to L, moving head 45' to R, rolling onto R side followed by sitting up. Pt performed L Epley maneuver 3x with asst of PT. Pt was educ not to perform quick head turns/movements for at least 24 hours. Pt was educ to keep body no more than 45' reclined x24 hours - to sleep in a recliner tonight. Pt was educ not to take meclizine Thurs or Fri before she comes to PT on Friday.                                     Manual Intervention (62599) Modalities: None this date    Pt. Education:  3/11 review and progress HEP - encourage pt to use RW daily to reduce fall risk    -3/2 patient educated on diagnosis, prognosis and expectations for rehab  -all patient questions were answered  Educated re hydration, importance of using RW to reduce fall risk    HEP instruction: Epley Maneuver emailed 3/23    3/11  Access Code: [email or web address]   URL: GIS Cloud/   Date: 03/11/2021   Prepared by: Bony Hoskins     Exercises   Tandem Stance - 10 reps - 3 sets - 1x daily - 7x weekly   Sit to Stand - 10 reps - 3 sets - 1x daily - 7x weekly   Standing Hip Abduction - 10 reps - 3 sets - 1x daily - 7x weekly     3/2 Instructed pt to use RW at all times, drink at least 64 oz water/day,    After sit to stand transition:  advised pt to stand and get her bearings before she walks to reduce fall risk    Therapeutic Exercise and NMR EXR  [] (15834) Provided verbal/tactile cueing for activities related to strengthening, flexibility, endurance, ROM for improvements in  [] LE / Lumbar: LE, proximal hip, and core control with self care, mobility, lifting, ambulation. [] UE / Cervical: cervical, postural, scapular, scapulothoracic and UE control with self care, reaching, carrying, lifting, house/yardwork, driving, computer work.  [] (96504) Provided verbal/tactile cueing for activities related to improving balance, coordination, kinesthetic sense, posture, motor skill, proprioception to assist with   [] LE / lumbar: LE, proximal hip, and core control in self care, mobility, lifting, ambulation and eccentric single leg control.    [] UE / cervical: cervical, scapular, scapulothoracic and UE control with self care, reaching, carrying, lifting, house/yardwork, driving, computer work.   [] (28948) Therapist is in constant attendance of 2 or more patients providing skilled therapy interventions, but not providing any significant amount of measurable one-on-one time to either patient, for improvements in  [] LE / lumbar: LE, proximal hip, and core control in self care, mobility, lifting, ambulation and eccentric single leg control. [] UE / cervical: cervical, scapular, scapulothoracic and UE control with self care, reaching, carrying, lifting, house/yardwork, driving, computer work. NMR and Therapeutic Activities:    [] (57819 or 14026) Provided verbal/tactile cueing for activities related to improving balance, coordination, kinesthetic sense, posture, motor skill, proprioception and motor activation to allow for proper function of   [] LE: / Lumbar core, proximal hip and LE with self care and ADLs  [] UE / Cervical: cervical, postural, scapular, scapulothoracic and UE control with self care, carrying, lifting, driving, computer work.   [] (11447) Gait Re-education- Provided training and instruction to the patient for proper LE, core and proximal hip recruitment and positioning and eccentric body weight control with ambulation re-education including up and down stairs     Home Management Training / Self Care:  [] (13725) Provided self-care/home management training related to activities of daily living and compensatory training, and/or use of adaptive equipment for improvement with: ADLs and compensatory training, meal preparation, safety procedures and instruction in use of adaptive equipment, including bathing, grooming, dressing, personal hygiene, basic household cleaning and chores.      Home Exercise Program:    [x] (81456) Reviewed/Progressed HEP activities related to strengthening, flexibility, endurance, ROM of   [] LE / Lumbar: core, proximal hip and LE for functional self-care, mobility, lifting and ambulation/stair navigation   [] UE / Cervical: cervical, postural, scapular, scapulothoracic and UE control with self care, reaching, carrying, lifting, house/yardwork, driving, computer work  [] (77742)Reviewed/Progressed HEP activities related to improving balance, coordination, kinesthetic sense, posture, motor skill, proprioception of   [] LE: core, proximal hip and LE for self care, mobility, lifting, and ambulation/stair navigation    [] UE / Cervical: cervical, postural,  scapular, scapulothoracic and UE control with self care, reaching, carrying, lifting, house/yardwork, driving, computer work    Manual Treatments:  PROM / STM / Oscillations-Mobs:  G-I, II, III, IV (PA's, Inf., Post.)  [] (26058) Provided manual therapy to mobilize LE, proximal hip and/or LS spine soft tissue/joints for the purpose of modulating pain, promoting relaxation,  increasing ROM, reducing/eliminating soft tissue swelling/inflammation/restriction, improving soft tissue extensibility and allowing for proper ROM for normal function with   [] LE / lumbar: self care, mobility, lifting and ambulation. [] UE / Cervical: self care, reaching, carrying, lifting, house/yardwork, driving, computer work. Modalities:  [] (61285) Vasopneumatic compression: Utilized vasopneumatic compression to decrease edema / swelling for the purpose of improving mobility and quad tone / recruitment which will allow for increased overall function including but not limited to self-care, transfers, ambulation, and ascending / descending stairs. Charges:  Timed Code Treatment Minutes: 42   Total Treatment Minutes: 42     [] EVAL - LOW (37387)   [] EVAL - MOD (03185)  [] EVAL - HIGH (17703)  [] RE-EVAL (43673)  [] RN(74696) x  1     [] Ionto  [] NMR (10883) x  1     [] Vaso  [] Manual (17488) x       [] Ultrasound  [x] TA x 3       [] Mech Traction (75923)  [] Aquatic Therapy x     [] ES (un) (00648):   [] Home Management Training x  [] ES(attended) (41743)   [] Dry Needling 1-2 muscles (73860):  [] Dry Needling 3+ muscles (951288  [] Group:      [x] Other: gait x1      GOALS:  Patient stated goal: fall less, get less dizzy  []? Progressing: []? Met: []? Not Met: []?  Adjusted     Therapist goals for Patient:   Short Term Goals: To be achieved in: 2 weeks  1. Independent in HEP and progression per patient tolerance, in order to prevent re-injury. []? Progressing: []? Met: []? Not Met: []? Adjusted  2. Patient will have a decrease in pain to facilitate improvement in movement, function, and ADLs as indicated by improvement with respect to Functional Deficits. []? Progressing: []? Met: []? Not Met: []? Adjusted     Long Term Goals: To be achieved in:6  weeks  1. Disability index score of 10 % or less on the DGI  to assist with reaching prior level of function. []? Progressing: []? Met: []? Not Met: []? Adjusted  2. Patient will demonstrate increased balance to  [facility],  so that pt can isaiah all ADLs/home chores without fear of falling/ c/o dizziness  []? Progressing: []? Met: []? Not Met: []? Adjusted  3. Patient will demonstrate increased Strength by 1 mmt grade and core activation to allow for proper functional mobility as indicated by patients Functional Deficits to allow pt to resume  Community ambulation, home chores including vacuuming, stairs without increase in symptoms. []? Progressing: []? Met: []? Not Met: []? Adjusted  4. Patient will return to functional activities including Shopping without increased symptoms or restriction. []? Progressing: []? Met: []? Not Met: []? Adjusted                      Overall Progression Towards Functional goals/ Treatment Progress Update:  [] Patient is progressing as expected towards functional goals listed. [] Progression is slowed due to complexities/Impairments listed. [] Progression has been slowed due to co-morbidities.   [x] Plan just implemented, too soon to assess goals progression <30days   [] Goals require adjustment due to lack of progress  [] Patient is not progressing as expected and requires additional follow up with physician  [] Other    Persisting Functional Limitations/Impairments:  []Sleeping []Sitting               [x]Standing [x]Transfers        [x]Walking [x]Kneeling               [x]Stairs [x]Squatting / bending   [x]ADLs [x]Reaching  [x]Lifting  [x]Housework  []Driving []Job related tasks  []Sports/Recreation []Other:        ASSESSMENT:   Assess effects of Epley Maneuver next visit. Treatment/Activity Tolerance:  [x] Patient able to complete tx [] Patient limited by fatigue  [] Patient limited by pain  [] Patient limited by other medical complications  [] Other:     Prognosis: [x] Good [] Fair  [] Poor    Patient Requires Follow-up: [x] Yes  [] No    Plan for next treatment session:  PLAN:  BALANCE/GAIT, vestibular rehab, strength, ROM/flexibility, posture and body mechs, manual, MOC, HEP, pt education     Frequency/Duration:  2 days per week for  6Weeks:  Interventions:  [x]? Therapeutic exercise including:strength, ROM, flexibility  [x]? NMR activation and proprioception including postural re-education  [x]? Manual therapy as indicated to include: IASTM, STM, PROM, Gr I-IV mobilizations, manipulation. [x]? Modalities as needed that may include: thermal agents, E-stim, Biofeedback, US, iontophoresis as indicated  [x]? Patient education on joint protection, postural re-education, activity modification, progression of HEP. X vestibular rehab       PLAN: See levon. PT 2 x / week for 6 weeks. [x] Continue per plan of care [] Alter current plan (see comments)  [] Plan of care initiated [] Hold pending MD visit [] Discharge    Electronically signed by: Saul Light PT, DPT    Note: If patient does not return for scheduled/ recommended follow up visits, this note will serve as a discharge from care along with most recent update on progress.

## 2021-03-26 ENCOUNTER — HOSPITAL ENCOUNTER (OUTPATIENT)
Dept: PHYSICAL THERAPY | Age: 76
Setting detail: THERAPIES SERIES
Discharge: HOME | End: 2021-03-26
Payer: MEDICARE

## 2021-03-26 NOTE — FLOWSHEET NOTE
Physical Therapy      Physical Therapy  Cancellation/No-show Note  Patient Name:  Maudry Oppenheim  :  1945   Date:  3/26/2021  Cancelled visits to date: 2  No-shows to date: 2  Patient status for today's appointment patient:  []  Cancelled3/16, 3/18   []  Rescheduled appointment  [x]  No-show 3/9, 3/26     Reason given by patient:  [x]  Patient ill 857, 0  []  Conflicting appointment    []  No transportation    []  Conflict with work  []  No reason given  []  Other:     Comments:      Phone call information:   [x]   Phone call made today to patient at 1:18 pm 3/26  time at number provided:   [phone]    [x]  Patient answered, conversation as follows: Pt apologized and stated this is the second time she's done this. Pt stated she does not know why she forgets about PT. Pt states she has a virus and does not feel well. PT told pt her next appointment is on Tues 3/30 at 1:00. She plans to be here. Pt states that the Epley Maneuver really helped and that her vertigo is much improved. PT will do balance ex with her next tx and will try to get a vestibular PT to perform the Epley Maneuver at the end of the session.    []  Patient did not answer, message left as follows:pt no show to appt today - pls call clinic to confirm next appt  []   Phone call not made today - not needed as pt called to cancel due to  Conflicting appt    Electronically signed by:Sridevi Muller 24, [facility]

## 2021-03-30 ENCOUNTER — HOSPITAL ENCOUNTER (OUTPATIENT)
Dept: PHYSICAL THERAPY | Age: 76
Setting detail: THERAPIES SERIES
Discharge: HOME | End: 2021-03-30
Payer: MEDICARE

## 2021-03-30 NOTE — FLOWSHEET NOTE
Physical Therapy      Physical Therapy  Cancellation/No-show Note  Patient Name:  Stephen Mandel  :  1945   Date:  3/30/2021  Cancelled visits to date: 2  No-shows to date: 3  Patient status for today's appointment patient:  []  Cancelled3/16, 3/18   []  Rescheduled appointment  [x]  No-show 3/9, 3/26, 3/30      Reason given by patient:  [x]  Patient ill ,   []  Conflicting appointment    []  No transportation    []  Conflict with work  []  No reason given  []  Other:     Comments:      Phone call information:   [x]   Phone call made today to patient at 1:24 pm 3/30  time at number provided:   [phone]    []  Patient answered, conversation as follows:     3/30 pt answered and grunted and proceeded to snore. PT then called pt's other phone number: [phone] and left VM re missed appt. PT then called pt's dtr Юлия Dee at [phone] and told Kayla about today's missed visit and pt's response when PT called pt re missed appt. Kayla states her dad (pt's spouse) should be home with pt and states that pt has been doing this today and yesterday. states she will call her dad to check on pt. PT advised Kayla have her mom follow up with PCP or ER depending on symptoms as inability to speak could be a sign of stroke. She is agreeable and will call PT back if she needs help with phone calls. 3/26 Pt apologized and stated this is the second time she's done this. Pt stated she does not know why she forgets about PT. Pt states she has a virus and does not feel well. PT told pt her next appointment is on Tues 3/30 at 1:00. She plans to be here. Pt states that the Epley Maneuver really helped and that her vertigo is much improved. PT will do balance ex with her next tx and will try to get a vestibular PT to perform the Epley Maneuver at the end of the session.      []  Patient did not answer, message left as follows:pt no show to appt today - pls call clinic to confirm next appt  [] Phone call not made today - not needed as pt called to cancel due to  Conflicting appt    Electronically signed by:Sridevi Champion, MPT [id number]

## 2021-04-01 ENCOUNTER — HOSPITAL ENCOUNTER (OUTPATIENT)
Dept: PHYSICAL THERAPY | Age: 76
Setting detail: THERAPIES SERIES
Discharge: HOME | End: 2021-04-01
Payer: MEDICARE

## 2021-04-01 PROCEDURE — 97110 THERAPEUTIC EXERCISES: CPT

## 2021-04-01 PROCEDURE — 97116 GAIT TRAINING THERAPY: CPT

## 2021-04-01 PROCEDURE — 97112 NEUROMUSCULAR REEDUCATION: CPT

## 2021-04-01 NOTE — FLOWSHEET NOTE
shifts frd/back 10x ea - intermittent UE support  Tandem stance with twists: 10x ea way B UE support  Wobble board  A/p  Wt shifts 10x ea       Vestibular rehab:  Estefania Yuen Test            Education        Epley Maneuver    4/1 pt reports tx for vertigo was very helpful and has not had bad vertigo since then. Manual Intervention (31066)                                                     Modalities: None this date    Pt. Education:  3/11 review and progress HEP - encourage pt to use RW daily to reduce fall risk    -3/2 patient educated on diagnosis, prognosis and expectations for rehab  -all patient questions were answered  Educated re hydration, importance of using RW to reduce fall risk    HEP instruction: Epley Maneuver emailed 3/23    3/11  Access Code: [email or web address]   URL: [email or web address]   Date: 03/11/2021   Prepared by: Chan Nance     Exercises   Tandem Stance - 10 reps - 3 sets - 1x daily - 7x weekly   Sit to Stand - 10 reps - 3 sets - 1x daily - 7x weekly   Standing Hip Abduction - 10 reps - 3 sets - 1x daily - 7x weekly     3/2 Instructed pt to use RW at all times, drink at least 64 oz water/day,    After sit to stand transition:  advised pt to stand and get her bearings before she walks to reduce fall risk    Therapeutic Exercise and NMR EXR  [] (60543) Provided verbal/tactile cueing for activities related to strengthening, flexibility, endurance, ROM for improvements in  [] LE / Lumbar: LE, proximal hip, and core control with self care, mobility, lifting, ambulation.   [] UE / Cervical: cervical, postural, scapular, scapulothoracic and UE control with self care, reaching, carrying, lifting, house/yardwork, driving, computer work.  [] (35787) Provided verbal/tactile cueing for activities related to improving balance, coordination, kinesthetic sense, posture, motor skill, proprioception to assist with   [] LE / lumbar: LE, proximal hip, and core control in self care, mobility, lifting, ambulation and eccentric single leg control. [] UE / cervical: cervical, scapular, scapulothoracic and UE control with self care, reaching, carrying, lifting, house/yardwork, driving, computer work.   [] (83118) Therapist is in constant attendance of 2 or more patients providing skilled therapy interventions, but not providing any significant amount of measurable one-on-one time to either patient, for improvements in  [] LE / lumbar: LE, proximal hip, and core control in self care, mobility, lifting, ambulation and eccentric single leg control. [] UE / cervical: cervical, scapular, scapulothoracic and UE control with self care, reaching, carrying, lifting, house/yardwork, driving, computer work. NMR and Therapeutic Activities:    [] (39946 or 31910) Provided verbal/tactile cueing for activities related to improving balance, coordination, kinesthetic sense, posture, motor skill, proprioception and motor activation to allow for proper function of   [] LE: / Lumbar core, proximal hip and LE with self care and ADLs  [] UE / Cervical: cervical, postural, scapular, scapulothoracic and UE control with self care, carrying, lifting, driving, computer work.   [] (39257) Gait Re-education- Provided training and instruction to the patient for proper LE, core and proximal hip recruitment and positioning and eccentric body weight control with ambulation re-education including up and down stairs     Home Management Training / Self Care:  [] (94288) Provided self-care/home management training related to activities of daily living and compensatory training, and/or use of adaptive equipment for improvement with: ADLs and compensatory training, meal preparation, safety procedures and instruction in use of adaptive equipment, including bathing, grooming, dressing, personal hygiene, basic household cleaning and chores.      Home Exercise Program:    [x] (81996) Reviewed/Progressed HEP activities related to strengthening, flexibility, endurance, ROM of   [] LE / Lumbar: core, proximal hip and LE for functional self-care, mobility, lifting and ambulation/stair navigation   [] UE / Cervical: cervical, postural, scapular, scapulothoracic and UE control with self care, reaching, carrying, lifting, house/yardwork, driving, computer work  [] (68028)Reviewed/Progressed HEP activities related to improving balance, coordination, kinesthetic sense, posture, motor skill, proprioception of   [] LE: core, proximal hip and LE for self care, mobility, lifting, and ambulation/stair navigation    [] UE / Cervical: cervical, postural,  scapular, scapulothoracic and UE control with self care, reaching, carrying, lifting, house/yardwork, driving, computer work    Manual Treatments:  PROM / STM / Oscillations-Mobs:  G-I, II, III, IV (PA's, Inf., Post.)  [] (95608) Provided manual therapy to mobilize LE, proximal hip and/or LS spine soft tissue/joints for the purpose of modulating pain, promoting relaxation,  increasing ROM, reducing/eliminating soft tissue swelling/inflammation/restriction, improving soft tissue extensibility and allowing for proper ROM for normal function with   [] LE / lumbar: self care, mobility, lifting and ambulation. [] UE / Cervical: self care, reaching, carrying, lifting, house/yardwork, driving, computer work. Modalities:  [] (42500) Vasopneumatic compression: Utilized vasopneumatic compression to decrease edema / swelling for the purpose of improving mobility and quad tone / recruitment which will allow for increased overall function including but not limited to self-care, transfers, ambulation, and ascending / descending stairs.        Charges:  Timed Code Treatment Minutes: 44   Total Treatment Minutes: 44     [] EVAL - LOW (25999)   [] EVAL - MOD (73139)  [] EVAL - HIGH (85822)  [] RE-EVAL (22847)  [x] AL(01269) x  1     [] Ionto  [x] NMR (24139) x  1     [] Vaso  [] Manual (40597) x       [] Ultrasound  [] TA x 3       [] [facility] Traction (97567)  [] Aquatic Therapy x     [] ES (un) (03343):   [] Home Management Training x  [] ES(attended) (54764)   [] Dry Needling 1-2 muscles (87588):  [] Dry Needling 3+ muscles (207907  [] Group:      [x] Other: gait x1      GOALS:  Patient stated goal: fall less, get less dizzy  []? Progressing: []? Met: []? Not Met: []? Adjusted     Therapist goals for Patient:   Short Term Goals: To be achieved in: 2 weeks  1. Independent in HEP and progression per patient tolerance, in order to prevent re-injury. []? Progressing: []? Met: []? Not Met: []? Adjusted  2. Patient will have a decrease in pain to facilitate improvement in movement, function, and ADLs as indicated by improvement with respect to Functional Deficits. []? Progressing: []? Met: []? Not Met: []? Adjusted     Long Term Goals: To be achieved in:6  weeks  1. Disability index score of 10 % or less on the DGI  to assist with reaching prior level of function. []? Progressing: []? Met: []? Not Met: []? Adjusted  2. Patient will demonstrate increased balance to  [facility],  so that pt can isaiah all ADLs/home chores without fear of falling/ c/o dizziness  []? Progressing: []? Met: []? Not Met: []? Adjusted  3. Patient will demonstrate increased Strength by 1 mmt grade and core activation to allow for proper functional mobility as indicated by patients Functional Deficits to allow pt to resume  Community ambulation, home chores including vacuuming, stairs without increase in symptoms. []? Progressing: []? Met: []? Not Met: []? Adjusted  4. Patient will return to functional activities including Shopping without increased symptoms or restriction. []? Progressing: []? Met: []? Not Met: []? Adjusted                      Overall Progression Towards Functional goals/ Treatment Progress Update:  [] Patient is progressing as expected towards functional goals listed.     [] Progression is slowed due to complexities/Impairments listed. [] Progression has been slowed due to co-morbidities. [x] Plan just implemented, too soon to assess goals progression <30days   [] Goals require adjustment due to lack of progress  [] Patient is not progressing as expected and requires additional follow up with physician  [] Other    Persisting Functional Limitations/Impairments:  []Sleeping []Sitting               [x]Standing [x]Transfers        [x]Walking [x]Kneeling               [x]Stairs [x]Squatting / bending   [x]ADLs [x]Reaching  [x]Lifting  [x]Housework  []Driving []Job related tasks  []Sports/Recreation []Other:        ASSESSMENT:   4/1Cont with tx for vestibular rehab/vertigo, balance, gait. epley helped last session  Assess effects of Epley Maneuver next visit. Treatment/Activity Tolerance:  [x] Patient able to complete tx [] Patient limited by fatigue  [] Patient limited by pain  [] Patient limited by other medical complications  [] Other:     Prognosis: [x] Good [] Fair  [] Poor    Patient Requires Follow-up: [x] Yes  [] No    Plan for next treatment session:  PLAN:  BALANCE/GAIT, vestibular rehab, strength, ROM/flexibility, posture and body mechs, manual, MOC, HEP, pt education     Frequency/Duration:  2 days per week for  6Weeks:  Interventions:  [x]? Therapeutic exercise including:strength, ROM, flexibility  [x]? NMR activation and proprioception including postural re-education  [x]? Manual therapy as indicated to include: IASTM, STM, PROM, Gr I-IV mobilizations, manipulation. [x]? Modalities as needed that may include: thermal agents, E-stim, Biofeedback, US, iontophoresis as indicated  [x]? Patient education on joint protection, postural re-education, activity modification, progression of HEP. X vestibular rehab       PLAN: See eval. PT 2 x / week for 6 weeks.    [x] Continue per plan of care [] Alter current plan (see comments)  [] Plan of care initiated [] Hold pending MD visit [] Discharge    Electronically signed by:

## 2021-04-06 ENCOUNTER — HOSPITAL ENCOUNTER (OUTPATIENT)
Dept: PHYSICAL THERAPY | Age: 76
Setting detail: THERAPIES SERIES
Discharge: HOME | End: 2021-04-06
Payer: MEDICARE

## 2021-04-06 NOTE — FLOWSHEET NOTE
Physical Therapy      Physical Therapy  Cancellation/No-show Note  Patient Name:  Aniya Campbell  :  1945   Date:  2021  Cancelled visits to date: 2  No-shows to date: 4  Patient status for today's appointment patient:  []  Cancelled3/16, 3/18   []  Rescheduled appointment  [x]  No-show 3/9, 3/26, 3/30,      Reason given by patient:  [x]  Patient ill ,   []  Conflicting appointment    []  No transportation    []  Conflict with work  []  No reason given  []  Other:     Comments:      Phone call information:   [x]   Phone call made today to patient at 1:58 pm   time at number provided:   [phone]    []  Patient answered, conversation as follows:   : Pt answered the phone and stated that she thought her appt was at 3:00 and that she is just sitting around waiting to come to PT this afternoon. Pt's appt was at 1:00. Pt has a 3:00 appt tomorrow with Dr. Quincy Burrell. Pt was made aware of this 3x. Pt states she will go to that and will come to PT on Thursday. 3/30 pt answered and grunted and proceeded to snore. PT then called pt's other phone number: [phone] and left VM re missed appt. PT then called pt's dtr Charisma Crooks at [phone] and told Kayla about today's missed visit and pt's response when PT called pt re missed appt. Kayla states her dad (pt's spouse) should be home with pt and states that pt has been doing this today and yesterday. states she will call her dad to check on pt. PT advised Kayla have her mom follow up with PCP or ER depending on symptoms as inability to speak could be a sign of stroke. She is agreeable and will call PT back if she needs help with phone calls. 3/26 Pt apologized and stated this is the second time she's done this. Pt stated she does not know why she forgets about PT. Pt states she has a virus and does not feel well. PT told pt her next appointment is on Tues 3/30 at 1:00. She plans to be here.   Pt states that the Epley Maneuver really helped and that her vertigo is much improved. PT will do balance ex with her next tx and will try to get a vestibular PT to perform the Epley Maneuver at the end of the session.      []  Patient did not answer, message left as follows:pt no show to appt today - pls call clinic to confirm next appt  []   Phone call not made today - not needed as pt called to cancel due to  Conflicting appt    Electronically signed by:Sridevi Muller 30, [address] [facility]

## 2021-04-07 ENCOUNTER — OFFICE VISIT (OUTPATIENT)
Dept: FAMILY MEDICINE CLINIC | Age: 76
End: 2021-04-07
Payer: MEDICARE

## 2021-04-07 VITALS
WEIGHT: 186 LBS | BODY MASS INDEX: 36.52 KG/M2 | TEMPERATURE: 97.3 F | DIASTOLIC BLOOD PRESSURE: 90 MMHG | SYSTOLIC BLOOD PRESSURE: 130 MMHG | HEIGHT: 60 IN

## 2021-04-07 DIAGNOSIS — I10 ESSENTIAL HYPERTENSION: ICD-10-CM

## 2021-04-07 DIAGNOSIS — E66.01 OBESITY, MORBID, BMI 40.0-49.9 (HCC): ICD-10-CM

## 2021-04-07 DIAGNOSIS — E78.49 OTHER HYPERLIPIDEMIA: ICD-10-CM

## 2021-04-07 DIAGNOSIS — J44.9 MODERATE COPD (CHRONIC OBSTRUCTIVE PULMONARY DISEASE) (HCC): ICD-10-CM

## 2021-04-07 DIAGNOSIS — K21.9 GASTROESOPHAGEAL REFLUX DISEASE WITHOUT ESOPHAGITIS: Primary | ICD-10-CM

## 2021-04-07 DIAGNOSIS — F33.41 RECURRENT MAJOR DEPRESSIVE DISORDER, IN PARTIAL REMISSION (HCC): ICD-10-CM

## 2021-04-07 DIAGNOSIS — I20.8 STABLE ANGINA (HCC): ICD-10-CM

## 2021-04-07 DIAGNOSIS — G89.4 CHRONIC PAIN SYNDROME: ICD-10-CM

## 2021-04-07 DIAGNOSIS — D68.59 HYPERCOAGULABLE STATE (HCC): ICD-10-CM

## 2021-04-07 DIAGNOSIS — F11.20 NARCOTIC DEPENDENCE (HCC): ICD-10-CM

## 2021-04-07 DIAGNOSIS — G25.81 RESTLESS LEGS SYNDROME (RLS): ICD-10-CM

## 2021-04-07 DIAGNOSIS — M54.40 BILATERAL LOW BACK PAIN WITH SCIATICA, SCIATICA LATERALITY UNSPECIFIED, UNSPECIFIED CHRONICITY: ICD-10-CM

## 2021-04-07 PROBLEM — I20.89 STABLE ANGINA: Status: ACTIVE | Noted: 2021-04-07

## 2021-04-07 PROCEDURE — 99214 OFFICE O/P EST MOD 30 MIN: CPT | Performed by: FAMILY MEDICINE

## 2021-04-07 RX ORDER — ROPINIROLE 0.25 MG/1
TABLET, FILM COATED ORAL
Qty: 90 TABLET | Refills: 1 | Status: SHIPPED | OUTPATIENT
Start: 2021-04-07 | End: 2021-05-04 | Stop reason: SDUPTHER

## 2021-04-07 RX ORDER — SERTRALINE HYDROCHLORIDE 25 MG/1
25 TABLET, FILM COATED ORAL DAILY
Qty: 30 TABLET | Refills: 5 | Status: SHIPPED | OUTPATIENT
Start: 2021-04-07 | End: 2021-11-04 | Stop reason: SDUPTHER

## 2021-04-07 ASSESSMENT — ENCOUNTER SYMPTOMS
BLURRED VISION: 0
BACK PAIN: 1

## 2021-04-07 NOTE — PROGRESS NOTES
SUBJECTIVE:    Aftab Farrell is a 77 y.o. female who presents for a follow up visit. Chief Complaint   Patient presents with    Follow-up     Patient is here for a follow up. Has been dizzy, has been doing PT for vertigo, but not really helping. Has been very tired over the past 2 weeks. Discuss lab. Dizziness  This is a chronic problem. The current episode started more than 1 month ago. The problem occurs intermittently. The problem has been waxing and waning. Associated symptoms include fatigue, headaches and vertigo ( currently in PT and maneuvers seem to be helping. Only had 2 sessions so far. ). Pertinent negatives include no chest pain or numbness. Mental Health Problem  The primary symptoms include dysphoric mood. This is a chronic problem. The onset of the illness is precipitated by emotional stress. The degree of incapacity that she is experiencing as a consequence of her illness is mild. Sequelae of the illness include an inability to work. Additional symptoms of the illness include fatigue, psychomotor retardation and headaches. She does not admit to suicidal ideas. She does not contemplate harming herself. Risk factors: chronic pain. Back Pain  This is a chronic problem. The current episode started more than 1 year ago. The problem occurs constantly. The pain is present in the lumbar spine. The quality of the pain is described as aching. The pain radiates to the left thigh. The pain is moderate. The symptoms are aggravated by standing. Associated symptoms include headaches and leg pain. Pertinent negatives include no chest pain, numbness or tingling. Risk factors: Lumbar surgery in 2000. Treatments tried: Currently in [facility]. Hypertension  This is a chronic problem. The current episode started more than 1 year ago. The problem is controlled. Associated symptoms include headaches and peripheral edema.  Pertinent negatives include no blurred vision, chest pain or palpitations. Risk factors for coronary artery disease include dyslipidemia, obesity, sedentary lifestyle and post-menopausal state. Past treatments include calcium channel blockers and diuretics. The current treatment provides significant improvement. Compliance problems include exercise and diet. Patient's medications, allergies, past medical,surgical, social and family histories were reviewed and updated as appropriate. Past Medical History:   Diagnosis Date    AVM     Chronic back pain     Depression     Gastritis     History of blood transfusion     secondary to GI bleed    History of GI bleed     Hypertension     Personal history of MRSA (methicillin resistant Staphylococcus aureus)     recurrent, last 2-3 years ago finger (doesnt remember which one)     Past Surgical History:   Procedure Laterality Date    ABDOMINAL EXPLORATION SURGERY  09/07/2017    Lysis of adhesions, removal of pelvic mass, total abdominal hysterectomy with BSO, partial omentectomy, appendectomy, and pelvic lymphadenectomy    BACK SURGERY      multiple lumbar spine surgeries including laminectomy and fusion    CERVICAL FUSION      COLONOSCOPY      ENDOSCOPY, COLON, DIAGNOSTIC      HIATAL HERNIA REPAIR  1999    OTHER SURGICAL HISTORY Left [id number]    SPINAL CORD STIMULATOR BATTERY REPLACMENT     Family History   Problem Relation Age of Onset    Diabetes Mother     Heart Disease Mother         cad, aortic aneurysm    Diabetes Father     Heart Disease Father         heart attack, chf    Other Sister         abdominal aneurysm     Social History     Tobacco Use    Smoking status: Never Smoker    Smokeless tobacco: Never Used   Substance Use Topics    Alcohol use: No      No Known Allergies  Current Outpatient Medications on File Prior to Visit   Medication Sig Dispense Refill    morphine (MSIR) 15 MG tablet Take 15 mg by mouth 3 times daily as needed for Pain.       furosemide (LASIX) 20 MG tablet TAKE ONE TABLET BY MOUTH DAILY AS NEEDED FOR WEIGHT GAIN OF 2LBS OR LEG EDEMA 90 tablet 0    meclizine (ANTIVERT) 25 MG tablet TAKE ONE TABLET BY MOUTH THREE TIMES A DAY AS NEEDED FOR DIZZINESS 90 tablet 1    buPROPion (WELLBUTRIN XL) 300 MG extended release tablet TAKE ONE TABLET BY MOUTH EVERY MORNING 90 tablet 1    rivaroxaban (XARELTO) 20 MG TABS tablet Take 20 mg by mouth Daily with supper      amLODIPine (NORVASC) 10 MG tablet Take 1 tablet by mouth daily 90 tablet 0    tiZANidine (ZANAFLEX) 4 MG tablet Take 4 mg by mouth 2 times daily       pregabalin (LYRICA) 150 MG capsule Take 150 mg by mouth three times daily.  ondansetron (ZOFRAN) 4 MG tablet Take 1 tablet by mouth daily as needed for Nausea or Vomiting 30 tablet 0    aspirin 81 MG chewable tablet Take 1 tablet by mouth daily 30 tablet 0     No current facility-administered medications on file prior to visit. Review of Systems   Constitutional: Positive for fatigue. Eyes: Negative for blurred vision. Cardiovascular: Negative for chest pain and palpitations. Musculoskeletal: Positive for back pain. Neurological: Positive for dizziness, vertigo ( currently in PT and maneuvers seem to be helping. Only had 2 sessions so far.) and headaches. Negative for tingling and numbness. Psychiatric/Behavioral: Positive for dysphoric mood. OBJECTIVE:    BP (!) 130/90   Temp 97.3 °F (36.3 °C)   Ht 5' (1.524 m)   Wt 186 lb (84.4 kg)   BMI 36.33 kg/m²    Physical Exam  Constitutional:       Appearance: She is well-developed. HENT:      Head: Normocephalic and atraumatic. Right Ear: External ear normal.      Left Ear: External ear normal.      Nose: Nose normal.   Eyes:      General:         Right eye: No discharge. Conjunctiva/sclera: Conjunctivae normal.   Neck:      Musculoskeletal: Normal range of motion and neck supple. Thyroid: No thyromegaly. Vascular: No JVD. Trachea: No tracheal deviation. Cardiovascular:      Rate and Rhythm: Normal rate and regular rhythm. Heart sounds: Normal heart sounds. Pulmonary:      Effort: Pulmonary effort is normal. No respiratory distress. Breath sounds: Normal breath sounds. No rales. Lymphadenopathy:      Cervical: No cervical adenopathy. Skin:     General: Skin is warm and dry. Neurological:      Mental Status: She is alert and oriented to person, place, and time. ASSESSMENT/PLAN:    Riley Daniels was seen today for follow-up. Diagnoses and all orders for this visit:      Moderate COPD (chronic obstructive pulmonary disease) ([facility] [address]  Stable    Narcotic dependence ([facility] [address]  Followed closely by pain management    Recurrent major depressive disorder, in partial remission [facility])  Patient has been on Wellbutrin for some time now but feels her depression has worsened. We will add sertraline 25 mg daily  -     sertraline (ZOLOFT) 25 MG tablet; Take 1 tablet by mouth daily    Other hyperlipidemia  -     Comprehensive Metabolic Panel, Fasting; Future  -     CBC Auto Differential; Future  -     Lipid, Fasting; Future    Bilateral low back pain with sciatica, sciatica laterality unspecified, unspecified chronicity  Followed by pain management    Essential hypertension  Blood pressure readings are in good control at home and blood pressure was much better 2 months ago. -     Comprehensive Metabolic Panel, Fasting; Future  -     CBC Auto Differential; Future    Restless legs syndrome (RLS)  -     rOPINIRole (REQUIP) 0.25 MG tablet; TAKE ONE TABLET BY MOUTH ONCE NIGHTLY        Return in about 3 months (around 7/7/2021). Please note portions of this note were completed with a voicerecognition program.  Efforts were made to edit the dictations but occasionally words are mis-transcribed. [employer]

## 2021-04-08 ENCOUNTER — HOSPITAL ENCOUNTER (OUTPATIENT)
Dept: PHYSICAL THERAPY | Age: 76
Setting detail: THERAPIES SERIES
Discharge: HOME | End: 2021-04-08
Payer: MEDICARE

## 2021-04-08 NOTE — FLOWSHEET NOTE
to be here. Pt states that the Epley Maneuver really helped and that her vertigo is much improved. PT will do balance ex with her next tx and will try to get a vestibular PT to perform the Epley Maneuver at the end of the session.      []  Patient did not answer, message left as follows:pt no show to appt today - pls call clinic to confirm next appt  [x]   Phone call not made today - not needed as pt called to cancel due to  Conflicting appt    Electronically signed by:Camron Garay 34, DPT

## 2021-04-13 ENCOUNTER — HOSPITAL ENCOUNTER (OUTPATIENT)
Dept: PHYSICAL THERAPY | Age: 76
Setting detail: THERAPIES SERIES
End: 2021-04-13
Payer: MEDICARE

## 2021-04-13 NOTE — FLOWSHEET NOTE
Physical Therapy      Physical Therapy  Cancellation/No-show Note  Patient Name:  Jose Tolentino  :     Date:  2021  Cancelled visits to date: 3  No-shows to date: 5  Patient status for today's appointment patient:  []  Cancelled3/16, 3/18,    []  Rescheduled appointment  [x]  No-show 3/9, 3/26, 3/30, ,      Reason given by patient:  []  Patient ill ,   []  Conflicting appointment    []  No transportation    []  Conflict with work  []  No reason given  [x]  Other:     Comments:   Pt did not show up for PT this date. Will d/c from PT at this time. Phone call information:   []   Phone call made today to patient at 2:55 pm   time at number provided:   [phone]    []  Patient answered, conversation as follows:   : PT called pt and cancelled all remaining appointments. Pt understands that she can return to PT when she can attend on a regular basis. Pt was slurring her words on the phone and stated that \"nothing is going right today. \"    : Pt answered the phone and stated that she thought her appt was at 3:00 and that she is just sitting around waiting to come to PT this afternoon. Pt's appt was at 1:00. Pt has a 3:00 appt tomorrow with Dr. Letitia Clark. Pt was made aware of this 3x. Pt states she will go to that and will come to PT on Thursday. 3/30 pt answered and grunted and proceeded to snore. PT then called pt's other phone number: [phone] and left VM re missed appt. PT then called pt's dtr Rosie Hernandez at [phone] and told Kayla about today's missed visit and pt's response when PT called pt re missed appt. Kayla states her dad (pt's spouse) should be home with pt and states that pt has been doing this today and yesterday. states she will call her dad to check on pt. PT advised Kayla have her mom follow up with PCP or ER depending on symptoms as inability to speak could be a sign of stroke.   She is agreeable and will call PT back if she needs

## 2021-04-16 ENCOUNTER — APPOINTMENT (OUTPATIENT)
Dept: PHYSICAL THERAPY | Age: 76
End: 2021-04-16
Payer: MEDICARE

## 2021-04-20 ENCOUNTER — APPOINTMENT (OUTPATIENT)
Dept: PHYSICAL THERAPY | Age: 76
End: 2021-04-20
Payer: MEDICARE

## 2021-05-03 DIAGNOSIS — F33.1 MODERATE EPISODE OF RECURRENT MAJOR DEPRESSIVE DISORDER (HCC): ICD-10-CM

## 2021-05-03 DIAGNOSIS — G25.81 RESTLESS LEGS SYNDROME (RLS): ICD-10-CM

## 2021-05-03 NOTE — TELEPHONE ENCOUNTER
----- Message from Ashley Yuniormanjinder sent at 5/3/2021  1:30 PM EDT -----  Subject: Refill Request    QUESTIONS  Name of Medication? rOPINIRole (REQUIP) 0.25 MG tablet  Patient-reported dosage and instructions? 0.25 MG tablet. Once a day  How many days do you have left? 0  Preferred Pharmacy? [address] [place] [address]  Pharmacy phone number (if available)? [phone]  ---------------------------------------------------------------------------  --------------  Papo WADE  What is the best way for the office to contact you? OK to leave message on   voicemail  Preferred Call Back Phone Number?  [phone]

## 2021-05-03 NOTE — TELEPHONE ENCOUNTER
Medication:   Requested Prescriptions     Pending Prescriptions Disp Refills    buPROPion (WELLBUTRIN XL) 300 MG extended release tablet 90 tablet 1     Sig: TAKE ONE TABLET BY MOUTH EVERY MORNING    rivaroxaban (XARELTO) 20 MG TABS tablet 90 tablet 1     Sig: Take 1 tablet by mouth Daily with supper    rOPINIRole (REQUIP) 0.25 MG tablet 90 tablet 1     Sig: TAKE ONE TABLET BY MOUTH ONCE NIGHTLY      Last Filled:     Patient Phone Number: [phone] (home)     Last appt: 4/7/2021   Next appt: 7/7/2021    Last OARRS:   RX Monitoring 9/27/2019   Attestation -   Periodic Controlled Substance Monitoring Possible medication side effects, risk of tolerance/dependence & alternative treatments discussed. Chronic Pain > 50 MEDD Re-evaluated the status of the patient's underlying condition causing pain.      PDMP Monitoring:    Last PDMP Fransisco Sears as Reviewed [facility]):  Review User Review Instant Review Result          Preferred Pharmacy:   [facility] [address], [address] [facility] [phone] [facility] [phone]  [address]  [place] [address]  Phone: [phone] Fax: [phone]

## 2021-05-04 RX ORDER — ROPINIROLE 0.25 MG/1
TABLET, FILM COATED ORAL
Qty: 90 TABLET | Refills: 1 | Status: SHIPPED | OUTPATIENT
Start: 2021-05-04 | End: 2021-07-07 | Stop reason: SDUPTHER

## 2021-05-04 RX ORDER — BUPROPION HYDROCHLORIDE 300 MG/1
TABLET ORAL
Qty: 90 TABLET | Refills: 1 | Status: SHIPPED | OUTPATIENT
Start: 2021-05-04 | End: 2021-11-04 | Stop reason: SDUPTHER

## 2021-06-24 RX ORDER — MECLIZINE HYDROCHLORIDE 25 MG/1
TABLET ORAL
Qty: 90 TABLET | Refills: 1 | Status: SHIPPED | OUTPATIENT
Start: 2021-06-24 | End: 2021-10-26

## 2021-07-07 ENCOUNTER — OFFICE VISIT (OUTPATIENT)
Dept: FAMILY MEDICINE CLINIC | Age: 76
End: 2021-07-07
Payer: MEDICARE

## 2021-07-07 VITALS
SYSTOLIC BLOOD PRESSURE: 126 MMHG | BODY MASS INDEX: 36.33 KG/M2 | DIASTOLIC BLOOD PRESSURE: 88 MMHG | WEIGHT: 186 LBS | TEMPERATURE: 97 F

## 2021-07-07 DIAGNOSIS — I48.19 PERSISTENT ATRIAL FIBRILLATION (HCC): ICD-10-CM

## 2021-07-07 DIAGNOSIS — Z00.00 ROUTINE GENERAL MEDICAL EXAMINATION AT A HEALTH CARE FACILITY: ICD-10-CM

## 2021-07-07 DIAGNOSIS — R73.9 HYPERGLYCEMIA: ICD-10-CM

## 2021-07-07 DIAGNOSIS — R26.89 BALANCE PROBLEM: ICD-10-CM

## 2021-07-07 DIAGNOSIS — Z13.220 SCREENING FOR LIPID DISORDERS: ICD-10-CM

## 2021-07-07 DIAGNOSIS — I10 ESSENTIAL HYPERTENSION: Primary | ICD-10-CM

## 2021-07-07 DIAGNOSIS — G25.81 RESTLESS LEGS SYNDROME (RLS): ICD-10-CM

## 2021-07-07 DIAGNOSIS — K21.9 GASTROESOPHAGEAL REFLUX DISEASE WITHOUT ESOPHAGITIS: ICD-10-CM

## 2021-07-07 PROBLEM — E78.2 MIXED HYPERLIPIDEMIA: Status: ACTIVE | Noted: 2019-01-04

## 2021-07-07 PROCEDURE — G0438 PPPS, INITIAL VISIT: HCPCS | Performed by: FAMILY MEDICINE

## 2021-07-07 RX ORDER — ROPINIROLE 0.5 MG/1
0.5 TABLET, FILM COATED ORAL NIGHTLY
Qty: 90 TABLET | Refills: 3 | Status: SHIPPED | OUTPATIENT
Start: 2021-07-07 | End: 2021-09-23 | Stop reason: ALTCHOICE

## 2021-07-07 RX ORDER — ROPINIROLE 0.25 MG/1
TABLET, FILM COATED ORAL
Qty: 90 TABLET | Refills: 1 | Status: SHIPPED | OUTPATIENT
Start: 2021-07-07 | End: 2021-07-07

## 2021-07-07 ASSESSMENT — ENCOUNTER SYMPTOMS
SHORTNESS OF BREATH: 1
DIARRHEA: 0
BACK PAIN: 1
CONSTIPATION: 0
CHEST TIGHTNESS: 0

## 2021-07-07 ASSESSMENT — LIFESTYLE VARIABLES
AUDIT TOTAL SCORE: 3
HOW OFTEN DURING THE LAST YEAR HAVE YOU BEEN UNABLE TO REMEMBER WHAT HAPPENED THE NIGHT BEFORE BECAUSE YOU HAD BEEN DRINKING: 0
HOW OFTEN DO YOU HAVE SIX OR MORE DRINKS ON ONE OCCASION: 0
HAS A RELATIVE, FRIEND, DOCTOR, OR ANOTHER HEALTH PROFESSIONAL EXPRESSED CONCERN ABOUT YOUR DRINKING OR SUGGESTED YOU CUT DOWN: 0
HOW OFTEN DURING THE LAST YEAR HAVE YOU HAD A FEELING OF GUILT OR REMORSE AFTER DRINKING: 0
HOW OFTEN DURING THE LAST YEAR HAVE YOU FAILED TO DO WHAT WAS NORMALLY EXPECTED FROM YOU BECAUSE OF DRINKING: 0
HOW MANY STANDARD DRINKS CONTAINING ALCOHOL DO YOU HAVE ON A TYPICAL DAY: 0
HOW OFTEN DURING THE LAST YEAR HAVE YOU NEEDED AN ALCOHOLIC DRINK FIRST THING IN THE MORNING TO GET YOURSELF GOING AFTER A NIGHT OF HEAVY DRINKING: 0
HOW OFTEN DURING THE LAST YEAR HAVE YOU FOUND THAT YOU WERE NOT ABLE TO STOP DRINKING ONCE YOU HAD STARTED: 0
HOW OFTEN DO YOU HAVE A DRINK CONTAINING ALCOHOL: 3
AUDIT-C TOTAL SCORE: 3
HAVE YOU OR SOMEONE ELSE BEEN INJURED AS A RESULT OF YOUR DRINKING: 0

## 2021-07-07 ASSESSMENT — PATIENT HEALTH QUESTIONNAIRE - PHQ9
1. LITTLE INTEREST OR PLEASURE IN DOING THINGS: 1
SUM OF ALL RESPONSES TO PHQ QUESTIONS 1-9: 2
2. FEELING DOWN, DEPRESSED OR HOPELESS: 1
SUM OF ALL RESPONSES TO PHQ9 QUESTIONS 1 & 2: 2
SUM OF ALL RESPONSES TO PHQ QUESTIONS 1-9: 2
SUM OF ALL RESPONSES TO PHQ QUESTIONS 1-9: 2

## 2021-07-07 NOTE — PATIENT INSTRUCTIONS
Personalized Preventive Plan for Sami Bhakta - 7/7/2021  Medicare offers a range of preventive health benefits. Some of the tests and screenings are paid in full while other may be subject to a deductible, co-insurance, and/or copay. Some of these benefits include a comprehensive review of your medical history including lifestyle, illnesses that may run in your family, and various assessments and screenings as appropriate. After reviewing your medical record and screening and assessments performed today your provider may have ordered immunizations, labs, imaging, and/or referrals for you. A list of these orders (if applicable) as well as your Preventive Care list are included within your After Visit Summary for your review. Other Preventive Recommendations:    · A preventive eye exam performed by an eye specialist is recommended every 1-2 years to screen for glaucoma; cataracts, macular degeneration, and other eye disorders. · A preventive dental visit is recommended every 6 months. · Try to get at least 150 minutes of exercise per week or 10,000 steps per day on a pedometer . · Order or download the FREE \"Exercise & Physical Activity: Your Everyday Guide\" from [employer]. Call 3-395.115.6339 or search [employer] online. · You need 3446-2796 mg of calcium and 4951-4462 IU of vitamin D per day. It is possible to meet your calcium requirement with diet alone, but a vitamin D supplement is usually necessary to meet this goal.  · When exposed to the sun, use a sunscreen that protects against both UVA and UVB radiation with an SPF of 30 or greater. Reapply every 2 to 3 hours or after sweating, drying off with a towel, or swimming. · Always wear a seat belt when traveling in a car. Always wear a helmet when riding a bicycle or motorcycle.

## 2021-07-07 NOTE — PROGRESS NOTES
Medicare Annual Wellness Visit  Name: Tobi  Date: 2021   MRN: [id number] Sex: Female   Age: 77 y.o. Ethnicity: Non-/Non    : 1945 Race: Gt Mcnulty is here for Medicare AWV (Patient is here for a follow up/awv), Hypertension, and Back Pain    Screenings for behavioral, psychosocial and functional/safety risks, and cognitive dysfunction are all negative except as indicated below. These results, as well as other patient data from the [address] form, are documented in Flowsheets linked to this Encounter. SUBJECTIVE:    Arya Loredo is a 77 y.o. female who presents for a follow up visit. Chief Complaint   Patient presents with   [facility] AWV     Patient is here for a follow up/awv    Hypertension    Back Pain        Back Pain  This is a chronic problem. The current episode started more than 1 year ago. The pain is present in the lumbar spine. The pain radiates to the left knee, left thigh and left foot. The pain is moderate. The symptoms are aggravated by standing. Pertinent negatives include no chest pain or headaches. Risk factors include obesity, menopause and sedentary lifestyle. She has tried analgesics, muscle relaxant, NSAIDs and home exercises for the symptoms. The treatment provided mild relief. Hypertension  This is a chronic problem. The current episode started more than 1 year ago. The problem is controlled. Associated symptoms include shortness of breath. Pertinent negatives include no chest pain, headaches or palpitations. Agents associated with hypertension include NSAIDs. Risk factors for coronary artery disease include obesity, sedentary lifestyle, post-menopausal state and dyslipidemia. Past treatments include calcium channel blockers. The current treatment provides significant improvement. Compliance problems include exercise and diet.          Patient's medications, allergies, past medical,surgical, social and family histories were reviewed and updated as appropriate. Past Medical History:   Diagnosis Date    AVM     Chronic back pain     Depression     Gastritis     History of blood transfusion     secondary to GI bleed    History of GI bleed     Hypertension     Personal history of MRSA (methicillin resistant Staphylococcus aureus)     recurrent, last 2-3 years ago finger (doesnt remember which one)     Past Surgical History:   Procedure Laterality Date    ABDOMINAL EXPLORATION SURGERY  09/07/2017    Lysis of adhesions, removal of pelvic mass, total abdominal hysterectomy with BSO, partial omentectomy, appendectomy, and pelvic lymphadenectomy    BACK SURGERY      multiple lumbar spine surgeries including laminectomy and fusion    CERVICAL FUSION      COLONOSCOPY      ENDOSCOPY, COLON, DIAGNOSTIC      HIATAL HERNIA REPAIR  1999    OTHER SURGICAL HISTORY Left [id number]    SPINAL CORD STIMULATOR BATTERY REPLACMENT     Family History   Problem Relation Age of Onset    Diabetes Mother     Heart Disease Mother         cad, aortic aneurysm    Diabetes Father     Heart Disease Father         heart attack, chf    Other Sister         abdominal aneurysm     Social History     Tobacco Use    Smoking status: Never Smoker    Smokeless tobacco: Never Used   Substance Use Topics    Alcohol use: No      Allergies   Allergen Reactions    Penicillins      Current Outpatient Medications on File Prior to Visit   Medication Sig Dispense Refill    meclizine (ANTIVERT) 25 MG tablet TAKE ONE TABLET BY MOUTH THREE TIMES A DAY AS NEEDED FOR DIZZINESS 90 tablet 1    buPROPion (WELLBUTRIN XL) 300 MG extended release tablet TAKE ONE TABLET BY MOUTH EVERY MORNING 90 tablet 1    sertraline (ZOLOFT) 25 MG tablet Take 1 tablet by mouth daily 30 tablet 5    morphine (MSIR) 15 MG tablet Take 15 mg by mouth 3 times daily as needed for Pain.       furosemide (LASIX) 20 MG tablet TAKE ONE TABLET BY MOUTH DAILY AS NEEDED FOR WEIGHT GAIN OF 2LBS OR LEG EDEMA 90 tablet 0    amLODIPine (NORVASC) 10 MG tablet Take 1 tablet by mouth daily 90 tablet 0    tiZANidine (ZANAFLEX) 4 MG tablet Take 4 mg by mouth 2 times daily       pregabalin (LYRICA) 150 MG capsule Take 150 mg by mouth three times daily.  ondansetron (ZOFRAN) 4 MG tablet Take 1 tablet by mouth daily as needed for Nausea or Vomiting 30 tablet 0    aspirin 81 MG chewable tablet Take 1 tablet by mouth daily 30 tablet 0     No current facility-administered medications on file prior to visit. Review of Systems   Constitutional: Positive for fatigue. Negative for activity change and unexpected weight change. HENT: Negative for congestion and postnasal drip. Respiratory: Positive for shortness of breath. Negative for chest tightness. Cardiovascular: Negative for chest pain, palpitations and leg swelling. Gastrointestinal: Negative for constipation and diarrhea. Genitourinary: Negative for difficulty urinating. Musculoskeletal: Positive for arthralgias (hands) and back pain ( followed by pain management). Neurological: Negative for headaches. Psychiatric/Behavioral: Positive for sleep disturbance ( RLS). The patient is not nervous/anxious. OBJECTIVE:    /88   Temp 97 °F (36.1 °C)   Wt 186 lb (84.4 kg)   BMI 36.33 kg/m²    Vitals:    07/07/21 1328 07/07/21 1456   BP: (!) 126/90 126/88   Temp: 97 °F (36.1 °C)    Weight: 186 lb (84.4 kg)        Physical Exam  Constitutional:       Appearance: She is well-developed. She is obese. HENT:      Head: Normocephalic and atraumatic. Right Ear: Tympanic membrane and external ear normal.      Left Ear: Tympanic membrane and external ear normal.      Nose: Nose normal.      Mouth/Throat:      Mouth: Mucous membranes are moist.      Pharynx: No posterior oropharyngeal erythema. Eyes:      General:         Right eye: No discharge.       Conjunctiva/sclera: Conjunctivae normal.   Neck: Thyroid: No thyromegaly. Vascular: No JVD. Trachea: No tracheal deviation. Cardiovascular:      Rate and Rhythm: Normal rate and regular rhythm. Heart sounds: Normal heart sounds. Pulmonary:      Effort: Pulmonary effort is normal. No respiratory distress. Breath sounds: Normal breath sounds. No rales. Musculoskeletal:      Cervical back: Normal range of motion and neck supple. Right lower leg: Edema (Trace) present. Left lower leg: Edema ( Trace) present. Lymphadenopathy:      Cervical: No cervical adenopathy. Skin:     General: Skin is warm and dry. Neurological:      Mental Status: She is alert and oriented to person, place, and time. Psychiatric:         Mood and Affect: Mood normal.         Behavior: Behavior normal.         ASSESSMENT/PLAN:    Niki Morgan was seen today for medicare awv, hypertension and back pain. Diagnoses and all orders for this visit:    Essential hypertension  Good control on current medication    Gastroesophageal reflux disease without esophagitis  Stable    Restless legs syndrome (RLS)  -     Discontinue: rOPINIRole (REQUIP) 0.25 MG tablet; TAKE ONE TABLET BY MOUTH ONCE NIGHTLY        -     rOPINIRole (REQUIP) 0.5 MG tablet; Take 1 tablet by mouth nightly    Persistent atrial fibrillation (HCC)  -     rivaroxaban (XARELTO) 20 MG TABS tablet; Take 1 tablet by mouth Daily with supper    Routine general medical examination at a health care facility  AWV done today    Balance problem  -     [address] [facility]    Hyperglycemia  -     Hemoglobin A1C; Future    Screening for lipid disorders  -     Comprehensive Metabolic Panel, Fasting; Future  -     CBC Auto Differential; Future  -     Lipid, Fasting; Future  -     TSH with Reflex; Future            Return in 4 months (on 11/7/2021) for Medicare Annual Wellness Visit in 1 year.     Please note portions of this note were completed with a voicerecognition program.  Efforts were made to Laterality Date    ABDOMINAL EXPLORATION SURGERY  09/07/2017    Lysis of adhesions, removal of pelvic mass, total abdominal hysterectomy with BSO, partial omentectomy, appendectomy, and pelvic lymphadenectomy    BACK SURGERY      multiple lumbar spine surgeries including laminectomy and fusion    CERVICAL FUSION      COLONOSCOPY      ENDOSCOPY, COLON, DIAGNOSTIC      HIATAL HERNIA REPAIR  1999    OTHER SURGICAL HISTORY Left [id number]    SPINAL CORD STIMULATOR BATTERY REPLACMENT         Family History   Problem Relation Age of Onset    Diabetes Mother     Heart Disease Mother         cad, aortic aneurysm    Diabetes Father     Heart Disease Father         heart attack, chf    Other Sister         abdominal aneurysm       CareTeam (Including outside providers/suppliers regularly involved in providing care):   Patient Care Team:  Kisha Gandhi MD as PCP - General (Family Medicine)  Ksiha Gandhi MD as PCP - [facility] Empaneled Provider  Sherren Earls, MD as Surgeon (Orthopedic Surgery)    Wt Readings from Last 3 Encounters:   07/07/21 186 lb (84.4 kg)   04/07/21 186 lb (84.4 kg)   02/12/21 194 lb (88 kg)     Vitals:    07/07/21 1328 07/07/21 1456   BP: (!) 126/90 126/88   Temp: 97 °F (36.1 °C)    Weight: 186 lb (84.4 kg)      Body mass index is 36.33 kg/m². Based upon direct observation of the patient, evaluation of cognition reveals recent and remote memory intact. Patient's complete Health Risk Assessment and screening values have been reviewed and are found in Flowsheets. The following problems were reviewed today and where indicated follow up appointments were made and/or referrals ordered. Positive Risk Factor Screenings with Interventions:     Fall Risk:  Timed Up and Go Test > 12 seconds?  (Complete if either Fall Risk answers are Yes): no  2 or more falls in past year?: (!) yes  Fall with injury in past year?: no  Fall Risk Interventions:    · Physical therapy referral ordered for strength and balance training          General Health and ACP:  General  In general, how would you say your health is?: Good  In the past 7 days, have you experienced any of the following?  New or Increased Pain, New or Increased Fatigue, Loneliness, Social Isolation, Stress or Anger?: (!) Social Isolation  Do you get the social and emotional support that you need?: Yes  Do you have a Living Will?: (!) No  Advance Directives     Power of [address] Will ACP-Advance Directive ACP-Power of     Not on File Not on File Not on File Not on File      General Health Risk Interventions:  · Social isolation: patient's comments regarding inadequate social support: Stated their friends have been dying  · No Living Will: Advance Care Planning addressed with patient today    Health Habits/Nutrition:  Health Habits/Nutrition  Do you exercise for at least 20 minutes 2-3 times per week?: (!) No  Have you lost any weight without trying in the past 3 months?: No  Do you eat only one meal per day?: (!) Yes  Have you seen the dentist within the past year?: Yes     Health Habits/Nutrition Interventions:  · Inadequate physical activity:  patient is not ready to increase his/her physical activity level at this time    Hearing/Vision:  No exam data present  Hearing/Vision  Do you or your family notice any trouble with your hearing that hasn't been managed with hearing aids?: No  Do you have difficulty driving, watching TV, or doing any of your daily activities because of your eyesight?: No  Have you had an eye exam within the past year?: (!) No  Hearing/Vision Interventions:  · Vision concerns:  patient encouraged to make appointment with his/her eye specialist    Safety:  Safety  Do you have working smoke detectors?: (!) No (just moved)  Have all throw rugs been removed or fastened?: Yes  Do you have non-slip mats or surfaces in all bathtubs/showers?: (!) No  Do all of your stairways have a railing or banister?: ([facility]  -     rivaroxaban (XARELTO) 20 MG TABS tablet; Take 1 tablet by mouth Daily with supper    Routine general medical examination at a health care facility  AWV done today    Balance problem  -     [address] [facility]    Hyperglycemia  -     Hemoglobin A1C; Future    Screening for lipid disorders  -     Comprehensive Metabolic Panel, Fasting; Future  -     CBC Auto Differential; Future  -     Lipid, Fasting; Future  -     TSH with Reflex;  Future

## 2021-07-13 ENCOUNTER — HOSPITAL ENCOUNTER (OUTPATIENT)
Dept: PHYSICAL THERAPY | Age: 76
Setting detail: THERAPIES SERIES
Discharge: HOME | End: 2021-07-13
Payer: MEDICARE

## 2021-07-13 NOTE — FLOWSHEET NOTE
Physical Therapy  Cancellation/No-show Note  Patient Name:  Torsten Miles  :  1945   Date:  2021  Cancelled visits to date: 0  No-shows to date: 1 EVAL    Patient status for today's appointment patient:  []  Cancelled  []  Rescheduled appointment  [x]  No-show 21     Reason given by patient:  []  Patient ill  []  Conflicting appointment  []  No transportation    []  Conflict with work  [x]  No reason given  []  Other:     Comments:      Phone call information:   []  Phone call made today to patient at _ time at number provided:      []  Patient answered, conversation as follows:    []  Patient did not answer, message left as follows:  [x]  Phone call not made today, as patient no-showed EVALUATION  []  Phone call not needed - pt contacted us to cancel and provided reason for cancellation.      Electronically signed by:  Dariusz Santana PT

## 2021-07-15 ENCOUNTER — HOSPITAL ENCOUNTER (OUTPATIENT)
Dept: PHYSICAL THERAPY | Age: 76
Setting detail: THERAPIES SERIES
Discharge: HOME | End: 2021-07-15
Payer: MEDICARE

## 2021-07-26 DIAGNOSIS — N30.00 ACUTE CYSTITIS WITHOUT HEMATURIA: ICD-10-CM

## 2021-07-26 NOTE — TELEPHONE ENCOUNTER
Medication:   Requested Prescriptions     Pending Prescriptions Disp Refills    bethanechol (URECHOLINE) 25 MG tablet [Pharmacy Med Name: BETHANECHOL 25 MG TABLET] 90 tablet 1     Sig: TAKE ONE TABLET BY MOUTH THREE TIMES A DAY      Last Filled:      Patient Phone Number: [phone] (home)     Last appt: 7/7/2021   Next appt: 11/10/2021    Last OARRS:   RX Monitoring 9/27/2019   Attestation -   Periodic Controlled Substance Monitoring Possible medication side effects, risk of tolerance/dependence & alternative treatments discussed. Chronic Pain > 50 MEDD Re-evaluated the status of the patient's underlying condition causing pain.      PDMP Monitoring:    Last PDMP Latvian Fetch as Reviewed [facility]):  Review User Review Instant Review Result          Preferred Pharmacy:   Reena [facility] [address], [address] [facility] [phone] [facility] [phone] [address] [facility]  [place] [address]  Phone: [phone] Fax: [phone]

## 2021-07-27 RX ORDER — BETHANECHOL CHLORIDE 25 MG/1
TABLET ORAL
Qty: 90 TABLET | Refills: 1 | Status: SHIPPED | OUTPATIENT
Start: 2021-07-27 | End: 2021-11-02

## 2021-09-15 ENCOUNTER — APPOINTMENT (OUTPATIENT)
Dept: CT IMAGING | Age: 76
DRG: 917 | End: 2021-09-15
Payer: MEDICARE

## 2021-09-15 ENCOUNTER — APPOINTMENT (OUTPATIENT)
Dept: GENERAL RADIOLOGY | Age: 76
DRG: 917 | End: 2021-09-15
Payer: MEDICARE

## 2021-09-15 ENCOUNTER — HOSPITAL ENCOUNTER (INPATIENT)
Age: 76
LOS: 2 days | Discharge: HOME | DRG: 917 | End: 2021-09-17
Attending: EMERGENCY MEDICINE | Admitting: INTERNAL MEDICINE
Payer: MEDICARE

## 2021-09-15 DIAGNOSIS — J96.91 RESPIRATORY FAILURE WITH HYPOXIA, UNSPECIFIED CHRONICITY (HCC): Primary | ICD-10-CM

## 2021-09-15 DIAGNOSIS — F11.20 NARCOTIC DEPENDENCE (HCC): ICD-10-CM

## 2021-09-15 PROBLEM — J96.02 ACUTE RESPIRATORY FAILURE WITH HYPOXIA AND HYPERCAPNIA (HCC): Status: ACTIVE | Noted: 2021-09-15

## 2021-09-15 PROBLEM — J96.01 ACUTE RESPIRATORY FAILURE WITH HYPOXIA AND HYPERCAPNIA (HCC): Status: ACTIVE | Noted: 2021-09-15

## 2021-09-15 LAB
A/G RATIO: 1.3 (ref 1.1–2.2)
ALBUMIN SERPL-MCNC: 4.5 G/DL (ref 3.4–5)
ALP BLD-CCNC: 107 U/L (ref 40–129)
ALT SERPL-CCNC: 23 U/L (ref 10–40)
ANION GAP SERPL CALCULATED.3IONS-SCNC: 13 MMOL/L (ref 3–16)
AST SERPL-CCNC: 36 U/L (ref 15–37)
BASE EXCESS ARTERIAL: -2.3 MMOL/L (ref -3–3)
BASE EXCESS ARTERIAL: -4.9 MMOL/L (ref -3–3)
BASE EXCESS VENOUS: -2.2 MMOL/L (ref -3–3)
BASOPHILS ABSOLUTE: 0 K/UL (ref 0–0.2)
BASOPHILS RELATIVE PERCENT: 0.4 %
BILIRUB SERPL-MCNC: 0.5 MG/DL (ref 0–1)
BUN BLDV-MCNC: 24 MG/DL (ref 7–20)
CALCIUM SERPL-MCNC: 9.6 MG/DL (ref 8.3–10.6)
CARBOXYHEMOGLOBIN ARTERIAL: 1.1 % (ref 0–1.5)
CARBOXYHEMOGLOBIN ARTERIAL: 1.5 % (ref 0–1.5)
CARBOXYHEMOGLOBIN: 4.3 % (ref 0–1.5)
CHLORIDE BLD-SCNC: 101 MMOL/L (ref 99–110)
CO2: 24 MMOL/L (ref 21–32)
CREAT SERPL-MCNC: 1.4 MG/DL (ref 0.6–1.2)
D DIMER: <200 NG/ML DDU (ref 0–229)
EOSINOPHILS ABSOLUTE: 0.3 K/UL (ref 0–0.6)
EOSINOPHILS RELATIVE PERCENT: 3.2 %
GFR AFRICAN AMERICAN: 44
GFR NON-AFRICAN AMERICAN: 37
GLOBULIN: 3.5 G/DL
GLUCOSE BLD-MCNC: 148 MG/DL (ref 70–99)
HCO3 ARTERIAL: 23.4 MMOL/L (ref 21–29)
HCO3 ARTERIAL: 25.5 MMOL/L (ref 21–29)
HCO3 VENOUS: 25.3 MMOL/L (ref 23–29)
HCT VFR BLD CALC: 38.1 % (ref 36–48)
HEMOGLOBIN, ART, EXTENDED: 12 G/DL (ref 12–16)
HEMOGLOBIN, ART, EXTENDED: 12.9 G/DL (ref 12–16)
HEMOGLOBIN, VEN, REDUCED: 3 %
HEMOGLOBIN: 12.4 G/DL (ref 12–16)
INR BLD: 2.12 (ref 0.88–1.12)
LACTIC ACID, SEPSIS: 1.3 MMOL/L (ref 0.4–1.9)
LYMPHOCYTES ABSOLUTE: 1.4 K/UL (ref 1–5.1)
LYMPHOCYTES RELATIVE PERCENT: 16.2 %
MCH RBC QN AUTO: 31.6 PG (ref 26–34)
MCHC RBC AUTO-ENTMCNC: 32.7 G/DL (ref 31–36)
MCV RBC AUTO: 96.9 FL (ref 80–100)
METHEMOGLOBIN ARTERIAL: 0.1 %
METHEMOGLOBIN ARTERIAL: 0.4 %
METHEMOGLOBIN VENOUS: 0.2 %
MONOCYTES ABSOLUTE: 0.9 K/UL (ref 0–1.3)
MONOCYTES RELATIVE PERCENT: 9.9 %
NEUTROPHILS ABSOLUTE: 6 K/UL (ref 1.7–7.7)
NEUTROPHILS RELATIVE PERCENT: 70.3 %
O2 CONTENT, VEN: 17 VOL %
O2 SAT, ARTERIAL: 82 %
O2 SAT, ARTERIAL: 98.6 %
O2 SAT, VEN: 97 %
O2 THERAPY: ABNORMAL
PCO2 ARTERIAL: 56.7 MMHG (ref 35–45)
PCO2 ARTERIAL: 57 MMHG (ref 35–45)
PCO2, VEN: 54.3 MMHG (ref 40–50)
PDW BLD-RTO: 13.9 % (ref 12.4–15.4)
PH ARTERIAL: 7.22 (ref 7.35–7.45)
PH ARTERIAL: 7.26 (ref 7.35–7.45)
PH VENOUS: 7.28 (ref 7.35–7.45)
PLATELET # BLD: 204 K/UL (ref 135–450)
PMV BLD AUTO: 9.2 FL (ref 5–10.5)
PO2 ARTERIAL: 123 MMHG (ref 75–108)
PO2 ARTERIAL: 52.2 MMHG (ref 75–108)
PO2, VEN: 93.5 MMHG (ref 25–40)
POTASSIUM REFLEX MAGNESIUM: 4.7 MMOL/L (ref 3.5–5.1)
PRO-BNP: 92 PG/ML (ref 0–449)
PROTHROMBIN TIME: 24.7 SEC (ref 9.9–12.7)
RBC # BLD: 3.93 M/UL (ref 4–5.2)
SODIUM BLD-SCNC: 138 MMOL/L (ref 136–145)
TCO2 ARTERIAL: 56.4 MMOL/L
TCO2 ARTERIAL: 60.9 MMOL/L
TCO2 CALC VENOUS: 60 MMOL/L
TOTAL PROTEIN: 8 G/DL (ref 6.4–8.2)
TROPONIN: 0.01 NG/ML
TROPONIN: <0.01 NG/ML
WBC # BLD: 8.6 K/UL (ref 4–11)

## 2021-09-15 PROCEDURE — 83605 ASSAY OF LACTIC ACID: CPT

## 2021-09-15 PROCEDURE — 85379 FIBRIN DEGRADATION QUANT: CPT

## 2021-09-15 PROCEDURE — 71045 X-RAY EXAM CHEST 1 VIEW: CPT

## 2021-09-15 PROCEDURE — 83880 ASSAY OF NATRIURETIC PEPTIDE: CPT

## 2021-09-15 PROCEDURE — 94660 CPAP INITIATION&MGMT: CPT

## 2021-09-15 PROCEDURE — 2580000003 HC RX 258: Performed by: EMERGENCY MEDICINE

## 2021-09-15 PROCEDURE — 2580000003 HC RX 258: Performed by: INTERNAL MEDICINE

## 2021-09-15 PROCEDURE — 2000000000 HC ICU R&B

## 2021-09-15 PROCEDURE — 6360000004 HC RX CONTRAST MEDICATION: Performed by: EMERGENCY MEDICINE

## 2021-09-15 PROCEDURE — 93005 ELECTROCARDIOGRAM TRACING: CPT | Performed by: EMERGENCY MEDICINE

## 2021-09-15 PROCEDURE — 94761 N-INVAS EAR/PLS OXIMETRY MLT: CPT

## 2021-09-15 PROCEDURE — 85025 COMPLETE CBC W/AUTO DIFF WBC: CPT

## 2021-09-15 PROCEDURE — U0003 INFECTIOUS AGENT DETECTION BY NUCLEIC ACID (DNA OR RNA); SEVERE ACUTE RESPIRATORY SYNDROME CORONAVIRUS 2 (SARS-COV-2) (CORONAVIRUS DISEASE [COVID-19]), AMPLIFIED PROBE TECHNIQUE, MAKING USE OF HIGH THROUGHPUT TECHNOLOGIES AS DESCRIBED BY CMS-2020-01-R: HCPCS

## 2021-09-15 PROCEDURE — 99285 EMERGENCY DEPT VISIT HI MDM: CPT

## 2021-09-15 PROCEDURE — 84484 ASSAY OF TROPONIN QUANT: CPT

## 2021-09-15 PROCEDURE — 82803 BLOOD GASES ANY COMBINATION: CPT

## 2021-09-15 PROCEDURE — 6360000002 HC RX W HCPCS

## 2021-09-15 PROCEDURE — U0005 INFEC AGEN DETEC AMPLI PROBE: HCPCS

## 2021-09-15 PROCEDURE — 85610 PROTHROMBIN TIME: CPT

## 2021-09-15 PROCEDURE — 2700000000 HC OXYGEN THERAPY PER DAY

## 2021-09-15 PROCEDURE — 96374 THER/PROPH/DIAG INJ IV PUSH: CPT

## 2021-09-15 PROCEDURE — 6360000002 HC RX W HCPCS: Performed by: EMERGENCY MEDICINE

## 2021-09-15 PROCEDURE — 36415 COLL VENOUS BLD VENIPUNCTURE: CPT

## 2021-09-15 PROCEDURE — 80053 COMPREHEN METABOLIC PANEL: CPT

## 2021-09-15 PROCEDURE — 71260 CT THORAX DX C+: CPT

## 2021-09-15 RX ORDER — POLYETHYLENE GLYCOL 3350 17 G/17G
17 POWDER, FOR SOLUTION ORAL DAILY PRN
Status: DISCONTINUED | OUTPATIENT
Start: 2021-09-15 | End: 2021-09-17 | Stop reason: HOSPADM

## 2021-09-15 RX ORDER — 0.9 % SODIUM CHLORIDE 0.9 %
250 INTRAVENOUS SOLUTION INTRAVENOUS ONCE
Status: COMPLETED | OUTPATIENT
Start: 2021-09-15 | End: 2021-09-15

## 2021-09-15 RX ORDER — FUROSEMIDE 10 MG/ML
40 INJECTION INTRAMUSCULAR; INTRAVENOUS ONCE
Status: COMPLETED | OUTPATIENT
Start: 2021-09-15 | End: 2021-09-15

## 2021-09-15 RX ORDER — PREGABALIN 75 MG/1
150 CAPSULE ORAL 3 TIMES DAILY
Status: DISCONTINUED | OUTPATIENT
Start: 2021-09-16 | End: 2021-09-17 | Stop reason: HOSPADM

## 2021-09-15 RX ORDER — SODIUM CHLORIDE 0.9 % (FLUSH) 0.9 %
5-40 SYRINGE (ML) INJECTION PRN
Status: DISCONTINUED | OUTPATIENT
Start: 2021-09-15 | End: 2021-09-17 | Stop reason: HOSPADM

## 2021-09-15 RX ORDER — ASPIRIN 81 MG/1
81 TABLET, CHEWABLE ORAL DAILY
Status: DISCONTINUED | OUTPATIENT
Start: 2021-09-16 | End: 2021-09-17 | Stop reason: HOSPADM

## 2021-09-15 RX ORDER — SODIUM CHLORIDE 9 MG/ML
25 INJECTION, SOLUTION INTRAVENOUS PRN
Status: DISCONTINUED | OUTPATIENT
Start: 2021-09-15 | End: 2021-09-17 | Stop reason: HOSPADM

## 2021-09-15 RX ORDER — FOLIC ACID/VIT B COMPLEX AND C 5 MG
1 TABLET ORAL DAILY
COMMUNITY
End: 2022-05-23 | Stop reason: SDUPTHER

## 2021-09-15 RX ORDER — ACETAMINOPHEN 650 MG/1
650 SUPPOSITORY RECTAL EVERY 6 HOURS PRN
Status: DISCONTINUED | OUTPATIENT
Start: 2021-09-15 | End: 2021-09-17 | Stop reason: HOSPADM

## 2021-09-15 RX ORDER — ALBUTEROL SULFATE 2.5 MG/3ML
2.5 SOLUTION RESPIRATORY (INHALATION) EVERY 4 HOURS PRN
Status: DISCONTINUED | OUTPATIENT
Start: 2021-09-15 | End: 2021-09-17 | Stop reason: HOSPADM

## 2021-09-15 RX ORDER — CHOLECALCIFEROL (VITAMIN D3) 10 MCG
1 TABLET ORAL DAILY
Status: DISCONTINUED | OUTPATIENT
Start: 2021-09-16 | End: 2021-09-17 | Stop reason: HOSPADM

## 2021-09-15 RX ORDER — IPRATROPIUM BROMIDE AND ALBUTEROL SULFATE 2.5; .5 MG/3ML; MG/3ML
1 SOLUTION RESPIRATORY (INHALATION)
Status: DISCONTINUED | OUTPATIENT
Start: 2021-09-16 | End: 2021-09-16

## 2021-09-15 RX ORDER — SODIUM CHLORIDE 9 MG/ML
INJECTION, SOLUTION INTRAVENOUS CONTINUOUS
Status: ACTIVE | OUTPATIENT
Start: 2021-09-15 | End: 2021-09-16

## 2021-09-15 RX ORDER — NALOXONE HYDROCHLORIDE 1 MG/ML
0.4 INJECTION INTRAMUSCULAR; INTRAVENOUS; SUBCUTANEOUS ONCE
Status: COMPLETED | OUTPATIENT
Start: 2021-09-15 | End: 2021-09-15

## 2021-09-15 RX ORDER — ACETAMINOPHEN 325 MG/1
650 TABLET ORAL EVERY 6 HOURS PRN
Status: DISCONTINUED | OUTPATIENT
Start: 2021-09-15 | End: 2021-09-17 | Stop reason: HOSPADM

## 2021-09-15 RX ORDER — NALOXONE HYDROCHLORIDE 1 MG/ML
INJECTION INTRAMUSCULAR; INTRAVENOUS; SUBCUTANEOUS
Status: COMPLETED
Start: 2021-09-15 | End: 2021-09-15

## 2021-09-15 RX ORDER — BETHANECHOL CHLORIDE 25 MG/1
25 TABLET ORAL 3 TIMES DAILY
Status: DISCONTINUED | OUTPATIENT
Start: 2021-09-15 | End: 2021-09-17 | Stop reason: HOSPADM

## 2021-09-15 RX ORDER — NALOXONE HYDROCHLORIDE 0.4 MG/ML
0.4 INJECTION, SOLUTION INTRAMUSCULAR; INTRAVENOUS; SUBCUTANEOUS PRN
Status: DISCONTINUED | OUTPATIENT
Start: 2021-09-15 | End: 2021-09-17 | Stop reason: HOSPADM

## 2021-09-15 RX ORDER — MORPHINE SULFATE 15 MG/1
15 TABLET ORAL 3 TIMES DAILY PRN
Status: CANCELLED | OUTPATIENT
Start: 2021-09-15

## 2021-09-15 RX ORDER — AMLODIPINE BESYLATE 5 MG/1
10 TABLET ORAL DAILY
Status: DISCONTINUED | OUTPATIENT
Start: 2021-09-16 | End: 2021-09-17 | Stop reason: HOSPADM

## 2021-09-15 RX ORDER — MECLIZINE HCL 12.5 MG/1
25 TABLET ORAL 3 TIMES DAILY PRN
Status: DISCONTINUED | OUTPATIENT
Start: 2021-09-15 | End: 2021-09-17 | Stop reason: HOSPADM

## 2021-09-15 RX ORDER — SODIUM CHLORIDE 0.9 % (FLUSH) 0.9 %
5-40 SYRINGE (ML) INJECTION EVERY 12 HOURS SCHEDULED
Status: DISCONTINUED | OUTPATIENT
Start: 2021-09-15 | End: 2021-09-17 | Stop reason: HOSPADM

## 2021-09-15 RX ORDER — DULOXETIN HYDROCHLORIDE 30 MG/1
CAPSULE, DELAYED RELEASE ORAL
COMMUNITY
Start: 2021-07-08 | End: 2022-01-04

## 2021-09-15 RX ADMIN — SODIUM CHLORIDE 250 ML: 9 INJECTION, SOLUTION INTRAVENOUS at 18:39

## 2021-09-15 RX ADMIN — SODIUM CHLORIDE: 9 INJECTION, SOLUTION INTRAVENOUS at 19:41

## 2021-09-15 RX ADMIN — Medication 10 ML: at 23:45

## 2021-09-15 RX ADMIN — NALOXONE HYDROCHLORIDE 0.4 MG: 1 INJECTION PARENTERAL at 19:23

## 2021-09-15 RX ADMIN — IOPAMIDOL 75 ML: 755 INJECTION, SOLUTION INTRAVENOUS at 17:31

## 2021-09-15 RX ADMIN — FUROSEMIDE 40 MG: 10 INJECTION, SOLUTION INTRAMUSCULAR; INTRAVENOUS at 15:49

## 2021-09-15 RX ADMIN — NALOXONE HYDROCHLORIDE 0.4 MG: 1 INJECTION INTRAMUSCULAR; INTRAVENOUS; SUBCUTANEOUS at 19:23

## 2021-09-15 ASSESSMENT — PAIN SCALES - GENERAL: PAINLEVEL_OUTOF10: 0

## 2021-09-15 NOTE — ED NOTES
Bed: 02  Expected date:   Expected time:   Means of arrival: [facility]  Comments:  [facility] [id number], RN  09/15/21 9316

## 2021-09-15 NOTE — Clinical Note
Patient Class: Inpatient [101]   REQUIRED: Diagnosis: Acute respiratory failure with hypoxia and [facility]capnia ([facility] [[id number]]   Estimated Length of Stay: Estimated stay of more than 2 midnights   Telemetry/Cardiac Monitoring Required?: Yes

## 2021-09-15 NOTE — PROGRESS NOTES
Patient placed on Bipap at this time. Tolerating well respirations at 16bpm with Spo2 100%.  Will start titrating Fio2 as tolerated

## 2021-09-15 NOTE — H&P
Hospital Medicine History and Physical    9/15/2021    Date of Admission: 9/15/2021    Date of Service: Pt seen/examined on 9/15/2021 and admitted to inpatient/ICU. Assessment/plan:  1. Acute respiratory failure with hypoxia and hypercapnia. Suspect unintentional morphine overdose. She has not been given narcan; will try a dose. She does not have wheezes on exam, making COPD exacerbation less likely. No evidence of pulmonary embolism or acute pulmonary pathology noted on CTPE protocol. For now, hold home dose of morphine and add as needed Narcan for respiratory depression. Monitor pulse oximeter closely. Continue breathing treatments. Screen for COVID-19. Repeat blood gas. Admitting to ICU for close monitoring. 1. Rate on BiPAP was set at 12. I just made adjustment; increased rate to 18. Repeat blood gas pending. Will likely need a repeat blood gas 2 hours from now (ordered). 2. Acute kidney injury. Likely prerenal azotemia. Continue intravenous fluid. Avoid nephrotoxic medications. Recheck renal function in the morning. 3. Intermittent chest pain. Will obtain serial troponin. Monitor on telemetry. Patient already on antiplatelet therapy. 4. QT prolongation. Hold/avoid all QT prolonging medications. Monitor on telemetry. Consider repeat EKG in the morning. 5. Other comorbidities: history of gastritis, essential hypertension, depression, history of CVA, chronic pain, gastroesophageal reflux disease, COPD, obesity with BMI of 36 kg/m², chronic narcotic use (morphine IR 15 mg 3 times daily, verified in PDMP), on chronic anticoagulation with Xarelto. Activities: Up with assist  Prophylaxis: Xarelto  Code status: Full code    ==========================================================  Chief complaint:  Chief Complaint   Patient presents with    Shortness of Breath     pt brought in by [place] ems for feeling sob, cp for several days.   ems reports pt had frothy sputum, placed on cpap.  spo2 % on room air, spo2 -80-90's       History of Presenting Illness: This is a pleasant 77 y.o. female with history of gastritis, essential hypertension, depression, history of CVA, chronic pain, gastroesophageal reflux disease, COPD, obesity with BMI of 36 kg/m², chronic narcotic use (morphine IR 15 mg 3 times daily, verified in PDMP), on chronic anticoagulation with Xarelto (per oncology notes reviewed, there was concern about possible lupus anticoagulant and at the time, decision was been weighed about possible anticoagulation; I do not see clear documentation for any other reason anticoagulant was started). Who was brought to the emergency room for evaluation of shortness of breath. Patient reportedly has had some chest discomfort, shortness of breath, ongoing for the past several days. Earlier today, she was noted to have frothy sputum, hypoxic with oxygen saturation in the 80s on room air. EMS were called and patient was placed on noninvasive ventilation. Chest x-ray reveals elevated right hemidiaphragm, chronic, seen on CT from 8/29/2019. CTPE protocol obtained in the emergency room was unremarkable for pulmonary embolism or acute pulmonary finding. Creatinine is elevated at 1.4; baseline 1.1.       Past Medical History:      Diagnosis Date    AVM     Chronic back pain     Depression     Gastritis     History of blood transfusion     secondary to GI bleed    History of GI bleed     Hypertension     Personal history of MRSA (methicillin resistant Staphylococcus aureus)     recurrent, last 2-3 years ago finger (doesnt remember which one)       Past Surgical History:      Procedure Laterality Date    ABDOMINAL EXPLORATION SURGERY  09/07/2017    Lysis of adhesions, removal of pelvic mass, total abdominal hysterectomy with BSO, partial omentectomy, appendectomy, and pelvic lymphadenectomy    BACK SURGERY      multiple lumbar spine surgeries including laminectomy and fusion    CERVICAL FUSION      COLONOSCOPY      ENDOSCOPY, COLON, DIAGNOSTIC      HIATAL HERNIA REPAIR  1999    OTHER SURGICAL HISTORY Left [id number]    SPINAL CORD STIMULATOR BATTERY REPLACMENT       Medications (prior to admission):  Prior to Admission medications    Medication Sig Start Date End Date Taking? Authorizing Provider   folbee plus (FOLBEE PLUS) TABS Take 1 tablet by mouth daily   Yes Historical Provider, MD   DULoxetine (CYMBALTA) 30 MG extended release capsule  7/8/21   Historical Provider, MD   bethanechol (URECHOLINE) 25 MG tablet TAKE ONE TABLET BY MOUTH THREE TIMES A DAY 7/27/21   Calvin Hanley, MD   rivaroxaban Lola Lev) 20 MG TABS tablet Take 1 tablet by mouth Daily with supper 7/7/21   Calvin aHnley, MD   rOPINIRole (REQUIP) 0.5 MG tablet Take 1 tablet by mouth nightly 7/7/21 10/5/21  Calvin Hanley, MD   meclizine (ANTIVERT) 25 MG tablet TAKE ONE TABLET BY MOUTH THREE TIMES A DAY AS NEEDED FOR DIZZINESS 6/24/21   Calvin Hanley, MD   buPROPion (WELLBUTRIN XL) 300 MG extended release tablet TAKE ONE TABLET BY MOUTH EVERY MORNING 5/4/21   Calvin Hanley MD   sertraline (ZOLOFT) 25 MG tablet Take 1 tablet by mouth daily 4/7/21   Calvin Hanley, MD   morphine (MSIR) 15 MG tablet Take 15 mg by mouth 3 times daily as needed for Pain. Historical Provider, MD   furosemide (LASIX) 20 MG tablet TAKE ONE TABLET BY MOUTH DAILY AS NEEDED FOR WEIGHT GAIN OF 2LBS OR LEG EDEMA 2/11/21   Calvin Hanley MD   amLODIPine (NORVASC) 10 MG tablet Take 1 tablet by mouth daily 2/18/20   ANGELES Mckeon NP   tiZANidine (ZANAFLEX) 4 MG tablet Take 4 mg by mouth 2 times daily     Historical Provider, MD   pregabalin (LYRICA) 150 MG capsule Take 150 mg by mouth three times daily.     Historical Provider, MD   ondansetron (ZOFRAN) 4 MG tablet Take 1 tablet by mouth daily as needed for Nausea or Vomiting 9/5/19   Ezra Garsia MD   aspirin 81 MG chewable tablet Take 1 tablet by mouth daily 8/22/19   Fadi Simpson MD       Allergy(ies):  Penicillins    Social History:  TOBACCO:  reports that she has never smoked. She has never used smokeless tobacco.  ETOH:  reports no history of alcohol use. Family History:      Problem Relation Age of Onset    Diabetes Mother     Heart Disease Mother         cad, aortic aneurysm    Diabetes Father     Heart Disease Father         heart attack, chf    Other Sister         abdominal aneurysm       Review of Systems:  Unable to obtain as patient is awake but remains drowsy. Vitals and physical examination:  BP (!) 125/90   Pulse 88   Resp 11   SpO2 99%   Gen/overall appearance: Not in acute distress. Droswy/lethargic. Head: Normocephalic, atraumatic  Eyes: EOMI, good acuity  ENT: Oral mucosa moist  Neck: No JVD, thyromegaly  CVS: Nml S1S2, no MRG, RRR  Pulm: Clear bilaterally. No crackles/wheezes  Gastrointestinal: Soft, NT/ND, +BS  Musculoskeletal: No edema. Warm  Neuro: Awakes only for a brief period of time, then doses off. Psychiatry: Unable to assess  Skin: Warm, dry with normal turgor. No rash  Capillary refill: Brisk,< 3 seconds   Peripheral Pulses: +2 palpable, equal bilaterally       Labs/imaging/EKG:  CBC:   Recent Labs     09/15/21  1522   WBC 8.6   HGB 12.4        BMP:    Recent Labs     09/15/21  1522      K 4.7      CO2 24   BUN 24*   CREATININE 1.4*   GLUCOSE 148*     Hepatic:   Recent Labs     09/15/21  1522   AST 36   ALT 23   BILITOT 0.5   ALKPHOS 107       XR CHEST PORTABLE    Result Date: 9/15/2021  EXAMINATION: ONE XRAY VIEW OF THE CHEST 9/15/2021 3:40 pm COMPARISON: Chest radiograph September 25, 2019 and priors.  HISTORY: ORDERING SYSTEM PROVIDED HISTORY: sob TECHNOLOGIST PROVIDED HISTORY: Reason for exam:->sob Reason for Exam: shortness of breath Acuity: Acute Type of Exam: Initial FINDINGS: There is again noted to be marked elevation of the right hemidiaphragm with chronic blunting of the right lateral costophrenic angle. No focal acute process identified within the lungs. No pneumothorax or pleural effusion. Cardiac and mediastinal contours are without acute process. No acute osseous abnormality. Marked elevation of the right hemidiaphragm, similar in appearance to prior. No acute process. CT CHEST PULMONARY EMBOLISM W CONTRAST    Result Date: 9/15/2021  EXAMINATION: CTA OF THE CHEST 9/15/2021 5:26 pm TECHNIQUE: CTA of the chest was performed after the administration of intravenous contrast.  Multiplanar reformatted images are provided for review. MIP images are provided for review. Dose modulation, iterative reconstruction, and/or weight based adjustment of the mA/kV was utilized to reduce the radiation dose to as low as reasonably achievable. COMPARISON: None. HISTORY: ORDERING SYSTEM PROVIDED HISTORY: sob TECHNOLOGIST PROVIDED HISTORY: Reason for exam:->sob Reason for Exam: Shortness of Breath (pt brought in by [place] ems for feeling sob, cp for several days. ems reports pt had frothy sputum, placed on cpap.  spo2 % on room air, spo2 -80-90's) Acuity: Acute Type of Exam: Initial FINDINGS: Pulmonary Arteries: Pulmonary arteries are adequately opacified for evaluation. No evidence of intraluminal filling defect to suggest pulmonary embolism. Main pulmonary artery is normal in caliber. Mediastinum: No evidence of mediastinal lymphadenopathy. The heart and pericardium demonstrate no acute abnormality. There is no acute abnormality of the thoracic aorta. Lungs/pleura: There is some dependent atelectasis at the lung bases bilaterally. Upper Abdomen: Limited images of the upper abdomen are unremarkable. Soft Tissues/Bones: No acute bone or soft tissue abnormality. No evidence of pulmonary embolism or acute pulmonary abnormality. EKG: Sinus rhythm with first-degree AV block. Nonspecific ST/T changes. Right bundle branch block. QTc 554.   I reviewed EKG. Discussed with ER provider.       Thank you Suleman Mckeon MD for the opportunity to be involved in this patient's care.    -----------------------------  Ameya Martinez MD  [facility]

## 2021-09-15 NOTE — ED NOTES
This RN and [address] [address], [address] attempted ABG draw at this time unsuccessfully.      Yosi Dixon, ANDREA  09/15/21 1943

## 2021-09-15 NOTE — ED PROVIDER NOTES
[address] [facility]  EMERGENCY DEPARTMENTENCOUNTER      Pt Name: Komal Gomez  MRN: [id number]  Lionelgfbhavani 1945  Date ofevaluation: 9/15/2021  Provider: Nataliia Cooper MD    CHIEF COMPLAINT       Chief Complaint   Patient presents with    Shortness of Breath     pt brought in by [place] ems for feeling sob, cp for several days. ems reports pt had frothy sputum, placed on cpap.  spo2 % on room air, spo2 -80-90's       HPI    HISTORY OF PRESENT ILLNESS   (Location/Symptom, Timing/Onset,Context/Setting, Quality, Duration, Modifying Factors, Severity)  Note limiting factors. Komal Gomez is a 77 y.o. female who presents to the emergency department with shortness of breath. This is a 67-year-old female who presents with shortness of breath for the last several days. The patient apparently told her daughter that she was feeling ill over the last several days. There is been no history of fevers. The patient is vaccinated for COVID-19. When EMS arrived, the patient had a lot of \"foam in her mouth\". CPAP was started with some improvement. Patient denies any chest pain. NursingNotes were reviewed. Review of Systems    REVIEW OF SYSTEMS    (2-9 systems for level 4, 10 or more for level 5)     Review of Systems   Constitutional: Negative for fever. HENT: Negative for rhinorrhea and sore throat. Eyes: Negative for redness. Respiratory: Positive for shortness of breath. Cardiovascular: Negative for chest pain. Gastrointestinal: Negative for abdominal pain. Genitourinary: Negative for flank pain. Neurological: Negative for headaches. Hematological: Negative for adenopathy. Psychiatric/Behavioral: Negative for confusion. Except as noted above the remainder of the review of systems was reviewed and negative.        PAST MEDICAL HISTORY     Past Medical History:   Diagnosis Date    AVM     Chronic back pain     Depression     Gastritis  History of blood transfusion     secondary to GI bleed    History of GI bleed     Hypertension     Personal history of MRSA (methicillin resistant Staphylococcus aureus)     recurrent, last 2-3 years ago finger (doesnt remember which one)         SURGICALHISTORY       Past Surgical History:   Procedure Laterality Date    ABDOMINAL EXPLORATION SURGERY  09/07/2017    Lysis of adhesions, removal of pelvic mass, total abdominal hysterectomy with BSO, partial omentectomy, appendectomy, and pelvic lymphadenectomy    BACK SURGERY      multiple lumbar spine surgeries including laminectomy and fusion    CERVICAL FUSION      COLONOSCOPY      ENDOSCOPY, COLON, DIAGNOSTIC      HIATAL HERNIA REPAIR  1999    OTHER SURGICAL HISTORY Left [id number]    SPINAL CORD STIMULATOR BATTERY REPLACMENT         CURRENT MEDICATIONS       Previous Medications    AMLODIPINE (NORVASC) 10 MG TABLET    Take 1 tablet by mouth daily    ASPIRIN 81 MG CHEWABLE TABLET    Take 1 tablet by mouth daily    BETHANECHOL (URECHOLINE) 25 MG TABLET    TAKE ONE TABLET BY MOUTH THREE TIMES A DAY    BUPROPION (WELLBUTRIN XL) 300 MG EXTENDED RELEASE TABLET    TAKE ONE TABLET BY MOUTH EVERY MORNING    DULOXETINE (CYMBALTA) 30 MG EXTENDED RELEASE CAPSULE        FOLBEE PLUS (FOLBEE PLUS) TABS    Take 1 tablet by mouth daily    FUROSEMIDE (LASIX) 20 MG TABLET    TAKE ONE TABLET BY MOUTH DAILY AS NEEDED FOR WEIGHT GAIN OF 2LBS OR LEG EDEMA    MECLIZINE (ANTIVERT) 25 MG TABLET    TAKE ONE TABLET BY MOUTH THREE TIMES A DAY AS NEEDED FOR DIZZINESS    MORPHINE (MSIR) 15 MG TABLET    Take 15 mg by mouth 3 times daily as needed for Pain. ONDANSETRON (ZOFRAN) 4 MG TABLET    Take 1 tablet by mouth daily as needed for Nausea or Vomiting    PREGABALIN (LYRICA) 150 MG CAPSULE    Take 150 mg by mouth three times daily.     RIVAROXABAN (XARELTO) 20 MG TABS TABLET    Take 1 tablet by mouth Daily with supper    ROPINIROLE (REQUIP) 0.5 MG TABLET    Take 1 tablet by mouth nightly    SERTRALINE (ZOLOFT) 25 MG TABLET    Take 1 tablet by mouth daily    TIZANIDINE (ZANAFLEX) 4 MG TABLET    Take 4 mg by mouth 2 times daily        ALLERGIES     Penicillins    FAMILY HISTORY       Family History   Problem Relation Age of Onset    Diabetes Mother     Heart Disease Mother         cad, aortic aneurysm    Diabetes Father     Heart Disease Father         heart attack, chf    Other Sister         abdominal aneurysm          SOCIAL HISTORY       Social History     Socioeconomic History    Marital status:      Spouse name: Not on file    Number of children: Not on file    Years of education: Not on file    Highest education level: Not on file   Occupational History    Not on file   Tobacco Use    Smoking status: Never Smoker    Smokeless tobacco: Never Used   Substance and Sexual Activity    Alcohol use: No    Drug use: No    Sexual activity: Not on file   Other Topics Concern    Not on file   Social History Narrative    Not on file     Social Determinants of Health     Financial Resource Strain:     Difficulty of Paying Living Expenses:    Food Insecurity:     Worried About Running Out of Food in the Last Year:     Ran Out of Food in the Last Year:    Transportation Needs:     Lack of Transportation (Medical):      Lack of Transportation (Non-Medical):    Physical Activity:     Days of Exercise per Week:     Minutes of Exercise per Session:    Stress:     Feeling of Stress :    Social Connections:     Frequency of Communication with Friends and Family:     Frequency of Social Gatherings with Friends and Family:     Attends Jew Services:     Active Member of Clubs or Organizations:     Attends Club or Organization Meetings:     Marital Status:    Intimate Partner Violence:     Fear of Current or Ex-Partner:     Emotionally Abused:     Physically Abused:     Sexually Abused:        SCREENINGS             PHYSICAL EXAM    (up to 7 for level 4, 8 or more for level 5)     ED Triage Vitals   BP Temp Temp src Pulse Resp SpO2 Height Weight   09/15/21 1530 -- -- 09/15/21 1527 09/15/21 1527 09/15/21 1527 -- --   105/68   94 15 (!) 89 %         Physical Exam:      General Appearance:  Alert, cooperative, appears stated age. Head:  Normocephalic, without obvious abnormality, atraumatic. Eyes:  conjunctiva/corneas clear, EOM's intact. Sclera anicteric. ENT:  Mucous remains are moist and pink. Positive secretions. Neck: Supple, symmetrical, trachea midline, no adenopathy. No jugular venous distention. Lungs:    Clear to auscultation bilaterally. Chest Wall:   No pain to palpation   Heart:   Genitourinary:  Regular rate rhythm with no murmurs rubs gallops  Unremarkable. Alvares catheter placed. Abdomen:    Soft and benign. No guarding rebound. Extremities:  No clubbing cyanosis or edema   Pulses:  Good throughout   Skin:  No rashes or lesions to exposed skin. Neurologic: Alert and oriented X 3. DIAGNOSTIC RESULTS     EKG: All EKG's are interpreted by the Emergency Department Physician who either signs or Co-signsthis chart in the absence of a cardiologist.    Sinus rhythm at a rate of 93 beats a minute with no acute ST elevations or depressions or pathologic Q waves. Normal axis. Right bundle-branch block. RADIOLOGY:   Non-plain filmimages such as CT, Ultrasound and MRI are read by the radiologist. Plain radiographic images are visualized and preliminarily interpreted by the emergency physician with the below findings:    See below    Interpretation per the Radiologist below, if available at the time ofthis note: All incidental findings were discussed with the patient. CT CHEST PULMONARY EMBOLISM W CONTRAST   Final Result   No evidence of pulmonary embolism or acute pulmonary abnormality. XR CHEST PORTABLE   Final Result   Marked elevation of the right hemidiaphragm, similar in appearance to prior. No acute process. ED BEDSIDE ULTRASOUND:   Performed by ED Physician - none    LABS:  Labs Reviewed   CBC WITH AUTO DIFFERENTIAL - Abnormal; Notable for the following components:       Result Value    RBC 3.93 (*)     All other components within normal limits    Narrative:     Performed at:  [facility] [address] [place], [place]   Phone [phone]   COMPREHENSIVE METABOLIC PANEL W/ REFLEX TO MG FOR LOW K - Abnormal; Notable for the following components:    Glucose 148 (*)     BUN 24 (*)     CREATININE 1.4 (*)     GFR Non- 37 (*)     GFR  44 (*)     All other components within normal limits    Narrative:     Performed at:  [facility] [address] [place], [place]   Phone [phone]   PROTIME-INR - Abnormal; Notable for the following components:    Protime 24.7 (*)     INR 2.12 (*)     All other components within normal limits    Narrative:     Performed at:  [facility] [address], [place]   Phone [phone]   BLOOD GAS, VENOUS - Abnormal; Notable for the following components:    pH, [place] 7.276 (*)     pCO2, [place] 54.3 (*)     pO2, [place] 93.5 (*)     Carboxyhemoglobin 4.3 (*)     All other components within normal limits    Narrative:     Performed at:  [facility] [address] [place] [address], [place]   Phone [phone]   BLOOD GAS, ARTERIAL - Abnormal; Notable for the following components:    pH, Arterial 7.261 (*)     pCO2, Arterial 56.7 (*)     pO2, Arterial 52.2 (*)     O2 Sat, Arterial 82.0 (*)     All other components within normal limits    Narrative:     Performed at:  [facility] [address], [place]   Phone [phone]   TROPONIN    Narrative:     Performed at:  [facility] [address], [place]   Phone [phone]     Narrative:     Performed at:  [facility]  [address] [place]   Phone [phone]   LACTATE, SEPSIS    Narrative:     Performed at:  [facility]  [address],  [address], [place]   Phone [phone]   D-DIMER, QUANTITATIVE    Narrative:     Performed at:  [facility]  [address], [address] [place]   Phone [phone]       All other labs were within normal range or not returned as of this dictation. EMERGENCY DEPARTMENT COURSE and DIFFERENTIAL DIAGNOSIS/MDM:   Vitals:    Vitals:    09/15/21 1745 09/15/21 1800 09/15/21 1815 09/15/21 1830   BP: (!) 126/110 (!) 141/81 (!) 131/96 (!) 133/92   Pulse: 88 88 89 88   Resp: 12 12 12 13   SpO2: 95% 93% 93% 96%           MDM    The patient has remained stable but in critical condition throughout her hospital course. The patient is improving with BiPAP. I initially wanted to intubate the patient but she is adamant that she does not want to be intubated and this was confirmed by her daughter and family at the bedside. Initial blood work does show the patient is acidotic. Medical work-up also shows an acute kidney injury. CT of the chest shows no obvious acute findings or pulmonary embolism. The patient will be admitted in respiratory distress. She is in stable but critical condition. REASSESSMENT          CRITICAL CARE TIME   Total Critical Care time was 50 minutes, excluding separatelyreportable procedures. There was a high probability ofclinically significant/life threatening deterioration in the patient's condition which required my urgent intervention. CONSULTS:  None    PROCEDURES:  Unless otherwise noted below, none     Procedures    FINAL IMPRESSION      1.  Respiratory failure with hypoxia, unspecified chronicity (HCC)          DISPOSITION/PLAN   DISPOSITION Decision To Admit 09/15/2021 04:26:55 PM      PATIENT REFERREDTO:  No follow-up provider specified. DISCHARGEMEDICATIONS:  New Prescriptions    No medications on file     Controlled Substances Monitoring:     RX Monitoring 9/27/2019   Attestation -   Periodic Controlled Substance Monitoring Possible medication side effects, risk of tolerance/dependence & alternative treatments discussed. Chronic Pain > 50 MEDD Re-evaluated the status of the patient's underlying condition causing pain.        (Please note that portions of this note were completed with a voice recognition program.  Efforts were made to edit the dictations but occasionally words are mis-transcribed.)    Martita Rosario MD (electronically signed)  Attending Emergency Physician          Martita Rosario MD  09/15/21 1938

## 2021-09-16 LAB
A/G RATIO: 1.4 (ref 1.1–2.2)
ALBUMIN SERPL-MCNC: 4.1 G/DL (ref 3.4–5)
ALP BLD-CCNC: 92 U/L (ref 40–129)
ALT SERPL-CCNC: 21 U/L (ref 10–40)
ANION GAP SERPL CALCULATED.3IONS-SCNC: 14 MMOL/L (ref 3–16)
AST SERPL-CCNC: 32 U/L (ref 15–37)
BASOPHILS ABSOLUTE: 0 K/UL (ref 0–0.2)
BASOPHILS RELATIVE PERCENT: 0.1 %
BILIRUB SERPL-MCNC: 0.5 MG/DL (ref 0–1)
BUN BLDV-MCNC: 28 MG/DL (ref 7–20)
CALCIUM SERPL-MCNC: 9.1 MG/DL (ref 8.3–10.6)
CHLORIDE BLD-SCNC: 107 MMOL/L (ref 99–110)
CO2: 22 MMOL/L (ref 21–32)
CREAT SERPL-MCNC: 2 MG/DL (ref 0.6–1.2)
EKG ATRIAL RATE: 93 BPM
EKG DIAGNOSIS: NORMAL
EKG P AXIS: 19 DEGREES
EKG P-R INTERVAL: 228 MS
EKG Q-T INTERVAL: 446 MS
EKG QRS DURATION: 174 MS
EKG QTC CALCULATION (BAZETT): 554 MS
EKG R AXIS: 71 DEGREES
EKG T AXIS: -2 DEGREES
EKG VENTRICULAR RATE: 93 BPM
EOSINOPHILS ABSOLUTE: 0 K/UL (ref 0–0.6)
EOSINOPHILS RELATIVE PERCENT: 0.1 %
GFR AFRICAN AMERICAN: 29
GFR NON-AFRICAN AMERICAN: 24
GLOBULIN: 2.9 G/DL
GLUCOSE BLD-MCNC: 123 MG/DL (ref 70–99)
HCT VFR BLD CALC: 35.7 % (ref 36–48)
HEMOGLOBIN: 11.7 G/DL (ref 12–16)
LYMPHOCYTES ABSOLUTE: 0.4 K/UL (ref 1–5.1)
LYMPHOCYTES RELATIVE PERCENT: 2.1 %
MAGNESIUM: 2 MG/DL (ref 1.8–2.4)
MCH RBC QN AUTO: 31.5 PG (ref 26–34)
MCHC RBC AUTO-ENTMCNC: 32.9 G/DL (ref 31–36)
MCV RBC AUTO: 95.7 FL (ref 80–100)
MONOCYTES ABSOLUTE: 1 K/UL (ref 0–1.3)
MONOCYTES RELATIVE PERCENT: 5.5 %
NEUTROPHILS ABSOLUTE: 16.7 K/UL (ref 1.7–7.7)
NEUTROPHILS RELATIVE PERCENT: 92.2 %
PDW BLD-RTO: 14 % (ref 12.4–15.4)
PHOSPHORUS: 5.2 MG/DL (ref 2.5–4.9)
PLATELET # BLD: 159 K/UL (ref 135–450)
PMV BLD AUTO: 9.8 FL (ref 5–10.5)
POTASSIUM SERPL-SCNC: 4.9 MMOL/L (ref 3.5–5.1)
RBC # BLD: 3.73 M/UL (ref 4–5.2)
SARS-COV-2: NOT DETECTED
SODIUM BLD-SCNC: 143 MMOL/L (ref 136–145)
TOTAL PROTEIN: 7 G/DL (ref 6.4–8.2)
TROPONIN: <0.01 NG/ML
WBC # BLD: 18.1 K/UL (ref 4–11)

## 2021-09-16 PROCEDURE — 6370000000 HC RX 637 (ALT 250 FOR IP): Performed by: INTERNAL MEDICINE

## 2021-09-16 PROCEDURE — 99223 1ST HOSP IP/OBS HIGH 75: CPT | Performed by: INTERNAL MEDICINE

## 2021-09-16 PROCEDURE — 85025 COMPLETE CBC W/AUTO DIFF WBC: CPT

## 2021-09-16 PROCEDURE — 97162 PT EVAL MOD COMPLEX 30 MIN: CPT

## 2021-09-16 PROCEDURE — 83735 ASSAY OF MAGNESIUM: CPT

## 2021-09-16 PROCEDURE — 94640 AIRWAY INHALATION TREATMENT: CPT

## 2021-09-16 PROCEDURE — 2700000000 HC OXYGEN THERAPY PER DAY

## 2021-09-16 PROCEDURE — 84484 ASSAY OF TROPONIN QUANT: CPT

## 2021-09-16 PROCEDURE — 97116 GAIT TRAINING THERAPY: CPT

## 2021-09-16 PROCEDURE — 2580000003 HC RX 258: Performed by: INTERNAL MEDICINE

## 2021-09-16 PROCEDURE — 97530 THERAPEUTIC ACTIVITIES: CPT

## 2021-09-16 PROCEDURE — 2060000000 HC ICU INTERMEDIATE R&B

## 2021-09-16 PROCEDURE — 94761 N-INVAS EAR/PLS OXIMETRY MLT: CPT

## 2021-09-16 PROCEDURE — 84100 ASSAY OF PHOSPHORUS: CPT

## 2021-09-16 PROCEDURE — 97166 OT EVAL MOD COMPLEX 45 MIN: CPT

## 2021-09-16 PROCEDURE — 97535 SELF CARE MNGMENT TRAINING: CPT

## 2021-09-16 PROCEDURE — 94150 VITAL CAPACITY TEST: CPT

## 2021-09-16 PROCEDURE — 36415 COLL VENOUS BLD VENIPUNCTURE: CPT

## 2021-09-16 PROCEDURE — 93010 ELECTROCARDIOGRAM REPORT: CPT | Performed by: INTERNAL MEDICINE

## 2021-09-16 PROCEDURE — 80053 COMPREHEN METABOLIC PANEL: CPT

## 2021-09-16 PROCEDURE — 94660 CPAP INITIATION&MGMT: CPT

## 2021-09-16 RX ORDER — IPRATROPIUM BROMIDE AND ALBUTEROL SULFATE 2.5; .5 MG/3ML; MG/3ML
1 SOLUTION RESPIRATORY (INHALATION) 4 TIMES DAILY
Status: DISCONTINUED | OUTPATIENT
Start: 2021-09-17 | End: 2021-09-17 | Stop reason: HOSPADM

## 2021-09-16 RX ORDER — ALBUTEROL SULFATE 90 UG/1
2 AEROSOL, METERED RESPIRATORY (INHALATION) 4 TIMES DAILY
Status: DISCONTINUED | OUTPATIENT
Start: 2021-09-16 | End: 2021-09-16

## 2021-09-16 RX ADMIN — PREGABALIN 150 MG: 75 CAPSULE ORAL at 14:48

## 2021-09-16 RX ADMIN — Medication 2 PUFF: at 16:05

## 2021-09-16 RX ADMIN — Medication 2 PUFF: at 08:40

## 2021-09-16 RX ADMIN — PREGABALIN 150 MG: 75 CAPSULE ORAL at 21:11

## 2021-09-16 RX ADMIN — PREGABALIN 150 MG: 75 CAPSULE ORAL at 10:39

## 2021-09-16 RX ADMIN — Medication 2 PUFF: at 12:26

## 2021-09-16 RX ADMIN — ASPIRIN 81 MG: 81 TABLET, CHEWABLE ORAL at 10:39

## 2021-09-16 RX ADMIN — RIVAROXABAN 20 MG: 20 TABLET, FILM COATED ORAL at 18:45

## 2021-09-16 RX ADMIN — BETHANECHOL CHLORIDE 25 MG: 25 TABLET ORAL at 21:18

## 2021-09-16 RX ADMIN — SODIUM CHLORIDE: 9 INJECTION, SOLUTION INTRAVENOUS at 03:50

## 2021-09-16 RX ADMIN — BETHANECHOL CHLORIDE 25 MG: 25 TABLET ORAL at 10:39

## 2021-09-16 RX ADMIN — SODIUM CHLORIDE: 9 INJECTION, SOLUTION INTRAVENOUS at 14:03

## 2021-09-16 RX ADMIN — ACETAMINOPHEN 650 MG: 325 TABLET ORAL at 21:11

## 2021-09-16 RX ADMIN — BETHANECHOL CHLORIDE 25 MG: 25 TABLET ORAL at 14:48

## 2021-09-16 RX ADMIN — NEPHROCAP 1 MG: 1 CAP ORAL at 10:39

## 2021-09-16 ASSESSMENT — PAIN DESCRIPTION - ORIENTATION
ORIENTATION: LOWER
ORIENTATION: LOWER

## 2021-09-16 ASSESSMENT — PAIN SCALES - GENERAL
PAINLEVEL_OUTOF10: 0
PAINLEVEL_OUTOF10: 0
PAINLEVEL_OUTOF10: 8
PAINLEVEL_OUTOF10: 0
PAINLEVEL_OUTOF10: 0
PAINLEVEL_OUTOF10: 8
PAINLEVEL_OUTOF10: 0
PAINLEVEL_OUTOF10: 5
PAINLEVEL_OUTOF10: 0

## 2021-09-16 ASSESSMENT — PAIN DESCRIPTION - LOCATION
LOCATION: BACK
LOCATION: BACK

## 2021-09-16 ASSESSMENT — PAIN DESCRIPTION - DESCRIPTORS
DESCRIPTORS: ACHING
DESCRIPTORS: ACHING

## 2021-09-16 ASSESSMENT — PAIN DESCRIPTION - PAIN TYPE
TYPE: CHRONIC PAIN
TYPE: CHRONIC PAIN

## 2021-09-16 NOTE — PROGRESS NOTES
This RN + RT attempted to obtain an ABG from the patient with no success. Will pass on to day shift.

## 2021-09-16 NOTE — PLAN OF CARE
Problem: Falls - Risk of:  Goal: Will remain free from falls  Description: Will remain free from falls  9/16/2021 1115 by Leticia Haynes RN  Outcome: Ongoing  Problem: Skin Integrity:  Goal: Will show no infection signs and symptoms  Description: Will show no infection signs and symptoms  9/16/2021 1115 by Leticia Haynes RN  Outcome: Ongoing

## 2021-09-16 NOTE — CARE COORDINATION
[facility]     William Earl, ANANYAN, CCM, RN  [facility]  [phone]

## 2021-09-16 NOTE — CONSULTS
PULMONARY AND CRITICAL CARE MEDICINE CONSULTATION NOTE    CONSULTING PHYSICIAN:  Puneet Hudson MD    ADMISSION DATE: 9/15/2021  ADMISSION DIAGNOSIS: Acute respiratory failure with hypoxia and hypercapnia ([facility]) [J96.01, J96.02]  Respiratory failure with hypoxia, unspecified chronicity ([facility] [address] [J96.91]    REASON FOR CONSULT:   Chief Complaint   Patient presents with    Shortness of Breath     pt brought in by [facility] for feeling sob, cp for several days. ems reports pt had frothy sputum, placed on cpap.  spo2 % on room air, spo2 -80-90's       DATE OF CONSULT: 9/15/2021    HISTORY OF PRESENT ILLNESS: 77y.o. year old female with a past medical history significant for chronic back pain, depression, hypertension who presented to the hospital after complaining of shortness of breath and chest pain for several days. Patient called EMS they found the patient to be hypoxic. Patient apparently has been chronically dependent on narcotics including morphine IR 15 mg 3 times a day for her back pain. Does have a history of depression, CVA, GERD, COPD, obesity and chronic anticoagulation with Xarelto. When the EMS arrived patient was found to be hypoxic and was started on oxygen in the edition in the ER was given noninvasive ventilation. Overnight remained on BiPAP support. CT chest did not show any coronary embolism. No acute pulmonary finding. At the time of interview patient sitting in bed in no apparent respite distress. Reports feeling better. Currently on 6 L/min of oxygen supplementation. Manages her medication herself. Does have a pillbox and denies any intentional overdose of medication. No sick contacts. REVIEW OF SYSTEMS:     CONSTITUTIONAL SYMPTOMS: The patient denies fever, fatigue, night sweats, weight loss or weight gain. HEENT: No vision changes. No tinnitus, Denies sinus pain. No hoarseness, or dysphagia. NECK: Patient denies swelling in the neck.    CARDIOVASCULAR: Denies chest pain, palpitation, syncope. RESPIRATORY: As Per HPI. GASTROINTESTINAL: Denies nausea, abdominal pain or change in bowel function. GENITOURINARY: Denies obstructive symptoms. No history of incontinence. BREASTS: No masses or lumps in the breasts. SKIN: No rashes or itching. MUSCULOSKELETAL: Denies weakness or bone pain. NEUROLOGICAL: No headaches or seizures. PSYCHIATRIC: Denies mood swings or depression. ENDOCRINE: Denies heat or cold intolerance or excessive thirst.  HEMATOLOGIC/LYMPHATIC: Denies easy bruising or lymph node swelling. ALLERGIC/IMMUNOLOGIC: No environmental allergies.     PAST MEDICAL HISTORY:   Past Medical History:   Diagnosis Date    AVM     Chronic back pain     Depression     Gastritis     History of blood transfusion     secondary to GI bleed    History of GI bleed     Hypertension     Personal history of MRSA (methicillin resistant Staphylococcus aureus)     recurrent, last 2-3 years ago finger (doesnt remember which one)       PAST SURGICAL HISTORY:   Past Surgical History:   Procedure Laterality Date    ABDOMINAL EXPLORATION SURGERY  09/07/2017    Lysis of adhesions, removal of pelvic mass, total abdominal hysterectomy with BSO, partial omentectomy, appendectomy, and pelvic lymphadenectomy    BACK SURGERY      multiple lumbar spine surgeries including laminectomy and fusion    CERVICAL FUSION      COLONOSCOPY      ENDOSCOPY, COLON, DIAGNOSTIC      HIATAL HERNIA REPAIR  1999    OTHER SURGICAL HISTORY Left [id number]    SPINAL CORD STIMULATOR BATTERY REPLACMENT       SOCIAL HISTORY:   Social History     Tobacco Use    Smoking status: Never Smoker    Smokeless tobacco: Never Used   Substance Use Topics    Alcohol use: No    Drug use: No       FAMILY HISTORY:   Family History   Problem Relation Age of Onset    Diabetes Mother     Heart Disease Mother         cad, aortic aneurysm    Diabetes Father     Heart Disease Father         heart attack, chf    Other Sister         abdominal aneurysm       MEDICATIONS:     No current facility-administered medications on file prior to encounter. Current Outpatient Medications on File Prior to Encounter   Medication Sig Dispense Refill    folbee plus (FOLBEE PLUS) TABS Take 1 tablet by mouth daily      DULoxetine (CYMBALTA) 30 MG extended release capsule       bethanechol (URECHOLINE) 25 MG tablet TAKE ONE TABLET BY MOUTH THREE TIMES A DAY 90 tablet 1    rivaroxaban (XARELTO) 20 MG TABS tablet Take 1 tablet by mouth Daily with supper 90 tablet 1    rOPINIRole (REQUIP) 0.5 MG tablet Take 1 tablet by mouth nightly 90 tablet 3    meclizine (ANTIVERT) 25 MG tablet TAKE ONE TABLET BY MOUTH THREE TIMES A DAY AS NEEDED FOR DIZZINESS 90 tablet 1    buPROPion (WELLBUTRIN XL) 300 MG extended release tablet TAKE ONE TABLET BY MOUTH EVERY MORNING 90 tablet 1    sertraline (ZOLOFT) 25 MG tablet Take 1 tablet by mouth daily 30 tablet 5    morphine (MSIR) 15 MG tablet Take 15 mg by mouth 3 times daily as needed for Pain.  furosemide (LASIX) 20 MG tablet TAKE ONE TABLET BY MOUTH DAILY AS NEEDED FOR WEIGHT GAIN OF 2LBS OR LEG EDEMA 90 tablet 0    amLODIPine (NORVASC) 10 MG tablet Take 1 tablet by mouth daily 90 tablet 0    tiZANidine (ZANAFLEX) 4 MG tablet Take 4 mg by mouth 2 times daily       pregabalin (LYRICA) 150 MG capsule Take 150 mg by mouth three times daily.       ondansetron (ZOFRAN) 4 MG tablet Take 1 tablet by mouth daily as needed for Nausea or Vomiting 30 tablet 0    aspirin 81 MG chewable tablet Take 1 tablet by mouth daily 30 tablet 0        albuterol sulfate HFA  2 puff Inhalation 4x daily    ipratropium  2 puff Inhalation 4x daily    amLODIPine  10 mg Oral Daily    aspirin  81 mg Oral Daily    bethanechol  25 mg Oral TID    b complex-C-folic acid  1 mg Oral Daily    pregabalin  150 mg Oral TID    rivaroxaban  20 mg Oral Dinner    sodium chloride flush  5-40 mL IntraVENous 2 times per day      sodium chloride      sodium chloride 100 mL/hr at 09/16/21 0500     meclizine, sodium chloride flush, sodium chloride, polyethylene glycol, acetaminophen **OR** acetaminophen, albuterol, naloxone      ALLERGIES:   Allergies as of 09/15/2021 - Fully Reviewed 07/07/2021   Allergen Reaction Noted    Penicillins  07/07/2021      OBJECTIVE:   height is 5' 3\" (1.6 m) and weight is 175 lb 1.6 oz (79.4 kg). Her temporal temperature is 97.4 °F (36.3 °C). Her blood pressure is 109/69 and her pulse is 95. Her respiration is 19 and oxygen saturation is 84% (abnormal). No intake/output data recorded. PHYSICAL EXAM:    CONSTITUTIONAL: She is a 77y.o.-year-old who appears her stated age. She is alert and oriented x 3 and in no acute distress. HEENT: PERRLA, EOMI. No scleral icterus. No thrush, atraumatic, normocephalic. NECK: Supple, without cervical or supraclavicular lymphadenopathy:  CARDIOVASCULAR: S1 S2 RRR. Without murmer  RESPIRATORY & CHEST: Bibasilar slightly decreased breath sounds heard. No wheezing heard no crackles heard. GASTROINTESTINAL & ABDOMEN: Soft, nontender, positive bowel sounds in all quadrants, non-distended, without hepatosplenomegaly. GENITOURINARY: Deferred. MUSCULOSKELETAL: No tenderness to palpation of the axial skeleton. There is no clubbing. No cyanosis. No edema of the lower extremities. SKIN OF BODY: No rash or jaundice. PSYCHIATRIC EVALUATION: Normal affect. Patient answers questions appropriately. HEMATOLOGIC/LYMPHATIC/ IMMUNOLOGIC: No palpable lymphadenopathy. NEUROLOGIC: Alert and oriented x 3. Groslly non-focal. Motor strength is 5+/5 in all muscle groups. The patient has a normal sensorium globally.       LABS:  Recent Labs     09/15/21  1522 09/16/21  0440   WBC 8.6 18.1*   HGB 12.4 11.7*   HCT 38.1 35.7*    159   ALT 23 21   AST 36 32    143   K 4.7 4.9    107   CREATININE 1.4* 2.0*   BUN 24* 28*   CO2 24 22   INR 2.12*  --        Recent Labs 09/15/21  1522 09/16/21  0440   GLUCOSE 148* 123*   CALCIUM 9.6 9.1    143   K 4.7 4.9   CO2 24 22    107   BUN 24* 28*   CREATININE 1.4* 2.0*       Recent Labs     09/15/21  1522 09/15/21  1955   PHART 7.261* 7.223*   ZXJ2XBF 56.7* 57.0*   PO2ART 52.2* 123.0*   WKK1ANF 25.5 23.4   A7JLAGEQ 82.0* 98.6   BEART -2.3 -4.9*       Lab Results   Component Value Date    INR 2.12 (H) 09/15/2021    INR 1.03 09/24/2019    INR 1.04 09/24/2019    PROTIME 24.7 (H) 09/15/2021    PROTIME 11.7 09/24/2019    PROTIME 11.9 09/24/2019     No results found for: AMYLASE   Lab Results   Component Value Date    LABA1C 5.7 07/29/2020     Lab Results   Component Value Date    .9 07/29/2020     Lab Results   Component Value Date    TSH 5.39 (H) 01/09/2018     Lab Results   Component Value Date    TROPONINI <0.01 09/16/2021      No results found for: CRP   No results found for: BNP   Lab Results   Component Value Date    DDIMER <200 09/15/2021      Lab Results   Component Value Date    FERRITIN 244.0 (H) 09/26/2019      Lab Results   Component Value Date    LACTA 1.3 05/08/2019           IMAGING:    Narrative   EXAMINATION:   CTA OF THE CHEST 9/15/2021 5:26 pm           FINDINGS:   Pulmonary Arteries: Pulmonary arteries are adequately opacified for   evaluation.  No evidence of intraluminal filling defect to suggest pulmonary   embolism.  Main pulmonary artery is normal in caliber.       Mediastinum: No evidence of mediastinal lymphadenopathy.  The heart and   pericardium demonstrate no acute abnormality.  There is no acute abnormality   of the thoracic aorta.       Lungs/pleura:  There is some dependent atelectasis at the lung bases   bilaterally.       Upper Abdomen: Limited images of the upper abdomen are unremarkable.       Soft Tissues/Bones: No acute bone or soft tissue abnormality.           Impression   No evidence of pulmonary embolism or acute pulmonary abnormality.               IMPRESSION:     Acute hypoxic and hypercapnic respiratory failure, resolving  Chronic opioid dependence  COPD of unknown severity not in exacerbation  COVID-19 rule out  Depression  Back pain    RECOMMENDATION:     Patient's respiratory status has improved. Overnight was on BiPAP therapy. Currently on 6 L/min of oxygen supplementation saturating in high 90s. Titrate to flow down to maintain SPO2 between 88 to 92%. Patient can be given BiPAP therapy  at nighttime. Currently avoid any opioid medications. Patient is already getting Lyrica 150 mg 3 times a day. By tomorrow morphine can be started as needed to reduce the risk of opioid withdrawal.  COVID-19 testing is negative. Continue with albuterol and Atrovent inhaler. She can move out of ICU today. Nothing further to add from pulmonary standpoint. Thank you for your consultation. We will sign off. Marlene Archuleta MD  Pulmonary Critical Care and Sleep Medicine  9/15/2021, 1:00 PM    This note was completed using dragon medical speech recognition software. Grammatical errors, random word insertions, pronoun errors and incomplete sentences are occasional consequences of this technology due to software limitations. If there are questions or concerns about the content of this note of information contained within the body of this dictation they should be addressed with the provider for clarification.

## 2021-09-16 NOTE — CARE COORDINATION
Discharge Planning Assessment  Readmission risk score 24%  RN discharge planner met with patient/ (and family member) to discuss reason for admission, current living situation, and potential needs at the time of discharge    Demographics/Insurance verified Yes    Current type of dwelling: single level home with one step entry     Patient from ECF/SW confirmed with:n/a    Living arrangements:with     Level of function/Support: independent, states wants assistance from CoA CM placed referral    PCP: Elie aBrlow    Last Visit to PCP: July 2021    DME: cane    Active with any community resources/agencies/skilled home care: none at present, patient inquires about CoA services, referral placed    Medication compliance issues: not identified    Financial issues that could impact healthcare: not identified    Tentative discharge plan: home    *Discussed and provided facilities of choice if transition to a skilled nursing facility is required at the time of discharge      *Discussed with patient and/or family that on the day of discharge home tentative time of discharge will be between 10 AM and noon.     Transportation at the time of discharge: family    ALYSA Ortega, CCM, RN  [facility]  [phone]

## 2021-09-16 NOTE — PROGRESS NOTES
----- Message from George Trotter sent at 8/31/2020 11:20 AM CDT -----  Zandra, with [facility], would like return call regarding pt  Please call back at [phone].  Raj Garza     Incentive Spirometry education and demonstration completed by Respiratory Therapy Yes      Response to education: Excellent     Teaching Time: 5 minutes    Minimum Predicted Vital Capacity - 524 mL. Patient's Actual Vital Capacity - 600 mL. Turning over to Nursing for routine follow-up Yes.     Comments: IS goal met IS turned to nursing    Electronically signed by Belen Holt on 9/16/2021 at 7:07 PM

## 2021-09-16 NOTE — PROGRESS NOTES
Pt shift assessment completed, see flowsheets for full details. Pt alert and oriented X3, VSS , N/C 2L, a.m med given, pt up in chair eating breakfast, will continue to monitor.

## 2021-09-16 NOTE — PROGRESS NOTES
Hospitalist Progress Note      PCP: Migdalia Chavarria MD    Date of Admission: 9/15/2021    Chief Complaint: Altered mental status, shortness of breath    Hospital Course:     77 y.o. female with history of gastritis, essential hypertension, depression, history of CVA, chronic pain, gastroesophageal reflux disease, COPD, obesity with BMI of 36 kg/m², chronic narcotic use (morphine IR 15 mg 3 times daily, verified in PDMP), on chronic anticoagulation with Xarelto (per oncology notes reviewed, there was concern about possible lupus anticoagulant and at the time, decision was been weighed about possible anticoagulation; I do not see clear documentation for any other reason anticoagulant was started). Who was brought to the emergency room for evaluation of shortness of breath. Subjective:     No acute event overnight, currently on 2 L nasal cannula with good saturation, she is alert, Covid testing is negative.       Medications:  Reviewed    Infusion Medications    sodium chloride      sodium chloride 100 mL/hr at 09/16/21 0500     Scheduled Medications    albuterol sulfate HFA  2 puff Inhalation 4x daily    ipratropium  2 puff Inhalation 4x daily    amLODIPine  10 mg Oral Daily    aspirin  81 mg Oral Daily    bethanechol  25 mg Oral TID    b complex-C-folic acid  1 mg Oral Daily    pregabalin  150 mg Oral TID    rivaroxaban  20 mg Oral Dinner    sodium chloride flush  5-40 mL IntraVENous 2 times per day     PRN Meds: meclizine, sodium chloride flush, sodium chloride, polyethylene glycol, acetaminophen **OR** acetaminophen, albuterol, naloxone      Intake/Output Summary (Last 24 hours) at 9/16/2021 1153  Last data filed at 9/16/2021 0600  Gross per 24 hour   Intake 1081.19 ml   Output 1325 ml   Net -243.81 ml       Physical Exam Performed:    /69   Pulse 95   Temp 97.4 °F (36.3 °C) (Temporal)   Resp 19   Ht 5' 3\" (1.6 m)   Wt 175 lb 1.6 oz (79.4 kg)   SpO2 (!) 84%   BMI 31.02 kg/m² General appearance: No apparent distress, appears stated age and cooperative. HEENT: Pupils equal, round, and reactive to light. Conjunctivae/corneas clear. Neck: Supple, with full range of motion. No jugular venous distention. Trachea midline. Respiratory:  Normal respiratory effort. Clear to auscultation, bilaterally without Rales/Wheezes/Rhonchi. Cardiovascular: Regular rate and rhythm with normal S1/S2 without murmurs, rubs or gallops. Abdomen: Soft, non-tender, non-distended with normal bowel sounds. Musculoskeletal: No clubbing, cyanosis or edema bilaterally. Full range of motion without deformity. Skin: Skin color, texture, turgor normal.  No rashes or lesions. Neurologic:  Neurovascularly intact without any focal sensory/motor deficits. Cranial nerves: II-XII intact, grossly non-focal.  Psychiatric: Alert and oriented, thought content appropriate, normal insight  Capillary Refill: Brisk,3 seconds, normal   Peripheral Pulses: +2 palpable, equal bilaterally       Labs:   Recent Labs     09/15/21  1522 09/16/21  0440   WBC 8.6 18.1*   HGB 12.4 11.7*   HCT 38.1 35.7*    159     Recent Labs     09/15/21  1522 09/16/21  0440    143   K 4.7 4.9    107   CO2 24 22   BUN 24* 28*   CREATININE 1.4* 2.0*   CALCIUM 9.6 9.1   PHOS  --  5.2*     Recent Labs     09/15/21  1522 09/16/21  0440   AST 36 32   ALT 23 21   BILITOT 0.5 0.5   ALKPHOS 107 92     Recent Labs     09/15/21  1522   INR 2.12*     Recent Labs     09/15/21  1522 09/15/21  2307 09/16/21  0440   TROPONINI 0.01 <0.01 <0.01       Urinalysis:      Lab Results   Component Value Date    NITRU Negative 09/24/2019    WBCUA 10 08/29/2019    BACTERIA 2+ 08/29/2019    RBCUA 3 08/29/2019    BLOODU Negative 09/24/2019    SPECGRAV 1.027 09/24/2019    GLUCOSEU Negative 09/24/2019       Radiology:  CT CHEST PULMONARY EMBOLISM W CONTRAST   Final Result   No evidence of pulmonary embolism or acute pulmonary abnormality.          XR CHEST PORTABLE   Final Result   Marked elevation of the right hemidiaphragm, similar in appearance to prior. No acute process. Assessment/Plan:    Active Hospital Problems    Diagnosis     Acute respiratory failure with hypoxia and hypercapnia (HCC) [J96.01, J96.02]      Acute hypoxic hypercapnic respiratory failure  Suspecting unintentional morphine overdose  Acute kidney injury likely prerenal azotemia  Intermittent chest pain, resolving. non-ACS troponin trend  QT prolongation  History of gastritis  History of essential hypertension  Depression  History of CABG  Chronic pain  GERD  COPD  Obesity with BMI 36 kg/m²  Chronic narcotic use morphine IR 15 mg 3 times daily daily. On chronic anticoagulation with Xarelto    Continue inpatient care  Patient is alert and oriented x2. Covid testing is negative  Currently on 2 L nasal cannula with good saturation, on physical examination she has no wheezing, no history of smoking per patient, she does not use oxygen at home. Acute kidney injury- UO 1325 ml over the last 24 hours, creatinine is worsening, continue IV fluid. Suspecting unintentional morphine overdose-she did receive Narcan upon admission, she is alert oriented, continue nasal cannula, currently on 2 L/min, hold off morphine for now. On chronic anticoagulationXarelto, will continue    Chronic pain, continue Lyrica. Essential hypertension, continue amlodipine. DVT Prophylaxis: On Xarelto  Diet: ADULT DIET; Regular;  No Added Salt (3-4 gm)  Code Status: Full Code    PT/OT Eval St PT OT for evaluation     Dispo -patient can be transfer to medical floor    Camden Mosquera MD

## 2021-09-16 NOTE — PLAN OF CARE
Problem: Falls - Risk of:  Goal: Will remain free from falls  Description: Will remain free from falls  Outcome: Ongoing     Problem: Falls - Risk of:  Goal: Absence of physical injury  Description: Absence of physical injury  Outcome: Ongoing  Bed in lowest position with wheels locked. Alarm activated. 3/4 side rails up. Non-skid socks on. Call light within reach. Hourly checks. All requested needs provided. Problem: Isolation Precautions - Risk of Spread of Infection  Goal: Prevent transmission of infection  Outcome: Ongoing  Hands washed before entering and exiting room. Proper PPE worn in room. Appropriate caution practiced. Problem: Skin Integrity:  Goal: Will show no infection signs and symptoms  Description: Will show no infection signs and symptoms  Outcome: Ongoing     Problem: Skin Integrity:  Goal: Absence of new skin breakdown  Description: Absence of new skin breakdown  Outcome: Ongoing  Repositioned for comfort and the prevention of pressure ulcer formation. Problem: Airway Clearance - Ineffective  Goal: Achieve or maintain patent airway  Outcome: Ongoing     Problem: Gas Exchange - Impaired  Goal: Absence of hypoxia  Outcome: Ongoing   On Bipap and sating high 90s. Problem:  Body Temperature -  Risk of, Imbalanced  Goal: Will regain or maintain usual level of consciousness  Outcome: Ongoing

## 2021-09-16 NOTE — PROGRESS NOTES
Occupational Therapy   Occupational Therapy Initial Assessment  Date: 2021   Patient Name: Saud Burkett  MRN: [id number]     : 1945    Date of Service: 2021    Discharge Recommendations:  Saud Burkett scored a 16/24 on the AM-PAC ADL Inpatient form. Current research shows that an AM-PAC score of 17 or less is typically not associated with a discharge to the patient's home setting. Based on the patient's AM-PAC score and their current ADL deficits, it is recommended that the patient have 3-5 sessions per week of Occupational Therapy at d/c to increase the patient's independence. Please see assessment section for further patient specific details. If patient discharges prior to next session this note will serve as a discharge summary. Please see below for the latest assessment towards goals. OT Equipment Recommendations  Equipment Needed: No (TBD next level of care)    Assessment   Performance deficits / Impairments: Decreased functional mobility ; Decreased ADL status; Decreased endurance;Decreased cognition;Decreased coordination;Decreased balance;Decreased high-level IADLs  Assessment: Pt is well below her baseline level of occupational function, based on the above deficits associated with acute respiratory failure with hypoxia. Pt would benefit from continued skilled acute OT services to address these deficits. Treatment Diagnosis: Decreased ADL/IADL status, functional mobility, endurance, coordination, and cognition associated with acute respiratory failure with hypoxia  Prognosis: Good  Decision Making: Medium Complexity  History: Pt lives w/spouse, I PTA  Exam: As above  Assistance / Modification: Min A for functional mobility, Min to Mod A standing balance, decreased problem solving  OT Education: OT Role;Plan of Care;ADL Adaptive Strategies;Transfer Training  Patient Education: pursed lip breathing, d/c recommendation. Pt verbalized understanding.  Needs reinforcement. Barriers to Learning: Cognition  REQUIRES OT FOLLOW UP: Yes  Activity Tolerance  Activity Tolerance: Treatment limited secondary to medical complications (free text)  Activity Tolerance: Pt desats to 85-86% with ADL activity/functional mobility. With pursed lip breathing, SPO2 rapidly returns to mid-90s. Safety Devices  Safety Devices in place: Yes  Type of devices: All fall risk precautions in place; Left in bed;Bed alarm in place;Call light within reach;Nurse notified;Gait belt;Patient at risk for falls           Patient Diagnosis(es): The encounter diagnosis was Respiratory failure with hypoxia, unspecified chronicity ([facility] [address] has a past medical history of AVM, Chronic back pain, Depression, Gastritis, History of blood transfusion, History of GI bleed, Hypertension, and Personal history of MRSA (methicillin resistant Staphylococcus aureus). has a past surgical history that includes hiatal hernia repair (1999); Endoscopy, colon, diagnostic; Colonoscopy; cervical fusion; back surgery; other surgical history (Left, 349053); and Abdominal exploration surgery (09/07/2017). Treatment Diagnosis: Decreased ADL/IADL status, functional mobility, endurance, coordination, and cognition associated with acute respiratory failure with hypoxia      Restrictions  Restrictions/Precautions  Restrictions/Precautions: Fall Risk (high fall risk)  Position Activity Restriction  Other position/activity restrictions: Yovani Wills is a 77 y.o. female who presents to the emergency department with shortness of breath. This is a 78-year-old female who presents with shortness of breath for the last several days. The patient apparently told her daughter that she was feeling ill over the last several days. There is been no history of fevers. The patient is vaccinated for COVID-19. When EMS arrived, the patient had a lot of \"foam in her mouth\". CPAP was started with some improvement.   Patient denies any chest pain.COVID -. Complications likely d/t accidental morphine overdose. Subjective   General  Chart Reviewed: Yes  Patient assessed for rehabilitation services?: Yes  Family / Caregiver Present: No  Diagnosis: Acute respitory failure with hypoxia  Subjective  Subjective: Pt seated in recliner on arrival, pleasant and agreeable to OT eval. Pt denies pai9n.   Patient Currently in Pain: Denies  Pre Treatment Pain Screening  Intervention List: Patient able to continue with treatment  Vital Signs  Patient Currently in Pain: Denies  Social/Functional History  Social/Functional History  Lives With: Spouse  Type of Home: House  Home Layout: One level  Home Access: Level entry  Bathroom Shower/Tub: Tub/Shower unit  Bathroom Toilet: Standard  Bathroom Equipment: Grab bars in shower (uses sink by toilet)  Home Equipment: [employer] Help From: Family (3 kids who live close by,  however  works at [employer])  ADL Assistance: Independent  Homemaking Assistance: Independent (reports she would like to have more assistance with this)  Homemaking Responsibilities: Yes  Ambulation Assistance: Independent (cane when outside)  Transfer Assistance: Independent  Active : Yes  Occupation: Retired  Type of occupation: insurance  Leisure & Hobbies: sewing, square dancing  Additional Comments: denies falls in last 6 months       Objective   Vision: Impaired  Vision Exceptions: Wears glasses for reading (not at hospital)  Hearing: Within functional limits    Orientation  Overall Orientation Status: Within Functional Limits     Balance  Sitting Balance: Stand by assistance (pt with posterior lean on toilet)  Standing Balance: Dependent/Total (Initial stand Min to Mod A of 2 w/posterior lean; Min A ambulating with RW; posterior lean at times)  Standing Balance  Time: ~20 sec, ~1 min X 3  Activity: static standing, functional mobility to toilet; functional mobility from toilet to recliner; functional mobility recliner to opposite side of bed  Comment: Posterior lean at times, verbal cues to extend trunk  Functional Mobility  Functional - Mobility Device: Rolling Walker  Activity: To/from bathroom; Other  Assist Level: Minimal assistance (Min of 1 + assist of another for line mgt, safety, redirection)  Functional Mobility Comments: cues to stay inside of walker; decreased awareness of edges of doorway, running into doorway w/walker wheels, assist of another for line mgt  Toilet Transfers  Toilet - Technique: Ambulating  Equipment Used: Standard toilet  Toilet Transfer: Minimal assistance  Toilet Transfers Comments: Pt attempting to sit too soon--needed cues to orient body w/toileting, to hold grab bar. ADL  Grooming: Stand by assistance (oral care, seated; tremors in hands; s/u wash hands & face seated)  LE Dressing: Increased time to complete;Verbal cueing; Moderate assistance (I socks, Min/Mod A for standing balance for clothing mgt; max verbal cueing for threading LEs into pullups)  Toileting: Moderate assistance;Minimal assistance (for balance for clothing mgt; pt had sarah catheter)  Tone RUE  RUE Tone: Normotonic  Tone LUE  LUE Tone: Normotonic  Coordination  Movements Are Fluid And Coordinated: No  Coordination and Movement description: Fine motor impairments;Right UE;Left UE; Intention tremors;Decreased accuracy; Decreased speed     Bed mobility  Sit to Supine: Stand by assistance  Transfers  Sit to stand: Moderate assistance (ranged from Mod A to Min A, stood from toilet & recliner; verbal cues for hand placement)  Stand to sit: Minimal assistance (to toilet, recliner X 2, to EOB; cues for orientation of body to surface, hand placement)     Cognition  Overall Cognitive Status: Exceptions  Following Commands: Follows one step commands with repetition; Inconsistently follows commands  Attention Span: Attends with cues to redirect (Pt very distractable)  Memory: Decreased short term memory (explained pt had catheter multiple times when she stated she had to urinate)  Safety Judgement: Decreased awareness of need for safety;Decreased awareness of need for assistance (Very impulsive)  Problem Solving: Decreased awareness of errors;Assistance required to correct errors made;Assistance required to identify errors made;Assistance required to implement solutions;Assistance required to generate solutions  Insights: Decreased awareness of deficits  Initiation: Does not require cues  Sequencing: Requires cues for some  Cognition Comment: Pt very confused, not following commands well, such as heading for toilet when told we were heading for the sink.   Perception  Overall Perceptual Status: Impaired  Motor Planning:  (verbal & tactile cues for walker mgt)     Sensation  Overall Sensation Status: Impaired (reports N/T L UE and L LE; able to ID all dermatomes in UE/LE testing)        LUE AROM (degrees)  LUE AROM : WNL  Left Hand AROM (degrees)  Left Hand AROM: WFL  RUE AROM (degrees)  RUE AROM : WNL  Right Hand AROM (degrees)  Right Hand AROM: WFL  LUE Strength  Gross LUE Strength: WFL (elbow, shoulder 5/5)  L Hand General: 4+/5  RUE Strength  Gross RUE Strength: WFL (shoulder 5/5, elbow 4-/5)  R Hand General: 4+/5                   Plan   Plan  Times per week: 3-5  Times per day: Daily  Current Treatment Recommendations: Safety Education & Training, Self-Care / ADL, Endurance Training, Functional Mobility Training, Balance Training, Patient/Caregiver Education & Training      AM-PAC Score        -[facility] Inpatient Daily Activity Raw Score: 16 (09/16/21 1443)  AM-PAC Inpatient ADL T-Scale Score : 35.96 (09/16/21 1443)  ADL Inpatient CMS 0-100% Score: 53.32 (09/16/21 1443)  ADL Inpatient CMS G-Code Modifier : CK (09/16/21 1443)    Goals  Short term goals  Time Frame for Short term goals: Discharge  Short term goal 1: CGA for functional transfers to ADL surfaces w/RW and w/safe technique  Short term goal 2: CGA for functional mobility w/RW for ADL activity w/safe technique  Short term goal 3: CGA for donning pullup without verbal cues/CGA for LB bathing  Short term goal 4: Pt to tolerate standing 3-5 min at sink for grooming  Short term goal 5: Pt to perform pursed lip breathing as needed during ADL activity w/min verbal cues       Therapy Time   Individual Concurrent Group Co-treatment   Time In 1253         Time Out 1401         Minutes 68               Timed Code Treatment Minutes:   53 min    Total Treatment Minutes:  76 min    C/ Balbir [address], OT   Trinity Abdullahi., OTR/L, [id number]

## 2021-09-16 NOTE — PROGRESS NOTES
Patient brought by squad from home when found unresponsive by family member. Reported to have frothy sputum. Possible unintentional overdose (takes morphine IR 15 mg 3 times daily) vs. Covid. Narcan'd in ED with report that patient became alert immediately after. Transferred to [facility] bed 5904 for Covid r/o. Chest CT negative for pulm edema, PE, and acute abnormalities. Responds to voice, but does not open eyes. Lethargic and very confused. Follows some simple commands. Oriented to self and place; disoriented to time and situation. Shook her head in believing that she could have overdosed or that she could have Covid. Can answer yes or no questions with gesturing/nodding, but stops responding after a few questions are asked in a row. Unable to complete admission questions. Unable to give PO beds. On bipap 14/8 @ 60% and sating high 90s. Diminished breath sounds throughout. Labored breathing with dyspnea at rest. ST with R bundle branch block. Abdomen is soft and round with active bowel sounds x4. Skin is pale, cool, and very diaphoretic. Soaked in sweat on arrival. Abrasion to L middle finger and R knee. Redness to breast and groin skin folds. Scattered bruising. Surgical scar to middle lower back with an implanted stimulator. Generalized weakness. Patient bathed with complete linen change. Repositioned for comfort and the prevention of pressure ulcer formation.

## 2021-09-16 NOTE — PROGRESS NOTES
Physical Therapy    Facility/Department: [address]  Initial Assessment    NAME: Sami Bhakta  : 1945  MRN: [id number]    Date of Service: 2021    Discharge Recommendations:  Sami Bhakta scored a 13/24 on the AM-PAC short mobility form. Current research shows that an AM-PAC score of 17 or less is typically not associated with a discharge to the patient's home setting. Based on the patient's AM-PAC score and their current functional mobility deficits, it is recommended that the patient have 3-5 sessions per week of Physical Therapy at d/c to increase the patient's independence. Please see assessment section for further patient specific details. If patient discharges prior to next session this note will serve as a discharge summary. Please see below for the latest assessment towards goals. 3-5 sessions per week   PT Equipment Recommendations  Other: owns RW, defer to next level of care    Assessment   Body structures, Functions, Activity limitations: Decreased functional mobility ; Decreased balance;Decreased safe awareness;Decreased cognition;Decreased endurance;Decreased strength  Assessment: Pt presents with above deficits after being admitted to hospital with acute repisratory failure with hypoxia. Pt tolerates activity throughout session on 2L O2 with SpO2 ranging from 85-96% O2 with quick improvement with PLB. Pt demonstrates cognitive deficits including decreased safety awareness and impulsivity and requires modx2 progressing to CGA for static standing balance with RW d/t posterior lean and min x1 + assist of another for line management/safety for ambulation 15' with RW. Cotx recommended for safety. Pt presents below highly independent PLOF and would benefit from continued skilled inpatient PT inorder to progress towards PLOF.   Treatment Diagnosis: decreased functional mobility  Prognosis: Good  Decision Making: Medium Complexity  PT Education: Goals;PT Role;Plan of Care;Transfer Training;Gait Training;Functional Mobility Training  Patient Education: PLB, discharge recommendations- pt verbalizes understanding  Barriers to Learning: cognition  REQUIRES PT FOLLOW UP: Yes  Activity Tolerance  Activity Tolerance: Patient Tolerated treatment well;Patient limited by fatigue;Patient limited by endurance; Patient limited by cognitive status  Activity Tolerance: Pt on 2L throughout session with SpO2 ranging from 85-96%. Pt drops to 85% at lowest post ambulation however periodically drops to 86-87% while talking seated in recliner or supine in bed. Pt consistantly recovers to above 90% within 1 min of PLB and above 95% with 2 min of PLB. Patient Diagnosis(es): The encounter diagnosis was Respiratory failure with hypoxia, unspecified chronicity ([facility] [address] has a past medical history of AVM, Chronic back pain, Depression, Gastritis, History of blood transfusion, History of GI bleed, Hypertension, and Personal history of MRSA (methicillin resistant Staphylococcus aureus). has a past surgical history that includes hiatal hernia repair (1999); Endoscopy, colon, diagnostic; Colonoscopy; cervical fusion; back surgery; other surgical history (Left, 054652); and Abdominal exploration surgery (09/07/2017). Restrictions  Restrictions/Precautions  Restrictions/Precautions: Fall Risk (high fall risk)  Position Activity Restriction  Other position/activity restrictions: Jimi Astudillo is a 77 y.o. female who presents to the emergency department with shortness of breath. This is a 81-year-old female who presents with shortness of breath for the last several days. The patient apparently told her daughter that she was feeling ill over the last several days. There is been no history of fevers. The patient is vaccinated for COVID-19. When EMS arrived, the patient had a lot of \"foam in her mouth\". CPAP was started with some improvement. Patient denies any chest pain. COVID -.  Complications likely d/t accidental morphine overdose. Vision/Hearing  Vision: Impaired  Vision Exceptions: Wears glasses for reading (not at hospital)  Hearing: Within functional limits     Subjective  General  Chart Reviewed: Yes  Patient assessed for rehabilitation services?: Yes  Family / Caregiver Present: No  Diagnosis: acute rspiratory failure with hypoxia  General Comment  Comments: pt seated in recliner upon approach and agreeable to PT/OT  Subjective  Subjective: pt denies pain upon approach however reports 4/10 back pain when sitting up straight in chair  Pain Screening  Patient Currently in Pain: Denies          Orientation  Orientation  Overall Orientation Status: Within Functional Limits  Social/Functional History  Social/Functional History  Lives With: Spouse  Type of Home: House  Home Layout: One level  Home Access: Level entry  Bathroom Shower/Tub: Tub/Shower unit  Bathroom Toilet: Standard  Bathroom Equipment: Grab bars in shower (uses sink by toilet)  Home Equipment: [employer] Help From: Family (3 kids who live close by,  however  works at [employer])  ADL Assistance: Independent  Homemaking Assistance: Independent (reports she would like to have more assistance with this)  Homemaking Responsibilities: Yes  Ambulation Assistance: Independent (cane when outside)  Transfer Assistance: Independent  Active : Yes  Occupation: Retired  Type of occupation: insurance  Leisure & Hobbies: sewing, square dancing  Additional Comments: denies falls in last 6 months  Cognition   Cognition  Overall Cognitive Status: Exceptions  Following Commands: Follows one step commands with repetition; Inconsistently follows commands  Attention Span: Attends with cues to redirect (Pt very distractable)  Memory: Decreased short term memory (explained pt had catheter multiple times when she stated she had to urinate)  Safety Judgement: Decreased awareness of need for safety;Decreased awareness of need for assistance (Very impulsive)  Problem Solving: Decreased awareness of errors;Assistance required to correct errors made;Assistance required to identify errors made;Assistance required to implement solutions;Assistance required to generate solutions  Insights: Decreased awareness of deficits  Initiation: Does not require cues  Sequencing: Requires cues for some  Cognition Comment: Pt very confused, not following commands well, such as heading for toilet when told we were heading for the sink. Objective          AROM RLE (degrees)  RLE AROM: WFL  RLE General AROM: observed in functional mobility throughout session  AROM LLE (degrees)  LLE AROM : WFL  LLE General AROM: observed in functional mobility throughout session  Strength RLE  Strength RLE: WFL  Comment: 4+/5 globally during seated MMTs with exception of 3+/5 hip flexion B  Strength LLE  Strength LLE: WFL  Comment: 4+/5 globally during seated MMTs with exception of 3+/5 hip flexion B     Sensation  Overall Sensation Status: Impaired (reports N/T L UE and L LE; able to ID all dermatomes in UE/LE testing)  Bed mobility  Sit to Supine: Stand by assistance  Comment: pt demonstrates impulsivity with repeated cues to prevent completion before line management performed  Transfers  Sit to Stand: Contact guard assistance;Minimal Assistance; Moderate Assistance (fluctuates from CGA-mod for sit>stands throughout session from recliner/commode up to RW with increased assist required d/t presence of posterior lean)  Stand to sit: Contact guard assistance;Minimal Assistance (fluctuates between CGA-min with RW; min required d/t unsafe handplacement and decreased eccentric control)  Comment: VC for handplacement during all transfer with pt demonstrating unsafe handplament during multiple transfers depite VC.  Pt demonstrates impulsivity while seated in recliner to brush teeth with repeated attempts to stand to brush teeth despite multiple conversations about sitting to complete task to conserve energy. Ambulation  Ambulation?: Yes  More Ambulation?: No  Ambulation 1  Surface: level tile  Device: Rolling Walker  Assistance: Minimal assistance;2 Person assistance (min + assist of another for line management/ redirection/ safety)  Quality of Gait: unsteady with unsafe positioning of RW, assist required to occassionall correct balance and for safe RW placement despite repeated VC  Distance: 10' x 2, 15'  Comments: Pt requires constant redirection- pt told plan to walk from recliner to opposite side of the bed however walks past bed out the door with heavy redirection required to get pt to EOB; SOB upon completion - see O2 sats in activity tolerance     Balance  Sitting - Static: Fair (SBA-CGA while seated on commode, posterior lean present)  Sitting - Dynamic: Fair (SBA-CGA while seated on commode including bending forward to sivakumar brief, posterior lean present when upright)  Standing - Static: Poor;+ (modx2 progressing to CGA d/t posterior lean present with B UE support on RW)  Standing - Dynamic: Poor;+ (min-mod x2 while pulling up brief without UE support, min for ambulation with RW)  Comments: Pt stands for 30 sec post first sit>stand from recliner with modx2 for standing balance, pt stands for ~ 3 min post stand from commode with mod x 2 progressing to CGA with B UE support on RW and min-mod x 2 while donning brief without UE support        Plan   Plan  Times per week: 3-5  Times per day: Daily  Current Treatment Recommendations: Strengthening, Transfer Training, Endurance Training, Balance Training, Functional Mobility Training  Safety Devices  Type of devices:  All fall risk precautions in place, Bed alarm in place, Left in bed, Call light within reach, Nurse notified    G-Code       OutComes Score                                                  AM-PAC Score  AM-PAC Inpatient Mobility Raw Score : 13 (09/16/21 1447)  AM-PAC Inpatient T-Scale Score : 36.74 (09/16/21 1447)  Mobility Inpatient CMS 0-100% Score: 64.91 (09/16/21 1447)  Mobility Inpatient CMS G-Code Modifier : CL (09/16/21 1447)          Goals  Short term goals  Time Frame for Short term goals: prior to dc  Short term goal 1: Pt will perform bedmobility with mod I  Short term goal 2: Pt will perform transfers with CGA  Short term goal 3: Pt will ambulate 48' with CGA  Short term goal 4: Pt will tolerate ~5 min standing while completing dynamic UE task with CGA  Patient Goals   Patient goals : to improve balance/independence and return to PLOF       Therapy Time   Individual Concurrent Group Co-treatment   Time In 1250         Time Out 1400         Minutes 70         Timed Code Treatment Minutes: [address] [facility], [address] [place] [address]

## 2021-09-16 NOTE — PROGRESS NOTES
[address] or Facility: [facility]   From: Jabaridonovan Meehan   RE: Romel Ballesteros 1945 RM: [id number]     Patient found unresponsive at home and 80% on RA. Possible unintentional morphine overdose vs. Covid r/o. To 5C. One dose of 0.4 Narcan given with order that says PRN, but does not have any perimeters. ER said Narcan woke the patient right up. However, she is still very lethargic now. Do you want another dose given?

## 2021-09-17 VITALS
HEART RATE: 89 BPM | TEMPERATURE: 98.5 F | RESPIRATION RATE: 18 BRPM | BODY MASS INDEX: 31.02 KG/M2 | DIASTOLIC BLOOD PRESSURE: 84 MMHG | WEIGHT: 175.1 LBS | SYSTOLIC BLOOD PRESSURE: 129 MMHG | HEIGHT: 63 IN | OXYGEN SATURATION: 97 %

## 2021-09-17 LAB
ANION GAP SERPL CALCULATED.3IONS-SCNC: 10 MMOL/L (ref 3–16)
BASOPHILS ABSOLUTE: 0 K/UL (ref 0–0.2)
BASOPHILS RELATIVE PERCENT: 0.2 %
BUN BLDV-MCNC: 32 MG/DL (ref 7–20)
CALCIUM SERPL-MCNC: 8.6 MG/DL (ref 8.3–10.6)
CHLORIDE BLD-SCNC: 103 MMOL/L (ref 99–110)
CO2: 23 MMOL/L (ref 21–32)
CREAT SERPL-MCNC: 1.5 MG/DL (ref 0.6–1.2)
EOSINOPHILS ABSOLUTE: 0.2 K/UL (ref 0–0.6)
EOSINOPHILS RELATIVE PERCENT: 1.4 %
GFR AFRICAN AMERICAN: 41
GFR NON-AFRICAN AMERICAN: 34
GLUCOSE BLD-MCNC: 153 MG/DL (ref 70–99)
HCT VFR BLD CALC: 31.1 % (ref 36–48)
HEMOGLOBIN: 10.1 G/DL (ref 12–16)
LYMPHOCYTES ABSOLUTE: 0.7 K/UL (ref 1–5.1)
LYMPHOCYTES RELATIVE PERCENT: 6.7 %
MCH RBC QN AUTO: 31 PG (ref 26–34)
MCHC RBC AUTO-ENTMCNC: 32.4 G/DL (ref 31–36)
MCV RBC AUTO: 95.8 FL (ref 80–100)
MONOCYTES ABSOLUTE: 0.4 K/UL (ref 0–1.3)
MONOCYTES RELATIVE PERCENT: 3.2 %
NEUTROPHILS ABSOLUTE: 9.8 K/UL (ref 1.7–7.7)
NEUTROPHILS RELATIVE PERCENT: 88.5 %
PDW BLD-RTO: 14.2 % (ref 12.4–15.4)
PLATELET # BLD: 142 K/UL (ref 135–450)
PMV BLD AUTO: 9.3 FL (ref 5–10.5)
POTASSIUM SERPL-SCNC: 3.7 MMOL/L (ref 3.5–5.1)
RBC # BLD: 3.25 M/UL (ref 4–5.2)
SODIUM BLD-SCNC: 136 MMOL/L (ref 136–145)
WBC # BLD: 11.1 K/UL (ref 4–11)

## 2021-09-17 PROCEDURE — 94640 AIRWAY INHALATION TREATMENT: CPT

## 2021-09-17 PROCEDURE — 94761 N-INVAS EAR/PLS OXIMETRY MLT: CPT

## 2021-09-17 PROCEDURE — 94660 CPAP INITIATION&MGMT: CPT

## 2021-09-17 PROCEDURE — 85025 COMPLETE CBC W/AUTO DIFF WBC: CPT

## 2021-09-17 PROCEDURE — 80048 BASIC METABOLIC PNL TOTAL CA: CPT

## 2021-09-17 PROCEDURE — 2700000000 HC OXYGEN THERAPY PER DAY

## 2021-09-17 PROCEDURE — 94680 O2 UPTK RST&XERS DIR SIMPLE: CPT

## 2021-09-17 PROCEDURE — 6370000000 HC RX 637 (ALT 250 FOR IP): Performed by: STUDENT IN AN ORGANIZED HEALTH CARE EDUCATION/TRAINING PROGRAM

## 2021-09-17 PROCEDURE — 36415 COLL VENOUS BLD VENIPUNCTURE: CPT

## 2021-09-17 PROCEDURE — 6370000000 HC RX 637 (ALT 250 FOR IP): Performed by: INTERNAL MEDICINE

## 2021-09-17 PROCEDURE — 2580000003 HC RX 258: Performed by: INTERNAL MEDICINE

## 2021-09-17 RX ORDER — MORPHINE SULFATE 15 MG/1
15 TABLET ORAL 2 TIMES DAILY PRN
Qty: 6 TABLET | Refills: 0 | Status: SHIPPED | OUTPATIENT
Start: 2021-09-17 | End: 2021-09-20

## 2021-09-17 RX ADMIN — BETHANECHOL CHLORIDE 25 MG: 25 TABLET ORAL at 15:35

## 2021-09-17 RX ADMIN — IPRATROPIUM BROMIDE AND ALBUTEROL SULFATE 1 AMPULE: .5; 3 SOLUTION RESPIRATORY (INHALATION) at 08:14

## 2021-09-17 RX ADMIN — IPRATROPIUM BROMIDE AND ALBUTEROL SULFATE 1 AMPULE: .5; 3 SOLUTION RESPIRATORY (INHALATION) at 12:59

## 2021-09-17 RX ADMIN — PREGABALIN 150 MG: 75 CAPSULE ORAL at 09:28

## 2021-09-17 RX ADMIN — BETHANECHOL CHLORIDE 25 MG: 25 TABLET ORAL at 09:28

## 2021-09-17 RX ADMIN — PREGABALIN 150 MG: 75 CAPSULE ORAL at 15:35

## 2021-09-17 RX ADMIN — NEPHROCAP 1 MG: 1 CAP ORAL at 09:28

## 2021-09-17 RX ADMIN — AMLODIPINE BESYLATE 10 MG: 5 TABLET ORAL at 09:28

## 2021-09-17 RX ADMIN — ASPIRIN 81 MG: 81 TABLET, CHEWABLE ORAL at 09:28

## 2021-09-17 RX ADMIN — Medication 10 ML: at 09:30

## 2021-09-17 ASSESSMENT — PAIN SCALES - GENERAL: PAINLEVEL_OUTOF10: 0

## 2021-09-17 NOTE — PROGRESS NOTES
Critical access hospital unable to accept this patient @ this time. [facility] can accept this patient for home care. Discharge planner notified.

## 2021-09-17 NOTE — DISCHARGE INSTR - COC
Continuity of Care Form    Patient Name: Marly Whipple   :    MRN:  [id number]    Admit date:  9/15/2021  Discharge date:  21    Code Status Order: Full Code   Advance Directives:      Admitting Physician:  Rylie Curtis MD  PCP: Paddy Brady MD    Discharging Nurse: [facility] Unit/Room#: 3ZU-3076/9336-42  Discharging Unit Phone Number: [phone]    Emergency Contact:   Extended Emergency Contact Information  Primary Emergency Contact: Marcelina Ramos [address] Phone: [phone]  Relation: Child  Secondary Emergency Contact: Harper Fields [address] Phone: [phone]  Relation: Child    Past Surgical History:  Past Surgical History:   Procedure Laterality Date    ABDOMINAL EXPLORATION SURGERY  2017    Lysis of adhesions, removal of pelvic mass, total abdominal hysterectomy with BSO, partial omentectomy, appendectomy, and pelvic lymphadenectomy    BACK SURGERY      multiple lumbar spine surgeries including laminectomy and fusion    CERVICAL FUSION      COLONOSCOPY      ENDOSCOPY, COLON, DIAGNOSTIC      HIATAL HERNIA REPAIR      OTHER SURGICAL HISTORY Left [id number]    SPINAL CORD STIMULATOR BATTERY REPLACMENT       Immunization History:   Immunization History   Administered Date(s) Administered    COVID-19, Velez Peter, PF, 30mcg/0.3mL 2021, 2021    Influenza Virus Vaccine 10/20/2018    Influenza, Triv, inactivated, subunit, adjuvanted, IM (Fluad 65 yrs and older) 10/23/2019    Pneumococcal Conjugate 13-valent (Poli Mckinley) 2019       Active Problems:  Patient Active Problem List   Diagnosis Code    Essential hypertension I10    Backache M54.9    mrsa infections B95.7    Iron deficiency anemia D50.9    Restless legs syndrome (RLS) G25.81    GERD K21.9    Generalized osteoarthrosis, involving multiple sites M15.9    Hypoxemia R09.02    Edema R60.9    Somnolence R40.0    Insomnia G47.00    RAD (reactive airway disease) J45.909    DDD (degenerative disc disease), lumbosacral M51.37    Lower back pain M54.5    Post laminectomy syndrome M96.1    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    Chronic pain syndrome G89.4    Right bundle branch block (RBBB) I45.10    B12 deficiency E53.8    Vertigo R42    Mixed hyperlipidemia E78.2    Unsteady gait R26.81    Depression F32.9    SHITAL (acute kidney injury) ([facility]) H10.9    Acute embolic stroke ([facility]) M75.7    Acute CVA (cerebrovascular accident) ([facility] [address] I63.9    Acute pulmonary insufficiency J98.4    Abnormal chest x-ray R93.89    Pneumonia of both lower lobes due to infectious organism J18.9    Moderate COPD (chronic obstructive pulmonary disease) ([facility]) J44.9    Narcotic dependence ([facility]) F11.20    Recurrent major depressive disorder, in partial remission ([facility]) F33.41    Obesity, morbid, BMI 40.0-49.9 ([facility]) E66.01    Hypercoagulable state ([facility]) D68.59    Stable angina ([facility]) I20.8    Acute respiratory failure with hypoxia and hypercapnia ([facility]) J96.01, J96.02       Isolation/Infection:   Isolation            No Isolation          Patient Infection Status       Infection Onset Added Last Indicated Last Indicated By Review Planned Expiration Resolved Resolved By    None active    Resolved    COVID-19 Rule Out 09/15/21 09/15/21 09/15/21 COVID-19 (Ordered)   09/16/21 Rule-Out Test Resulted            Nurse Assessment:  Last Vital Signs: /75   Pulse 88   Temp 97.6 °F (36.4 °C) (Oral)   Resp 16   Ht 5' 3\" (1.6 m)   Wt 175 lb 1.6 oz (79.4 kg)   SpO2 92%   BMI 31.02 kg/m²     Last documented pain score (0-10 scale): Pain Level: 8  Last Weight:   Wt Readings from Last 1 Encounters:   09/16/21 175 lb 1.6 oz (79.4 kg)     Mental Status:  oriented and alert    IV Access:  - None    Nursing Mobility/ADLs:  Walking   Independent  Transfer  Assisted  Bathing  Independent  Dressing  Independent  Toileting Assisted  Feeding  Independent  Med Admin  Independent  Med Delivery   whole    Wound Care Documentation and Therapy:  Incision 09/07/17 Abdomen (Active)   Number of days: 1137       Wound 09/24/19 Coccyx (Active)   Number of days: 723        Elimination:  Continence:   · Bowel: Yes  · Bladder: Yes  Urinary Catheter: None   Colostomy/Ileostomy/Ileal Conduit: No       Date of Last BM: unknown    Intake/Output Summary (Last 24 hours) at 9/17/2021 1526  Last data filed at 9/17/2021 1350  Gross per 24 hour   Intake 480 ml   Output 950 ml   Net -470 ml     I/O last 3 completed shifts: In: 1466.5 [P.O.:480; I.V.:986.5]  Out: 950 [Urine:950]    Safety Concerns: At Risk for Falls    Impairments/Disabilities:      None    Nutrition Therapy:  Current Nutrition Therapy:   - Oral Diet:  General and Low Sodium (3-4gm)    Routes of Feeding: Oral  Liquids: Thin Liquids  Daily Fluid Restriction: no  Last Modified Barium Swallow with Video (Video Swallowing Test): not done    Treatments at the Time of Hospital Discharge:   Respiratory Treatments: HHN  Oxygen Therapy:  is on oxygen at 3 L/min per nasal cannula.   Ventilator:    - No ventilator support    Rehab Therapies: SN,PT,OT  Weight Bearing Status/Restrictions: No weight bearing restirctions  Other Medical Equipment (for information only, NOT a DME order):  O2  Other Treatments: none    Patient's personal belongings (please select all that are sent with patient):  Glasses, Dentures upper and lower    RN SIGNATURE:  Electronically signed by Margaret Willett RN on 9/17/21 at 5:42 PM EDT    CASE MANAGEMENT/SOCIAL WORK SECTION    Inpatient Status Date: 9-15-21    Readmission Risk Assessment Score:  Readmission Risk              Risk of Unplanned Readmission:  24           Discharging to Facility/ [address] [place]       Phone -[phone]               / signature: Electronically signed by Ciro Melchor Van Bates on 9/17/21 at 9:10 AM EDT    PHYSICIAN SECTION    Prognosis: Good    Condition at Discharge: Stable    Rehab Potential (if transferring to Rehab): {Prognosis:[id number]}    Recommended Labs or Other Treatments After Discharge: Follow-up with PCP within 1 week of discharge, follow-up with pain management outpatient, decrease morphine to twice daily instead of 3 times daily continue on 2 L/min nasal cannula    Physician Certification: I certify the above information and transfer of Freddie Douglas  is necessary for the continuing treatment of the diagnosis listed and that she requires [facility] for less 30 days.   Patient declined skilled nursing facility, she wants to go home with home therapy    Update Admission H&P: No change in H&P    PHYSICIAN SIGNATURE:  Electronically signed by Erik Gutierrez MD on 9/17/21 at 2:19 PM EDT

## 2021-09-17 NOTE — CARE COORDINATION
Bee at [facility] can accept patient today. Called patient's daughter, Luis F Edwards and she requested a referral to [facility] if its in-network. Anjana Costa works at [facility].

## 2021-09-17 NOTE — PROGRESS NOTES
Home Oxygen Evaluation        O2 sat on room air at rest =93%       O2 sat on room air with exertion = 85%       O2 sat on 1LPM oxygen with exertion = 86%       O2 sat on 2LPM oxygen with exertion = 89%         O2 sat on 3LPM oxygen with exertion = 92%

## 2021-09-17 NOTE — DISCHARGE SUMMARY
Pt d/c'd home with home care. Paperwork explained and given to pt. All belongings gathered and given to pt . IV, tele and sarah removed. Escorted to lobby via w/c.

## 2021-09-17 NOTE — DISCHARGE SUMMARY
Hospital Medicine Discharge Summary    Patient ID: Marly Whipple      Patient's PCP: Paddy Brady MD    Admit Date: 9/15/2021     Discharge Date:   9/17/2021    Admitting Physician: Rylie Curtis MD     Discharge Physician: Neha Ng MD     Discharge Diagnoses: Active Hospital Problems    Diagnosis     Acute respiratory failure with hypoxia and hypercapnia (HCC) [J96.01, J96.02]        The patient was seen and examined on day of discharge and this discharge summary is in conjunction with any daily progress note from day of discharge. Hospital Course:        77 y. o. female with history of gastritis, essential hypertension, depression, history of CVA, chronic pain, gastroesophageal reflux disease, COPD, obesity with BMI of 36 kg/m², chronic narcotic use (morphine IR 15 mg 3 times daily, verified in PDMP), on chronic anticoagulation with Xarelto (per oncology notes reviewed, there was concern about possible lupus anticoagulant and at the time, decision was been weighed about possible anticoagulation; I do not see clear documentation for any other reason anticoagulant was started).  Who was brought to the emergency room for evaluation of shortness of breath.       Active medical conditions:    Acute hypoxic hypercapnic respiratory failure, resolved  Suspecting unintentional morphine overdose  Acute kidney injury likely prerenal azotemia  Intermittent chest pain, resolving. non-ACS troponin trend  QT prolongation  History of gastritis  History of essential hypertension  Depression  History of CABG  Chronic pain  GERD  COPD  Obesity with BMI 36 kg/m²  Chronic narcotic use morphine IR 15 mg 3 times daily daily. On chronic anticoagulation with Xarelto     Continue inpatient care  Patient is alert and oriented x2.   Covid testing is negative  Currently on 2 L nasal cannula with good saturation, on physical examination she has no wheezing, no history of smoking per patient, she does not use equal bilaterally       Labs: For convenience and continuity at follow-up the following most recent labs are provided:      CBC:    Lab Results   Component Value Date    WBC 11.1 09/17/2021    HGB 10.1 09/17/2021    HCT 31.1 09/17/2021     09/17/2021       Renal:    Lab Results   Component Value Date     09/17/2021    K 3.7 09/17/2021    K 4.7 09/15/2021     09/17/2021    CO2 23 09/17/2021    BUN 32 09/17/2021    CREATININE 1.5 09/17/2021    CALCIUM 8.6 09/17/2021    PHOS 5.2 09/16/2021         Significant Diagnostic Studies    Radiology:   CT CHEST PULMONARY EMBOLISM W CONTRAST   Final Result   No evidence of pulmonary embolism or acute pulmonary abnormality. XR CHEST PORTABLE   Final Result   Marked elevation of the right hemidiaphragm, similar in appearance to prior. No acute process. Consults:     IP CONSULT TO PULMONOLOGY  IP CONSULT TO CASE MANAGEMENT    Disposition:   patient refused ECF, she wants to go home with home therapy    Condition at Discharge: Stable    Discharge Instructions/Follow-up: Follow-up with PCP within 1 week of discharge, follow-up with pain management outpatient, she refused ECF, will discharge to home with home therapy, oxygen at 2 L nasal cannula per minute    Code Status:  Full Code     Activity: activity as tolerated    Diet: cardiac diet      Discharge Medications:     Current Discharge Medication List           Details   morphine (MSIR) 15 MG tablet Take 1 tablet by mouth 2 times daily as needed for Pain for up to 3 days.   Qty: 6 tablet, Refills: 0    Comments: Reduce doses taken as pain becomes manageable  Associated Diagnoses: Narcotic dependence ([facility] [address]              Details   folbee plus (FOLBEE PLUS) TABS Take 1 tablet by mouth daily      DULoxetine (CYMBALTA) 30 MG extended release capsule       bethanechol (URECHOLINE) 25 MG tablet TAKE ONE TABLET BY MOUTH THREE TIMES A DAY  Qty: 90 tablet, Refills: 1    Associated Diagnoses: Acute cystitis without hematuria      rivaroxaban (XARELTO) 20 MG TABS tablet Take 1 tablet by mouth Daily with supper  Qty: 90 tablet, Refills: 1    Associated Diagnoses: Persistent atrial fibrillation (HCC)      rOPINIRole (REQUIP) 0.5 MG tablet Take 1 tablet by mouth nightly  Qty: 90 tablet, Refills: 3      meclizine (ANTIVERT) 25 MG tablet TAKE ONE TABLET BY MOUTH THREE TIMES A DAY AS NEEDED FOR DIZZINESS  Qty: 90 tablet, Refills: 1      buPROPion (WELLBUTRIN XL) 300 MG extended release tablet TAKE ONE TABLET BY MOUTH EVERY MORNING  Qty: 90 tablet, Refills: 1    Associated Diagnoses: Moderate episode of recurrent major depressive disorder ([facility])      sertraline (ZOLOFT) 25 MG tablet Take 1 tablet by mouth daily  Qty: 30 tablet, Refills: 5    Associated Diagnoses: Recurrent major depressive disorder, in partial remission ([facility])      furosemide (LASIX) 20 MG tablet TAKE ONE TABLET BY MOUTH DAILY AS NEEDED FOR WEIGHT GAIN OF 2LBS OR LEG EDEMA  Qty: 90 tablet, Refills: 0    Associated Diagnoses: Essential hypertension      amLODIPine (NORVASC) 10 MG tablet Take 1 tablet by mouth daily  Qty: 90 tablet, Refills: 0      tiZANidine (ZANAFLEX) 4 MG tablet Take 4 mg by mouth 2 times daily       pregabalin (LYRICA) 150 MG capsule Take 150 mg by mouth three times daily. ondansetron (ZOFRAN) 4 MG tablet Take 1 tablet by mouth daily as needed for Nausea or Vomiting  Qty: 30 tablet, Refills: 0      aspirin 81 MG chewable tablet Take 1 tablet by mouth daily  Qty: 30 tablet, Refills: 0             Time Spent on discharge is more than 30 minutes in the examination, evaluation, counseling and review of medications and discharge plan. Signed:    Branden Marie MD   9/17/2021      Thank you Slime Sandoval MD for the opportunity to be involved in this patient's care. If you have any questions or concerns please feel free to contact me at [phone].

## 2021-09-17 NOTE — CARE COORDINATION
Notified MD via perfect serve,   Mrs Sabra Knight is refusing SNF. Spouse is here to take her home, please order home care and home DME-02, thank you  Patient's daughter [facility]    Referral made to [address] [place] [address], [phone] or [phone]  for home oxygen. Will need an order with qualifying diagnosis, & Resp 02 SATs, within 24 hours of Discharge. The following is how the SATs should read:   RA at rest_____%   RA with Ambulation ____. ..

## 2021-09-17 NOTE — CARE COORDINATION
Patient aware that Shira Elizabeth [phone] reported that  [facility] is out of network, but she has been accepted to [facility]. ..possibly later today. Shira Elizabeth was notified of possible bipap at night.

## 2021-09-17 NOTE — PROGRESS NOTES
Awake, Bi-PAP removed per pt's request, placed on oxygen  2L n/c. No complaints. VSS. No distress noted. [email or web address]

## 2021-09-17 NOTE — CARE COORDINATION
Called Bee to check if patient was accepted in network at [place], when medically ready for discharge. Please complete & sign the GABY/AVS/Prescriptions,  RN please print GABY/AVS once completed.  Thank you, Meryle Moeller, MSKATIE, [phone]

## 2021-09-20 ENCOUNTER — CARE COORDINATION (OUTPATIENT)
Dept: CASE MANAGEMENT | Age: 76
End: 2021-09-20

## 2021-09-20 DIAGNOSIS — J96.01 ACUTE RESPIRATORY FAILURE WITH HYPOXIA AND HYPERCAPNIA (HCC): Primary | ICD-10-CM

## 2021-09-20 DIAGNOSIS — J96.02 ACUTE RESPIRATORY FAILURE WITH HYPOXIA AND HYPERCAPNIA (HCC): Primary | ICD-10-CM

## 2021-09-20 PROCEDURE — 1111F DSCHRG MED/CURRENT MED MERGE: CPT | Performed by: FAMILY MEDICINE

## 2021-09-20 NOTE — CARE COORDINATION
Medication reconciliation was performed with patient, who verbalizes understanding of administration of home medications. Advised obtaining a 90-day supply of all daily and as-needed medications. Reviewed and educated patient on any new and changed medications related to discharge diagnosis. Was patient discharged with a pulse oximeter? No, but has one. Discussed and confirmed pulse oximeter discharge instructions and when to notify provider or seek emergency care. Pt states doing well, no issues or concerns. Denies SOB, CP, cough, fever. Has O2 at the house but not using, states her sats have been middle to upper 90s. [facility]. nurse was out on Saturday. Therapy coming today. F/U appts listed below. Agreed to more CTC f/u calls    CTN provided contact information. Plan for follow-up call in 5-7 days based on severity of symptoms and risk factors.   Plan for next call: self management-respiratory failure, HC, f/u appt    Care Transitions 24 Hour Call    Do you have any ongoing symptoms?: No  Do you have a copy of your discharge instructions?: Yes  Do you have all of your prescriptions and are they filled?: Yes  Have you been contacted by a [address]?: No  Have you scheduled your follow up appointment?: Yes  How are you going to get to your appointment?: Car - family or friend to transport  Were you discharged with any Home Care or Post Acute Services: Yes  Post Acute Services: Home Health  Do you feel like you have everything you need to keep you well at home?: Yes  Care Transitions Interventions  No Identified Needs         Follow Up  Future Appointments   Date Time Provider Jennifer Matias   9/28/2021  9:15 AM [facility], APRN - [id number] - DYD   11/10/2021  1:00 PM Urvashi Stratton.MD Arnav, RN

## 2021-09-23 ENCOUNTER — APPOINTMENT (OUTPATIENT)
Dept: GENERAL RADIOLOGY | Age: 76
DRG: 871 | End: 2021-09-23
Payer: MEDICARE

## 2021-09-23 ENCOUNTER — HOSPITAL ENCOUNTER (INPATIENT)
Age: 76
LOS: 5 days | Discharge: HOME HEALTH | DRG: 871 | End: 2021-09-29
Attending: EMERGENCY MEDICINE | Admitting: INTERNAL MEDICINE
Payer: MEDICARE

## 2021-09-23 ENCOUNTER — APPOINTMENT (OUTPATIENT)
Dept: CT IMAGING | Age: 76
DRG: 871 | End: 2021-09-23
Payer: MEDICARE

## 2021-09-23 DIAGNOSIS — R53.1 GENERAL WEAKNESS: Primary | ICD-10-CM

## 2021-09-23 DIAGNOSIS — R50.9 FEBRILE ILLNESS, ACUTE: ICD-10-CM

## 2021-09-23 DIAGNOSIS — N30.00 ACUTE CYSTITIS WITHOUT HEMATURIA: ICD-10-CM

## 2021-09-23 PROBLEM — R29.898 LOWER EXTREMITY WEAKNESS: Status: ACTIVE | Noted: 2021-09-23

## 2021-09-23 PROBLEM — R53.81 PHYSICAL DECONDITIONING: Status: ACTIVE | Noted: 2021-09-23

## 2021-09-23 PROBLEM — N39.0 UTI (URINARY TRACT INFECTION): Status: ACTIVE | Noted: 2021-09-23

## 2021-09-23 PROBLEM — Z78.9 DECREASED ACTIVITIES OF DAILY LIVING (ADL): Status: ACTIVE | Noted: 2021-09-23

## 2021-09-23 LAB
A/G RATIO: 1.4 (ref 1.1–2.2)
ALBUMIN SERPL-MCNC: 3.8 G/DL (ref 3.4–5)
ALP BLD-CCNC: 85 U/L (ref 40–129)
ALT SERPL-CCNC: 17 U/L (ref 10–40)
ANION GAP SERPL CALCULATED.3IONS-SCNC: 8 MMOL/L (ref 3–16)
AST SERPL-CCNC: 34 U/L (ref 15–37)
BACTERIA: ABNORMAL /HPF
BASOPHILS ABSOLUTE: 0 K/UL (ref 0–0.2)
BASOPHILS RELATIVE PERCENT: 0.1 %
BILIRUB SERPL-MCNC: 0.7 MG/DL (ref 0–1)
BILIRUBIN URINE: ABNORMAL
BLOOD, URINE: ABNORMAL
BUN BLDV-MCNC: 18 MG/DL (ref 7–20)
CALCIUM SERPL-MCNC: 9.1 MG/DL (ref 8.3–10.6)
CHLORIDE BLD-SCNC: 100 MMOL/L (ref 99–110)
CLARITY: ABNORMAL
CO2: 27 MMOL/L (ref 21–32)
COLOR: YELLOW
CREAT SERPL-MCNC: 1.2 MG/DL (ref 0.6–1.2)
EKG ATRIAL RATE: 98 BPM
EKG DIAGNOSIS: NORMAL
EKG P AXIS: 19 DEGREES
EKG P-R INTERVAL: 196 MS
EKG Q-T INTERVAL: 370 MS
EKG QRS DURATION: 154 MS
EKG QTC CALCULATION (BAZETT): 472 MS
EKG R AXIS: 47 DEGREES
EKG T AXIS: -8 DEGREES
EKG VENTRICULAR RATE: 98 BPM
EOSINOPHILS ABSOLUTE: 0 K/UL (ref 0–0.6)
EOSINOPHILS RELATIVE PERCENT: 0.1 %
EPITHELIAL CELLS, UA: 0 /HPF (ref 0–5)
GFR AFRICAN AMERICAN: 53
GFR NON-AFRICAN AMERICAN: 44
GLOBULIN: 2.8 G/DL
GLUCOSE BLD-MCNC: 117 MG/DL (ref 70–99)
GLUCOSE URINE: NEGATIVE MG/DL
HCT VFR BLD CALC: 30.6 % (ref 36–48)
HEMOGLOBIN: 10.3 G/DL (ref 12–16)
HYALINE CASTS: 11 /LPF (ref 0–8)
INR BLD: 1.21 (ref 0.88–1.12)
KETONES, URINE: 15 MG/DL
LACTIC ACID, SEPSIS: 1.2 MMOL/L (ref 0.4–1.9)
LEUKOCYTE ESTERASE, URINE: ABNORMAL
LYMPHOCYTES ABSOLUTE: 0.3 K/UL (ref 1–5.1)
LYMPHOCYTES RELATIVE PERCENT: 2.9 %
MCH RBC QN AUTO: 32 PG (ref 26–34)
MCHC RBC AUTO-ENTMCNC: 33.7 G/DL (ref 31–36)
MCV RBC AUTO: 95 FL (ref 80–100)
MICROSCOPIC EXAMINATION: YES
MONOCYTES ABSOLUTE: 0.5 K/UL (ref 0–1.3)
MONOCYTES RELATIVE PERCENT: 5.4 %
NEUTROPHILS ABSOLUTE: 8.3 K/UL (ref 1.7–7.7)
NEUTROPHILS RELATIVE PERCENT: 91.5 %
NITRITE, URINE: NEGATIVE
PDW BLD-RTO: 13.7 % (ref 12.4–15.4)
PH UA: 5.5 (ref 5–8)
PLATELET # BLD: 207 K/UL (ref 135–450)
PMV BLD AUTO: 8.1 FL (ref 5–10.5)
POTASSIUM REFLEX MAGNESIUM: 3.9 MMOL/L (ref 3.5–5.1)
PRO-BNP: 1371 PG/ML (ref 0–449)
PROCALCITONIN: 1.22 NG/ML (ref 0–0.15)
PROTEIN UA: ABNORMAL MG/DL
PROTHROMBIN TIME: 13.8 SEC (ref 9.9–12.7)
RBC # BLD: 3.22 M/UL (ref 4–5.2)
RBC UA: 4 /HPF (ref 0–4)
SODIUM BLD-SCNC: 135 MMOL/L (ref 136–145)
SPECIFIC GRAVITY UA: 1.01 (ref 1–1.03)
TOTAL PROTEIN: 6.6 G/DL (ref 6.4–8.2)
TROPONIN: 0.01 NG/ML
URINE REFLEX TO CULTURE: YES
URINE TYPE: ABNORMAL
UROBILINOGEN, URINE: 1 E.U./DL
WBC # BLD: 9.1 K/UL (ref 4–11)
WBC UA: 156 /HPF (ref 0–5)

## 2021-09-23 PROCEDURE — 94760 N-INVAS EAR/PLS OXIMETRY 1: CPT

## 2021-09-23 PROCEDURE — 87086 URINE CULTURE/COLONY COUNT: CPT

## 2021-09-23 PROCEDURE — 6360000002 HC RX W HCPCS: Performed by: PHYSICIAN ASSISTANT

## 2021-09-23 PROCEDURE — 96361 HYDRATE IV INFUSION ADD-ON: CPT

## 2021-09-23 PROCEDURE — 87186 SC STD MICRODIL/AGAR DIL: CPT

## 2021-09-23 PROCEDURE — G0378 HOSPITAL OBSERVATION PER HR: HCPCS

## 2021-09-23 PROCEDURE — 85610 PROTHROMBIN TIME: CPT

## 2021-09-23 PROCEDURE — 83880 ASSAY OF NATRIURETIC PEPTIDE: CPT

## 2021-09-23 PROCEDURE — 6370000000 HC RX 637 (ALT 250 FOR IP): Performed by: INTERNAL MEDICINE

## 2021-09-23 PROCEDURE — 81001 URINALYSIS AUTO W/SCOPE: CPT

## 2021-09-23 PROCEDURE — 83605 ASSAY OF LACTIC ACID: CPT

## 2021-09-23 PROCEDURE — 96375 TX/PRO/DX INJ NEW DRUG ADDON: CPT

## 2021-09-23 PROCEDURE — 84145 PROCALCITONIN (PCT): CPT

## 2021-09-23 PROCEDURE — 85025 COMPLETE CBC W/AUTO DIFF WBC: CPT

## 2021-09-23 PROCEDURE — 70450 CT HEAD/BRAIN W/O DYE: CPT

## 2021-09-23 PROCEDURE — 6370000000 HC RX 637 (ALT 250 FOR IP): Performed by: PHYSICIAN ASSISTANT

## 2021-09-23 PROCEDURE — 2700000000 HC OXYGEN THERAPY PER DAY

## 2021-09-23 PROCEDURE — 2580000003 HC RX 258: Performed by: INTERNAL MEDICINE

## 2021-09-23 PROCEDURE — 71045 X-RAY EXAM CHEST 1 VIEW: CPT

## 2021-09-23 PROCEDURE — 2580000003 HC RX 258: Performed by: EMERGENCY MEDICINE

## 2021-09-23 PROCEDURE — 80053 COMPREHEN METABOLIC PANEL: CPT

## 2021-09-23 PROCEDURE — 87088 URINE BACTERIA CULTURE: CPT

## 2021-09-23 PROCEDURE — 99284 EMERGENCY DEPT VISIT MOD MDM: CPT

## 2021-09-23 PROCEDURE — 93005 ELECTROCARDIOGRAM TRACING: CPT | Performed by: PHYSICIAN ASSISTANT

## 2021-09-23 PROCEDURE — 87040 BLOOD CULTURE FOR BACTERIA: CPT

## 2021-09-23 PROCEDURE — 96374 THER/PROPH/DIAG INJ IV PUSH: CPT

## 2021-09-23 PROCEDURE — 93010 ELECTROCARDIOGRAM REPORT: CPT | Performed by: INTERNAL MEDICINE

## 2021-09-23 PROCEDURE — 84484 ASSAY OF TROPONIN QUANT: CPT

## 2021-09-23 RX ORDER — ACETAMINOPHEN 500 MG
500 TABLET ORAL EVERY 6 HOURS PRN
COMMUNITY

## 2021-09-23 RX ORDER — MORPHINE SULFATE 15 MG/1
15 TABLET ORAL 2 TIMES DAILY
COMMUNITY

## 2021-09-23 RX ORDER — BETHANECHOL CHLORIDE 10 MG/1
25 TABLET ORAL 3 TIMES DAILY
Status: DISCONTINUED | OUTPATIENT
Start: 2021-09-23 | End: 2021-09-29 | Stop reason: HOSPADM

## 2021-09-23 RX ORDER — PREGABALIN 75 MG/1
150 CAPSULE ORAL 3 TIMES DAILY
Status: DISCONTINUED | OUTPATIENT
Start: 2021-09-23 | End: 2021-09-29 | Stop reason: HOSPADM

## 2021-09-23 RX ORDER — 0.9 % SODIUM CHLORIDE 0.9 %
30 INTRAVENOUS SOLUTION INTRAVENOUS ONCE
Status: COMPLETED | OUTPATIENT
Start: 2021-09-23 | End: 2021-09-23

## 2021-09-23 RX ORDER — DULOXETIN HYDROCHLORIDE 30 MG/1
30 CAPSULE, DELAYED RELEASE ORAL DAILY
Status: DISCONTINUED | OUTPATIENT
Start: 2021-09-24 | End: 2021-09-23 | Stop reason: ALTCHOICE

## 2021-09-23 RX ORDER — ASPIRIN 81 MG/1
81 TABLET, CHEWABLE ORAL DAILY
Status: DISCONTINUED | OUTPATIENT
Start: 2021-09-24 | End: 2021-09-29 | Stop reason: HOSPADM

## 2021-09-23 RX ORDER — SODIUM CHLORIDE 0.9 % (FLUSH) 0.9 %
5-40 SYRINGE (ML) INJECTION EVERY 12 HOURS SCHEDULED
Status: DISCONTINUED | OUTPATIENT
Start: 2021-09-23 | End: 2021-09-29 | Stop reason: HOSPADM

## 2021-09-23 RX ORDER — ONDANSETRON 2 MG/ML
4 INJECTION INTRAMUSCULAR; INTRAVENOUS EVERY 6 HOURS PRN
Status: DISCONTINUED | OUTPATIENT
Start: 2021-09-23 | End: 2021-09-29 | Stop reason: HOSPADM

## 2021-09-23 RX ORDER — ACETAMINOPHEN 325 MG/1
650 TABLET ORAL EVERY 6 HOURS PRN
Status: DISCONTINUED | OUTPATIENT
Start: 2021-09-23 | End: 2021-09-27 | Stop reason: SDUPTHER

## 2021-09-23 RX ORDER — BUPROPION HYDROCHLORIDE 300 MG/1
300 TABLET ORAL DAILY
Status: DISCONTINUED | OUTPATIENT
Start: 2021-09-24 | End: 2021-09-29 | Stop reason: HOSPADM

## 2021-09-23 RX ORDER — ROPINIROLE 0.25 MG/1
0.25 TABLET, FILM COATED ORAL NIGHTLY
COMMUNITY

## 2021-09-23 RX ORDER — ACETAMINOPHEN 650 MG/1
650 SUPPOSITORY RECTAL EVERY 6 HOURS PRN
Status: DISCONTINUED | OUTPATIENT
Start: 2021-09-23 | End: 2021-09-27 | Stop reason: SDUPTHER

## 2021-09-23 RX ORDER — SODIUM CHLORIDE 0.9 % (FLUSH) 0.9 %
5-40 SYRINGE (ML) INJECTION PRN
Status: DISCONTINUED | OUTPATIENT
Start: 2021-09-23 | End: 2021-09-29 | Stop reason: HOSPADM

## 2021-09-23 RX ORDER — ACETAMINOPHEN 325 MG/1
650 TABLET ORAL ONCE
Status: COMPLETED | OUTPATIENT
Start: 2021-09-23 | End: 2021-09-23

## 2021-09-23 RX ORDER — POLYETHYLENE GLYCOL 3350 17 G/17G
17 POWDER, FOR SOLUTION ORAL DAILY PRN
Status: DISCONTINUED | OUTPATIENT
Start: 2021-09-23 | End: 2021-09-29 | Stop reason: HOSPADM

## 2021-09-23 RX ORDER — ONDANSETRON 4 MG/1
4 TABLET, ORALLY DISINTEGRATING ORAL EVERY 8 HOURS PRN
Status: DISCONTINUED | OUTPATIENT
Start: 2021-09-23 | End: 2021-09-29 | Stop reason: HOSPADM

## 2021-09-23 RX ORDER — ROPINIROLE 0.25 MG/1
0.25 TABLET, FILM COATED ORAL NIGHTLY
Status: DISCONTINUED | OUTPATIENT
Start: 2021-09-23 | End: 2021-09-29 | Stop reason: HOSPADM

## 2021-09-23 RX ORDER — MORPHINE SULFATE 15 MG/1
15 TABLET ORAL 3 TIMES DAILY
Status: DISCONTINUED | OUTPATIENT
Start: 2021-09-23 | End: 2021-09-24

## 2021-09-23 RX ORDER — SODIUM CHLORIDE 9 MG/ML
25 INJECTION, SOLUTION INTRAVENOUS PRN
Status: DISCONTINUED | OUTPATIENT
Start: 2021-09-23 | End: 2021-09-29 | Stop reason: HOSPADM

## 2021-09-23 RX ORDER — AMLODIPINE BESYLATE 5 MG/1
10 TABLET ORAL DAILY
Status: DISCONTINUED | OUTPATIENT
Start: 2021-09-24 | End: 2021-09-23 | Stop reason: ALTCHOICE

## 2021-09-23 RX ADMIN — RIVAROXABAN 20 MG: 20 TABLET, FILM COATED ORAL at 23:34

## 2021-09-23 RX ADMIN — BETHANECHOL CHLORIDE 25 MG: 10 TABLET ORAL at 23:35

## 2021-09-23 RX ADMIN — ROPINIROLE HYDROCHLORIDE 0.25 MG: 0.25 TABLET, FILM COATED ORAL at 23:35

## 2021-09-23 RX ADMIN — ACETAMINOPHEN 650 MG: 325 TABLET ORAL at 14:35

## 2021-09-23 RX ADMIN — PREGABALIN 150 MG: 75 CAPSULE ORAL at 23:34

## 2021-09-23 RX ADMIN — ACETAMINOPHEN 650 MG: 325 TABLET ORAL at 23:34

## 2021-09-23 RX ADMIN — MORPHINE SULFATE 15 MG: 15 TABLET ORAL at 23:34

## 2021-09-23 RX ADMIN — SERTRALINE 25 MG: 50 TABLET, FILM COATED ORAL at 23:35

## 2021-09-23 RX ADMIN — Medication 1000 MG: at 18:42

## 2021-09-23 RX ADMIN — SODIUM CHLORIDE 1779 ML: 9 INJECTION, SOLUTION INTRAVENOUS at 18:40

## 2021-09-23 RX ADMIN — Medication 10 ML: at 23:36

## 2021-09-23 ASSESSMENT — ENCOUNTER SYMPTOMS
VOMITING: 0
COUGH: 0
COLOR CHANGE: 0
PHOTOPHOBIA: 0
CONSTIPATION: 0
SHORTNESS OF BREATH: 0
ABDOMINAL PAIN: 0
DIARRHEA: 0
CHEST TIGHTNESS: 0
RESPIRATORY NEGATIVE: 1
BACK PAIN: 0
NAUSEA: 0

## 2021-09-23 ASSESSMENT — PAIN DESCRIPTION - PAIN TYPE
TYPE: CHRONIC PAIN
TYPE: CHRONIC PAIN

## 2021-09-23 ASSESSMENT — PAIN - FUNCTIONAL ASSESSMENT: PAIN_FUNCTIONAL_ASSESSMENT: ACTIVITIES ARE NOT PREVENTED

## 2021-09-23 ASSESSMENT — PAIN DESCRIPTION - ONSET: ONSET: ON-GOING

## 2021-09-23 ASSESSMENT — PAIN DESCRIPTION - FREQUENCY: FREQUENCY: INTERMITTENT

## 2021-09-23 ASSESSMENT — PAIN SCALES - GENERAL
PAINLEVEL_OUTOF10: 0
PAINLEVEL_OUTOF10: 10
PAINLEVEL_OUTOF10: 10
PAINLEVEL_OUTOF10: 5

## 2021-09-23 ASSESSMENT — PAIN DESCRIPTION - PROGRESSION: CLINICAL_PROGRESSION: GRADUALLY WORSENING

## 2021-09-23 ASSESSMENT — PAIN DESCRIPTION - DESCRIPTORS: DESCRIPTORS: ACHING

## 2021-09-23 ASSESSMENT — PAIN DESCRIPTION - ORIENTATION: ORIENTATION: LOWER

## 2021-09-23 ASSESSMENT — PAIN DESCRIPTION - LOCATION
LOCATION: BACK
LOCATION: BACK

## 2021-09-23 NOTE — H&P
Hospital Medicine History & Physical      PCP: Gianna Gagnon MD    Date of Admission: 9/23/2021    Date of Service: Pt seen/examined on 9/23/2021  and Admitted to Inpatient with expected LOS greater than two midnights due to medical therapy. Chief Complaint:    Chief Complaint   Patient presents with    Fatigue     PT. IN PER [facility] WITH REPORT OF HER NOT BEING ABLE TO GET HERSELF UP AND POSSIBLY GETTING A DOUBLE DOSE OF HER PAIN MEDICATION FROM THE GRAND SON. History Of Present Illness: The patient is a 77 y.o. female with history of hypertension, RLS, GERD, RAD, LHC, COPD who presented with couple day history of generalized weakness and malaise, not able to do her ADLs, low-grade fever and lower extremity weakness. No cough or production, no dizziness, no palpitation. No dysuria or hematuria. No constipation or diarrhea. Work-up in the ER was noted for positive urinalysis suggestive for UTI with elevated procalcitonin.   CT head did not reveal acute pathology  Chest x-ray clear lung fields    Past Medical History:        Diagnosis Date    AVM     Chronic back pain     Depression     Gastritis     History of blood transfusion     secondary to GI bleed    History of GI bleed     Hypertension     Personal history of MRSA (methicillin resistant Staphylococcus aureus)     recurrent, last 2-3 years ago finger (doesnt remember which one)       Past Surgical History:        Procedure Laterality Date    ABDOMINAL EXPLORATION SURGERY  09/07/2017    Lysis of adhesions, removal of pelvic mass, total abdominal hysterectomy with BSO, partial omentectomy, appendectomy, and pelvic lymphadenectomy    BACK SURGERY      multiple lumbar spine surgeries including laminectomy and fusion    CERVICAL FUSION      COLONOSCOPY      ENDOSCOPY, COLON, DIAGNOSTIC      HIATAL HERNIA REPAIR  1999    OTHER SURGICAL HISTORY Left [id number]    SPINAL CORD STIMULATOR BATTERY REPLACMENT       Medications mouth daily 2/18/20   [facility] ANGELES High NP       Allergies:  Penicillins    Social History:  The patient currently lives with family    TOBACCO:   reports that she has never smoked. She has never used smokeless tobacco.  ETOH:   reports no history of alcohol use. Family History:  Reviewed in detail and negative for DM, Early CAD, Cancer, CVA. Positive as follows:        Problem Relation Age of Onset    Diabetes Mother     Heart Disease Mother         cad, aortic aneurysm    Diabetes Father     Heart Disease Father         heart attack, chf    Other Sister         abdominal aneurysm       REVIEW OF SYSTEMS:   Positive for generalized malaise weakness, not able to do ADLs and as noted in the HPI. All other systems reviewed and negative. PHYSICAL EXAM:    /79   Pulse 98   Temp 100.6 °F (38.1 °C) (Oral)   Resp 26   Ht 5' 6\" (1.676 m)   Wt 180 lb (81.6 kg)   SpO2 93%   BMI 29.05 kg/m²     General appearance: No apparent distress appears stated age and cooperative. HEENT Normal cephalic, atraumatic without obvious deformity. Pupils equal, round, and reactive to light. Extra ocular muscles intact. Conjunctivae/corneas clear. Neck: Supple, No jugular venous distention/bruits. Trachea midline without thyromegaly or adenopathy with full range of motion. Lungs: Clear to auscultation, bilaterally without Rales/Wheezes/Rhonchi with good respiratory effort. Heart: Regular rate and rhythm with Normal S1/S2 without murmurs, rubs or gallops, point of maximum impulse non-displaced  Abdomen: Soft, non-tender or non-distended without rigidity or guarding and positive bowel sounds all four quadrants. Extremities: No clubbing, cyanosis, or edema bilaterally. Full range of motion without deformity and normal gait intact. Skin: Skin color, texture, turgor normal.  No rashes or lesions.   Neurologic: Alert and oriented X 3, neurovascularly intact with sensory/motor intact upper

## 2021-09-23 NOTE — ED NOTES
Bed: 08  Expected date:   Expected time:   Means of arrival:   Comments:  Santos Phillips 81, RN  09/23/21 0956

## 2021-09-23 NOTE — ED PROVIDER NOTES
[facility]        Pt Name: Femi Urbina  MRN: [id number]  [place] 1945  Date of evaluation: 9/23/2021  Provider: ANURADHA Gutiérrez  PCP: Javier Armstrong MD  Note Started: 2:34 PM EDT        I have seen and evaluated this patient with my supervising physician Kristy Shipman MD.    Mulu Ester       Chief Complaint   Patient presents with    Fatigue     PT. IN PER [facility] WITH REPORT OF HER NOT BEING ABLE TO GET HERSELF UP AND POSSIBLY GETTING A DOUBLE DOSE OF HER PAIN MEDICATION FROM THE GRAND SON. HISTORY OF PRESENT ILLNESS   (Location, Timing/Onset, Context/Setting, Quality, Duration, Modifying Factors, Severity, Associated Signs and Symptoms)  Note limiting factors. Chief Complaint: Weakness    Femi Urbina is a 77 y.o. female with past medical history of chronic back pain, depression, hypertension and document history of MRSA who presents the ED with complaint of weakness and fatigue. Patient arrived by EMS with complaints of generalized weakness and fatigue. Apparently has had difficulty with weakness and fatigue over the past couple days. Has been unable to get up and perform ADLs. Patient states she has had some fever and chills. States she had cough. Denies chest pain or shortness of breath. Denies headache, lightheadedness/dizziness, visual changes, speech disturbances, numbness/tingling, urinary symptoms or change in bowel movements. Denies abdominal pain, nausea/vomiting, urinary symptoms or changes in bowel meds. Denies any rashes or lesions. Patient is currently on 2 L supplemental oxygen at this time. EMS reports the patient is on oxygen chronically but patient states she does not wear oxygen chronically. States she only wears it at night. Nursing Notes were all reviewed and agreed with or any disagreements were addressed in the HPI.     REVIEW OF SYSTEMS    (2-9 systems for level 4, 10 or more for level 5)     Review of Systems   Constitutional: Positive for activity change, chills, fatigue and fever. Negative for appetite change and diaphoresis. Eyes: Negative for photophobia and visual disturbance. Respiratory: Negative. Negative for cough, chest tightness and shortness of breath. Cardiovascular: Negative. Negative for chest pain, palpitations and leg swelling. Gastrointestinal: Negative for abdominal pain, constipation, diarrhea, nausea and vomiting. Genitourinary: Negative for decreased urine volume, difficulty urinating, dysuria, flank pain, frequency, hematuria and urgency. Musculoskeletal: Negative for arthralgias, back pain, myalgias, neck pain and neck stiffness. Skin: Negative for color change, pallor, rash and wound. Neurological: Positive for weakness. Negative for dizziness, tremors, seizures, syncope, facial asymmetry, speech difficulty, light-headedness, numbness and headaches. Positives and Pertinent negatives as per HPI. Except as noted above in the ROS, all other systems were reviewed and negative.        PAST MEDICAL HISTORY     Past Medical History:   Diagnosis Date    AVM     Chronic back pain     Depression     Gastritis     History of blood transfusion     secondary to GI bleed    History of GI bleed     Hypertension     Personal history of MRSA (methicillin resistant Staphylococcus aureus)     recurrent, last 2-3 years ago finger (doesnt remember which one)         SURGICAL HISTORY     Past Surgical History:   Procedure Laterality Date    ABDOMINAL EXPLORATION SURGERY  09/07/2017    Lysis of adhesions, removal of pelvic mass, total abdominal hysterectomy with BSO, partial omentectomy, appendectomy, and pelvic lymphadenectomy    BACK SURGERY      multiple lumbar spine surgeries including laminectomy and fusion    CERVICAL FUSION      COLONOSCOPY      ENDOSCOPY, COLON, DIAGNOSTIC      HIATAL HERNIA REPAIR  1999    OTHER SURGICAL HISTORY Left [id number]    SPINAL CORD STIMULATOR BATTERY REPLACMENT         CURRENTMEDICATIONS       Previous Medications    AMLODIPINE (NORVASC) 10 MG TABLET    Take 1 tablet by mouth daily    ASPIRIN 81 MG CHEWABLE TABLET    Take 1 tablet by mouth daily    BETHANECHOL (URECHOLINE) 25 MG TABLET    TAKE ONE TABLET BY MOUTH THREE TIMES A DAY    BUPROPION (WELLBUTRIN XL) 300 MG EXTENDED RELEASE TABLET    TAKE ONE TABLET BY MOUTH EVERY MORNING    DULOXETINE (CYMBALTA) 30 MG EXTENDED RELEASE CAPSULE        FOLBEE PLUS (FOLBEE PLUS) TABS    Take 1 tablet by mouth daily    FUROSEMIDE (LASIX) 20 MG TABLET    TAKE ONE TABLET BY MOUTH DAILY AS NEEDED FOR WEIGHT GAIN OF 2LBS OR LEG EDEMA    MECLIZINE (ANTIVERT) 25 MG TABLET    TAKE ONE TABLET BY MOUTH THREE TIMES A DAY AS NEEDED FOR DIZZINESS    ONDANSETRON (ZOFRAN) 4 MG TABLET    Take 1 tablet by mouth daily as needed for Nausea or Vomiting    PREGABALIN (LYRICA) 150 MG CAPSULE    Take 150 mg by mouth three times daily.     RIVAROXABAN (XARELTO) 20 MG TABS TABLET    Take 1 tablet by mouth Daily with supper    ROPINIROLE (REQUIP) 0.5 MG TABLET    Take 1 tablet by mouth nightly    SERTRALINE (ZOLOFT) 25 MG TABLET    Take 1 tablet by mouth daily    TIZANIDINE (ZANAFLEX) 4 MG TABLET    Take 4 mg by mouth 2 times daily          ALLERGIES     Penicillins    FAMILYHISTORY       Family History   Problem Relation Age of Onset    Diabetes Mother     Heart Disease Mother         cad, aortic aneurysm    Diabetes Father     Heart Disease Father         heart attack, chf    Other Sister         abdominal aneurysm          SOCIAL HISTORY       Social History     Tobacco Use    Smoking status: Never Smoker    Smokeless tobacco: Never Used   Substance Use Topics    Alcohol use: No    Drug use: No       SCREENINGS             PHYSICAL EXAM    (up to 7 for level 4, 8 or more for level 5)     ED Triage Vitals [09/23/21 1408]   BP Temp Temp Source Pulse Resp SpO2 Height Weight 114/73 100.6 °F (38.1 °C) Oral 101 18 94 % 5' 6\" (1.676 m) 180 lb (81.6 kg)       Physical Exam  Constitutional:       General: She is not in acute distress. Appearance: Normal appearance. She is well-developed. She is not ill-appearing, toxic-appearing or diaphoretic. HENT:      Head: Normocephalic and atraumatic. Comments: Atraumatic. No raccoon eyes or mccoy sign. Right Ear: External ear normal.      Left Ear: External ear normal.   Eyes:      General:         Right eye: No discharge. Left eye: No discharge. Extraocular Movements: Extraocular movements intact. Conjunctiva/sclera: Conjunctivae normal.      Pupils: Pupils are equal, round, and reactive to light. Cardiovascular:      Rate and Rhythm: Normal rate and regular rhythm. Pulses: Normal pulses. Heart sounds: Normal heart sounds. No murmur heard. No friction rub. No gallop. Comments: 2+ radial pulses bilaterally. No pedal edema. No calf tenderness. No JVD. Pulmonary:      Effort: Pulmonary effort is normal. No respiratory distress. Breath sounds: Normal breath sounds. No stridor. No wheezing, rhonchi or rales. Comments: On 2 L nasal cannula oxygen at this time. Chest:      Chest wall: No tenderness. Abdominal:      General: Abdomen is flat. Bowel sounds are normal. There is no distension. Palpations: Abdomen is soft. There is no mass. Tenderness: There is no abdominal tenderness. There is no right CVA tenderness, left CVA tenderness, guarding or rebound. Negative signs include Albright's sign and McBurney's sign. Hernia: No hernia is present. Musculoskeletal:         General: Normal range of motion. Cervical back: Normal range of motion and neck supple. Skin:     General: Skin is warm and dry. Coloration: Skin is not pale. Findings: No erythema or rash. Neurological:      General: No focal deficit present.       Mental Status: She is alert and oriented to person, place, and time. GCS: GCS eye subscore is 4. GCS verbal subscore is 5. GCS motor subscore is 6. Cranial Nerves: Cranial nerves are intact. No cranial nerve deficit, dysarthria or facial asymmetry. Sensory: Sensation is intact. No sensory deficit. Motor: Motor function is intact. Comments: Gait deferred.    Psychiatric:         Behavior: Behavior normal.         DIAGNOSTIC RESULTS   LABS:    Labs Reviewed   URINE RT REFLEX TO CULTURE - Abnormal; Notable for the following components:       Result Value    Clarity, UA CLOUDY (*)     Bilirubin Urine SMALL (*)     Ketones, Urine 15 (*)     Blood, Urine SMALL (*)     Protein, UA TRACE (*)     Leukocyte Esterase, Urine LARGE (*)     All other components within normal limits    Narrative:     Performed at:  [facility] [address], [place], [address] [employer]   Phone [phone]   CBC WITH AUTO DIFFERENTIAL - Abnormal; Notable for the following components:    RBC 3.22 (*)     Hemoglobin 10.3 (*)     Hematocrit 30.6 (*)     Neutrophils Absolute 8.3 (*)     Lymphocytes Absolute 0.3 (*)     All other components within normal limits    Narrative:     Performed at:  [facility] [address], [place], [address]   Phone [phone]   COMPREHENSIVE METABOLIC PANEL W/ REFLEX TO MG FOR LOW K - Abnormal; Notable for the following components:    Sodium 135 (*)     Glucose 117 (*)     GFR Non- 44 (*)     GFR  53 (*)     All other components within normal limits    Narrative:     Performed at:  [facility] [address], [place], [address] [employer]   Phone 21  - Abnormal; Notable for the following components:    Pro-BNP 1,371 (*)     All other components within normal limits    Narrative:     Performed at:  [facility] [address], [place], [place] [address]   Phone [phone] - Abnormal; Notable for the following components:    Protime 13.8 (*)     INR 1.21 (*)     All other components within normal limits    Narrative:     Performed at:  [facility] [email or web address] [place] [address] [employer]   Phone [phone]   PROCALCITONIN - Abnormal; Notable for the following components:    Procalcitonin 1.22 (*)     All other components within normal limits    Narrative:     Performed at:  [facility] [email or web address] [place] [address] [employer]   Phone [phone]   MICROSCOPIC URINALYSIS - Abnormal; Notable for the following components:    Bacteria, UA 4+ (*)     Hyaline Casts, UA 11 (*)     WBC,  (*)     All other components within normal limits    Narrative:     Performed at:  [facility] [email or web address] [place] [address] [employer]   Phone [phone]   CULTURE, BLOOD 1   CULTURE, BLOOD 2   CULTURE, URINE   TROPONIN    Narrative:     Performed at:  [facility] [email or web address] [address] [employer]   Phone [phone]   LACTATE, SEPSIS    Narrative:     Performed at:  [facility] [email or web address] [place] [address] [employer]   Phone [phone]   LACTATE, SEPSIS       When ordered only abnormal lab results are displayed. All other labs were within normal range or not returned as of this dictation. EKG: When ordered, EKG's are interpreted by the Emergency Department Physician in the absence of a cardiologist.  Please see their note for interpretation of EKG.     RADIOLOGY:   Non-plain film images such as CT, Ultrasound and MRI are read by the radiologist. Plain radiographic images are visualized and preliminarily interpreted by the ED Provider with the below findings:        Interpretation per the Radiologist below, if available at the time of this note:    CT HEAD WO CONTRAST   Final Result No acute intracranial abnormality. XR CHEST PORTABLE   Final Result   No acute process. No results found. PROCEDURES   Unless otherwise noted below, none     Procedures    CRITICAL CARE TIME   N/A    CONSULTS:  None      EMERGENCY DEPARTMENT COURSE and DIFFERENTIAL DIAGNOSIS/MDM:   Vitals:    Vitals:    09/23/21 1408 09/23/21 1445 09/23/21 1500   BP: 114/73 117/74 114/76   Pulse: 101 98 97   Resp: 18 22 24   Temp: 100.6 °F (38.1 °C)     TempSrc: Oral     SpO2: 94% 95% 95%   Weight: 180 lb (81.6 kg)     Height: 5' 6\" (1.676 m)         Patient was given the following medications:  Medications   cefTRIAXone (ROCEPHIN) 1000 mg in sterile water 10 mL IV syringe (has no administration in time range)   0.9 % sodium chloride bolus (has no administration in time range)   acetaminophen (TYLENOL) tablet 650 mg (650 mg Oral Given 9/23/21 1435)           Patient is a 36-year-old female who presents to the ED complaint of weakness. Patient complaint of generalized weakness and fatigue. Upon arrival patient had temp of 100.6. Was initially tachycardic. Remaining vital signs unremarkable. Denies any other complaints at this time. Urinalysis did show 4+ bacteria 156 white blood cells concerning for underlying acute cystitis. Was given Rocephin here in the ED. Tylenol for fever here in the ED. INR 1.2. CBC showed normal white count and platelets. CMP showed no acute abnormality. Troponin normal.  BNP elevated at 1300. Patient is on Lasix at home but no history of CHF. Last echo back in 2019 showed EF of 65 to 70%. Lactic acid was normal.  Procalcitonin 1.22.  CT of the head unremarkable. Chest x-ray showed no acute abnormality. EKG interpreted by attending. Held fluids at this time given patient's normal white count and lactic acid with no hypotension or tachycardia and concern for potential underlying CHF given elevated BNP and the fact that she is on Lasix at home.   Last echo appears to be back in 2019. Do not believe 30 mL/kg fluid bolus indicate this time given concern for further respiratory compromise. Patient will require admission given the fact that she is weak and fatigued and has not been able to perform ADLs at home. Case will discussed with hospital service for admission. FINAL IMPRESSION      1. General weakness    2. Febrile illness, acute    3. Acute cystitis without hematuria          DISPOSITION/PLAN   DISPOSITION Decision To Admit 09/23/2021 05:39:00 PM      PATIENT REFERRED TO:  No follow-up provider specified.     DISCHARGE MEDICATIONS:  New Prescriptions    No medications on file       DISCONTINUED MEDICATIONS:  Discontinued Medications    No medications on file              (Please note that portions of this note were completed with a voice recognition program.  Efforts were made to edit the dictations but occasionally words are mis-transcribed.)    ANURADHA Jimenez (electronically signed)         ANURADHA Sánchez  09/23/21 7319

## 2021-09-23 NOTE — ED NOTES
Pharmacy Medication History Note      List of current medications patient is taking is complete. Source of information: Patient     Changes made to medication list:  Medications flagged for removal (include reason, ex. noncompliance):  None     Medications removed (include reason, ex. therapy complete or physician discontinued):  None     Medications added/doses adjusted:  Morphine Sulfate IR 15 mg three times daily   OTC Tylenol 500 mg PRN   Ropinirole 0.25 mg nightly  Furosemide 20 mg daily    Other notes (ex. Recent course of antibiotics, Coumadin dosing):  None   Denies use of other OTC or herbal medications. Last dose times updated. No current facility-administered medications on file prior to encounter. Current Outpatient Medications on File Prior to Encounter   Medication Sig Dispense Refill    rOPINIRole (REQUIP) 0.25 MG tablet Take 0.25 mg by mouth nightly      morphine (MSIR) 15 MG tablet Take 15 mg by mouth 3 times daily.       acetaminophen (TYLENOL) 500 MG tablet Take 500 mg by mouth every 6 hours as needed for Pain      folbee plus (FOLBEE PLUS) TABS Take 1 tablet by mouth daily      bethanechol (URECHOLINE) 25 MG tablet TAKE ONE TABLET BY MOUTH THREE TIMES A DAY 90 tablet 1    rivaroxaban (XARELTO) 20 MG TABS tablet Take 1 tablet by mouth Daily with supper 90 tablet 1    meclizine (ANTIVERT) 25 MG tablet TAKE ONE TABLET BY MOUTH THREE TIMES A DAY AS NEEDED FOR DIZZINESS 90 tablet 1    buPROPion (WELLBUTRIN XL) 300 MG extended release tablet TAKE ONE TABLET BY MOUTH EVERY MORNING 90 tablet 1    sertraline (ZOLOFT) 25 MG tablet Take 1 tablet by mouth daily 30 tablet 5    furosemide (LASIX) 20 MG tablet TAKE ONE TABLET BY MOUTH DAILY AS NEEDED FOR WEIGHT GAIN OF 2LBS OR LEG EDEMA (Patient taking differently: Take 20 mg by mouth daily ) 90 tablet 0    tiZANidine (ZANAFLEX) 4 MG tablet Take 4 mg by mouth 2 times daily       pregabalin (LYRICA) 150 MG capsule Take 150 mg by mouth three times daily.      ondansetron (ZOFRAN) 4 MG tablet Take 1 tablet by mouth daily as needed for Nausea or Vomiting 30 tablet 0    aspirin 81 MG chewable tablet Take 1 tablet by mouth daily 30 tablet 0    DULoxetine (CYMBALTA) 30 MG extended release capsule       [DISCONTINUED] rOPINIRole (REQUIP) 0.5 MG tablet Take 1 tablet by mouth nightly (Patient taking differently: Take 0.25 mg by mouth nightly ) 90 tablet 3    amLODIPine (NORVASC) 10 MG tablet Take 1 tablet by mouth daily 90 tablet 0     Joseph Valadez, PharmD    PGY1 Pharmacy Resident   [id number]

## 2021-09-23 NOTE — ED PROVIDER NOTES
I independently performed a history and physical on [address] [facility]. All diagnostic, treatment, and disposition decisions were made by myself in conjunction with the advanced practice provider. Briefly, this is a 77 y.o. female here for confusion, weakness. Has been unable to get herself up and take care of activities of daily living. Similar happened over a week ago and she was in the hospital for this. Denies nausea or vomiting. No pain. No chest pain, shortness of breath, headache. Lives at home with her . Does have cough which has been worsening. She is typically on oxygen. Feels as though after being given fluids here that she is thinking more clearly. On exam,   General: Patient is in no acute distress  Skin: No cyanosis  HEENT: Dry mucous membranes  Heart: Regular rate, regular rhythm  Lung: No respiratory distress  Abdomen: Soft, nontender  Neuro: Moving all extremities, no facial droop, no slurred speech, answers questions appropriately. Cranial nerves II to XII intact      EKG  The Ekg interpreted by me in the absence of a cardiologist shows. Normal sinus rhythm  No acute ST changes   HR 98  Right bundle branch block  T wave inversions leads III, aVF, V2, V3, V4  No significant EKG changes compared to study in 9/15      Screenings            MDM  Briefly, this is a 77 y.o. female here for confusion, weakness. Found to be febrile, tachypneic. No focal neuro deficits on exam to indicate stroke. Improved with some IV fluids. Likely infectious in etiology. No headache to indicate meningitis and no neck stiffness. Found urinary tract infection. Started on ceftriaxone. Given 30cc/kg IV fluids ideal body weight for possible sepsis. No evidence of intracranial bleed on head CT    Patient Referrals:  No follow-up provider specified. Discharge Medications:  New Prescriptions    No medications on file       FINAL IMPRESSION  1. General weakness    2.  Febrile illness, acute 3. Acute cystitis without hematuria        Blood pressure 111/79, pulse 98, temperature 100.6 °F (38.1 °C), temperature source Oral, resp. rate 26, height 5' 6\" (1.676 m), weight 180 lb (81.6 kg), SpO2 93 %. For further details of [address] [facility] emergency department encounter, please see documentation by advanced practice providerMaranda.         Ameya Johnston MD  09/23/21 5947

## 2021-09-24 PROBLEM — U07.1 COVID-19: Status: ACTIVE | Noted: 2021-09-24

## 2021-09-24 LAB
ANION GAP SERPL CALCULATED.3IONS-SCNC: 12 MMOL/L (ref 3–16)
BASE EXCESS ARTERIAL: 0.4 MMOL/L (ref -3–3)
BASOPHILS ABSOLUTE: 0 K/UL (ref 0–0.2)
BASOPHILS RELATIVE PERCENT: 0.2 %
BUN BLDV-MCNC: 22 MG/DL (ref 7–20)
C-REACTIVE PROTEIN: 200.5 MG/L (ref 0–5.1)
CALCIUM SERPL-MCNC: 8.8 MG/DL (ref 8.3–10.6)
CARBOXYHEMOGLOBIN ARTERIAL: 0.8 % (ref 0–1.5)
CHLORIDE BLD-SCNC: 101 MMOL/L (ref 99–110)
CO2: 25 MMOL/L (ref 21–32)
CREAT SERPL-MCNC: 1.3 MG/DL (ref 0.6–1.2)
D DIMER: 600 NG/ML DDU (ref 0–229)
EOSINOPHILS ABSOLUTE: 0 K/UL (ref 0–0.6)
EOSINOPHILS RELATIVE PERCENT: 0.4 %
GFR AFRICAN AMERICAN: 48
GFR NON-AFRICAN AMERICAN: 40
GLUCOSE BLD-MCNC: 83 MG/DL (ref 70–99)
HCO3 ARTERIAL: 25.9 MMOL/L (ref 21–29)
HCT VFR BLD CALC: 27.2 % (ref 36–48)
HEMOGLOBIN, ART, EXTENDED: 12 G/DL (ref 12–16)
HEMOGLOBIN: 9 G/DL (ref 12–16)
LV EF: 68 %
LVEF MODALITY: NORMAL
LYMPHOCYTES ABSOLUTE: 0.4 K/UL (ref 1–5.1)
LYMPHOCYTES RELATIVE PERCENT: 9.3 %
MAGNESIUM: 2 MG/DL (ref 1.8–2.4)
MCH RBC QN AUTO: 31.7 PG (ref 26–34)
MCHC RBC AUTO-ENTMCNC: 33.2 G/DL (ref 31–36)
MCV RBC AUTO: 95.4 FL (ref 80–100)
METHEMOGLOBIN ARTERIAL: 0.6 %
MONOCYTES ABSOLUTE: 0.4 K/UL (ref 0–1.3)
MONOCYTES RELATIVE PERCENT: 8.4 %
NEUTROPHILS ABSOLUTE: 3.9 K/UL (ref 1.7–7.7)
NEUTROPHILS RELATIVE PERCENT: 81.7 %
O2 SAT, ARTERIAL: 96.7 %
O2 THERAPY: NORMAL
PCO2 ARTERIAL: 43.9 MMHG (ref 35–45)
PDW BLD-RTO: 14 % (ref 12.4–15.4)
PH ARTERIAL: 7.38 (ref 7.35–7.45)
PLATELET # BLD: 173 K/UL (ref 135–450)
PMV BLD AUTO: 8.2 FL (ref 5–10.5)
PO2 ARTERIAL: 86.7 MMHG (ref 75–108)
POTASSIUM REFLEX MAGNESIUM: 3.4 MMOL/L (ref 3.5–5.1)
PROCALCITONIN: 1.19 NG/ML (ref 0–0.15)
RBC # BLD: 2.85 M/UL (ref 4–5.2)
SARS-COV-2, NAAT: DETECTED
SODIUM BLD-SCNC: 138 MMOL/L (ref 136–145)
TCO2 ARTERIAL: 61 MMOL/L
WBC # BLD: 4.7 K/UL (ref 4–11)

## 2021-09-24 PROCEDURE — XW033H5 INTRODUCTION OF TOCILIZUMAB INTO PERIPHERAL VEIN, PERCUTANEOUS APPROACH, NEW TECHNOLOGY GROUP 5: ICD-10-PCS | Performed by: INTERNAL MEDICINE

## 2021-09-24 PROCEDURE — 2580000003 HC RX 258: Performed by: INTERNAL MEDICINE

## 2021-09-24 PROCEDURE — U0005 INFEC AGEN DETEC AMPLI PROBE: HCPCS

## 2021-09-24 PROCEDURE — 36600 WITHDRAWAL OF ARTERIAL BLOOD: CPT

## 2021-09-24 PROCEDURE — 84145 PROCALCITONIN (PCT): CPT

## 2021-09-24 PROCEDURE — 85025 COMPLETE CBC W/AUTO DIFF WBC: CPT

## 2021-09-24 PROCEDURE — 96376 TX/PRO/DX INJ SAME DRUG ADON: CPT

## 2021-09-24 PROCEDURE — 87635 SARS-COV-2 COVID-19 AMP PRB: CPT

## 2021-09-24 PROCEDURE — 6370000000 HC RX 637 (ALT 250 FOR IP): Performed by: INTERNAL MEDICINE

## 2021-09-24 PROCEDURE — 82803 BLOOD GASES ANY COMBINATION: CPT

## 2021-09-24 PROCEDURE — 51798 US URINE CAPACITY MEASURE: CPT

## 2021-09-24 PROCEDURE — 80048 BASIC METABOLIC PNL TOTAL CA: CPT

## 2021-09-24 PROCEDURE — 96365 THER/PROPH/DIAG IV INF INIT: CPT

## 2021-09-24 PROCEDURE — 6370000000 HC RX 637 (ALT 250 FOR IP): Performed by: PHYSICIAN ASSISTANT

## 2021-09-24 PROCEDURE — U0003 INFECTIOUS AGENT DETECTION BY NUCLEIC ACID (DNA OR RNA); SEVERE ACUTE RESPIRATORY SYNDROME CORONAVIRUS 2 (SARS-COV-2) (CORONAVIRUS DISEASE [COVID-19]), AMPLIFIED PROBE TECHNIQUE, MAKING USE OF HIGH THROUGHPUT TECHNOLOGIES AS DESCRIBED BY CMS-2020-01-R: HCPCS

## 2021-09-24 PROCEDURE — 51701 INSERT BLADDER CATHETER: CPT

## 2021-09-24 PROCEDURE — 2500000003 HC RX 250 WO HCPCS: Performed by: INTERNAL MEDICINE

## 2021-09-24 PROCEDURE — 2580000003 HC RX 258: Performed by: PHYSICIAN ASSISTANT

## 2021-09-24 PROCEDURE — 2700000000 HC OXYGEN THERAPY PER DAY

## 2021-09-24 PROCEDURE — 1200000000 HC SEMI PRIVATE

## 2021-09-24 PROCEDURE — G0378 HOSPITAL OBSERVATION PER HR: HCPCS

## 2021-09-24 PROCEDURE — 6360000002 HC RX W HCPCS: Performed by: PHYSICIAN ASSISTANT

## 2021-09-24 PROCEDURE — 36415 COLL VENOUS BLD VENIPUNCTURE: CPT

## 2021-09-24 PROCEDURE — 6360000002 HC RX W HCPCS: Performed by: INTERNAL MEDICINE

## 2021-09-24 PROCEDURE — 85379 FIBRIN DEGRADATION QUANT: CPT

## 2021-09-24 PROCEDURE — XW033E5 INTRODUCTION OF REMDESIVIR ANTI-INFECTIVE INTO PERIPHERAL VEIN, PERCUTANEOUS APPROACH, NEW TECHNOLOGY GROUP 5: ICD-10-PCS | Performed by: INTERNAL MEDICINE

## 2021-09-24 PROCEDURE — 86140 C-REACTIVE PROTEIN: CPT

## 2021-09-24 PROCEDURE — 83735 ASSAY OF MAGNESIUM: CPT

## 2021-09-24 PROCEDURE — 96361 HYDRATE IV INFUSION ADD-ON: CPT

## 2021-09-24 RX ORDER — ACETAMINOPHEN 650 MG/1
650 SUPPOSITORY RECTAL EVERY 6 HOURS PRN
Status: DISCONTINUED | OUTPATIENT
Start: 2021-09-24 | End: 2021-09-29 | Stop reason: HOSPADM

## 2021-09-24 RX ORDER — DEXAMETHASONE SODIUM PHOSPHATE 10 MG/ML
10 INJECTION, SOLUTION INTRAMUSCULAR; INTRAVENOUS ONCE
Status: COMPLETED | OUTPATIENT
Start: 2021-09-24 | End: 2021-09-24

## 2021-09-24 RX ORDER — ACETAMINOPHEN 325 MG/1
650 TABLET ORAL EVERY 6 HOURS PRN
Status: DISCONTINUED | OUTPATIENT
Start: 2021-09-24 | End: 2021-09-29 | Stop reason: HOSPADM

## 2021-09-24 RX ORDER — VITAMIN B COMPLEX
2000 TABLET ORAL DAILY
Status: DISCONTINUED | OUTPATIENT
Start: 2021-09-24 | End: 2021-09-29 | Stop reason: HOSPADM

## 2021-09-24 RX ORDER — SODIUM CHLORIDE 9 MG/ML
INJECTION, SOLUTION INTRAVENOUS CONTINUOUS
Status: DISCONTINUED | OUTPATIENT
Start: 2021-09-24 | End: 2021-09-28

## 2021-09-24 RX ORDER — DEXAMETHASONE SODIUM PHOSPHATE 4 MG/ML
6 INJECTION, SOLUTION INTRA-ARTICULAR; INTRALESIONAL; INTRAMUSCULAR; INTRAVENOUS; SOFT TISSUE EVERY 24 HOURS
Status: DISCONTINUED | OUTPATIENT
Start: 2021-09-24 | End: 2021-09-24

## 2021-09-24 RX ORDER — DEXAMETHASONE SODIUM PHOSPHATE 10 MG/ML
10 INJECTION, SOLUTION INTRAMUSCULAR; INTRAVENOUS EVERY 24 HOURS
Status: DISCONTINUED | OUTPATIENT
Start: 2021-09-25 | End: 2021-09-25

## 2021-09-24 RX ORDER — MORPHINE SULFATE 15 MG/1
15 TABLET ORAL 2 TIMES DAILY
Status: DISCONTINUED | OUTPATIENT
Start: 2021-09-24 | End: 2021-09-29 | Stop reason: HOSPADM

## 2021-09-24 RX ORDER — GUAIFENESIN/DEXTROMETHORPHAN 100-10MG/5
5 SYRUP ORAL EVERY 4 HOURS PRN
Status: DISCONTINUED | OUTPATIENT
Start: 2021-09-24 | End: 2021-09-29 | Stop reason: HOSPADM

## 2021-09-24 RX ADMIN — SODIUM CHLORIDE: 9 INJECTION, SOLUTION INTRAVENOUS at 23:57

## 2021-09-24 RX ADMIN — TOCILIZUMAB 648 MG: 180 INJECTION, SOLUTION SUBCUTANEOUS at 17:54

## 2021-09-24 RX ADMIN — DEXAMETHASONE SODIUM PHOSPHATE 10 MG: 10 INJECTION, SOLUTION INTRAMUSCULAR; INTRAVENOUS at 16:19

## 2021-09-24 RX ADMIN — BETHANECHOL CHLORIDE 25 MG: 10 TABLET ORAL at 20:17

## 2021-09-24 RX ADMIN — PREGABALIN 150 MG: 75 CAPSULE ORAL at 08:51

## 2021-09-24 RX ADMIN — PREGABALIN 150 MG: 75 CAPSULE ORAL at 14:42

## 2021-09-24 RX ADMIN — BUPROPION HYDROCHLORIDE 300 MG: 300 TABLET, EXTENDED RELEASE ORAL at 08:50

## 2021-09-24 RX ADMIN — Medication 10 ML: at 08:51

## 2021-09-24 RX ADMIN — ROPINIROLE HYDROCHLORIDE 0.25 MG: 0.25 TABLET, FILM COATED ORAL at 20:18

## 2021-09-24 RX ADMIN — MORPHINE SULFATE 15 MG: 15 TABLET ORAL at 08:50

## 2021-09-24 RX ADMIN — Medication 10 ML: at 20:19

## 2021-09-24 RX ADMIN — SERTRALINE 25 MG: 50 TABLET, FILM COATED ORAL at 08:51

## 2021-09-24 RX ADMIN — DEXAMETHASONE SODIUM PHOSPHATE 6 MG: 4 INJECTION, SOLUTION INTRAMUSCULAR; INTRAVENOUS at 14:41

## 2021-09-24 RX ADMIN — SODIUM CHLORIDE: 9 INJECTION, SOLUTION INTRAVENOUS at 16:19

## 2021-09-24 RX ADMIN — MORPHINE SULFATE 15 MG: 15 TABLET ORAL at 20:18

## 2021-09-24 RX ADMIN — POTASSIUM BICARBONATE 20 MEQ: 782 TABLET, EFFERVESCENT ORAL at 16:22

## 2021-09-24 RX ADMIN — REMDESIVIR 200 MG: 100 INJECTION, POWDER, LYOPHILIZED, FOR SOLUTION INTRAVENOUS at 16:19

## 2021-09-24 RX ADMIN — Medication 2000 UNITS: at 16:22

## 2021-09-24 RX ADMIN — BETHANECHOL CHLORIDE 25 MG: 10 TABLET ORAL at 08:50

## 2021-09-24 RX ADMIN — ACETAMINOPHEN 650 MG: 325 TABLET ORAL at 14:41

## 2021-09-24 RX ADMIN — SODIUM CHLORIDE: 9 INJECTION, SOLUTION INTRAVENOUS at 11:51

## 2021-09-24 RX ADMIN — ASPIRIN 81 MG 81 MG: 81 TABLET ORAL at 08:51

## 2021-09-24 RX ADMIN — Medication 10 ML: at 16:20

## 2021-09-24 RX ADMIN — BETHANECHOL CHLORIDE 25 MG: 10 TABLET ORAL at 14:41

## 2021-09-24 RX ADMIN — Medication 1000 MG: at 17:55

## 2021-09-24 RX ADMIN — RIVAROXABAN 20 MG: 20 TABLET, FILM COATED ORAL at 17:55

## 2021-09-24 RX ADMIN — PREGABALIN 150 MG: 75 CAPSULE ORAL at 20:17

## 2021-09-24 ASSESSMENT — PAIN SCALES - GENERAL
PAINLEVEL_OUTOF10: 0
PAINLEVEL_OUTOF10: 5
PAINLEVEL_OUTOF10: 0
PAINLEVEL_OUTOF10: 0
PAINLEVEL_OUTOF10: 5

## 2021-09-24 ASSESSMENT — PAIN DESCRIPTION - PROGRESSION: CLINICAL_PROGRESSION: NOT CHANGED

## 2021-09-24 ASSESSMENT — PAIN DESCRIPTION - ORIENTATION
ORIENTATION: LOWER
ORIENTATION: LOWER

## 2021-09-24 ASSESSMENT — PAIN DESCRIPTION - PAIN TYPE
TYPE: CHRONIC PAIN

## 2021-09-24 ASSESSMENT — PAIN DESCRIPTION - DESCRIPTORS
DESCRIPTORS: ACHING
DESCRIPTORS: ACHING

## 2021-09-24 ASSESSMENT — PAIN DESCRIPTION - LOCATION
LOCATION: BACK

## 2021-09-24 ASSESSMENT — PAIN - FUNCTIONAL ASSESSMENT: PAIN_FUNCTIONAL_ASSESSMENT: ACTIVITIES ARE NOT PREVENTED

## 2021-09-24 ASSESSMENT — PAIN DESCRIPTION - FREQUENCY: FREQUENCY: INTERMITTENT

## 2021-09-24 ASSESSMENT — PAIN DESCRIPTION - ONSET: ONSET: ON-GOING

## 2021-09-24 NOTE — PROGRESS NOTES
Assessment complete. Alert, oriented x4. Complaining of pain, given scheduled morphine per MAR. Febrile (100.7), given PRN Tylenol per MAR. Incontinent of urine; cleaned, changed, repositioned. Jabari Gtz initiated d/t incontinence, urgency; in addition, patient noted to be unsteady on feet when ambulating from ED bed to hospital bed (recommend use of stedy at this time). Oriented to room and use of call light. Educated on fall risk and precautions, verbalized understanding. The care plan and education has been reviewed and mutually agreed upon with the patient. In bed, alarm on, bed in lowest position, call light and table within reach. No further needs expressed at this time. 0030  Temp coming down (99.8). 5945  Patient noted to have not urinated. Taken to bathroom using stedy. Feeling the urge, but unable to void. Bladder scanned for 415mL. Message sent to hospitalist.    7700  Straight cath for 250mL of lesia urine. fragile X/SMA/COVID

## 2021-09-24 NOTE — SIGNIFICANT EVENT
Rapid response called for this patient. This morning the pt was able to pivot into the recliner to eat her breakfast. This RN and the NP assisted the pt to the recliner with the walker. Pt was talkative and alert/oriented x4. The pt was on 2L oxygen at this time, but was obviously labored for breath at rest and exertion. This RN turned her oxygen up to 5L. The pt lungs sounded diminished, wheezy, and crackles in all lobes. The pt told this RN she was swabbed for covid last week and it was negative. She stated she had been around no one sick or traveled. This RN messaged the provider on 2 occasions with concerns for covid. Rapid swab and PCR swab ordered, collected, and sent to lab. Pt resulted covid positive. This RN assisted the pt out of the chair back to bed after she was finished with lunch. This time, the stedy was needed as the pt had increased weakness. She was unable to follow commands and would not let go of the stedy bar. This RN attempted to get the pt attention but the pt seemed flat affect and zoned out. The pt then fell backwards onto the bed. This RN moved the stedy and quickly grabbed the pt legs to prevent her from falling. This RN placed the pt legs up into the bed. This RN got pt vitals and saw her oxygen saturation was 70% on 5L. This RN turned pt oxygen up to 15L and called for assistance. Charge RN called rapid response. Disposition: to move pt to room 5564. Pt transported to  via this RN and transport. Charge RN on 5T stated the pt was supposed to go to [facility] however on multiple occasions I was told room 5564. Bed side report given to  charge RN. She accepted the pt. All questions and concerns addressed.

## 2021-09-24 NOTE — PROGRESS NOTES
Physical/Occupational Therapy  Nemesio Dinorah  PT/OT orders noted and appreciated. Prior to attempt, pt's RN calling RR secondary to episode of decreased responsiveness and rapid desaturation of O2. Per charge RN, Elke Durham, plans for pt to transfer to  secondary to COVID (+) status. PT/OT electing to HOLD pt this date due to medical status and will follow-up with pt as schedule and medical status allows.   Thank you,  Ashlie Murillo, PT, DPT, [id number]  Loel Goodpasture, Leron Lees., OTR/L, [id number],

## 2021-09-24 NOTE — PROGRESS NOTES
[facility] PROGRESS NOTE    9/24/2021 2:05 PM        Name: Kristin Draper . Admitted: 9/23/2021  Primary Care Provider: El Ortiz MD (Tel: [phone])      Subjective:  . Patient admitted with UTI. Was just hospitalized with hypoxia last week. At that time covid was negative as well as CTPA. She was discharged with home O2. Currently she feels SOB and is on 5L of O2. Feels weak.      Reviewed interval ancillary notes    Current Medications  0.9 % sodium chloride infusion, Continuous  morphine (MSIR) tablet 15 mg, BID  perflutren lipid microspheres (DEFINITY) injection 1.65 mg, ONCE PRN  acetaminophen (TYLENOL) tablet 650 mg, Q6H PRN   Or  acetaminophen (TYLENOL) suppository 650 mg, Q6H PRN  guaiFENesin-dextromethorphan (ROBITUSSIN DM) 100-10 MG/5ML syrup 5 mL, Q4H PRN  dexamethasone (DECADRON) injection 6 mg, Q24H  Vitamin D (CHOLECALCIFEROL) tablet 2,000 Units, Daily  aspirin chewable tablet 81 mg, Daily  bethanechol (URECHOLINE) tablet 25 mg, TID  buPROPion (WELLBUTRIN XL) extended release tablet 300 mg, Daily  pregabalin (LYRICA) capsule 150 mg, TID  rivaroxaban (XARELTO) tablet 20 mg, Dinner  sertraline (ZOLOFT) tablet 25 mg, Daily  rOPINIRole (REQUIP) tablet 0.25 mg, Nightly  sodium chloride flush 0.9 % injection 5-40 mL, 2 times per day  sodium chloride flush 0.9 % injection 5-40 mL, PRN  0.9 % sodium chloride infusion, PRN  ondansetron (ZOFRAN-ODT) disintegrating tablet 4 mg, Q8H PRN   Or  ondansetron (ZOFRAN) injection 4 mg, Q6H PRN  acetaminophen (TYLENOL) tablet 650 mg, Q6H PRN   Or  acetaminophen (TYLENOL) suppository 650 mg, Q6H PRN  polyethylene glycol (GLYCOLAX) packet 17 g, Daily PRN  cefTRIAXone (ROCEPHIN) 1000 mg in sterile water 10 mL IV syringe, Q24H        Objective:  /89   Pulse 93   Temp 99.8 °F (37.7 °C) (Oral)   Resp 22   Ht 5' (1.524 m)   Wt 182 lb (82.6 kg)   SpO2 92%   BMI 35.54 kg/m²     Intake/Output Summary (Last 24 hours) at 9/24/2021 1405  Last data filed at 9/24/2021 0852  Gross per 24 hour   Intake 375 ml   Output 400 ml   Net -25 ml      Wt Readings from Last 3 Encounters:   09/23/21 182 lb (82.6 kg)   09/16/21 175 lb 1.6 oz (79.4 kg)   07/07/21 186 lb (84.4 kg)       General appearance:  Appears comfortable  Eyes: Sclera clear. Pupils equal.  ENT: Moist oral mucosa. Trachea midline, no adenopathy. Cardiovascular: Regular rhythm, normal S1, S2. No murmur. No edema in lower extremities  Respiratory: Not using accessory muscles. Good inspiratory effort. Decreased at bases  GI: Abdomen soft, no tenderness, not distended, normal bowel sounds  Musculoskeletal: No cyanosis in digits, neck supple  Neurology: CN 2-12 grossly intact. No speech or motor deficits  Psych: Normal affect. Alert and oriented in time, place and person  Skin: Warm, dry, normal turgor    Labs and Tests:  CBC:   Recent Labs     09/23/21  1447 09/24/21  0502   WBC 9.1 4.7   HGB 10.3* 9.0*    173     BMP:    Recent Labs     09/23/21  1447 09/24/21  0502   * 138   K 3.9 3.4*    101   CO2 27 25   BUN 18 22*   CREATININE 1.2 1.3*   GLUCOSE 117* 83     Hepatic:   Recent Labs     09/23/21  1447   AST 34   ALT 17   BILITOT 0.7   ALKPHOS 85     CXR  No acute process. Problem List  Principal Problem:    UTI (urinary tract infection)  Active Problems:    Essential hypertension    Restless legs syndrome (RLS)    GERD    Mixed hyperlipidemia    Obesity, morbid, BMI 40.0-49.9 (HCC)    Physical deconditioning    Lower extremity weakness    Decreased activities of daily living (ADL)  Resolved Problems:    * No resolved hospital problems. *       Assessment & Plan:   1. Acute respiratory failure-secondary to covid. Start remdesivir and decadron. 2. COVID-see above. Check Sed, CRP. May need actemra  3. UTI-continue rocephin      Diet: ADULT DIET; Regular;  Low Sodium (2 gm)  Code:Full Code  DVT PPX: Terence Stevens PA-C   9/24/2021 2:05 PM      Addendum: rapid response called earlier this afternoon for hypoxia. Was on NRB. Now on 10 liters. Give additional decadron. . Give dose of actemra. Discussed with pharmacy.

## 2021-09-24 NOTE — PROGRESS NOTES
Patient has arrived to unit in stable condition from 4T, report given at bedside by [facility]. Vitals obtained, SpO2 95% on 10L HFNC. Patient is awake, alert and oriented. Audible wheezing observed. Patient does not appear to be in distress. Shahnaz-care provided, brief changed, patient repositioned in bed for comfort. Patient oriented to room and call light. Plan of care discussed with patient, patient agreeable. Call light within reach. Fall precautions in place.

## 2021-09-24 NOTE — CARE COORDINATION
Discharge Planning Assessment    RN discharge planner met with patient/ (and son Pankaj Langley) to discuss reason for admission, current living situation, and potential needs at the time of discharge     Demographics/Insurance verified Yes     Current type of dwelling: single level home with one step entry      Patient from ECF/SW confirmed with:n/a     Living arrangements:with      Level of function/Support: independent     PCP: Dr. Shana Kaufman Visit to PCP: July 2021     DME: cane     Active with any community resources/agencies/skilled home care: Home O2  Serviced by Kade.   Was previously DC on 9/17/21  with [facility] [place] [facility]     Medication compliance issues: not identified     Financial issues that could impact healthcare: not identified     Tentative discharge plan: home w/home O2 and HH     Discussed and provided facilities of choice if transition to a skilled nursing facility is required at the time of discharge        Discussed with patient and/or family that on the day of discharge home tentative time of discharge will be between 10 AM and noon.     Transportation at the time of discharge: family    Electronically signed by Walter Fritz RN on 9/24/2021 at 11:20 AM

## 2021-09-24 NOTE — PROGRESS NOTES
Clinical Pharmacy Note    Pharmacy consulted by Anita Callahan PA-C to start remdesivir. Patient is COVID positive and currently on 2L O2. Orders have been placed.     London Bustos PharmD  9/24/2021 3:08 PM

## 2021-09-24 NOTE — PLAN OF CARE
Shift assessment complete. VSS. Morning medications administered per MAR. Pt oxygen on 4L. Pt lungs sounds wheezing, crackles, and diminished in all lobes. Pt also has a temperature of 99.8. Pt is diaphoretic and hot to touch. Pt up to chair eating breakfast. Call light within reach. Chair alarm engaged. Pt encouraged to call for assistance. Son at bedside.      Problem: Skin Integrity:  Goal: Will show no infection signs and symptoms  Description: Will show no infection signs and symptoms  9/24/2021 1056 by Anmol Mckenzie RN  Outcome: Ongoing  9/24/2021 0912 by Alfie Shafer RN  Outcome: Ongoing  Goal: Absence of new skin breakdown  Description: Absence of new skin breakdown  9/24/2021 1056 by Anmol Mckenzie RN  Outcome: Ongoing  9/24/2021 0912 by Alfie Shafer RN  Outcome: Ongoing     Problem: Pain:  Goal: Pain level will decrease  Description: Pain level will decrease  9/24/2021 1056 by Anmol Mckenzie RN  Outcome: Ongoing  9/24/2021 0912 by Alfie Shafer RN  Outcome: Ongoing  Goal: Control of acute pain  Description: Control of acute pain  9/24/2021 1056 by Anmol Mckenzie RN  Outcome: Ongoing  9/24/2021 0912 by Alfie Shafer RN  Outcome: Ongoing  Goal: Control of chronic pain  Description: Control of chronic pain  9/24/2021 1056 by Anmol Mckenzie RN  Outcome: Ongoing  9/24/2021 0912 by Alfie Shafer RN  Outcome: Ongoing     Problem: Falls - Risk of:  Goal: Will remain free from falls  Description: Will remain free from falls  9/24/2021 1056 by Anmol Mckenzie RN  Outcome: Ongoing  9/24/2021 0912 by Alfie Shafer RN  Outcome: Ongoing  Goal: Absence of physical injury  Description: Absence of physical injury  9/24/2021 1056 by Anmol Mckenzie RN  Outcome: Ongoing  9/24/2021 0912 by Alfie Shafer RN  Outcome: Ongoing

## 2021-09-24 NOTE — PROGRESS NOTES
.4 Eyes Skin Assessment     NAME:  Gael Garcia  YOB: 1945  MEDICAL RECORD NUMBER:  [id number]    The patient is being assess for  Transfer to New Unit    I agree that 2 RN's have performed a thorough Head to Toe Skin Assessment on the patient. ALL assessment sites listed below have been assessed. Areas assessed by both nurses:    Head, Face, Ears, Shoulders, Back, Chest, Arms, Elbows, Hands, Sacrum. Buttock, Coccyx, Ischium and Legs. Feet and Heels        Does the Patient have a Wound?  No noted wound(s)       Dada Prevention initiated:  Yes   Wound Care Orders initiated:  No    Pressure Injury (Stage 3,4, Unstageable, DTI, NWPT, and Complex wounds) if present place consult order under [de-identified] No    New and Established Ostomies if present place consult order under : No      Nurse 1 eSignature: Electronically signed by Madhuri Martin RN on 9/24/21 at 3:28 PM EDT    **SHARE this note so that the co-signing nurse is able to place an eSignature**    Nurse 2 eSignature: Electronically signed by Arturo Toribio RN on 9/24/21 at 3:28 PM EDT

## 2021-09-24 NOTE — PROGRESS NOTES
Pt seen in  ED, admission completed. Pt is alert and oriented x 4. Pt lives at home with her , and is being admitted for UTI. Plan of care updated, all questions answered.

## 2021-09-24 NOTE — PROGRESS NOTES
4 Eyes Skin Assessment     NAME:  Markell Dubose  YOB: 1945  MEDICAL RECORD NUMBER:  [id number]    The patient is being assess for  Admission    I agree that 2 RN's have performed a thorough Head to Toe Skin Assessment on the patient. ALL assessment sites listed below have been assessed. Areas assessed by both nurses:    Head, Face, Ears, Shoulders, Back, Chest, Arms, Elbows, Hands, Sacrum. Buttock, Coccyx, Ischium and Legs. Feet and Heels        Does the Patient have a Wound?  Other scattered scabs; blanchable erythema to buttocks       Dada Prevention initiated:  Yes   Wound Care Orders initiated:  No    Pressure Injury (Stage 3,4, Unstageable, DTI, NWPT, and Complex wounds) if present place consult order under [de-identified] No    New and Established Ostomies if present place consult order under : No      Nurse 1 eSignature: Electronically signed by Anne Marie Fung RN on 9/24/21 at 6:20 AM EDT    **SHARE this note so that the co-signing nurse is able to place an eSignature**    Nurse 2 eSignature: Electronically signed by Felix Hill RN on 9/24/21 at 3:48 PM EDT

## 2021-09-25 ENCOUNTER — APPOINTMENT (OUTPATIENT)
Dept: GENERAL RADIOLOGY | Age: 76
DRG: 871 | End: 2021-09-25
Payer: MEDICARE

## 2021-09-25 LAB
A/G RATIO: 0.9 (ref 1.1–2.2)
ALBUMIN SERPL-MCNC: 2.9 G/DL (ref 3.4–5)
ALP BLD-CCNC: 68 U/L (ref 40–129)
ALT SERPL-CCNC: 24 U/L (ref 10–40)
ANION GAP SERPL CALCULATED.3IONS-SCNC: 11 MMOL/L (ref 3–16)
ANION GAP SERPL CALCULATED.3IONS-SCNC: 9 MMOL/L (ref 3–16)
AST SERPL-CCNC: 61 U/L (ref 15–37)
BASOPHILS ABSOLUTE: 0 K/UL (ref 0–0.2)
BASOPHILS RELATIVE PERCENT: 0.1 %
BILIRUB SERPL-MCNC: <0.2 MG/DL (ref 0–1)
BUN BLDV-MCNC: 20 MG/DL (ref 7–20)
BUN BLDV-MCNC: 21 MG/DL (ref 7–20)
C-REACTIVE PROTEIN: 138.4 MG/L (ref 0–5.1)
CALCIUM SERPL-MCNC: 8.6 MG/DL (ref 8.3–10.6)
CALCIUM SERPL-MCNC: 8.8 MG/DL (ref 8.3–10.6)
CHLORIDE BLD-SCNC: 105 MMOL/L (ref 99–110)
CHLORIDE BLD-SCNC: 106 MMOL/L (ref 99–110)
CO2: 20 MMOL/L (ref 21–32)
CO2: 24 MMOL/L (ref 21–32)
CREAT SERPL-MCNC: 0.8 MG/DL (ref 0.6–1.2)
CREAT SERPL-MCNC: 0.8 MG/DL (ref 0.6–1.2)
D DIMER: 491 NG/ML DDU (ref 0–229)
EOSINOPHILS ABSOLUTE: 0 K/UL (ref 0–0.6)
EOSINOPHILS RELATIVE PERCENT: 0 %
GFR AFRICAN AMERICAN: >60
GFR AFRICAN AMERICAN: >60
GFR NON-AFRICAN AMERICAN: >60
GFR NON-AFRICAN AMERICAN: >60
GLOBULIN: 3.3 G/DL
GLUCOSE BLD-MCNC: 134 MG/DL (ref 70–99)
GLUCOSE BLD-MCNC: 186 MG/DL (ref 70–99)
HCT VFR BLD CALC: 26.7 % (ref 36–48)
HEMOGLOBIN: 8.7 G/DL (ref 12–16)
LYMPHOCYTES ABSOLUTE: 0.3 K/UL (ref 1–5.1)
LYMPHOCYTES RELATIVE PERCENT: 7.4 %
MCH RBC QN AUTO: 31.1 PG (ref 26–34)
MCHC RBC AUTO-ENTMCNC: 32.7 G/DL (ref 31–36)
MCV RBC AUTO: 95.1 FL (ref 80–100)
MONOCYTES ABSOLUTE: 0.2 K/UL (ref 0–1.3)
MONOCYTES RELATIVE PERCENT: 5.4 %
NEUTROPHILS ABSOLUTE: 3.2 K/UL (ref 1.7–7.7)
NEUTROPHILS RELATIVE PERCENT: 87.1 %
ORGANISM: ABNORMAL
PDW BLD-RTO: 13.9 % (ref 12.4–15.4)
PLATELET # BLD: 174 K/UL (ref 135–450)
PMV BLD AUTO: 8.9 FL (ref 5–10.5)
POTASSIUM REFLEX MAGNESIUM: 4.2 MMOL/L (ref 3.5–5.1)
POTASSIUM REFLEX MAGNESIUM: 4.8 MMOL/L (ref 3.5–5.1)
RBC # BLD: 2.81 M/UL (ref 4–5.2)
SARS-COV-2: DETECTED
SODIUM BLD-SCNC: 137 MMOL/L (ref 136–145)
SODIUM BLD-SCNC: 138 MMOL/L (ref 136–145)
TOTAL PROTEIN: 6.2 G/DL (ref 6.4–8.2)
URINE CULTURE, ROUTINE: ABNORMAL
WBC # BLD: 3.7 K/UL (ref 4–11)

## 2021-09-25 PROCEDURE — 6370000000 HC RX 637 (ALT 250 FOR IP): Performed by: PHYSICIAN ASSISTANT

## 2021-09-25 PROCEDURE — 93306 TTE W/DOPPLER COMPLETE: CPT

## 2021-09-25 PROCEDURE — 6370000000 HC RX 637 (ALT 250 FOR IP): Performed by: INTERNAL MEDICINE

## 2021-09-25 PROCEDURE — 85025 COMPLETE CBC W/AUTO DIFF WBC: CPT

## 2021-09-25 PROCEDURE — 6360000002 HC RX W HCPCS: Performed by: PHYSICIAN ASSISTANT

## 2021-09-25 PROCEDURE — 80053 COMPREHEN METABOLIC PANEL: CPT

## 2021-09-25 PROCEDURE — 36415 COLL VENOUS BLD VENIPUNCTURE: CPT

## 2021-09-25 PROCEDURE — 2500000003 HC RX 250 WO HCPCS: Performed by: INTERNAL MEDICINE

## 2021-09-25 PROCEDURE — 2580000003 HC RX 258: Performed by: PHYSICIAN ASSISTANT

## 2021-09-25 PROCEDURE — 97530 THERAPEUTIC ACTIVITIES: CPT

## 2021-09-25 PROCEDURE — 97162 PT EVAL MOD COMPLEX 30 MIN: CPT

## 2021-09-25 PROCEDURE — 94761 N-INVAS EAR/PLS OXIMETRY MLT: CPT

## 2021-09-25 PROCEDURE — 2580000003 HC RX 258: Performed by: INTERNAL MEDICINE

## 2021-09-25 PROCEDURE — 97166 OT EVAL MOD COMPLEX 45 MIN: CPT

## 2021-09-25 PROCEDURE — 85379 FIBRIN DEGRADATION QUANT: CPT

## 2021-09-25 PROCEDURE — 71045 X-RAY EXAM CHEST 1 VIEW: CPT

## 2021-09-25 PROCEDURE — 6360000002 HC RX W HCPCS: Performed by: INTERNAL MEDICINE

## 2021-09-25 PROCEDURE — 86140 C-REACTIVE PROTEIN: CPT

## 2021-09-25 PROCEDURE — 1200000000 HC SEMI PRIVATE

## 2021-09-25 PROCEDURE — 2700000000 HC OXYGEN THERAPY PER DAY

## 2021-09-25 PROCEDURE — 97161 PT EVAL LOW COMPLEX 20 MIN: CPT

## 2021-09-25 RX ADMIN — SODIUM CHLORIDE 990 ML: 9 INJECTION, SOLUTION INTRAVENOUS at 12:56

## 2021-09-25 RX ADMIN — SERTRALINE 25 MG: 50 TABLET, FILM COATED ORAL at 08:40

## 2021-09-25 RX ADMIN — POTASSIUM BICARBONATE 20 MEQ: 782 TABLET, EFFERVESCENT ORAL at 08:40

## 2021-09-25 RX ADMIN — REMDESIVIR 100 MG: 100 INJECTION, POWDER, LYOPHILIZED, FOR SOLUTION INTRAVENOUS at 11:25

## 2021-09-25 RX ADMIN — Medication 2000 UNITS: at 08:40

## 2021-09-25 RX ADMIN — DEXAMETHASONE SODIUM PHOSPHATE 20 MG: 4 INJECTION, SOLUTION INTRA-ARTICULAR; INTRALESIONAL; INTRAMUSCULAR; INTRAVENOUS; SOFT TISSUE at 15:24

## 2021-09-25 RX ADMIN — ROPINIROLE HYDROCHLORIDE 0.25 MG: 0.25 TABLET, FILM COATED ORAL at 19:55

## 2021-09-25 RX ADMIN — MORPHINE SULFATE 15 MG: 15 TABLET ORAL at 08:40

## 2021-09-25 RX ADMIN — Medication 10 ML: at 19:55

## 2021-09-25 RX ADMIN — BETHANECHOL CHLORIDE 25 MG: 10 TABLET ORAL at 19:55

## 2021-09-25 RX ADMIN — BETHANECHOL CHLORIDE 25 MG: 10 TABLET ORAL at 15:22

## 2021-09-25 RX ADMIN — PREGABALIN 150 MG: 75 CAPSULE ORAL at 08:40

## 2021-09-25 RX ADMIN — BUPROPION HYDROCHLORIDE 300 MG: 300 TABLET, EXTENDED RELEASE ORAL at 08:40

## 2021-09-25 RX ADMIN — Medication 10 ML: at 08:39

## 2021-09-25 RX ADMIN — MORPHINE SULFATE 15 MG: 15 TABLET ORAL at 19:55

## 2021-09-25 RX ADMIN — BETHANECHOL CHLORIDE 25 MG: 10 TABLET ORAL at 08:41

## 2021-09-25 RX ADMIN — Medication 1000 MG: at 18:08

## 2021-09-25 RX ADMIN — PREGABALIN 150 MG: 75 CAPSULE ORAL at 15:21

## 2021-09-25 RX ADMIN — ASPIRIN 81 MG 81 MG: 81 TABLET ORAL at 08:40

## 2021-09-25 RX ADMIN — RIVAROXABAN 20 MG: 20 TABLET, FILM COATED ORAL at 18:08

## 2021-09-25 RX ADMIN — PREGABALIN 150 MG: 75 CAPSULE ORAL at 19:54

## 2021-09-25 ASSESSMENT — PAIN SCALES - GENERAL
PAINLEVEL_OUTOF10: 0
PAINLEVEL_OUTOF10: 5
PAINLEVEL_OUTOF10: 0

## 2021-09-25 NOTE — PROGRESS NOTES
Physical Therapy    Facility/Department: [address] ONCOLOGY  Initial Assessment    NAME: Komal Rose  : 1945  MRN: [id number]    Date of Service: 2021    Discharge Recommendations:  PT Equipment Recommendations  Equipment Needed: No (Will continue to assess, possible need for RW)     Komal Rose scored a 20/24 on the AM-PAC short mobility form. Current research shows that an AM-PAC score of 18 or greater is typically associated with a discharge to the patient's home setting. Based on the patient's AM-PAC score and their current functional mobility deficits, it is recommended that the patient have 2-3 sessions per week of Physical Therapy at d/c to increase the patient's independence. At this time, this patient demonstrates the endurance and safety to discharge home with 24/7 assist and home PT and a follow up treatment frequency of 2-3x/wk. Please see assessment section for further patient specific details. If patient discharges prior to next session this note will serve as a discharge summary. Please see below for the latest assessment towards goals. HOME HEALTH CARE: LEVEL 1 STANDARD  - Initial home health evaluation to occur within 24-48 hours, in patient home   - Therapy to evaluate with goal of regaining prior level of functioning   - Therapy to evaluate if patient has [address] needs for personal care    Assessment   Body structures, Functions, Activity limitations: Decreased balance;Decreased endurance;Decreased functional mobility   Assessment: Pt is a 76 yo female admitted to [facility] with UTI and COVID+. The patient is currently requiring increased supplemental O2 from baseline and requires CGA with use of RW for transfers to chair. Further assessment of pt's tolerance to ambulation was limited due to pt's high flow line. Recommending continued skilled PT to safely progress tolerance to activity and independence with functional mobility.   Treatment Diagnosis: Decreased tolerance to activity  Prognosis: Good  Decision Making: Medium Complexity  Clinical Presentation: Stable  PT Education: Goals;PT Role;Plan of Care;Home Exercise Program;General Safety  Patient Education: Pt verbalized understanding  Barriers to Learning: None  REQUIRES PT FOLLOW UP: Yes  Activity Tolerance  Activity Tolerance: Patient Tolerated treatment well  Activity Tolerance: Pt maintained SpO2 >90% on 6L with all standing exercises. Pt weaned to 5L and continued to maintain SpO2 >90% on 5L. RN notified. Patient Diagnosis(es): The primary encounter diagnosis was General weakness. Diagnoses of Febrile illness, acute and Acute cystitis without hematuria were also pertinent to this visit. has a past medical history of AVM, Chronic back pain, Depression, Gastritis, History of blood transfusion, History of GI bleed, Hypertension, and Personal history of MRSA (methicillin resistant Staphylococcus aureus). has a past surgical history that includes hiatal hernia repair (1999); Endoscopy, colon, diagnostic; Colonoscopy; cervical fusion; back surgery; other surgical history (Left, [id number]); and Abdominal exploration surgery (09/07/2017). Restrictions  Restrictions/Precautions  Restrictions/Precautions: Isolation, Fall Risk (High fall risk; Droplet Plus (COVID+))  Required Braces or Orthoses?: No  Position Activity Restriction  Other position/activity restrictions: Pt admittd on 9/23, per Progress note on 9/25 \"Patient admitted with UTI. Was just hospitalized with hypoxia last week. At that time covid was negative as well as CTPA. She was discharged with home O2. 9/24 she tested positive for covid. In the am she was on 4L but the afternoon. She was lethargic, sats were in the 70s. She had a rapid response and was requiring 15L. She is on high dose decadron. She had actemra 9/24 and started remdesivir. \"     Vision/Hearing  Vision: Impaired  Vision Exceptions: Wears glasses for reading  Hearing: Within functional limits       Subjective  General  Chart Reviewed: Yes  Patient assessed for rehabilitation services?: Yes  Family / Caregiver Present: No  Diagnosis: UTI and COVID+  Follows Commands: Within Functional Limits  General Comment  Comments: Pt semi-reclined in bed upon therapist arrival. Agreeable to PT/OT eval.  Subjective  Subjective: Pt denies pain. Pain Screening  Patient Currently in Pain: Denies  Vital Signs  Patient Currently in Pain: Denies       Orientation  Orientation  Overall Orientation Status: Within Normal Limits     Social/Functional History  Social/Functional History  Lives With: Spouse, Family (13 yo grandson, in high school)  Type of Home: House  Home Layout: One level, Laundry in basement (full flight of steps to basement with railing)  Home Access: Stairs to enter without rails  Entrance Stairs - Number of Steps: 2  Bathroom Shower/Tub: Tub/Shower unit  Bathroom Toilet: Standard  Home Equipment: Oxygen (Hasn't worn O2 at home until the last 2 weeks, wore it at night and PRN during day on 2L)  ADL Assistance: [facility]: Needs assistance (Spouse helps a lot with household chores)  Ambulation Assistance: Independent  Transfer Assistance: Independent  Active : Yes  IADL Comments: Sleeps on flat mattress. Additional Comments: No falls in last 6 months.      Cognition   See OT note    Objective  AROM RLE (degrees)  RLE AROM: WFL  AROM LLE (degrees)  LLE AROM : WFL  Strength RLE  Strength RLE: WFL  Comment: Grossly at least 3/5 as observed through functional mobility  Strength LLE  Strength LLE: WFL  Comment: Grossly at least 3/5 as observed through functional mobility        Sensation  Overall Sensation Status: WFL     Bed mobility  Sit to Supine: Modified independent  Scooting: Independent  Comment: HOB elevated     Transfers  Sit to Stand: Contact guard assistance  Stand to sit: Contact guard assistance  Bed to Chair: Contact guard assistance  Comment: Pt stood EOB for standing exercises then transferred bed>chair with use of RW and mild posterior sway     Ambulation  Ambulation?: No (deferred due to limited O2 line as pt is on high flow)        Balance  Posture: Good  Sitting - Static: Good  Sitting - Dynamic: Good  Standing - Static: Fair;+  Standing - Dynamic: Fair;+  Comments: Indep for sitting balance at EOB. CGA for standing balance with (B) UE support on RW with initial posterior lean that improved with time. Exercises  Hip Flexion: Standing marches 4 x 20 reps  Ankle Pumps: x5 reps (B)  Upper Extremity: Shoulder flexion with coordinated inhale/exhale to improve O2 sat x6 reps  Comments: Pt completed multiple sets of standing marches with seated/standing rest breaks between PRN to mimic walking as pt unable to ambulated due to short O2 line. Plan   Plan  Times per week: 3-5x  Current Treatment Recommendations: Strengthening, Transfer Training, Endurance Training, Patient/Caregiver Education & Training, Equipment Evaluation, Education, & procurement, Safety Education & Training, Home Exercise Program, Gait Training, Balance Training, Stair training, Functional Mobility Training  Safety Devices  Type of devices:  All fall risk precautions in place, Gait belt, Patient at risk for falls, Nurse notified, Left in chair, Chair alarm in place, Call light within reach    AM-PAC Score  AM-PAC Inpatient Mobility Raw Score : 20 (09/25/21 1232)  AM-PAC Inpatient T-Scale Score : 47.67 (09/25/21 1232)  Mobility Inpatient CMS 0-100% Score: 35.83 (09/25/21 1232)  Mobility Inpatient CMS G-Code Modifier : CJ (09/25/21 [facility]2)          Goals  Short term goals  Time Frame for Short term goals: Before discharge  Short term goal 1: Pt will complete bed mobility indep  Short term goal 2: Pt will complete sit<>stand indep  Short term goal 3: Pt will ambulate 50 ft indep  Short term goal 4: Pt will ascend/descend 2 steps without rail indep  Patient Goals   Patient goals : Get better Therapy Time   Individual Concurrent Group Co-treatment   Time In 1150         Time Out 1218         Minutes 28         Timed Code Treatment Minutes: [address] [facility], [address] [facility], DPT #[id number]

## 2021-09-25 NOTE — PROGRESS NOTES
[facility]    9/25/2021 11:48 AM        Name: Corona Godfrey . Admitted: 9/23/2021  Primary Care Provider: Lisa Samuels MD (Tel: [phone])      Subjective:  . Patient admitted with UTI. Was just hospitalized with hypoxia last week. At that time covid was negative as well as CTPA. She was discharged with home O2. 9/24 she tested positive for covid. In the am she was on 4L but the afternoon. She was lethargic, sats were in the 70s. She had a rapid response and was requiring 15L. She is on high dose decadron. She had actemra 9/24 and started remdesivir.      Reviewed interval ancillary notes    Current Medications  0.9 % sodium chloride infusion, Continuous  morphine (MSIR) tablet 15 mg, BID  perflutren lipid microspheres (DEFINITY) injection 1.65 mg, ONCE PRN  acetaminophen (TYLENOL) tablet 650 mg, Q6H PRN   Or  acetaminophen (TYLENOL) suppository 650 mg, Q6H PRN  guaiFENesin-dextromethorphan (ROBITUSSIN DM) 100-10 MG/5ML syrup 5 mL, Q4H PRN  Vitamin D (CHOLECALCIFEROL) tablet 2,000 Units, Daily  remdesivir 100 mg in sodium chloride 0.9 % 250 mL IVPB, Q24H  potassium bicarb-citric acid (EFFER-K) effervescent tablet 20 mEq, Daily  dexamethasone (PF) (DECADRON) injection 10 mg, Q24H  aspirin chewable tablet 81 mg, Daily  bethanechol (URECHOLINE) tablet 25 mg, TID  buPROPion (WELLBUTRIN XL) extended release tablet 300 mg, Daily  pregabalin (LYRICA) capsule 150 mg, TID  rivaroxaban (XARELTO) tablet 20 mg, Dinner  sertraline (ZOLOFT) tablet 25 mg, Daily  rOPINIRole (REQUIP) tablet 0.25 mg, Nightly  sodium chloride flush 0.9 % injection 5-40 mL, 2 times per day  sodium chloride flush 0.9 % injection 5-40 mL, PRN  0.9 % sodium chloride infusion, PRN  ondansetron (ZOFRAN-ODT) disintegrating tablet 4 mg, Q8H PRN   Or  ondansetron (ZOFRAN) injection 4 mg, Q6H PRN  acetaminophen (TYLENOL) tablet 650 mg, Q6H PRN   Or  acetaminophen (TYLENOL) suppository 650 mg, Q6H PRN  polyethylene glycol (GLYCOLAX) packet 17 g, Daily PRN  cefTRIAXone (ROCEPHIN) 1000 mg in sterile water 10 mL IV syringe, Q24H        Objective:  /82   Pulse 76   Temp 97.4 °F (36.3 °C) (Axillary)   Resp 14   Ht 5' (1.524 m)   Wt 182 lb (82.6 kg)   SpO2 93%   BMI 35.54 kg/m²     Intake/Output Summary (Last 24 hours) at 9/25/2021 1148  Last data filed at 9/25/2021 0000  Gross per 24 hour   Intake 936 ml   Output --   Net 936 ml      Wt Readings from Last 3 Encounters:   09/23/21 182 lb (82.6 kg)   09/16/21 175 lb 1.6 oz (79.4 kg)   07/07/21 186 lb (84.4 kg)       General appearance:  Appears comfortable  Eyes: Sclera clear. Pupils equal.  ENT: Moist oral mucosa. Trachea midline, no adenopathy. Cardiovascular: Regular rhythm, normal S1, S2. No murmur. No edema in lower extremities  Respiratory: Not using accessory muscles. Good inspiratory effort. Decreased at bases  GI: Abdomen soft, no tenderness, not distended, normal bowel sounds  Musculoskeletal: No cyanosis in digits, neck supple  Neurology: CN 2-12 grossly intact. No speech or motor deficits  Psych: Normal affect. Alert and oriented in time, place and person  Skin: Warm, dry, normal turgor    Labs and Tests:  CBC:   Recent Labs     09/23/21  1447 09/24/21  0502   WBC 9.1 4.7   HGB 10.3* 9.0*    173     BMP:    Recent Labs     09/23/21  1447 09/24/21  0502   * 138   K 3.9 3.4*    101   CO2 27 25   BUN 18 22*   CREATININE 1.2 1.3*   GLUCOSE 117* 83     Hepatic:   Recent Labs     09/23/21  1447   AST 34   ALT 17   BILITOT 0.7   ALKPHOS 85     CXR  No acute process.     Problem List  Principal Problem:    UTI (urinary tract infection)  Active Problems:    Essential hypertension    Restless legs syndrome (RLS)    GERD    Mixed hyperlipidemia    Obesity, morbid, BMI 40.0-49.9 (HCC)    Physical deconditioning    Lower extremity weakness    Decreased activities of daily living (ADL)    COVID-19  Resolved Problems:    * No resolved hospital problems. *       Assessment & Plan:   1. Acute respiratory failure-secondary to covid. On decadron, day 2 of remdesivir. Had actemra on 9/24. O2 requirement currently 6L. Down from 13 yesterday and 10 this am.    2. COVID-see above. 3. UTI-continue rocephin. Cultures positive for ecoli. Update son Aggie Gandhi on the phone with patients permission. Diet: ADULT DIET; Regular; Low Sodium (2 gm)  Code:Full Code  DVT PPX: zakiya Hoskins PA-C   9/25/2021 11:48 AM      Addendum: rapid response called earlier this afternoon for hypoxia. Was on NRB. Now on 10 liters. Give additional decadron. . Give dose of actemra. Discussed with pharmacy.

## 2021-09-25 NOTE — PROGRESS NOTES
Physician Progress Note      Jose Miguel Canela  CSN #:                  [id number]  :                       1945  ADMIT DATE:       2021 2:05 PM  [address] [place] DATE:  RESPONDING  PROVIDER #:        Peggy Rodgers PA-C          QUERY TEXT:    Pt admitted with UTI and found to have COVID-19 also noted to have  signs of   sepsis. If possible, please document in progress notes and/or discharge   summary whether the patient may have also had:]    The medical record reflects the following:  Risk Factors: complaints of generalized weakness and fatigue. .. has been sick   for over a week  Clinical Indicators: Dx with UTI and COVID 19; , Temp up to 103.9,   procalcitonin 1.22, .5; per ED given IV Fluids for possible sepsis  Treatment: IV Fluids, IV Rocephin, IV Remdesivir and Actemra  Options provided:  -- Sepsis present on admission due to UTI and COVID-19 infection  -- Sepsis not present on admission but developed during stay, due to UTI and   COVID-19 infection  -- Covid-19 infection and UTI without sepsis  -- Other - I will add my own diagnosis  -- Disagree - Not applicable / Not valid  -- Disagree - Clinically unable to determine / Unknown  -- Refer to Clinical Documentation Reviewer    PROVIDER RESPONSE TEXT:    This patient has sepsis which was present on admission due to UTI and COVID-19   infection.     Query created by: Ravi Gray on 2021 8:36 AM      Electronically signed by:  Peggy Rodgers PA-C 2021 8:41 AM

## 2021-09-25 NOTE — PROGRESS NOTES
Occupational Therapy   Occupational Therapy Initial Assessment  Date: 2021   Patient Name: Nemesio Copeland  MRN: [id number]     : 1945    Date of Service: 2021    Discharge Recommendations:Angelia Quintanilla scored a 18/24 on the AM-PAC ADL Inpatient form. Current research shows that an AM-PAC score of 18 or greater is typically associated with a discharge to the patient's home setting. Based on the patient's AM-PAC score, and their current ADL deficits, it is recommended that the patient have 2-3 sessions per week of Occupational Therapy at d/c to increase the patient's independence. At this time, this patient demonstrates the endurance and safety to discharge home with [facility] a follow up treatment frequency of 2-3x/wk. Please see assessment section for further patient specific details. If patient discharges prior to next session this note will serve as a discharge summary. Please see below for the latest assessment towards goals. Assessment   Performance deficits / Impairments: Decreased functional mobility ; Decreased endurance;Decreased ADL status; Decreased high-level IADLs  Assessment: Pt presents with the above deficits impacting daily occupational performance and would benefit from continued skilled OT services. Treatment Diagnosis: Decreased mobility, ADL status, ADL transfers, endurance and high level IADL's associated with UTI, E Coli infection and Covid19. Prognosis: Good  Decision Making: Medium Complexity  OT Education: OT Role;Plan of Care;Equipment;Precautions;Transfer Training  REQUIRES OT FOLLOW UP: Yes  Activity Tolerance  Activity Tolerance: Patient Tolerated treatment well  Activity Tolerance: Pt tolerated O2 weaning from 6L O2 to 5L O2--RN notified. PO 93-95% and HR 80bpm.  Safety Devices  Safety Devices in place: Yes  Type of devices: All fall risk precautions in place; Left in chair;Call light within reach;Nurse notified; Chair alarm in place;Gait belt;Patient at risk for falls  Restraints  Initially in place: No           Patient Diagnosis(es): The primary encounter diagnosis was General weakness. Diagnoses of Febrile illness, acute and Acute cystitis without hematuria were also pertinent to this visit. has a past medical history of AVM, Chronic back pain, Depression, Gastritis, History of blood transfusion, History of GI bleed, Hypertension, and Personal history of MRSA (methicillin resistant Staphylococcus aureus). has a past surgical history that includes hiatal hernia repair (1999); Endoscopy, colon, diagnostic; Colonoscopy; cervical fusion; back surgery; other surgical history (Left, [id number]); and Abdominal exploration surgery (09/07/2017). Treatment Diagnosis: Decreased mobility, ADL status, ADL transfers, endurance and high level IADL's associated with UTI, E Coli infection and Covid19. Restrictions  Restrictions/Precautions  Restrictions/Precautions: Isolation, Fall Risk (High fall risk; Droplet Plus (COVID+))  Required Braces or Orthoses?: No  Position Activity Restriction  Other position/activity restrictions: Pt admittd on 9/23, per Progress note on 9/25 \"Patient admitted with UTI. Was just hospitalized with hypoxia last week. At that time covid was negative as well as CTPA. She was discharged with home O2. 9/24 she tested positive for covid. In the am she was on 4L but the afternoon. She was lethargic, sats were in the 70s. She had a rapid response and was requiring 15L. She is on high dose decadron. She had actemra 9/24 and started remdesivir. \"    Subjective   General  Chart Reviewed: Yes  Patient assessed for rehabilitation services?: Yes  Family / Caregiver Present: No  Diagnosis: UTI/ Covid19  Subjective  Subjective: Pt supine in bed on arrival and agreeable for session. RN approval prior to session.   Patient Currently in Pain: Denies  Pain Assessment  Pain Assessment: 0-10  Pain Level: 0  Patient's Stated Pain Goal: No pain  Vital Signs  Temp: 97.4 °F (36.3 °C)  Temp Source: Axillary  Pulse: 76  Heart Rate Source: Monitor  Resp: 16  BP: 122/82  BP Location: Left upper arm  MAP (mmHg): 96  Patient Position: Semi fowlers  Level of Consciousness: Alert (0)  MEWS Score: 0  Patient Currently in Pain: Denies  Oxygen Therapy  SpO2: 93 %  Pulse Oximeter Device Mode: Continuous  Pulse Oximeter Device Location: Finger  O2 Device: High flow nasal cannula  O2 Flow Rate (L/min): 5 L/min  Social/Functional History  Social/Functional History  Lives With: Spouse, Family (15 yo grandson, in high school)  Type of Home: House  Home Layout: One level, Laundry in basement (full flight of steps to basement with railing)  Home Access: Stairs to enter without rails  Entrance Stairs - Number of Steps: 2  Bathroom Shower/Tub: Tub/Shower unit  Bathroom Toilet: Standard  Home Equipment: Oxygen (Hasn't worn O2 at home until the last 2 weeks, wore it at night and PRN during day on 2L)  ADL Assistance: Independent  Homemaking Assistance: Needs assistance (Spouse helps a lot with household chores)  Ambulation Assistance: Independent  Transfer Assistance: Independent  Active : Yes  IADL Comments: Sleeps on flat mattress. Additional Comments: No falls in last 6 months. Objective        Orientation  Overall Orientation Status: Within Normal Limits  Observation/Palpation  Posture: Fair  Balance  Sitting Balance: Supervision  Standing Balance: Contact guard assistance  Functional Mobility  Functional - Mobility Device: Rolling Walker  Activity: Other  Assist Level: Contact guard assistance  ADL  Feeding: Setup  Additional Comments: Pt declined ADL's. Tone RUE  RUE Tone: Normotonic  Tone LUE  LUE Tone: Normotonic  Coordination  Movements Are Fluid And Coordinated: Yes  Quality of Movement Other  Comment: Pt tolerated static and dynamic stand using RW for support at EOB x 6-7 minutes x 2 episodes and performed standing marching in place x 20 reps x 2 sets.   Pt required 1 seated rest break in chair. Pt also performed stand step transfer from bed to chair with CGA using RW. Transfers  Stand Step Transfers: Contact guard assistance  Sit to stand: Contact guard assistance  Stand to sit: Contact guard assistance  Vision - Basic Assessment  Prior Vision: Wears glasses all the time  Visual History: No significant visual history  Patient Visual Report: No visual complaint reported. Cognition  Overall Cognitive Status: WFL  Perception  Overall Perceptual Status: WFL     Sensation  Overall Sensation Status: WFL        LUE AROM (degrees)  LUE AROM : WFL  Left Hand AROM (degrees)  Left Hand AROM: WFL  RUE AROM (degrees)  RUE AROM : WFL  Right Hand AROM (degrees)  Right Hand AROM: WFL  LUE Strength  Gross LUE Strength: WFL  RUE Strength  Gross RUE Strength: WFL     Plan   Plan  Times per week: 3-5x/week  Times per day: Daily  Current Treatment Recommendations: Gait Training, Patient/Caregiver Education & Training, Equipment Evaluation, Education, & procurement, Balance Training, Endurance Training, Safety Education & Training, Self-Care / ADL    AM-PAC Score        AM-[facility] Inpatient Daily Activity Raw Score: 18 (09/25/21 1304)  AM-PAC Inpatient ADL T-Scale Score : 38.66 (09/25/21 1304)  ADL Inpatient CMS 0-100% Score: 46.65 (09/25/21 1304)  ADL Inpatient CMS G-Code Modifier : CK (09/25/21 1304)    Goals  Short term goals  Time Frame for Short term goals: Discharge  Short term goal 1: Mod Independent ADL transfers to/from bed, chair and toilet  Short term goal 2: Mod Independent toileting  Short term goal 3: Mod Independent UB/LB ADL's  Short term goal 4: Mod Independent functional mobility using RW or AAD as needed for ADL's/functional activity  Short term goal 5:  Increase stand tolerance to >5 minutes for participation in ADL's/functional activity  Long term goals  Time Frame for Long term goals : LTG=STG  Patient Goals   Patient goals : Safe Discharge       Therapy Time   Individual Concurrent Group Co-treatment   Time In 1150         Time Out 1218         Minutes 28              Timed Code Treatment Minutes:  13 Minutes    Total Treatment Minutes:  28 minutes      ALLI CHAVARRIA [address], [address], OTR/L [id number]

## 2021-09-25 NOTE — ACP (ADVANCE CARE PLANNING)
Advanced Care Planning Note. Purpose of Encounter: Advanced care planning in light of covid, respiratory failure  Parties In Attendance: Patient  Decisional Capacity: Yes  Subjective: Patient admitted with UTI, hypoxia. Tested positive for covid. Was requiring 15 L of oxygen yesterday. Feeling better today. Objective:   CXR  No acute process. Goals of Care Determination: patient wants full cardiopulmonary support including intubation, shock, meds, cpr.   Plan: Continue decadron, remdesivir, Iv abx. Code Status: full code   Time spent on Advanced care Plannin  Advanced Care Planning Documents: Completed advanced directives on chart,  is the POA.     Norma Holguin PA-C  2021 11:54 AM

## 2021-09-25 NOTE — PROGRESS NOTES
Rapid Response Quick Summary    Room: 4462 4T    Assessment of concern / patient:  Hypoxia r/t COVID.  Pt previously wearing 2L O2 via NC, increased to 10L at this time- SpO2 reading 99% upon arrival    Physician involved:  Del    Interventions:  Transfer to COVID floor    Disposition:  5T when bed ready

## 2021-09-26 LAB
ANION GAP SERPL CALCULATED.3IONS-SCNC: 5 MMOL/L (ref 3–16)
BASOPHILS ABSOLUTE: 0 K/UL (ref 0–0.2)
BASOPHILS RELATIVE PERCENT: 0.1 %
BUN BLDV-MCNC: 19 MG/DL (ref 7–20)
C-REACTIVE PROTEIN: 78.7 MG/L (ref 0–5.1)
CALCIUM SERPL-MCNC: 8.7 MG/DL (ref 8.3–10.6)
CHLORIDE BLD-SCNC: 110 MMOL/L (ref 99–110)
CO2: 24 MMOL/L (ref 21–32)
CREAT SERPL-MCNC: 0.7 MG/DL (ref 0.6–1.2)
D DIMER: 367 NG/ML DDU (ref 0–229)
EOSINOPHILS ABSOLUTE: 0 K/UL (ref 0–0.6)
EOSINOPHILS RELATIVE PERCENT: 0.1 %
GFR AFRICAN AMERICAN: >60
GFR NON-AFRICAN AMERICAN: >60
GLUCOSE BLD-MCNC: 135 MG/DL (ref 70–99)
HCT VFR BLD CALC: 28.2 % (ref 36–48)
HEMOGLOBIN: 9.4 G/DL (ref 12–16)
LYMPHOCYTES ABSOLUTE: 0.4 K/UL (ref 1–5.1)
LYMPHOCYTES RELATIVE PERCENT: 9 %
MCH RBC QN AUTO: 31.6 PG (ref 26–34)
MCHC RBC AUTO-ENTMCNC: 33.3 G/DL (ref 31–36)
MCV RBC AUTO: 94.8 FL (ref 80–100)
MONOCYTES ABSOLUTE: 0.2 K/UL (ref 0–1.3)
MONOCYTES RELATIVE PERCENT: 3.9 %
NEUTROPHILS ABSOLUTE: 3.4 K/UL (ref 1.7–7.7)
NEUTROPHILS RELATIVE PERCENT: 86.9 %
PDW BLD-RTO: 13.7 % (ref 12.4–15.4)
PLATELET # BLD: 175 K/UL (ref 135–450)
PMV BLD AUTO: 9.1 FL (ref 5–10.5)
POTASSIUM REFLEX MAGNESIUM: 4.6 MMOL/L (ref 3.5–5.1)
PROCALCITONIN: 0.48 NG/ML (ref 0–0.15)
RBC # BLD: 2.98 M/UL (ref 4–5.2)
SODIUM BLD-SCNC: 139 MMOL/L (ref 136–145)
WBC # BLD: 3.9 K/UL (ref 4–11)

## 2021-09-26 PROCEDURE — 1200000000 HC SEMI PRIVATE

## 2021-09-26 PROCEDURE — 2580000003 HC RX 258: Performed by: INTERNAL MEDICINE

## 2021-09-26 PROCEDURE — 6360000002 HC RX W HCPCS: Performed by: INTERNAL MEDICINE

## 2021-09-26 PROCEDURE — 86140 C-REACTIVE PROTEIN: CPT

## 2021-09-26 PROCEDURE — 85379 FIBRIN DEGRADATION QUANT: CPT

## 2021-09-26 PROCEDURE — 2500000003 HC RX 250 WO HCPCS: Performed by: INTERNAL MEDICINE

## 2021-09-26 PROCEDURE — 51701 INSERT BLADDER CATHETER: CPT

## 2021-09-26 PROCEDURE — 6360000002 HC RX W HCPCS: Performed by: PHYSICIAN ASSISTANT

## 2021-09-26 PROCEDURE — 6370000000 HC RX 637 (ALT 250 FOR IP): Performed by: INTERNAL MEDICINE

## 2021-09-26 PROCEDURE — 85025 COMPLETE CBC W/AUTO DIFF WBC: CPT

## 2021-09-26 PROCEDURE — 80048 BASIC METABOLIC PNL TOTAL CA: CPT

## 2021-09-26 PROCEDURE — 51798 US URINE CAPACITY MEASURE: CPT

## 2021-09-26 PROCEDURE — 2700000000 HC OXYGEN THERAPY PER DAY

## 2021-09-26 PROCEDURE — 2580000003 HC RX 258: Performed by: PHYSICIAN ASSISTANT

## 2021-09-26 PROCEDURE — 36415 COLL VENOUS BLD VENIPUNCTURE: CPT

## 2021-09-26 PROCEDURE — 6370000000 HC RX 637 (ALT 250 FOR IP): Performed by: PHYSICIAN ASSISTANT

## 2021-09-26 PROCEDURE — 84145 PROCALCITONIN (PCT): CPT

## 2021-09-26 PROCEDURE — 94761 N-INVAS EAR/PLS OXIMETRY MLT: CPT

## 2021-09-26 RX ADMIN — SODIUM CHLORIDE: 9 INJECTION, SOLUTION INTRAVENOUS at 06:44

## 2021-09-26 RX ADMIN — REMDESIVIR 100 MG: 100 INJECTION, POWDER, LYOPHILIZED, FOR SOLUTION INTRAVENOUS at 10:50

## 2021-09-26 RX ADMIN — BETHANECHOL CHLORIDE 25 MG: 10 TABLET ORAL at 13:37

## 2021-09-26 RX ADMIN — BETHANECHOL CHLORIDE 25 MG: 10 TABLET ORAL at 09:31

## 2021-09-26 RX ADMIN — ASPIRIN 81 MG 81 MG: 81 TABLET ORAL at 09:33

## 2021-09-26 RX ADMIN — PREGABALIN 150 MG: 75 CAPSULE ORAL at 19:01

## 2021-09-26 RX ADMIN — MORPHINE SULFATE 15 MG: 15 TABLET ORAL at 09:44

## 2021-09-26 RX ADMIN — DEXAMETHASONE SODIUM PHOSPHATE 20 MG: 4 INJECTION, SOLUTION INTRA-ARTICULAR; INTRALESIONAL; INTRAMUSCULAR; INTRAVENOUS; SOFT TISSUE at 09:34

## 2021-09-26 RX ADMIN — Medication 2000 UNITS: at 09:32

## 2021-09-26 RX ADMIN — PREGABALIN 150 MG: 75 CAPSULE ORAL at 13:38

## 2021-09-26 RX ADMIN — ROPINIROLE HYDROCHLORIDE 0.25 MG: 0.25 TABLET, FILM COATED ORAL at 21:03

## 2021-09-26 RX ADMIN — Medication 1000 MG: at 19:01

## 2021-09-26 RX ADMIN — Medication 10 ML: at 09:37

## 2021-09-26 RX ADMIN — BUPROPION HYDROCHLORIDE 300 MG: 300 TABLET, EXTENDED RELEASE ORAL at 09:31

## 2021-09-26 RX ADMIN — MORPHINE SULFATE 15 MG: 15 TABLET ORAL at 21:03

## 2021-09-26 RX ADMIN — RIVAROXABAN 20 MG: 20 TABLET, FILM COATED ORAL at 16:38

## 2021-09-26 RX ADMIN — PREGABALIN 150 MG: 75 CAPSULE ORAL at 09:44

## 2021-09-26 RX ADMIN — Medication 10 ML: at 21:04

## 2021-09-26 RX ADMIN — BETHANECHOL CHLORIDE 25 MG: 10 TABLET ORAL at 21:04

## 2021-09-26 RX ADMIN — POTASSIUM BICARBONATE 20 MEQ: 782 TABLET, EFFERVESCENT ORAL at 09:30

## 2021-09-26 RX ADMIN — SERTRALINE 25 MG: 50 TABLET, FILM COATED ORAL at 09:31

## 2021-09-26 RX ADMIN — SODIUM CHLORIDE: 9 INJECTION, SOLUTION INTRAVENOUS at 19:02

## 2021-09-26 ASSESSMENT — PAIN SCALES - GENERAL
PAINLEVEL_OUTOF10: 0

## 2021-09-26 NOTE — PLAN OF CARE
Problem: Skin Integrity:  Goal: Will show no infection signs and symptoms  Description: Will show no infection signs and symptoms  Outcome: Ongoing  Goal: Absence of new skin breakdown  Description: Absence of new skin breakdown  Outcome: Ongoing     Problem: Pain:  Goal: Pain level will decrease  Description: Pain level will decrease  Outcome: Ongoing  Goal: Control of acute pain  Description: Control of acute pain  Outcome: Ongoing  Goal: Control of chronic pain  Description: Control of chronic pain  Outcome: Ongoing     Problem: Falls - Risk of:  Goal: Will remain free from falls  Description: Will remain free from falls  Outcome: Ongoing  Goal: Absence of physical injury  Description: Absence of physical injury  Outcome: Ongoing     Problem: Airway Clearance - Ineffective  Goal: Achieve or maintain patent airway  Outcome: Ongoing     Problem: Gas Exchange - Impaired  Goal: Absence of hypoxia  Outcome: Ongoing  Goal: Promote optimal lung function  Outcome: Ongoing     Problem: Breathing Pattern - Ineffective  Goal: Ability to achieve and maintain a regular respiratory rate  Outcome: Ongoing     Problem:  Body Temperature -  Risk of, Imbalanced  Goal: Ability to maintain a body temperature within defined limits  Outcome: Ongoing  Goal: Will regain or maintain usual level of consciousness  Outcome: Ongoing  Goal: Complications related to the disease process, condition or treatment will be avoided or minimized  Outcome: Ongoing     Problem: Isolation Precautions - Risk of Spread of Infection  Goal: Prevent transmission of infection  Outcome: Ongoing     Problem: Nutrition Deficits  Goal: Optimize nutritional status  Outcome: Ongoing     Problem: Risk for Fluid Volume Deficit  Goal: Maintain normal heart rhythm  Outcome: Ongoing  Goal: Maintain absence of muscle cramping  Outcome: Ongoing  Goal: Maintain normal serum potassium, sodium, calcium, phosphorus, and pH  Outcome: Ongoing     Problem: Loneliness or Risk for Loneliness  Goal: Demonstrate positive use of time alone when socialization is not possible  Outcome: Ongoing     Problem: Fatigue  Goal: Verbalize increase energy and improved vitality  Outcome: Ongoing     Problem: Patient Education: Go to Patient Education Activity  Goal: Patient/Family Education  Outcome: Ongoing     Problem: Musculor/Skeletal Functional Status  Goal: Highest potential functional level  Outcome: Ongoing  Goal: Absence of falls  Outcome: Ongoing    Eber Calhoun RN   [phone]

## 2021-09-26 NOTE — PLAN OF CARE
O2 dropping to 85-89% on 5L HIgh Flow. O2 increased to 0L- pt now sating 89-91%. Respiratory aware.      Darron Curran BSN, RN   [phone]

## 2021-09-26 NOTE — PROGRESS NOTES
[facility]    9/26/2021 12:37 PM        Name: Dariel Guzman . Admitted: 9/23/2021  Primary Care Provider: Suly Good MD (Tel: [phone])      Subjective:  . Patient admitted with UTI. Was just hospitalized with hypoxia last week. At that time covid was negative as well as CTPA. She was discharged with home O2. 9/24 she tested positive for covid. In the am she was on 4L but the afternoon. She was lethargic, sats were in the 70s. She had a rapid response and was requiring 15L. She is on high dose decadron. She had actemra 9/24 and started remdesivir. Feeling better each day. Still on 6 liters. No new complaints.      Reviewed interval ancillary notes    Current Medications  dexamethasone (DECADRON) 20 mg in sodium chloride 0.9 % IVPB, Q24H  0.9 % sodium chloride infusion, Continuous  morphine (MSIR) tablet 15 mg, BID  perflutren lipid microspheres (DEFINITY) injection 1.65 mg, ONCE PRN  acetaminophen (TYLENOL) tablet 650 mg, Q6H PRN   Or  acetaminophen (TYLENOL) suppository 650 mg, Q6H PRN  guaiFENesin-dextromethorphan (ROBITUSSIN DM) 100-10 MG/5ML syrup 5 mL, Q4H PRN  Vitamin D (CHOLECALCIFEROL) tablet 2,000 Units, Daily  remdesivir 100 mg in sodium chloride 0.9 % 250 mL IVPB, Q24H  potassium bicarb-citric acid (EFFER-K) effervescent tablet 20 mEq, Daily  aspirin chewable tablet 81 mg, Daily  bethanechol (URECHOLINE) tablet 25 mg, TID  buPROPion (WELLBUTRIN XL) extended release tablet 300 mg, Daily  pregabalin (LYRICA) capsule 150 mg, TID  rivaroxaban (XARELTO) tablet 20 mg, Dinner  sertraline (ZOLOFT) tablet 25 mg, Daily  rOPINIRole (REQUIP) tablet 0.25 mg, Nightly  sodium chloride flush 0.9 % injection 5-40 mL, 2 times per day  sodium chloride flush 0.9 % injection 5-40 mL, PRN  0.9 % sodium chloride infusion, PRN  ondansetron (ZOFRAN-ODT) disintegrating tablet 4 mg, Q8H PRN Essential hypertension    Restless legs syndrome (RLS)    GERD    Mixed hyperlipidemia    Obesity, morbid, BMI 40.0-49.9 (HCC)    Physical deconditioning    Lower extremity weakness    Decreased activities of daily living (ADL)    COVID-19  Resolved Problems:    * No resolved hospital problems. *       Assessment & Plan:   1. Acute respiratory failure-secondary to covid. On decadron, day 3 of remdesivir. Had actemra on 9/24. O2 requirement currently 6L and stable. Has been as high as 15.  2. COVID-see above. 3. UTI-continue rocephin. Cultures positive for ecoli. Update son Fanta Wong on the phone with patients permission. Diet: ADULT DIET; Regular;  Low Sodium (2 gm)  Code:Full Code  DVT PPX: [place] PA-C   9/26/2021 12:37 PM

## 2021-09-26 NOTE — PLAN OF CARE
Results for Rainer Alonzo (MRN [id number]) as of 9/26/2021 15:16   Ref. Range 9/25/2021 17:43 9/26/2021 07:46   BUN Latest Ref Range: 7 - 20 mg/dL 21 (H) 19   Creatinine Latest Ref Range: 0.6 - 1.2 mg/dL 0.8 0.7     Results for Rainer Alonzo (MRN [id number]) as of 9/26/2021 15:16   Ref. Range 9/26/2021 07:46   CRP Latest Ref Range: 0.0 - 5.1 mg/L 78.7 (H)     Results for Rainer Alonzo (MRN [id number]) as of 9/26/2021 15:16   Ref. Range 9/25/2021 17:43 9/26/2021 07:46   WBC Latest Ref Range: 4.0 - 11.0 K/uL 3.7 (L) 3.9 (L)   RBC Latest Ref Range: 4.00 - 5.20 M/uL 2.81 (L) 2.98 (L)   Hemoglobin Quant Latest Ref Range: 12.0 - 16.0 g/dL 8.7 (L) 9.4 (L)   Hematocrit Latest Ref Range: 36.0 - 48.0 % 26.7 (L) 28.2 (L)     Will continue to monitor.     Merry HAREN, RN   [phone]

## 2021-09-26 NOTE — PLAN OF CARE
Pt ambulated to shower with portable o2. Pt tolerated well. Pt o2 93% on high flow after bathing and assistance, SBAx1 back to bed. Pt voiced appreciation. Will continue to monitor.      Tonie WATT, RN   [phone]

## 2021-09-27 LAB
BLOOD CULTURE, ROUTINE: NORMAL
CULTURE, BLOOD 2: NORMAL

## 2021-09-27 PROCEDURE — 6360000002 HC RX W HCPCS: Performed by: INTERNAL MEDICINE

## 2021-09-27 PROCEDURE — 6370000000 HC RX 637 (ALT 250 FOR IP): Performed by: PHYSICIAN ASSISTANT

## 2021-09-27 PROCEDURE — 51798 US URINE CAPACITY MEASURE: CPT

## 2021-09-27 PROCEDURE — 6370000000 HC RX 637 (ALT 250 FOR IP): Performed by: INTERNAL MEDICINE

## 2021-09-27 PROCEDURE — 2500000003 HC RX 250 WO HCPCS: Performed by: INTERNAL MEDICINE

## 2021-09-27 PROCEDURE — 51701 INSERT BLADDER CATHETER: CPT

## 2021-09-27 PROCEDURE — 2580000003 HC RX 258: Performed by: PHYSICIAN ASSISTANT

## 2021-09-27 PROCEDURE — 1200000000 HC SEMI PRIVATE

## 2021-09-27 PROCEDURE — 2580000003 HC RX 258: Performed by: INTERNAL MEDICINE

## 2021-09-27 PROCEDURE — 6360000002 HC RX W HCPCS: Performed by: PHYSICIAN ASSISTANT

## 2021-09-27 PROCEDURE — 93306 TTE W/DOPPLER COMPLETE: CPT

## 2021-09-27 RX ORDER — PANTOPRAZOLE SODIUM 40 MG/1
40 TABLET, DELAYED RELEASE ORAL
Status: DISCONTINUED | OUTPATIENT
Start: 2021-09-27 | End: 2021-09-27

## 2021-09-27 RX ADMIN — ACETAMINOPHEN 650 MG: 325 TABLET ORAL at 04:01

## 2021-09-27 RX ADMIN — SERTRALINE 25 MG: 50 TABLET, FILM COATED ORAL at 09:20

## 2021-09-27 RX ADMIN — Medication 10 ML: at 21:07

## 2021-09-27 RX ADMIN — BUPROPION HYDROCHLORIDE 300 MG: 300 TABLET, EXTENDED RELEASE ORAL at 09:20

## 2021-09-27 RX ADMIN — PREGABALIN 150 MG: 75 CAPSULE ORAL at 13:57

## 2021-09-27 RX ADMIN — SODIUM CHLORIDE: 9 INJECTION, SOLUTION INTRAVENOUS at 04:04

## 2021-09-27 RX ADMIN — ASPIRIN 81 MG 81 MG: 81 TABLET ORAL at 09:20

## 2021-09-27 RX ADMIN — DEXAMETHASONE SODIUM PHOSPHATE 20 MG: 4 INJECTION, SOLUTION INTRA-ARTICULAR; INTRALESIONAL; INTRAMUSCULAR; INTRAVENOUS; SOFT TISSUE at 10:54

## 2021-09-27 RX ADMIN — SODIUM CHLORIDE: 9 INJECTION, SOLUTION INTRAVENOUS at 17:07

## 2021-09-27 RX ADMIN — Medication 1000 MG: at 18:22

## 2021-09-27 RX ADMIN — GUAIFENESIN AND DEXTROMETHORPHAN 5 ML: 100; 10 SYRUP ORAL at 21:07

## 2021-09-27 RX ADMIN — PREGABALIN 150 MG: 75 CAPSULE ORAL at 21:16

## 2021-09-27 RX ADMIN — MORPHINE SULFATE 15 MG: 15 TABLET ORAL at 09:20

## 2021-09-27 RX ADMIN — POTASSIUM BICARBONATE 20 MEQ: 782 TABLET, EFFERVESCENT ORAL at 09:19

## 2021-09-27 RX ADMIN — Medication 2000 UNITS: at 09:20

## 2021-09-27 RX ADMIN — BETHANECHOL CHLORIDE 25 MG: 10 TABLET ORAL at 13:56

## 2021-09-27 RX ADMIN — BETHANECHOL CHLORIDE 25 MG: 10 TABLET ORAL at 21:06

## 2021-09-27 RX ADMIN — PREGABALIN 150 MG: 75 CAPSULE ORAL at 09:20

## 2021-09-27 RX ADMIN — MORPHINE SULFATE 15 MG: 15 TABLET ORAL at 21:07

## 2021-09-27 RX ADMIN — BETHANECHOL CHLORIDE 25 MG: 10 TABLET ORAL at 09:19

## 2021-09-27 RX ADMIN — REMDESIVIR 100 MG: 100 INJECTION, POWDER, LYOPHILIZED, FOR SOLUTION INTRAVENOUS at 09:35

## 2021-09-27 RX ADMIN — RIVAROXABAN 20 MG: 20 TABLET, FILM COATED ORAL at 18:22

## 2021-09-27 RX ADMIN — ROPINIROLE HYDROCHLORIDE 0.25 MG: 0.25 TABLET, FILM COATED ORAL at 21:06

## 2021-09-27 ASSESSMENT — PAIN DESCRIPTION - LOCATION: LOCATION: HEAD

## 2021-09-27 ASSESSMENT — PAIN DESCRIPTION - PAIN TYPE: TYPE: ACUTE PAIN

## 2021-09-27 ASSESSMENT — PAIN SCALES - GENERAL
PAINLEVEL_OUTOF10: 8
PAINLEVEL_OUTOF10: 8
PAINLEVEL_OUTOF10: 0
PAINLEVEL_OUTOF10: 6

## 2021-09-27 NOTE — FLOWSHEET NOTE
Patient's head to toe assessment complete at this time. Routine VSS. Pt resting comfortably in bed with respirations even and unlabored. Pt is awake, alert and oriented. Pt denies pain at this time. Pt currently on 10L HFNC supplemental oxygen to maintain saturations >90%. All nightly medications given per MAR. Purewick in place. No other needs expressed, will continue to monitor.      Fall precautions in place: bed locked and in lowest position, bed alarm on, 2/4 side rails raised, non-slip socks on, call light and overhead table within reach, patient knows when to appropriately call out for help.        09/26/21 2101   Vital Signs   Temp 98 °F (36.7 °C)   Temp Source Oral   Pulse 66   Heart Rate Source Monitor   Resp 18   /86   BP Location Right upper arm   MAP (mmHg) 103   Patient Position Semi fowlers   Level of Consciousness Alert (0)   MEWS Score 1   Patient Currently in Pain Denies   Pain Assessment   Pain Assessment 0-10   Pain Level 0   Oxygen Therapy   SpO2 91 %   Pulse Oximeter Device Mode Continuous   Pulse Oximeter Device Location Finger   O2 Device High flow nasal cannula   O2 Flow Rate (L/min) 10 L/min

## 2021-09-27 NOTE — PROGRESS NOTES
Oxygen increased to 13L HFNC due to desaturations. Will continue to monitor. 0210 Oxygen increased to 15L for saturations of 89%.    0401 Oxygen decreased to 12L.

## 2021-09-27 NOTE — PROGRESS NOTES
Pt bladder scanned, 336 ml in bladder. Pt used BSC after scan and urinated approximately 300. Will cont to monitor intake and ouput. Pt has had two heavy wets in brief as well.

## 2021-09-27 NOTE — PLAN OF CARE
normal heart rhythm  Outcome: Ongoing  Goal: Maintain absence of muscle cramping  Outcome: Ongoing  Goal: Maintain normal serum potassium, sodium, calcium, phosphorus, and pH  Outcome: Ongoing     Problem: Loneliness or Risk for Loneliness  Goal: Demonstrate positive use of time alone when socialization is not possible  Outcome: Ongoing     Problem: Fatigue  Goal: Verbalize increase energy and improved vitality  Outcome: Ongoing     Problem: Patient Education: Go to Patient Education Activity  Goal: Patient/Family Education  Outcome: Ongoing     Problem: Musculor/Skeletal Functional Status  Goal: Highest potential functional level  Outcome: Ongoing  Goal: Absence of falls  Outcome: Ongoing

## 2021-09-27 NOTE — PROGRESS NOTES
[facility]    9/27/2021 2:20 PM        Name: Timbo Correa . Admitted: 9/23/2021  Primary Care Provider: William Andrade MD (Tel: [phone])      Subjective:  . Patient admitted with UTI. Was just hospitalized with hypoxia last week. At that time covid was negative as well as CTPA. She was discharged with home O2. 9/24 she tested positive for covid. In the am she was on 4L but the afternoon. She was lethargic, sats were in the 70s. She had a rapid response and was requiring 15L. She is on high dose decadron. She had actemra 9/24 and started remdesivir. Feeling better each day.  Was on 8 L this am. Down to 2L this am.     Reviewed interval ancillary notes    Current Medications  dexamethasone (DECADRON) 20 mg in sodium chloride 0.9 % IVPB, Q24H  0.9 % sodium chloride infusion, Continuous  morphine (MSIR) tablet 15 mg, BID  perflutren lipid microspheres (DEFINITY) injection 1.65 mg, ONCE PRN  acetaminophen (TYLENOL) tablet 650 mg, Q6H PRN   Or  acetaminophen (TYLENOL) suppository 650 mg, Q6H PRN  guaiFENesin-dextromethorphan (ROBITUSSIN DM) 100-10 MG/5ML syrup 5 mL, Q4H PRN  Vitamin D (CHOLECALCIFEROL) tablet 2,000 Units, Daily  remdesivir 100 mg in sodium chloride 0.9 % 250 mL IVPB, Q24H  potassium bicarb-citric acid (EFFER-K) effervescent tablet 20 mEq, Daily  aspirin chewable tablet 81 mg, Daily  bethanechol (URECHOLINE) tablet 25 mg, TID  buPROPion (WELLBUTRIN XL) extended release tablet 300 mg, Daily  pregabalin (LYRICA) capsule 150 mg, TID  rivaroxaban (XARELTO) tablet 20 mg, Dinner  sertraline (ZOLOFT) tablet 25 mg, Daily  rOPINIRole (REQUIP) tablet 0.25 mg, Nightly  sodium chloride flush 0.9 % injection 5-40 mL, 2 times per day  sodium chloride flush 0.9 % injection 5-40 mL, PRN  0.9 % sodium chloride infusion, PRN  ondansetron (ZOFRAN-ODT) disintegrating tablet 4 mg, Q8H PRN weakness    Decreased activities of daily living (ADL)    COVID-19  Resolved Problems:    * No resolved hospital problems. *       Assessment & Plan:   1. Acute respiratory failure-secondary to covid. On decadron, day 4 of remdesivir. Had actemra on 9/24. O2 requirement currently 8L and stable. Has been as high as 15.  2. COVID-see above. 3. UTI-continue rocephin. Cultures positive for ecoli. Update son Miri Wilson on the phone with patients permission. Diet: ADULT DIET; Regular;  Low Sodium (2 gm)  Code:Full Code  DVT PPX: Justni Gutierrez PA-C   9/27/2021 2:20 PM

## 2021-09-27 NOTE — CARE COORDINATION
Cm spoke briefly to patient. She is currently on her baseline oxygen of 2 liters. Therapy last eval recommended hc. Pt active with [facility]. Pt will need hc orders on d/c.  CM will monitor for d/c needs    Torres Pavon RN, BSN  [phone]

## 2021-09-27 NOTE — PROGRESS NOTES
Message sent to on-call hospitalist: Patient has orders to bladder scan [facility]. Pt just bladder scanned, showed 374ml in bladder. Orders to straight cath PRN, but no parameters. Would you like me to go ahead and straight cath or place Sarah? Please advise    2220 Message received: Greater then 350. I changed order. Straight Cath this time if has more retention can place sarah    2240 Patient straight cathed at this time for bladder scan of >374ml. 300ml out. Repeat bladder scan showed 24ml in bladder. 4538 Patient bladder scanned again, showed 393ml in bladder. 9413 Patient straight cathed at this time. 250ml out.

## 2021-09-28 ENCOUNTER — APPOINTMENT (OUTPATIENT)
Dept: GENERAL RADIOLOGY | Age: 76
DRG: 871 | End: 2021-09-28
Payer: MEDICARE

## 2021-09-28 LAB
ALBUMIN SERPL-MCNC: 3.4 G/DL (ref 3.4–5)
ANION GAP SERPL CALCULATED.3IONS-SCNC: 10 MMOL/L (ref 3–16)
BANDED NEUTROPHILS RELATIVE PERCENT: 3 % (ref 0–7)
BASOPHILS ABSOLUTE: 0 K/UL (ref 0–0.2)
BASOPHILS RELATIVE PERCENT: 0 %
BUN BLDV-MCNC: 20 MG/DL (ref 7–20)
CALCIUM SERPL-MCNC: 9.4 MG/DL (ref 8.3–10.6)
CHLORIDE BLD-SCNC: 102 MMOL/L (ref 99–110)
CO2: 28 MMOL/L (ref 21–32)
CREAT SERPL-MCNC: 0.8 MG/DL (ref 0.6–1.2)
EOSINOPHILS ABSOLUTE: 0 K/UL (ref 0–0.6)
EOSINOPHILS RELATIVE PERCENT: 0 %
GFR AFRICAN AMERICAN: >60
GFR NON-AFRICAN AMERICAN: >60
GLUCOSE BLD-MCNC: 99 MG/DL (ref 70–99)
HCT VFR BLD CALC: 32.3 % (ref 36–48)
HEMOGLOBIN: 10.9 G/DL (ref 12–16)
LYMPHOCYTES ABSOLUTE: 1.1 K/UL (ref 1–5.1)
LYMPHOCYTES RELATIVE PERCENT: 17 %
MCH RBC QN AUTO: 31.4 PG (ref 26–34)
MCHC RBC AUTO-ENTMCNC: 33.6 G/DL (ref 31–36)
MCV RBC AUTO: 93.4 FL (ref 80–100)
MONOCYTES ABSOLUTE: 0.4 K/UL (ref 0–1.3)
MONOCYTES RELATIVE PERCENT: 6 %
NEUTROPHILS ABSOLUTE: 5 K/UL (ref 1.7–7.7)
NEUTROPHILS RELATIVE PERCENT: 74 %
PDW BLD-RTO: 13.7 % (ref 12.4–15.4)
PHOSPHORUS: 3 MG/DL (ref 2.5–4.9)
PLATELET # BLD: 217 K/UL (ref 135–450)
PMV BLD AUTO: 9.2 FL (ref 5–10.5)
POTASSIUM SERPL-SCNC: 4.1 MMOL/L (ref 3.5–5.1)
PROCALCITONIN: 0.17 NG/ML (ref 0–0.15)
RBC # BLD: 3.46 M/UL (ref 4–5.2)
RBC # BLD: NORMAL 10*6/UL
SODIUM BLD-SCNC: 140 MMOL/L (ref 136–145)
WBC # BLD: 6.5 K/UL (ref 4–11)

## 2021-09-28 PROCEDURE — 6370000000 HC RX 637 (ALT 250 FOR IP): Performed by: INTERNAL MEDICINE

## 2021-09-28 PROCEDURE — 6370000000 HC RX 637 (ALT 250 FOR IP): Performed by: PHYSICIAN ASSISTANT

## 2021-09-28 PROCEDURE — 84145 PROCALCITONIN (PCT): CPT

## 2021-09-28 PROCEDURE — 97530 THERAPEUTIC ACTIVITIES: CPT

## 2021-09-28 PROCEDURE — 6370000000 HC RX 637 (ALT 250 FOR IP): Performed by: NURSE PRACTITIONER

## 2021-09-28 PROCEDURE — 97535 SELF CARE MNGMENT TRAINING: CPT

## 2021-09-28 PROCEDURE — 6360000002 HC RX W HCPCS: Performed by: INTERNAL MEDICINE

## 2021-09-28 PROCEDURE — 2580000003 HC RX 258: Performed by: PHYSICIAN ASSISTANT

## 2021-09-28 PROCEDURE — 2580000003 HC RX 258: Performed by: INTERNAL MEDICINE

## 2021-09-28 PROCEDURE — 80069 RENAL FUNCTION PANEL: CPT

## 2021-09-28 PROCEDURE — 97116 GAIT TRAINING THERAPY: CPT

## 2021-09-28 PROCEDURE — 94761 N-INVAS EAR/PLS OXIMETRY MLT: CPT

## 2021-09-28 PROCEDURE — 36415 COLL VENOUS BLD VENIPUNCTURE: CPT

## 2021-09-28 PROCEDURE — 71045 X-RAY EXAM CHEST 1 VIEW: CPT

## 2021-09-28 PROCEDURE — 1200000000 HC SEMI PRIVATE

## 2021-09-28 PROCEDURE — 6360000002 HC RX W HCPCS: Performed by: PHYSICIAN ASSISTANT

## 2021-09-28 PROCEDURE — 2500000003 HC RX 250 WO HCPCS: Performed by: INTERNAL MEDICINE

## 2021-09-28 PROCEDURE — 85025 COMPLETE CBC W/AUTO DIFF WBC: CPT

## 2021-09-28 PROCEDURE — 86140 C-REACTIVE PROTEIN: CPT

## 2021-09-28 RX ORDER — ALBUTEROL SULFATE 90 UG/1
2 AEROSOL, METERED RESPIRATORY (INHALATION) EVERY 6 HOURS PRN
Status: DISCONTINUED | OUTPATIENT
Start: 2021-09-28 | End: 2021-09-29 | Stop reason: HOSPADM

## 2021-09-28 RX ORDER — AMLODIPINE BESYLATE 5 MG/1
10 TABLET ORAL DAILY
Status: DISCONTINUED | OUTPATIENT
Start: 2021-09-28 | End: 2021-09-29 | Stop reason: HOSPADM

## 2021-09-28 RX ORDER — FUROSEMIDE 10 MG/ML
60 INJECTION INTRAMUSCULAR; INTRAVENOUS ONCE
Status: COMPLETED | OUTPATIENT
Start: 2021-09-28 | End: 2021-09-28

## 2021-09-28 RX ADMIN — POTASSIUM BICARBONATE 20 MEQ: 782 TABLET, EFFERVESCENT ORAL at 09:05

## 2021-09-28 RX ADMIN — MORPHINE SULFATE 15 MG: 15 TABLET ORAL at 09:05

## 2021-09-28 RX ADMIN — BUPROPION HYDROCHLORIDE 300 MG: 300 TABLET, EXTENDED RELEASE ORAL at 09:05

## 2021-09-28 RX ADMIN — ACETAMINOPHEN 650 MG: 325 TABLET ORAL at 17:42

## 2021-09-28 RX ADMIN — SODIUM CHLORIDE: 9 INJECTION, SOLUTION INTRAVENOUS at 02:15

## 2021-09-28 RX ADMIN — SERTRALINE 25 MG: 50 TABLET, FILM COATED ORAL at 09:05

## 2021-09-28 RX ADMIN — Medication 10 ML: at 09:05

## 2021-09-28 RX ADMIN — BETHANECHOL CHLORIDE 25 MG: 10 TABLET ORAL at 13:30

## 2021-09-28 RX ADMIN — FUROSEMIDE 60 MG: 10 INJECTION, SOLUTION INTRAMUSCULAR; INTRAVENOUS at 03:15

## 2021-09-28 RX ADMIN — ASPIRIN 81 MG 81 MG: 81 TABLET ORAL at 09:05

## 2021-09-28 RX ADMIN — DEXAMETHASONE SODIUM PHOSPHATE 20 MG: 4 INJECTION, SOLUTION INTRA-ARTICULAR; INTRALESIONAL; INTRAMUSCULAR; INTRAVENOUS; SOFT TISSUE at 09:06

## 2021-09-28 RX ADMIN — MORPHINE SULFATE 15 MG: 15 TABLET ORAL at 20:53

## 2021-09-28 RX ADMIN — BETHANECHOL CHLORIDE 25 MG: 10 TABLET ORAL at 09:05

## 2021-09-28 RX ADMIN — Medication 2000 UNITS: at 09:05

## 2021-09-28 RX ADMIN — PREGABALIN 150 MG: 75 CAPSULE ORAL at 09:05

## 2021-09-28 RX ADMIN — SODIUM CHLORIDE 25 ML: 9 INJECTION, SOLUTION INTRAVENOUS at 09:05

## 2021-09-28 RX ADMIN — RIVAROXABAN 20 MG: 20 TABLET, FILM COATED ORAL at 17:34

## 2021-09-28 RX ADMIN — ROPINIROLE HYDROCHLORIDE 0.25 MG: 0.25 TABLET, FILM COATED ORAL at 20:53

## 2021-09-28 RX ADMIN — Medication 10 ML: at 20:54

## 2021-09-28 RX ADMIN — AMLODIPINE BESYLATE 10 MG: 5 TABLET ORAL at 09:05

## 2021-09-28 RX ADMIN — BETHANECHOL CHLORIDE 25 MG: 10 TABLET ORAL at 20:53

## 2021-09-28 RX ADMIN — PREGABALIN 150 MG: 75 CAPSULE ORAL at 20:53

## 2021-09-28 RX ADMIN — PREGABALIN 150 MG: 75 CAPSULE ORAL at 13:30

## 2021-09-28 RX ADMIN — REMDESIVIR 100 MG: 100 INJECTION, POWDER, LYOPHILIZED, FOR SOLUTION INTRAVENOUS at 09:55

## 2021-09-28 RX ADMIN — Medication 1000 MG: at 17:34

## 2021-09-28 RX ADMIN — GUAIFENESIN AND DEXTROMETHORPHAN 5 ML: 100; 10 SYRUP ORAL at 02:19

## 2021-09-28 ASSESSMENT — PAIN SCALES - GENERAL
PAINLEVEL_OUTOF10: 0
PAINLEVEL_OUTOF10: 7
PAINLEVEL_OUTOF10: 0
PAINLEVEL_OUTOF10: 0
PAINLEVEL_OUTOF10: 7

## 2021-09-28 NOTE — PROGRESS NOTES
[facility]    9/28/2021 7:38 AM        Name: Mary Alice Gardner . Admitted: 9/23/2021  Primary Care Provider: Steve Bob MD (Tel: [phone])      Subjective:  . Seen this am while up in the chair  Looks chronically ill and frail, however states she is feeling better. Anxious for d/c soon. Currently on 3 liters.   Uses home oxygen at 2 liters at hs only   bp elevated resume norvasc 10 mg     Reviewed interval ancillary notes    Current Medications  albuterol sulfate  (90 Base) MCG/ACT inhaler 2 puff, Q6H PRN  dexamethasone (DECADRON) 20 mg in sodium chloride 0.9 % IVPB, Q24H  morphine (MSIR) tablet 15 mg, BID  perflutren lipid microspheres (DEFINITY) injection 1.65 mg, ONCE PRN  acetaminophen (TYLENOL) tablet 650 mg, Q6H PRN   Or  acetaminophen (TYLENOL) suppository 650 mg, Q6H PRN  guaiFENesin-dextromethorphan (ROBITUSSIN DM) 100-10 MG/5ML syrup 5 mL, Q4H PRN  Vitamin D (CHOLECALCIFEROL) tablet 2,000 Units, Daily  remdesivir 100 mg in sodium chloride 0.9 % 250 mL IVPB, Q24H  potassium bicarb-citric acid (EFFER-K) effervescent tablet 20 mEq, Daily  aspirin chewable tablet 81 mg, Daily  bethanechol (URECHOLINE) tablet 25 mg, TID  buPROPion (WELLBUTRIN XL) extended release tablet 300 mg, Daily  pregabalin (LYRICA) capsule 150 mg, TID  rivaroxaban (XARELTO) tablet 20 mg, Dinner  sertraline (ZOLOFT) tablet 25 mg, Daily  rOPINIRole (REQUIP) tablet 0.25 mg, Nightly  sodium chloride flush 0.9 % injection 5-40 mL, 2 times per day  sodium chloride flush 0.9 % injection 5-40 mL, PRN  0.9 % sodium chloride infusion, PRN  ondansetron (ZOFRAN-ODT) disintegrating tablet 4 mg, Q8H PRN   Or  ondansetron (ZOFRAN) injection 4 mg, Q6H PRN  polyethylene glycol (GLYCOLAX) packet 17 g, Daily PRN  cefTRIAXone (ROCEPHIN) 1000 mg in sterile water 10 mL IV syringe, Q24H        Objective:  BP (!) 172/106 Pulse 70   Temp 97.3 °F (36.3 °C) (Temporal)   Resp 20   Ht 5' (1.524 m)   Wt 182 lb (82.6 kg)   SpO2 92%   BMI 35.54 kg/m²     Intake/Output Summary (Last 24 hours) at 9/28/2021 0738  Last data filed at 9/28/2021 7583  Gross per 24 hour   Intake 3720 ml   Output 2900 ml   Net 820 ml      Wt Readings from Last 3 Encounters:   09/23/21 182 lb (82.6 kg)   09/16/21 175 lb 1.6 oz (79.4 kg)   07/07/21 186 lb (84.4 kg)       General appearance:  Appears comfortable but looks chronically ill   Eyes: Sclera clear. Pupils equal.  ENT: Moist oral mucosa. Trachea midline, no adenopathy. Cardiovascular: Regular rhythm, normal S1, S2. No murmur. + edema in lower extremities  Respiratory: Not using accessory muscles. crackles in bases   GI: Abdomen soft, no tenderness, not distended, normal bowel sounds  Musculoskeletal: No cyanosis in digits, neck supple  Neurology: CN 2-12 grossly intact. No speech or motor deficits  Psych: Normal affect. Alert and oriented in time, place and person  Skin: Warm, dry, normal turgor    Labs and Tests:  CBC:   Recent Labs     09/25/21  1743 09/26/21  0746 09/28/21  0838   WBC 3.7* 3.9* 6.5   HGB 8.7* 9.4* 10.9*    175 217     BMP:    Recent Labs     09/25/21  1743 09/26/21  0746 09/28/21  0838    139 140   K 4.8 4.6 4.1    110 102   CO2 24 24 28   BUN 21* 19 20   CREATININE 0.8 0.7 0.8   GLUCOSE 134* 135* 99     Hepatic:   No results for input(s): AST, ALT, ALB, BILITOT, ALKPHOS in the last 72 hours.   Escherichia coli (1)    Antibiotic Interpretation MERLY Status    ampicillin Resistant >=32 mcg/mL     ceFAZolin Sensitive <=4 mcg/mL     cefepime Sensitive <=0.12 mcg/mL     cefTRIAXone Sensitive <=0.25 mcg/mL     ciprofloxacin Sensitive <=0.25 mcg/mL     ertapenem Sensitive <=0.12 mcg/mL     gentamicin Sensitive <=1 mcg/mL     levofloxacin Sensitive 1 mcg/mL     nitrofurantoin Sensitive <=16 mcg/mL     piperacillin-tazobactam Sensitive <=4 mcg/mL trimethoprim-sulfamethoxazole Resistant >=320 mcg/mL             Problem List  Principal Problem:    UTI (urinary tract infection)  Active Problems:    Essential hypertension    Restless legs syndrome (RLS)    GERD    Mixed hyperlipidemia    Obesity, morbid, BMI 40.0-49.9 (HCC)    Physical deconditioning    Lower extremity weakness    Decreased activities of daily living (ADL)    COVID-19  Resolved Problems:    * No resolved hospital problems. *       Assessment & Plan:   1. Acute hypoxic respiratory failure due to  Covid: On remdesevir day 5 an ddecadron. Also had actemra on 9/24 , currently on 3 liters. Will continue to attempt to wean off oxygen. She uses 2 liters at hs only   2. UTI:  On rocephin day # 5 ,  Will not need any antibiotics at d/c  3. HTN:  bp elevated , resume home CCB  4. Debility:  PT and OT following  5. Anticipate d/c home in the next 1 - 2 days with home care services. Diet: ADULT DIET; Regular;  Low Sodium (2 gm)  Code:Full Code  DVT PPX      ANGELES Vinson - CNP   9/28/2021 7:38 AM

## 2021-09-28 NOTE — PROGRESS NOTES
but the afternoon. She was lethargic, sats were in the 70s. She had a rapid response and was requiring 15L. She is on high dose decadron. She had actemra 9/24 and started remdesivir. \"  Subjective   General  Response To Previous Treatment: Not applicable  Family / Caregiver Present: No  Subjective  Subjective: Pt denies pain. Pt agreeable to PT treatment. General Comment  Comments: Pt semi-reclined in bed upon therapist arrival. Agreeable to PT. Pain Screening  Patient Currently in Pain: Denies  Vital Signs  Patient Currently in Pain: Denies       Orientation  Orientation  Overall Orientation Status: Within Normal Limits  Cognition      Objective   Bed mobility  Supine to Sit: Supervision  Scooting: Supervision  Transfers  Sit to Stand: Contact guard assistance  Stand to sit: Contact guard assistance  Ambulation  Ambulation?: Yes  Ambulation 1  Surface: level tile  Device: Rolling Walker  Assistance: Contact guard assistance  Quality of Gait: Pt ambulates with decreased step length, slow radha, slightly widened ERNESTO, no LOB.   Distance: ~35 ft  Stairs/Curb  Stairs?: No     Balance  Posture: Good  Sitting - Static: Good  Sitting - Dynamic: Good  Standing - Static: Fair;+  Standing - Dynamic: Fair;+       G-Code     OutComes Score                                                     AM-PAC Score  AM-PAC Inpatient Mobility Raw Score : 19 (09/28/21 1601)  AM-PAC Inpatient T-Scale Score : 45.44 (09/28/21 1601)  Mobility Inpatient CMS 0-100% Score: 41.77 (09/28/21 1601)  Mobility Inpatient CMS G-Code Modifier : CK (09/28/21 1601)          Goals  Short term goals  Time Frame for Short term goals: Before discharge  Short term goal 1: Pt will complete bed mobility indep  Short term goal 2: Pt will complete sit<>stand indep  Short term goal 3: Pt will ambulate 50 ft indep  Short term goal 4: Pt will ascend/descend 2 steps without rail indep  Patient Goals   Patient goals : Get better   **none met this date    Plan Plan  Times per week: 3-5x  Times per day: Daily  Current Treatment Recommendations: Strengthening, Transfer Training, Endurance Training, Patient/Caregiver Education & Training, Equipment Evaluation, Education, & procurement, Safety Education & Training, Home Exercise Program, Gait Training, Balance Training, Stair training, Functional Mobility Training  Safety Devices  Type of devices:  All fall risk precautions in place, Gait belt, Patient at risk for falls, Nurse notified, Left in chair, Chair alarm in place, Call light within reach  Restraints  Initially in place: No     Therapy Time   Individual Concurrent Group Co-treatment   Time In 1528         Time Out 1545         Minutes 17         Timed Code Treatment Minutes: [address], [place], DPT, [id number]  Norris Landa, PT

## 2021-09-28 NOTE — PROGRESS NOTES
Pt with increased wheezing, cough and elevated BP, provider notified. Message read:  Pt is a 76 y/o female here for UTI, and is COVID positive. Pt is having increased wheezing, wet cough and blood pressure is trending up. Pt does not have a history of CHF, but with her increased cough, wheezing and elevated blood pressure I was wondering if we should decrease or stop fluids. Pt has normal saline running at 100ml/hr. Please advise. Thank you  Awaiting response.

## 2021-09-28 NOTE — PROGRESS NOTES
Occupational Therapy  Facility/Department: [address] ONCOLOGY  Daily Treatment Note  NAME: Rosemary Rojas  : 1945  MRN: [id number]    Date of Service: 2021    Discharge Recommendations: Rosemary Rojas scored a 18/24 on the AM-PAC ADL Inpatient form. Current research shows that an AM-PAC score of 18 or greater is typically associated with a discharge to the patient's home setting. Based on the patient's AM-PAC score, and their current ADL deficits, it is recommended that the patient have 2-3 sessions per week of Occupational Therapy at d/c to increase the patient's independence. At this time, this patient demonstrates the endurance and safety to discharge home with [address] and a follow up treatment frequency of 2-3x/wk. Please see assessment section for further patient specific details. If patient discharges prior to next session this note will serve as a discharge summary. Please see below for the latest assessment towards goals. Assessment   Performance deficits / Impairments: Decreased functional mobility ; Decreased endurance;Decreased ADL status; Decreased high-level IADLs  Assessment: Pt presents with the above deficits impacting daily occupational performance and would benefit from continued skilled OT services. Treatment Diagnosis: Decreased mobility, ADL status, ADL transfers, endurance and high level IADL's associated with UTI, E Coli infection and Covid19. Prognosis: Good  OT Education: OT Role;Plan of Care;Equipment;Precautions;Transfer Training  REQUIRES OT FOLLOW UP: Yes  Activity Tolerance  Activity Tolerance: Patient Tolerated treatment well;Patient limited by fatigue  Safety Devices  Safety Devices in place: Yes  Type of devices: All fall risk precautions in place; Left in chair;Call light within reach;Nurse notified; Chair alarm in place;Gait belt;Patient at risk for falls  Restraints  Initially in place: No         Patient Diagnosis(es): The primary encounter diagnosis was General weakness. Diagnoses of Febrile illness, acute and Acute cystitis without hematuria were also pertinent to this visit. has a past medical history of AVM, Chronic back pain, Depression, Gastritis, History of blood transfusion, History of GI bleed, Hypertension, and Personal history of MRSA (methicillin resistant Staphylococcus aureus). has a past surgical history that includes hiatal hernia repair (1999); Endoscopy, colon, diagnostic; Colonoscopy; cervical fusion; back surgery; other surgical history (Left, 236768); and Abdominal exploration surgery (09/07/2017). Restrictions  Restrictions/Precautions  Restrictions/Precautions: Isolation, Fall Risk (High fall risk; Droplet Plus (COVID+))  Required Braces or Orthoses?: No  Position Activity Restriction  Other position/activity restrictions: Pt admittd on 9/23, per Progress note on 9/25 \"Patient admitted with UTI. Was just hospitalized with hypoxia last week. At that time covid was negative as well as CTPA. She was discharged with home O2. 9/24 she tested positive for covid. In the am she was on 4L but the afternoon. She was lethargic, sats were in the 70s. She had a rapid response and was requiring 15L. She is on high dose decadron. She had actemra 9/24 and started remdesivir. \"  Subjective   General  Chart Reviewed: Yes  Patient assessed for rehabilitation services?: Yes  Family / Caregiver Present: No  Diagnosis: UTI/ Covid19  Subjective  Subjective: Patient was laying supine in bed with RN present in room upon arrival. Patient was hesitant to engage in therapy session due to fatigue and low endurance. Therapist provided education on role and benefit of participation in therapy, patient verbalized understanding and agreed to therapy session. General Comment  Comments: RN present in room for first half of therapy session, RN to administer medications, take vitals, and educate patient on plan of care.   Vital Signs  Patient Currently in Pain: Denies   Orientation  Orientation  Overall Orientation Status: Within Functional Limits  Objective    ADL  UE Bathing: Minimal assistance (sponge bathing sitting EOB; assistance for back)  LE Bathing: Minimal assistance (spong bathing sitting EOB with min assist for thoroughness)  UE Dressing: Minimal assistance (donning/doffing gown)  Toileting: None (pure wick)  Additional Comments: Pt required minimal cues for thoroughness of adl completion; pt with increased fatigue and decreased energy reported throughout completion of adls        Balance  Sitting Balance: Supervision  Standing Balance: Contact guard assistance  Standing Balance  Time: ~5 minutes total  Activity: functional mobility/transfers  Comment: fatigue noted and increased SOB with activity  Functional Mobility  Functional - Mobility Device: Rolling Walker  Activity: Other  Assist Level: Stand by assistance  Functional Mobility Comments: good attention to navigation of environmental hazzards and barriers; cues for hand placement and continued use of walker  Bed mobility  Supine to Sit: Supervision  Scooting: Modified independent (cues required)  Transfers  Stand Step Transfers: Stand by assistance  Sit to stand: Stand by assistance  Stand to sit: Stand by assistance  Transfer Comments: SOB with transfers, fatigue reported by pt         Cognition  Overall Cognitive Status: WFL     Perception  Overall Perceptual Status: [facility]         Plan   Plan  Times per week: 3-5x/week  Times per day: Daily  Current Treatment Recommendations: Gait Training, Patient/Caregiver Education & Training, Equipment Evaluation, Education, & procurement, Balance Training, Endurance Training, Safety Education & Training, Self-Care / ADL    AM-[facility] Score        AM-[facility] Inpatient Daily Activity Raw Score: 18 (09/28/21 1022)  AM-PAC Inpatient ADL T-Scale Score : 38.66 (09/28/21 1022)  ADL Inpatient CMS 0-100% Score: 46.65 (09/28/21 1022)  ADL Inpatient CMS G-Code Modifier : NORRIS (09/28/21 1022)    Goals  Short term goals  Time Frame for Short term goals: Discharge  Short term goal 1: Mod Independent ADL transfers to/from bed, chair and toilet - SBA 9/28  Short term goal 2: Mod Independent toileting - ongoing 9/28  Short term goal 3: Mod Independent UB/LB ADL's - Min A 9/28  Short term goal 4: Mod Independent functional mobility using RW or AAD as needed for ADL's/functional activity - SBA 9/28  Short term goal 5:  Increase stand tolerance to >5 minutes for participation in ADL's/functional activity - ongoing 9/28  Long term goals  Time Frame for Long term goals : LTG=STG  Patient Goals   Patient goals : Safe Discharge       Therapy Time   Individual Concurrent Group Co-treatment   Time In 0848         Time Out 0930         Minutes 42         Timed Code Treatment Minutes: [address], OTR/L -- [id number]

## 2021-09-29 VITALS
SYSTOLIC BLOOD PRESSURE: 150 MMHG | RESPIRATION RATE: 16 BRPM | HEIGHT: 60 IN | HEART RATE: 68 BPM | BODY MASS INDEX: 35.73 KG/M2 | TEMPERATURE: 86.2 F | WEIGHT: 182 LBS | OXYGEN SATURATION: 92 % | DIASTOLIC BLOOD PRESSURE: 94 MMHG

## 2021-09-29 LAB — C-REACTIVE PROTEIN: 35.1 MG/L (ref 0–5.1)

## 2021-09-29 PROCEDURE — 2580000003 HC RX 258: Performed by: PHYSICIAN ASSISTANT

## 2021-09-29 PROCEDURE — 6370000000 HC RX 637 (ALT 250 FOR IP): Performed by: INTERNAL MEDICINE

## 2021-09-29 PROCEDURE — 6360000002 HC RX W HCPCS: Performed by: PHYSICIAN ASSISTANT

## 2021-09-29 PROCEDURE — 6370000000 HC RX 637 (ALT 250 FOR IP): Performed by: PHYSICIAN ASSISTANT

## 2021-09-29 PROCEDURE — 2580000003 HC RX 258: Performed by: INTERNAL MEDICINE

## 2021-09-29 PROCEDURE — 6370000000 HC RX 637 (ALT 250 FOR IP): Performed by: NURSE PRACTITIONER

## 2021-09-29 PROCEDURE — 6360000002 HC RX W HCPCS: Performed by: INTERNAL MEDICINE

## 2021-09-29 RX ORDER — DEXAMETHASONE 6 MG/1
6 TABLET ORAL
Qty: 4 TABLET | Refills: 0 | Status: SHIPPED | OUTPATIENT
Start: 2021-10-01 | End: 2021-10-05

## 2021-09-29 RX ADMIN — AMLODIPINE BESYLATE 10 MG: 5 TABLET ORAL at 08:27

## 2021-09-29 RX ADMIN — ASPIRIN 81 MG 81 MG: 81 TABLET ORAL at 08:27

## 2021-09-29 RX ADMIN — SERTRALINE 25 MG: 50 TABLET, FILM COATED ORAL at 08:26

## 2021-09-29 RX ADMIN — DEXAMETHASONE SODIUM PHOSPHATE 20 MG: 4 INJECTION, SOLUTION INTRA-ARTICULAR; INTRALESIONAL; INTRAMUSCULAR; INTRAVENOUS; SOFT TISSUE at 10:56

## 2021-09-29 RX ADMIN — BETHANECHOL CHLORIDE 25 MG: 10 TABLET ORAL at 08:27

## 2021-09-29 RX ADMIN — ONDANSETRON 4 MG: 2 INJECTION INTRAMUSCULAR; INTRAVENOUS at 12:15

## 2021-09-29 RX ADMIN — POTASSIUM BICARBONATE 20 MEQ: 782 TABLET, EFFERVESCENT ORAL at 08:27

## 2021-09-29 RX ADMIN — Medication 2000 UNITS: at 08:27

## 2021-09-29 RX ADMIN — BUPROPION HYDROCHLORIDE 300 MG: 300 TABLET, EXTENDED RELEASE ORAL at 08:26

## 2021-09-29 RX ADMIN — PREGABALIN 150 MG: 75 CAPSULE ORAL at 08:27

## 2021-09-29 RX ADMIN — MORPHINE SULFATE 15 MG: 15 TABLET ORAL at 08:44

## 2021-09-29 RX ADMIN — Medication 10 ML: at 08:37

## 2021-09-29 ASSESSMENT — PAIN SCALES - GENERAL: PAINLEVEL_OUTOF10: 6

## 2021-09-29 NOTE — DISCHARGE SUMMARY
[facility]    Patient Demographics    Patient. Martine Hernández  Date of Birth. 1945  MRN. [id number]     Primary care provider. Rosalva Lowery MD  (Tel: [phone])    Admit date: 9/23/2021    Discharge date 9/29/2021  Note Date: 9/29/2021     Reason for Hospitalization. Chief Complaint   Patient presents with    Fatigue     PT. IN PER [facility] WITH REPORT OF HER NOT BEING ABLE TO GET HERSELF UP AND POSSIBLY GETTING A DOUBLE DOSE OF HER PAIN MEDICATION FROM [facility]. Significant Findings. Principal Problem:    UTI (urinary tract infection)  Active Problems:    Essential hypertension    Restless legs syndrome (RLS)    GERD    Mixed hyperlipidemia    Obesity, morbid, BMI 40.0-49.9 (HCC)    Physical deconditioning    Lower extremity weakness    Decreased activities of daily living (ADL)    COVID-19  Resolved Problems:    * No resolved hospital problems. *       Problems and results from this hospitalization that need follow up. 1. COVID:  Follow up with PCP     Significant test results and incidental findings. Escherichia coli (1)             Antibiotic Interpretation MERLY Status     ampicillin Resistant >=32 mcg/mL       ceFAZolin Sensitive <=4 mcg/mL       cefepime Sensitive <=0.12 mcg/mL       cefTRIAXone Sensitive <=0.25 mcg/mL       ciprofloxacin Sensitive <=0.25 mcg/mL       ertapenem Sensitive <=0.12 mcg/mL       gentamicin Sensitive <=1 mcg/mL       levofloxacin Sensitive 1 mcg/mL       nitrofurantoin Sensitive <=16 mcg/mL       piperacillin-tazobactam Sensitive <=4 mcg/mL       trimethoprim-sulfamethoxazole Resistant >=320 mcg/mL            Invasive procedures and treatments. [facility] Course. The patient was admitted with generalized weakness which was initially thought to be r/t an accidental overdose of her pain medications.   She was recently d/c from the hospital with hypoxia and was sent home on 2 liters of oxygen. During that admission her COVID was negative. During this admission she tested positive for COVID and required 4 liters of oxygen upon admission. She then decompensated and required up to 15 liters of high flow oxygen. She was treated with 5 days of remdesevir,  Decadron and Actemra. At the time of d/c she was at her baseline oxygen requirement of 2 liters. Her inflammatory markers where trending down at the time of d/c and she felt markedly improved and was very eager to be released. She was d/c home on 6 additional days of decadron therapy. She was noted to have UTI during her stay and completed therapy with rocephin. HTN:  BP was in range on home therapy  Debility:  PT and OT followed and she will be dc home with home care services, PT/ OT and Skilled RN care        Consults. IP CONSULT TO SOCIAL WORK  IP CONSULT TO PHARMACY  IP CONSULT TO PHARMACY    Physical examination on discharge day. /78   Pulse 78   Temp 97.8 °F (36.6 °C) (Temporal)   Resp 18   Ht 5' (1.524 m)   Wt 182 lb (82.6 kg)   SpO2 94%   BMI 35.54 kg/m²   General appearance. Alert. Looks comfortable. HEENT. Sclera clear. Moist mucus membranes. Cardiovascular. Regular rate and rhythm, normal S1, S2. No murmur. Respiratory. Not using accessory muscles. Clear to auscultation bilaterally, no wheeze. Gastrointestinal. Abdomen soft, non-tender, not distended, normal bowel sounds  Neurology. Facial symmetry. No speech deficits. Moving all extremities equally. Extremities. No edema in lower extremities. Skin. Warm, dry, normal turgor    Condition at time of discharge stable     Medication instructions provided to patient at discharge.      Medication List      START taking these medications    * Dexamethasone 20 MG Tabs  Take 10 mg by mouth daily (with breakfast) for 2 days  Start taking on: September 30, 2021     * dexamethasone 6 MG tablet  Commonly known as: Geronimo Hamman           Where to Get Your Medications      These medications were sent to [facility], [address] [place] [address], [place] [id number]    Phone: [phone]   · Dexamethasone 20 MG Tabs  · dexamethasone 6 MG tablet         Discharge recommendations given to patient. Follow Up. in 1 week   Disposition. Home   Activity. as tolerated   Diet: ADULT DIET; Regular; Low Sodium (2 gm)      Spent > 30 minutes in discharge process.     Signed:  ANGELES Espinosa CNP     9/29/2021 7:39 AM

## 2021-09-29 NOTE — DISCHARGE INSTR - COC
Continuity of Care Form    Patient Name: Fidel Slater   :    MRN:  [id number]    Admit date:  2021  Discharge date:  2021    Code Status Order: Full Code   Advance Directives:      Admitting Physician:  Carmine Gutierrez MD  PCP: Gabriel Mosquera MD    Discharging Nurse: [facility] Unit/Room#: 2WT-2738/9953-74  Discharging Unit Phone Number: [phone]    Emergency Contact:   Extended Emergency Contact Information  Primary Emergency Contact: Heydi Krishnamurthy [address] Phone: [phone]  Relation: Child  Secondary Emergency Contact: Sangita Geller [address] Phone: [phone]  Relation: Child    Past Surgical History:  Past Surgical History:   Procedure Laterality Date    ABDOMINAL EXPLORATION SURGERY  2017    Lysis of adhesions, removal of pelvic mass, total abdominal hysterectomy with BSO, partial omentectomy, appendectomy, and pelvic lymphadenectomy    BACK SURGERY      multiple lumbar spine surgeries including laminectomy and fusion    CERVICAL FUSION      COLONOSCOPY      ENDOSCOPY, COLON, DIAGNOSTIC      HIATAL HERNIA REPAIR      OTHER SURGICAL HISTORY Left 248220    SPINAL CORD STIMULATOR BATTERY REPLACMENT       Immunization History:   Immunization History   Administered Date(s) Administered    COVID-19, Pfizer, PF, 30mcg/0.3mL 2021, 2021    Influenza Virus Vaccine 10/20/2018    Influenza, Triv, inactivated, subunit, adjuvanted, IM (Fluad 65 yrs and older) 10/23/2019    Pneumococcal Conjugate 13-valent (Zahida Lien) 2019       Active Problems:  Patient Active Problem List   Diagnosis Code    Essential hypertension I10    Backache M54.9    mrsa infections B95.7    Iron deficiency anemia D50.9    Restless legs syndrome (RLS) G25.81    GERD K21.9    Generalized osteoarthrosis, involving multiple sites M15.9    Hypoxemia R09.02    Edema R60.9    Somnolence R40.0    Insomnia G47.00    RAD (reactive airway disease) J45.909    DDD (degenerative disc disease), lumbosacral M51.37    Lower back pain M54.5    Post laminectomy syndrome M96.1    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    Chronic pain syndrome G89.4    Right bundle branch block (RBBB) I45.10    B12 deficiency E53.8    Vertigo R42    Mixed hyperlipidemia E78.2    Unsteady gait R26.81    Depression F32.9    SHITAL (acute kidney injury) ([facility]) Y19.9    Acute embolic stroke ([facility]) G65.7    Acute CVA (cerebrovascular accident) ([facility] [address] I63.9    Acute pulmonary insufficiency J98.4    Abnormal chest x-ray R93.89    Pneumonia of both lower lobes due to infectious organism J18.9    Moderate COPD (chronic obstructive pulmonary disease) ([facility]) J44.9    Narcotic dependence ([facility] [address] F11.20    Recurrent major depressive disorder, in partial remission ([facility] [address] F33.41    Obesity, morbid, BMI 40.0-49.9 ([facility]) E66.01    Hypercoagulable state ([address] D68.59    Stable angina ([facility] [address] I20.8    Acute respiratory failure with hypoxia and hypercapnia ([facility]) J96.01, J96.02    UTI (urinary tract infection) N39.0    Physical deconditioning R53.81    Lower extremity weakness R29.898    Decreased activities of daily living (ADL) Z78.9    COVID-19 U07.1       Isolation/Infection:   Isolation            Droplet Plus  Droplet Plus          Patient Infection Status       Infection Onset Added Last Indicated Last Indicated By Review Planned Expiration Resolved Resolved By    COVID-19 09/24/21 09/24/21 09/24/21 COVID-19 10/02/21 10/08/21      Resolved    COVID-19 Rule Out 09/24/21 09/24/21 09/24/21 COVID-19 (Ordered)   09/24/21 Rule-Out Test Resulted    COVID-19 Rule Out 09/15/21 09/15/21 09/15/21 COVID-19 (Ordered)   09/16/21 Rule-Out Test Resulted            Nurse Assessment:  Last Vital Signs: BP (!) 150/94   Pulse 68   Temp (!) 86.2 °F (30.1 °C) (Temporal)   Resp 16   Ht 5' (1.524 m)   Wt 182 lb (82.6 kg)   SpO2 92%   BMI 35.54 kg/m²     Last documented pain score (0-10 scale): Pain Level: 6  Last Weight:   Wt Readings from Last 1 Encounters:   09/23/21 182 lb (82.6 kg)     Mental Status:  oriented and alert    IV Access:  - None    Nursing Mobility/ADLs:  Walking   Assisted  Transfer  Assisted  Bathing  Assisted  Dressing  Assisted  Toileting  Assisted  Feeding  Assisted  Med Admin  Independent  Med Delivery   whole    Wound Care Documentation and Therapy:  Incision 09/07/17 Abdomen (Active)   Number of days: 2315       Wound 09/24/19 Coccyx (Active)   Number of days: 126        Elimination:  Continence: Bowel: Yes  Bladder: Yes  Urinary Catheter: {Urinary Catheter:[id number]}   Colostomy/Ileostomy/Ileal Conduit: No       Date of Last BM: 9/28    Intake/Output Summary (Last 24 hours) at 9/29/2021 1127  Last data filed at 9/29/2021 0845  Gross per 24 hour   Intake 4445.82 ml   Output 700 ml   Net 3745.82 ml     I/O last 3 completed shifts: In: 4205.8 [P.O.:120; I.V.:3493.4; IV Piggyback:592.4]  Out: 1500 [Urine:1500]    Safety Concerns:     History of Falls (last 30 days)    Impairments/Disabilities:      Vision    Nutrition Therapy:  Current Nutrition Therapy:   - Oral Diet:  General    Routes of Feeding: Oral  Liquids: Thin Liquids  Daily Fluid Restriction: no  Last Modified Barium Swallow with Video (Video Swallowing Test): not done    Treatments at the Time of Hospital Discharge:   Respiratory Treatments: see orders  Oxygen Therapy:  is on oxygen at 2 L/min per nasal cannula.   Ventilator:    - No ventilator support    Rehab Therapies: Physical Therapy and Occupational Therapy  Weight Bearing Status/Restrictions: No weight bearing restirctions  Other Medical Equipment (for information only, NOT a DME order):  cane and walker  Other Treatments: na    Patient's personal belongings (please select all that are sent with patient):  Glasses, Dentures upper and lower, Jewelry, cell phone    RN SIGNATURE:  Electronically signed by Manohar Willett RN on 9/29/21 at 11:34 AM EDT    CASE MANAGEMENT/SOCIAL WORK SECTION    Inpatient Status Date: 1945    Readmission Risk Assessment Score:  Readmission Risk              Risk of Unplanned Readmission:  25           Discharging to Facility/ Agency   Name: [facility]   Address[facility]  [facility]:[phone]  Fax:[phone]    Dialysis Facility (if applicable)   Name:  Address:  Dialysis Schedule:  Phone:  Fax:    / signature: Jennifer Price RN, BSN  [phone]      PHYSICIAN SECTION    Prognosis: Good    Condition at Discharge: Stable    Rehab Potential (if transferring to Rehab):     Recommended Labs or Other Treatments After Discharge: resume previous home care ,  skilled RN care,  PT and OT     Physician Certification: I certify the above information and transfer of Monique Morley  is necessary for the continuing treatment of the diagnosis listed and that she requires Home Care for >  30 days.      Update Admission H&P: No change in H&P    PHYSICIAN SIGNATURE:  Electronically signed by NAGELES Torrez CNP on 9/29/21 at 11:46 AM EDT

## 2021-09-29 NOTE — CARE COORDINATION
CM received call from Kirby Ryder with [facility]  that PT/OT need added to home care orders. RN sent message to NP requesting they be added.     Laureano Hodgson RN, BSN  [phone]

## 2021-09-29 NOTE — PROGRESS NOTES
Patient up to chair. On 2L NC at 92%. Denies any pain. No needs at this time. Morning medications given.

## 2021-09-29 NOTE — CARE COORDINATION
CM sent PS message to NP asking for PT/OT to be added to hc orders.     Lorraine Shafer RN, BSN  [phone]

## 2021-09-29 NOTE — CARE COORDINATION
CM spoke to [place] with [email or web address] [phone] and she is aware pt is discharging and that home care orders are in chart. She has epic access and will get all information.     Patient discharged 9/29/2021 to home with [facility]   All discharge needs met per case management     Lorraine Shafer, RN, BSN  [phone]

## 2021-09-29 NOTE — PROGRESS NOTES
Pt discharged all orders reviewed and questions answered. Pt aware to isolate till Nathan Oct 3rd. Family to  medication from [facility] 1.40 they will transport pt to private residence homecare following pt [address] completed.

## 2021-09-29 NOTE — PLAN OF CARE
Problem: Skin Integrity:  Goal: Will show no infection signs and symptoms  Description: Will show no infection signs and symptoms  Outcome: Ongoing  Goal: Absence of new skin breakdown  Description: Absence of new skin breakdown  Outcome: Ongoing     Problem: Falls - Risk of:  Goal: Will remain free from falls  Description: Will remain free from falls  9/28/2021 2039 by Adilene Casey RN  Outcome: Ongoing  9/28/2021 0912 by Suzan Zurita RN  Outcome: Ongoing  Note: Patient remains absent from falls at this time. Remains alert and oriented, in bed with call light and belongings in reach. Non-slip footwear on and 2/4 siderails raised. Bed remains in lowest/locked position at all times with alarm activated. Fall precautions in place. Patient encouraged to use call light to request assistance, v/u.  Will continue to monitor.   Goal: Absence of physical injury  Description: Absence of physical injury  Outcome: Ongoing     Problem: Airway Clearance - Ineffective  Goal: Achieve or maintain patent airway  Outcome: Ongoing

## 2021-09-30 ENCOUNTER — CARE COORDINATION (OUTPATIENT)
Dept: CASE MANAGEMENT | Age: 76
End: 2021-09-30

## 2021-09-30 DIAGNOSIS — U07.1 COVID-19: Primary | ICD-10-CM

## 2021-09-30 PROCEDURE — 1111F DSCHRG MED/CURRENT MED MERGE: CPT | Performed by: FAMILY MEDICINE

## 2021-09-30 NOTE — CARE COORDINATION
[address] Transitions Initial Follow Up Call    Call within 2 business days of discharge: Yes    Patient: Joycelyn Clifford Patient : 1945   MRN: [id number]  Reason for Admission:   Discharge Date: 21 RARS: Readmission Risk Score: 26      Last Discharge [address] [address]       Complaint Diagnosis Description Type Department Provider    21 Fatigue General weakness . .. ED to Hosp-Admission (Discharged) (ADMITTED) [facility] 5T France Nava MD; Coreen Zuleta. .. Non-face-to-face services provided:  Obtained and reviewed discharge summary and/or continuity of care documents  1111F completed     Transitions of Care Initial Call    Was this an external facility discharge? No Discharge Facility:     Challenges to be reviewed by the provider   Additional needs identified to be addressed with provider: No  none             Method of communication with provider : none      Advance Care Planning:   Does patient have an Advance Directive: reviewed and current. Was this a readmission? Yes  Patient stated reason for admission: weakness  Patients top risk factors for readmission: medical condition-COVID, UTI    Care Transition Nurse (CTN) contacted the patient by telephone to perform post hospital discharge assessment. Verified name and  with patient as identifiers. Provided introduction to self, and explanation of the CTN role. CTN reviewed discharge instructions, medical action plan and red flags with patient who verbalized understanding. Patient given an opportunity to ask questions and does not have any further questions or concerns at this time. Were discharge instructions available to patient? Yes. Reviewed appropriate site of care based on symptoms and resources available to patient including: When to call 911. The patient agrees to contact the PCP office for questions related to their healthcare.      Medication reconciliation was performed with patient, who verbalizes understanding of administration of home medications. Advised obtaining a 90-day supply of all daily and as-needed medications. Covid Risk Education     Educated patient about risk for severe COVID-19 due to risk factors according to CDC guidelines. CTN reviewed discharge instructions, medical action plan and red flag symptoms with the patient who verbalized understanding. Discussed COVID vaccination status: Yes. Education provided on COVID-19 vaccination as appropriate. Discussed exposure protocols and quarantine with CDC Guidelines. Patient was given an opportunity to verbalize any questions and concerns and agrees to contact CTN or health care provider for questions related to their healthcare. Reviewed and educated patient on any new and changed medications related to discharge diagnosis. Was patient discharged with a pulse oximeter? No Discussed and confirmed pulse oximeter discharge instructions and when to notify provider or seek emergency care. Pt states doing well, no issues or concerns, just tired and weak. Denies SOB, CP, cough, urinary issues, fever. [facility] epic[facility] is aware of D/C and has the orders to resume care. Encouraged pt to make f/u appt with PCP, voiced understanding. Agreed to more CTC f/u calls    CTN provided contact information. Plan for follow-up call in 5-7 days based on severity of symptoms and risk factors.   Plan for next call: self management-COVID, HC, f/u appts    Care Transitions 24 Hour Call    Do you have any ongoing symptoms?: No  Do you have a copy of your discharge instructions?: Yes  Do you have all of your prescriptions and are they filled?: Yes  Have you been contacted by a [facility] Pharmacist?: No  Have you scheduled your follow up appointment?: No  Were you discharged with any Home Care or Post Acute Services: Yes  Post Acute Services: [address] [place] [address] you feel like you have everything you need to keep you well at home?: Yes  Care Transitions Interventions  No Identified Needs Follow Up  Future Appointments   Date Time Provider Jennifer Matias   11/10/2021  1:00 PM MD Anita Butterfield RN

## 2021-10-05 ENCOUNTER — TELEPHONE (OUTPATIENT)
Dept: FAMILY MEDICINE CLINIC | Age: 76
End: 2021-10-05

## 2021-10-06 ENCOUNTER — TELEPHONE (OUTPATIENT)
Dept: FAMILY MEDICINE CLINIC | Age: 76
End: 2021-10-06

## 2021-10-06 NOTE — TELEPHONE ENCOUNTER
Edna from [facility] [phone] is calling to get a verbal for physical therapy.        Please advise

## 2021-10-07 ENCOUNTER — CARE COORDINATION (OUTPATIENT)
Dept: CASE MANAGEMENT | Age: 76
End: 2021-10-07

## 2021-10-07 NOTE — CARE COORDINATION
[address] Transitions Follow Up Call    10/7/2021    Patient: Lillian Kelly  Patient : 1945   MRN: [id number]  Reason for Admission:   Discharge Date: 21 RARS: Readmission Risk Score: 32         Spoke with: [address] Transitions Subsequent and Final Call    Subsequent and Final Calls  Do you have any ongoing symptoms?: No  Have your medications changed?: No  Do you have any questions related to your medications?: No  Do you currently have any active services?: Yes  Are you currently active with any services?: Home Health  Do you have any needs or concerns that I can assist you with?: No  Identified Barriers: None  Care Transitions Interventions  No Identified Needs  Other Interventions:         Pt states doing well, no issues or concerns.  Agreed to more CTC f/u calls      Follow Up  Future Appointments   Date Time Provider Jennifer Matias   11/10/2021  1:00 PM MD Jean Carr, RN

## 2021-10-15 ENCOUNTER — CARE COORDINATION (OUTPATIENT)
Dept: CASE MANAGEMENT | Age: 76
End: 2021-10-15

## 2021-10-15 ENCOUNTER — TELEPHONE (OUTPATIENT)
Dept: FAMILY MEDICINE CLINIC | Age: 76
End: 2021-10-15

## 2021-10-15 NOTE — CARE COORDINATION
Date/Time:  10/15/2021 11:43 AM  Attempted to reach patient by telephone. Call within 2 business days of discharge: No Left HIPPA compliant message requesting a return call. Will attempt to reach patient again.

## 2021-10-15 NOTE — TELEPHONE ENCOUNTER
[address] [place] [address] with [address] [phone] is calling to report that the patient missed the visit today 10/15. States that the spouse of the patient answered the door and said that the patient didn't feel like doing it today.

## 2021-10-21 ENCOUNTER — CARE COORDINATION (OUTPATIENT)
Dept: CASE MANAGEMENT | Age: 76
End: 2021-10-21

## 2021-10-21 ENCOUNTER — TELEPHONE (OUTPATIENT)
Dept: FAMILY MEDICINE CLINIC | Age: 76
End: 2021-10-21

## 2021-10-21 NOTE — CARE COORDINATION
[address] Transitions Follow Up Call    10/21/2021    Patient: Aster Grant  Patient : 1945   MRN: [id number]  Reason for Admission:   Discharge Date: 21 RARS: Readmission Risk Score: 32         Spoke with: [address] Transitions Subsequent and Final Call    Subsequent and Final Calls  Do you have any ongoing symptoms?: No  Have your medications changed?: No  Do you have any questions related to your medications?: No  Do you currently have any active services?: Yes  Are you currently active with any services?: Home Health  Do you have any needs or concerns that I can assist you with?: No  Identified Barriers: None  Care Transitions Interventions  No Identified Needs  Other Interventions:         Pt states doing well, no issues or concerns. F/U appt listed below. No need for further f/u CTC calls per pt, feels fine.           Follow Up  Future Appointments   Date Time Provider Jennifer Matias   11/10/2021  1:00 PM MD Peter Osborne RN

## 2021-10-23 PROBLEM — N39.0 UTI (URINARY TRACT INFECTION): Status: RESOLVED | Noted: 2021-09-23 | Resolved: 2021-10-23

## 2021-10-26 RX ORDER — MECLIZINE HYDROCHLORIDE 25 MG/1
TABLET ORAL
Qty: 90 TABLET | Refills: 1 | Status: SHIPPED | OUTPATIENT
Start: 2021-10-26 | End: 2022-01-04

## 2021-10-28 ENCOUNTER — TELEPHONE (OUTPATIENT)
Dept: FAMILY MEDICINE CLINIC | Age: 76
End: 2021-10-28

## 2021-10-28 NOTE — TELEPHONE ENCOUNTER
Edna with [address] [phone] is calling to inform  that they are dismissing the patient from home PT because the patient has failed to keep any appointments and she has failed to reschedule with them States that she will not answer the phone or call them back.

## 2021-10-29 DIAGNOSIS — N30.00 ACUTE CYSTITIS WITHOUT HEMATURIA: ICD-10-CM

## 2021-11-02 RX ORDER — BETHANECHOL CHLORIDE 25 MG/1
TABLET ORAL
Qty: 90 TABLET | Refills: 1 | Status: SHIPPED | OUTPATIENT
Start: 2021-11-02 | End: 2022-01-17

## 2021-11-03 ENCOUNTER — TELEPHONE (OUTPATIENT)
Dept: FAMILY MEDICINE CLINIC | Age: 76
End: 2021-11-03

## 2021-11-03 NOTE — TELEPHONE ENCOUNTER
----- Message from Malu Mario sent at 11/3/2021  3:55 PM EDT -----  Subject: Message to Provider    QUESTIONS  Information for Provider? Patient's sister Haritha Corona called in today to speak   with April in the clinical staff. She states that she was just speaking   with her today and she was told to call her back and speak with her   directly. I called clinical and no one got back with me after being placed   on hold for 4 minutes. ---------------------------------------------------------------------------  --------------  Tricia WADE  What is the best way for the office to contact you? OK to leave message on   voicemail  Preferred Call Back Phone Number? [phone]  ---------------------------------------------------------------------------  --------------  SCRIPT ANSWERS  Relationship to Patient? Sibling  Representative Name? Haritha Corona   Is the Representative on the appropriate HIPAA document in Epic?  Yes

## 2021-11-04 ENCOUNTER — OFFICE VISIT (OUTPATIENT)
Dept: FAMILY MEDICINE CLINIC | Age: 76
End: 2021-11-04
Payer: MEDICARE

## 2021-11-04 ENCOUNTER — TELEPHONE (OUTPATIENT)
Dept: FAMILY MEDICINE CLINIC | Age: 76
End: 2021-11-04

## 2021-11-04 VITALS
WEIGHT: 172 LBS | OXYGEN SATURATION: 93 % | BODY MASS INDEX: 33.59 KG/M2 | TEMPERATURE: 97.3 F | SYSTOLIC BLOOD PRESSURE: 100 MMHG | DIASTOLIC BLOOD PRESSURE: 68 MMHG | HEART RATE: 89 BPM

## 2021-11-04 DIAGNOSIS — R53.1 WEAKNESS: ICD-10-CM

## 2021-11-04 DIAGNOSIS — R53.1 WEAKNESS: Primary | ICD-10-CM

## 2021-11-04 DIAGNOSIS — R41.0 CONFUSION: ICD-10-CM

## 2021-11-04 DIAGNOSIS — R29.6 RECURRENT FALLS: Primary | ICD-10-CM

## 2021-11-04 PROCEDURE — 99214 OFFICE O/P EST MOD 30 MIN: CPT | Performed by: FAMILY MEDICINE

## 2021-11-04 PROCEDURE — 81000 URINALYSIS NONAUTO W/SCOPE: CPT | Performed by: FAMILY MEDICINE

## 2021-11-04 RX ORDER — SPIRONOLACTONE 50 MG/1
1 TABLET, FILM COATED ORAL
COMMUNITY
End: 2022-08-04

## 2021-11-04 RX ORDER — BUPROPION HYDROCHLORIDE 300 MG/1
TABLET ORAL
Qty: 90 TABLET | Refills: 1 | Status: SHIPPED | OUTPATIENT
Start: 2021-11-04 | End: 2022-05-16

## 2021-11-04 RX ORDER — SERTRALINE HYDROCHLORIDE 25 MG/1
25 TABLET, FILM COATED ORAL DAILY
Qty: 30 TABLET | Refills: 5 | Status: SHIPPED | OUTPATIENT
Start: 2021-11-04 | End: 2022-01-04

## 2021-11-04 RX ORDER — FUROSEMIDE 20 MG/1
TABLET ORAL
Qty: 90 TABLET | Refills: 0 | Status: SHIPPED | OUTPATIENT
Start: 2021-11-04

## 2021-11-04 RX ORDER — AMLODIPINE BESYLATE 10 MG/1
10 TABLET ORAL DAILY
Qty: 90 TABLET | Refills: 0 | Status: SHIPPED | OUTPATIENT
Start: 2021-11-04 | End: 2022-01-17

## 2021-11-04 NOTE — TELEPHONE ENCOUNTER
Pt needs referral to home health for recurrent falls and weakness. Had a home health recently - in chart.

## 2021-11-04 NOTE — PROGRESS NOTES
Patient is here today due to falling almost every day for the past 5-6 days. Son states that she has had some weakness and typically can get around with her cane. Has had some dizziness and SOB the same time frame. Some confusion the past couple of days. States normally gets around with cane but has been weaker past week and needing to use her walker. More confused past week. States has fallen 5-6 times in past week. States hasn't   Always weak on left side due to previous back surgery but    No dif swallowing. No change in speech. No urinary sx. Sx similar to when had UTI in past. Was hospitalized with similar sx in Sept.       Vitals:    11/04/21 1150   BP: 100/68   Site: Left Upper Arm   Position: Sitting   Cuff Size: Large Adult   Pulse: 89   Temp: 97.3 °F (36.3 °C)   TempSrc: Infrared   SpO2: 93%   Weight: 172 lb (78 kg)     Wt Readings from Last 3 Encounters:   11/04/21 172 lb (78 kg)   09/23/21 182 lb (82.6 kg)   09/16/21 175 lb 1.6 oz (79.4 kg)     Body mass index is 33.59 kg/m². PHQ Scores 7/7/2021 1/4/2019 2/27/2018 1/30/2018   PHQ2 Score 2 5 5 6   PHQ9 Score 2 18 14 17           GEN: Alert and oriented to person and place but not date,walking with walker but unsteady and needs close supervision  Lungs CTAB  CV rrr  Moving ext equally  3-4/5 strength equally bilaterally upper and lower  CN grossly intact  Drank cup of water no difficulty        ASSESSMENT AND PLAN:       Peter Mera was seen today for fall. Diagnoses and all orders for this visit:    Weakness  -     CBC Auto Differential; Future  -     TSH with Reflex; Future  -     Comprehensive Metabolic Panel; Future    Confusion  -     CBC Auto Differential; Future  -     TSH with Reflex; Future  -     Comprehensive Metabolic Panel; Future    Other orders  -     amLODIPine (NORVASC) 10 MG tablet;  Take 1 tablet by mouth daily  -     furosemide (LASIX) 20 MG tablet; TAKE ONE TABLET BY MOUTH DAILY AS NEEDED FOR WEIGHT GAIN OF 2LBS OR LEG EDEMA  - sertraline (ZOLOFT) 25 MG tablet; Take 1 tablet by mouth daily  -     buPROPion (WELLBUTRIN XL) 300 MG extended release tablet; TAKE ONE TABLET BY MOUTH EVERY MORNING      Unable to give urine in office  Gave cup to bring sample back  Will refer back to home health for eval and PT, per last note from home health pt did not reschedule appts, discussed with son and he will make sure they get scheduled this time  Get labs  If worsening sx go to ED    Pt needs home health due to multiple medical conditions that require her to have significant assistance from another person to leave her home.          Portions of Note per  Dillon Wild CMA AAMA with corrections and edits per Magda Poon MD.  I agree with entirety of note and was present and performed history and physical.  I also confirm that the note above accurately reflects all work, treatment, procedures, and medical decision making performed by me, Magda Poon MD

## 2021-11-05 LAB
BACTERIA URINE, POC: 0
BILIRUBIN URINE: 1 MG/DL
BLOOD, URINE: NEGATIVE
CASTS URINE, POC: 0
CLARITY: ABNORMAL
COLOR: YELLOW
CRYSTALS URINE, POC: 0
EPI CELLS URINE, POC: 0
GLUCOSE URINE: 0
KETONES, URINE: POSITIVE
LEUKOCYTE EST, POC: 0
NITRITE, URINE: NEGATIVE
PH UA: 5 (ref 4.5–8)
PROTEIN UA: NEGATIVE
RBC URINE, POC: 0
SPECIFIC GRAVITY UA: 1.03 (ref 1–1.03)
UROBILINOGEN, URINE: NORMAL
WBC URINE, POC: 0
YEAST URINE, POC: 0

## 2021-11-06 LAB — URINE CULTURE, ROUTINE: NORMAL

## 2021-12-30 ENCOUNTER — OFFICE VISIT (OUTPATIENT)
Dept: FAMILY MEDICINE CLINIC | Age: 76
End: 2021-12-30
Payer: MEDICARE

## 2021-12-30 VITALS — OXYGEN SATURATION: 93 % | TEMPERATURE: 99.8 F | HEART RATE: 93 BPM

## 2021-12-30 DIAGNOSIS — K52.9 ACUTE GASTROENTERITIS: Primary | ICD-10-CM

## 2021-12-30 PROCEDURE — 99213 OFFICE O/P EST LOW 20 MIN: CPT | Performed by: NURSE PRACTITIONER

## 2021-12-30 NOTE — PROGRESS NOTES
2021  Radha Ferrell (:  1945)    Allergies: Allergies   Allergen Reactions    Penicillins      (review in Epic)    FLU/RESPIRATORY/COVID-19 CLINIC EVALUATION    HPI:   Chief Complaint   Patient presents with    Diarrhea      SYMPTOMS:    INSTRUCTIONS:  \"[x]\" Indicates a positive item  \"[]\" Indicates a negative item        Symptom duration, days:    Date symptoms started : ____________    [] 1   [x] 2   [] 3   [] 4 - 7   [] 8 - 10   [] 11 - 13   [] >14    [] Fevers    [] Symptom (not measured)  [] Measured (Result:  degrees)  [] Chills  [] Cough [] Dry [] Productive   []Loss of Taste  [] Loss of Smell  []Decreased Appetite  [] Coughing up blood  }  [] Chest Congestion  [] Nasal Congestion  [] Runny  Nose  [] Sneezing  [] Feeling short of breath   []Sometimes    [] Frequently    [] All the time     [] Chest pain     [] Headaches  []Tolerable  [] Severe     [x] Fatigue  [] Sore throat  [] Muscle aches  [] Nausea  [] Vomiting  []Unable to keep fluids down     [x] Diarrhea  [] Mild  []Severe       [x] Vaccinated for COVID 19 - no booster  [x] History of COVID 19 (Date:          )    [] OTHER SYMPTOMS:  Per patient started with diarrhea 2-3 days prior and took Pepto. Has not had any episodes of diarrhea in the past 24 hours. Feeling much better today. Denies any recent sick contacts. Symptom course:   [] Worsening     [] Stable     [] Improving      RISK FACTORS:1INSTRUCTIONS:  \"[x]\" Indicates a positive item. Negative  for risk factors if not checked.     [] Close contact with a lab confirmed COVID-19 patient within 14 days of symptom onset  [] History of travel from affected geographical areas within 14 days of symptom onset    PHYSICAL EXAMINATION:    Vitals:    21 1445   Pulse: 93   Temp: 99.8 °F (37.7 °C)   TempSrc: Tympanic   SpO2: 93%          [x] Alert  [x] Oriented to person/place/time    [x] No apparent distress   [] Toxic appearing  [] Face flushed appearing     [x] Normal Mood  [] Anxious appearing      [] Sclera clear    [] Pinna, TMs,  Canals normal bilaterally  [] TM Red  [] Right [] Left [] Bilateral  [] TM Bulging [] Right [] Left []  Billateral    [] Oropharynx [] Clear [] Red [] Exudate [] Swollen    [] No adenopathy [] Adenopathy __________    [x] Lungs clear with good movement and effort  [x] Breathing appears normal     [] Speaks in complete sentences  [] Appears tachypneic   [] Wheezing           [] Rhonchi   [] Decreased    [x] CV RRR  [x] No Murmur  [] Murmur  [] Irregular  [] Tachycardic    [] OTHER:  1}      TESTS ORDERED:    [] POCT FLU  [] POCT STREP  [] COVID-19 Test sent  [] Appointment made at testing clinic for patient to get a COVID test.       TEST RESULTS:    POCT FLU test:  [] Positive  [] Negative  POCT STREP test:  [] Positive  [] Negative    ASSESSMENT:  [] Allergic Rhinitis  [] Asthma Exacerbation  [] Bronchitis  [] COPD Exacerbation  [] Gastroenteritis  [] Influenza  [] Sinusitis  [] Strep Throat [] Sore Throat  [] Viral URI   [] Possible COVID-19   [] Exposure to COVID -19  [] Positive for COVID  [] Screening for Viral Disease (COVID test no sx)        Tonja Padilla was seen today for diarrhea. Diagnoses and all orders for this visit:    Acute gastroenteritis  Symptoms resolved today. Continue to monitor symptoms.              [] Low risk for complications from COVID 19  [] Moderate risk for complications from COVID 19  [x] High risk for complications from COVID 19    PLAN:    [x] Discharge home with written instructions for:  [] Flu management  [] Strep throat management  [] Viral respiratory illness management  [] Sinusitis management  [] Bronchitis Management  [] Possible COVID-19 infection with self-quarantine and management of symptoms  [x] Follow-up with primary care physician or emergency department if worsens  [] Note given for work    [] Referred to emergency department for evaluation

## 2022-01-04 RX ORDER — MECLIZINE HYDROCHLORIDE 25 MG/1
TABLET ORAL
Qty: 90 TABLET | Refills: 1 | Status: SHIPPED | OUTPATIENT
Start: 2022-01-04 | End: 2022-03-14

## 2022-01-04 RX ORDER — DULOXETIN HYDROCHLORIDE 30 MG/1
CAPSULE, DELAYED RELEASE ORAL
Qty: 90 CAPSULE | Refills: 1 | Status: SHIPPED | OUTPATIENT
Start: 2022-01-04 | End: 2022-05-26

## 2022-01-14 DIAGNOSIS — N30.00 ACUTE CYSTITIS WITHOUT HEMATURIA: ICD-10-CM

## 2022-01-17 RX ORDER — BETHANECHOL CHLORIDE 25 MG/1
TABLET ORAL
Qty: 90 TABLET | Refills: 1 | Status: SHIPPED | OUTPATIENT
Start: 2022-01-17 | End: 2022-03-22

## 2022-01-17 NOTE — TELEPHONE ENCOUNTER
Medication:   Requested Prescriptions     Pending Prescriptions Disp Refills    bethanechol (URECHOLINE) 25 MG tablet [Pharmacy Med Name: BETHANECHOL 25 MG TABLET] 90 tablet 1     Sig: TAKE ONE TABLET BY MOUTH THREE TIMES A DAY      Last Filled:     Patient Phone Number: [phone] (home)     Last appt: 12/30/2021   Next appt: 1/14/2022    Last OARRS:   RX Monitoring 9/27/2019   Attestation -   Periodic Controlled Substance Monitoring Possible medication side effects, risk of tolerance/dependence & alternative treatments discussed. Chronic Pain > 50 MEDD Re-evaluated the status of the patient's underlying condition causing pain.      PDMP Monitoring:    Last PDMP Media Huff as Reviewed [facility]):  Review User Review Instant Review Result   Sallie Essex 9/15/2021  7:05 PM Reviewed PDMP [1]     Preferred Pharmacy:   [facility] [address], [address] [phone] Grace Carter [phone]  [address]  [place] [address]  Phone: [phone] Fax: [phone]

## 2022-01-26 ENCOUNTER — TELEPHONE (OUTPATIENT)
Dept: FAMILY MEDICINE CLINIC | Age: 77
End: 2022-01-26

## 2022-01-26 NOTE — TELEPHONE ENCOUNTER
----- Message from Marta Peters sent at 1/26/2022  1:12 PM EST -----  Subject: Message to Provider    QUESTIONS  Information for Provider? Pt needs her  to drop off a paper that   she needs faxed to the [facility] department of motor vehicles. This needs to be   faxed in by next week.   ---------------------------------------------------------------------------  --------------  [address]  What is the best way for the office to contact you? OK to leave message on   voicemail  Preferred Call Back Phone Number? [phone]  ---------------------------------------------------------------------------  --------------  SCRIPT ANSWERS  Relationship to Patient?  Self

## 2022-01-27 NOTE — PROGRESS NOTES
SUBJECTIVE:    Nava Bazan is a 77 y.o. female who presents for a follow up visit. Chief Complaint   Patient presents with    Follow-up     Patient is here for a follow up. No lab. Has been dizzy off and on for the past 2 weeks, feels off balance. Has had a lot of sweating , thinks it may be related to one of her medications. Dizziness  This is a recurrent problem. The current episode started more than 1 month ago. The problem occurs intermittently. The problem has been gradually worsening. Associated symptoms include diaphoresis, fatigue and nausea. Pertinent negatives include no fever, headaches, vertigo or weakness. Nothing aggravates the symptoms. Treatments tried: antivert. The treatment provided no relief. Patient's medications, allergies, past medical,surgical, social and family histories were reviewed and updated as appropriate.      Past Medical History:   Diagnosis Date    AVM     Chronic back pain     Depression     Gastritis     History of blood transfusion     secondary to GI bleed    History of GI bleed     Hypertension     Personal history of MRSA (methicillin resistant Staphylococcus aureus)     recurrent, last 2-3 years ago finger (doesnt remember which one)     Past Surgical History:   Procedure Laterality Date    ABDOMINAL EXPLORATION SURGERY  09/07/2017    Lysis of adhesions, removal of pelvic mass, total abdominal hysterectomy with BSO, partial omentectomy, appendectomy, and pelvic lymphadenectomy    BACK SURGERY      multiple lumbar spine surgeries including laminectomy and fusion    CERVICAL FUSION      COLONOSCOPY      ENDOSCOPY, COLON, DIAGNOSTIC      HIATAL HERNIA REPAIR  1999    OTHER SURGICAL HISTORY Left [id number]    SPINAL CORD STIMULATOR BATTERY REPLACMENT     Family History   Problem Relation Age of Onset    Diabetes Mother     Heart Disease Mother         cad, aortic aneurysm    Diabetes Father     Heart Disease Father         heart attack, chf    Other Sister         abdominal aneurysm     Social History     Tobacco Use    Smoking status: Never Smoker    Smokeless tobacco: Never Used   Substance Use Topics    Alcohol use: No      No Known Allergies  Current Outpatient Medications on File Prior to Visit   Medication Sig Dispense Refill    morphine (MSIR) 15 MG tablet Take 15 mg by mouth 3 times daily as needed for Pain.  furosemide (LASIX) 20 MG tablet TAKE ONE TABLET BY MOUTH DAILY AS NEEDED FOR WEIGHT GAIN OF 2LBS OR LEG EDEMA 90 tablet 0    DULoxetine (CYMBALTA) 30 MG extended release capsule TAKE ONE CAPSULE BY MOUTH DAILY 90 capsule 4    meclizine (ANTIVERT) 25 MG tablet TAKE ONE TABLET BY MOUTH THREE TIMES A DAY AS NEEDED FOR DIZZINESS 90 tablet 1    buPROPion (WELLBUTRIN XL) 300 MG extended release tablet TAKE ONE TABLET BY MOUTH EVERY MORNING 90 tablet 1    rOPINIRole (REQUIP) 0.25 MG tablet TAKE ONE TABLET BY MOUTH ONCE NIGHTLY 90 tablet 1    rivaroxaban (XARELTO) 20 MG TABS tablet Take 20 mg by mouth Daily with supper      bethanechol (URECHOLINE) 25 MG tablet TAKE ONE TABLET BY MOUTH THREE TIMES A  tablet 1    amLODIPine (NORVASC) 10 MG tablet Take 1 tablet by mouth daily 90 tablet 0    tiZANidine (ZANAFLEX) 4 MG tablet Take 4 mg by mouth 2 times daily       pregabalin (LYRICA) 150 MG capsule Take 150 mg by mouth three times daily.  ondansetron (ZOFRAN) 4 MG tablet Take 1 tablet by mouth daily as needed for Nausea or Vomiting 30 tablet 0    aspirin 81 MG chewable tablet Take 1 tablet by mouth daily 30 tablet 0     No current facility-administered medications on file prior to visit. Review of Systems   Constitutional: Positive for diaphoresis and fatigue. Negative for fever. Gastrointestinal: Positive for nausea. Neurological: Positive for dizziness. Negative for vertigo, weakness and headaches.        OBJECTIVE:    /86   Temp 97.8 °F (36.6 °C)   Wt 194 lb (88 kg)   BMI 37.89 kg/m² Physical Exam  Constitutional:       Appearance: She is obese. HENT:      Head: Normocephalic and atraumatic. Right Ear: Tympanic membrane normal.      Left Ear: Tympanic membrane normal.      Nose: Nose normal.      Mouth/Throat:      Mouth: Mucous membranes are moist.      Pharynx: No posterior oropharyngeal erythema. Cardiovascular:      Rate and Rhythm: Normal rate and regular rhythm. Heart sounds: Normal heart sounds. Pulmonary:      Effort: Pulmonary effort is normal.      Breath sounds: Normal breath sounds. Musculoskeletal:      Thoracic back: She exhibits deformity ( kyphosis). She exhibits normal range of motion, no tenderness, no swelling, no edema and no pain. Neurological:      Mental Status: She is alert. Gait: Gait abnormal ( slow and cautious and ambulating with a cane). Psychiatric:         Mood and Affect: Mood normal.         Behavior: Behavior normal.         ASSESSMENT/PLAN:    Brian Disla was seen today for follow-up. Diagnoses and all orders for this visit:    Balance problem  -     [facility]    Dizziness  -     Comprehensive Metabolic Panel, Fasting; Future  -     CBC Auto Differential; Future  -     TSH with Reflex; Future    Fatigue, unspecified type  -     TSH with Reflex; Future        Return in about 4 weeks (around 3/12/2021). Please note portions of this note were completed with a voicerecognition program.  Efforts were made to edit the dictations but occasionally words are mis-transcribed. There are no Wet Read(s) to document.

## 2022-01-28 ENCOUNTER — TELEPHONE (OUTPATIENT)
Dept: FAMILY MEDICINE CLINIC | Age: 77
End: 2022-01-28

## 2022-01-28 NOTE — TELEPHONE ENCOUNTER
----- Message from CamSemi sent at 1/28/2022  2:22 PM EST -----  Subject: Appointment Request    Reason for Call: Routine Physical Exam    QUESTIONS  Type of Appointment? Established Patient  Reason for appointment request? No appointments available during search  Additional Information for Provider? 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 The next available appt   is in march and pt needs to be seen before then. She would like for   someone to reach out to her to get her scheduled before march if possible   ---------------------------------------------------------------------------  --------------  CALL BACK INFO  What is the best way for the office to contact you? OK to leave message on   voicemail  Preferred Call Back Phone Number? [phone]  ---------------------------------------------------------------------------  --------------  SCRIPT ANSWERS  Relationship to Patient? Self  (If the patient has Medicare as their primary insurance coverage ask this   question) Are you requesting a Medicare Annual Wellness Visit? No  (Is the patient requesting a pap smear with their physical exam?)? No  (Is the patient requesting their annual physical and does not need PAP or   AWV per above?)? Yes   Have you been diagnosed with, awaiting test results for, or told that you   are suspected of having COVID-19 (Coronavirus)? (If patient has tested   negative or was tested as a requirement for work, school, or travel and   not based on symptoms, answer no)? No  Within the past two weeks have you developed any of the following symptoms   (answer no if symptoms have been present longer than 2 weeks or began   more than 2 weeks ago)?  Fever or Chills, Cough, Shortness of breath or   difficulty breathing, Loss of taste or smell, Sore throat, Nasal   congestion, Sneezing or runny nose, Fatigue or generalized body aches   (answer no if pain is specific to a body part e.g. back pain), Diarrhea,   Headache? No  Have you had close contact with someone with COVID-19 in the last 14 days? No  (Service Expert  click yes below to proceed with Familia Ellis As Usual   Scheduling)?  Yes

## 2022-03-14 RX ORDER — MECLIZINE HYDROCHLORIDE 25 MG/1
TABLET ORAL
Qty: 90 TABLET | Refills: 1 | Status: SHIPPED | OUTPATIENT
Start: 2022-03-14 | End: 2022-05-16

## 2022-03-14 NOTE — TELEPHONE ENCOUNTER
Medication:   Requested Prescriptions     Pending Prescriptions Disp Refills    meclizine (ANTIVERT) 25 MG tablet [Pharmacy Med Name: MECLIZINE 25 MG TABLET] 90 tablet 1     Sig: TAKE ONE TABLET BY MOUTH THREE TIMES A DAY AS NEEDED FOR DIZZINESS      Last Filled:    Patient Phone Number: [phone] (home)     Last appt: 12/30/2021   Next appt: 3/31/2022    Last OARRS:   RX Monitoring 9/27/2019   Attestation -   Periodic Controlled Substance Monitoring Possible medication side effects, risk of tolerance/dependence & alternative treatments discussed. Chronic Pain > 50 MEDD Re-evaluated the status of the patient's underlying condition causing pain.      PDMP Monitoring:    Last PDMP Francy Ho as Reviewed [facility]):  Review User Review Instant Review Result   Jennifer Alba 9/15/2021  7:05 PM Reviewed PDMP [1]     Preferred Pharmacy:   [facility] [address], [address] [phone] Hong Maldonado [phone] [address] [facility] [address] [address]  Phone: [phone] Fax: [phone]

## 2022-03-18 DIAGNOSIS — N30.00 ACUTE CYSTITIS WITHOUT HEMATURIA: ICD-10-CM

## 2022-03-21 NOTE — TELEPHONE ENCOUNTER
Medication:   Requested Prescriptions     Pending Prescriptions Disp Refills    bethanechol (URECHOLINE) 25 MG tablet [Pharmacy Med Name: BETHANECHOL 25 MG TABLET] 90 tablet 1     Sig: TAKE ONE TABLET BY MOUTH THREE TIMES A DAY      Last Filled:      Patient Phone Number: [phone] (home)     Last appt: 12/30/2021   Next appt: 3/31/2022    Last OARRS:   RX Monitoring 9/27/2019   Attestation -   Periodic Controlled Substance Monitoring Possible medication side effects, risk of tolerance/dependence & alternative treatments discussed. Chronic Pain > 50 MEDD Re-evaluated the status of the patient's underlying condition causing pain.      PDMP Monitoring:    Last PDMP Roberto Mountain as Reviewed [facility]):  Review User Review Instant Review Result   Grace Jara 9/15/2021  7:05 PM Reviewed PDMP [1]     Preferred Pharmacy:   [facility] [address], [address] [phone] Ary Dakins [phone] [address] [facility] [place] [address]  Phone: [phone] Fax: [phone]

## 2022-03-22 RX ORDER — BETHANECHOL CHLORIDE 25 MG/1
TABLET ORAL
Qty: 90 TABLET | Refills: 1 | Status: SHIPPED | OUTPATIENT
Start: 2022-03-22 | End: 2022-06-27

## 2022-04-01 DIAGNOSIS — N30.00 ACUTE CYSTITIS WITHOUT HEMATURIA: ICD-10-CM

## 2022-04-01 RX ORDER — BETHANECHOL CHLORIDE 25 MG/1
TABLET ORAL
Qty: 90 TABLET | Refills: 1 | OUTPATIENT
Start: 2022-04-01

## 2022-04-18 RX ORDER — AMLODIPINE BESYLATE 10 MG/1
10 TABLET ORAL DAILY
Qty: 90 TABLET | Refills: 0 | Status: SHIPPED | OUTPATIENT
Start: 2022-04-18 | End: 2022-04-26 | Stop reason: SDUPTHER

## 2022-04-18 NOTE — TELEPHONE ENCOUNTER
Requested Prescriptions     Pending Prescriptions Disp Refills    amLODIPine (NORVASC) 10 MG tablet 90 tablet 0     Last OV - 12/30/21  Next OV - none  Last labs - 9/28/21

## 2022-04-25 NOTE — TELEPHONE ENCOUNTER
amLODIPine (NORVASC) 10 MG tablet 90 tablet 0 4/18/2022     Sig - Route:  Take 1 tablet by mouth daily - Oral    Sent to pharmacy as: amLODIPine Besylate 10 MG Oral Tablet [facility])      Pharmacy    [address] [id number] Charleen Franco, [address] [facility] [phone] [facility] [phone]   [address] [place] [address]   Phone:  [phone]  Fax:  [phone]     Provider out of the office on Monday

## 2022-04-26 RX ORDER — AMLODIPINE BESYLATE 10 MG/1
10 TABLET ORAL DAILY
Qty: 90 TABLET | Refills: 0 | Status: SHIPPED | OUTPATIENT
Start: 2022-04-26 | End: 2022-07-22

## 2022-04-29 ENCOUNTER — OFFICE VISIT (OUTPATIENT)
Dept: FAMILY MEDICINE CLINIC | Age: 77
End: 2022-04-29
Payer: MEDICARE

## 2022-04-29 VITALS
HEART RATE: 94 BPM | BODY MASS INDEX: 35.15 KG/M2 | OXYGEN SATURATION: 95 % | SYSTOLIC BLOOD PRESSURE: 124 MMHG | DIASTOLIC BLOOD PRESSURE: 80 MMHG | WEIGHT: 180 LBS | TEMPERATURE: 98.5 F | RESPIRATION RATE: 16 BRPM

## 2022-04-29 DIAGNOSIS — L03.211 CELLULITIS OF FACE: Primary | ICD-10-CM

## 2022-04-29 PROCEDURE — 99213 OFFICE O/P EST LOW 20 MIN: CPT | Performed by: FAMILY MEDICINE

## 2022-04-29 RX ORDER — CEFDINIR 300 MG/1
300 CAPSULE ORAL 2 TIMES DAILY
Qty: 14 CAPSULE | Refills: 0 | Status: SHIPPED | OUTPATIENT
Start: 2022-04-29 | End: 2022-05-06

## 2022-04-29 NOTE — PROGRESS NOTES
Subjective:      Patient ID: Emmanuel Shi is a 77 y.o. female. CC: Patient presents for acute medical problem-left facial rash. Medical assistant notes reviewed. HPI pt is here due to having blisters on her face/lips. Pt stated this has been going on for a week now, red, drainage, sore. Patient had a scab on her face and she states she ripped it off and is very uncomfortable. She denies any fevers or chills. Review of Systems  Allergies   Allergen Reactions    Penicillins      Objective:   Physical Exam  Vitals and nursing note reviewed. Constitutional:       General: She is not in acute distress. Appearance: She is well-developed. HENT:      Mouth/Throat:      Dentition: Has dentures. No gum lesions. Lymphadenopathy:      Cervical: Cervical adenopathy present. Left cervical: Superficial cervical adenopathy present. No deep or posterior cervical adenopathy. Skin:     General: Skin is warm. Findings: Wound present. Comments: Left lateral face with a 1x1 centimeter scab in her surrounding erythema with no warmth and some swelling. Neurological:      Mental Status: She is alert. Psychiatric:         Behavior: Behavior is cooperative. Assessment:      Anabella Schultz was seen today for blister. Diagnoses and all orders for this visit:    Cellulitis of face    Other orders  -     cefdinir (OMNICEF) 300 MG capsule; Take 1 capsule by mouth 2 times daily for 7 days            Plan:      Patient was advised not to pick at the area affected area and she may use topical antibiotic twice daily. Start oral antibiotic    RTC as needed    Medical decision making of low complexity    Please note that this chart was generated using Dragon dictation software. Although every effort was made to ensure the accuracy of this automated transcription, some errors in transcription may have occurred.

## 2022-05-13 NOTE — TELEPHONE ENCOUNTER
Medication:   Requested Prescriptions     Pending Prescriptions Disp Refills    meclizine (ANTIVERT) 25 MG tablet [Pharmacy Med Name: MECLIZINE 25 MG TABLET] 90 tablet 1     Sig: TAKE ONE TABLET BY MOUTH THREE TIMES A DAY AS NEEDED FOR DIZZINESS      Last Filled:      Patient Phone Number: [phone] (home)     Last appt: 7/7/2021  Next appt: Visit date not found    Last OARRS:   RX Monitoring 9/27/2019   Attestation -   Periodic Controlled Substance Monitoring Possible medication side effects, risk of tolerance/dependence & alternative treatments discussed. Chronic Pain > 50 MEDD Re-evaluated the status of the patient's underlying condition causing pain.      PDMP Monitoring:    Last PDMP Darling Johnson as Reviewed [facility]):  Review User Review Instant Review Result   Adrian BARCENAS 9/15/2021  7:05 PM Reviewed PDMP [1]     Preferred Pharmacy:   [facility] [id number] Vola Kocher, [facility] [phone] [facility] [phone]  [address] [place] [address] Road  [address] [place] [address]  Phone: [phone] Fax: [phone] [address]  [place] [address]  Phone: [phone] Fax: [phone]

## 2022-05-13 NOTE — TELEPHONE ENCOUNTER
Medication:   Requested Prescriptions     Pending Prescriptions Disp Refills    buPROPion (WELLBUTRIN XL) 300 MG extended release tablet [Pharmacy Med Name: buPROPion HCL  MG TABLET] 90 tablet 1     Sig: TAKE ONE TABLET BY MOUTH EVERY MORNING    sertraline (ZOLOFT) 25 MG tablet [Pharmacy Med Name: SERTRALINE HCL 25 MG TABLET] 90 tablet      Sig: TAKE ONE TABLET BY MOUTH DAILY      Last Filled:      Patient Phone Number: [phone] (home)     Last appt: 7/7/2021  Next appt:     Last OARRS:   RX Monitoring 9/27/2019   Attestation -   Periodic Controlled Substance Monitoring Possible medication side effects, risk of tolerance/dependence & alternative treatments discussed. Chronic Pain > 50 MEDD Re-evaluated the status of the patient's underlying condition causing pain.      PDMP Monitoring:    Last PDMP Yonis Carvajal as Reviewed [facility]):  Review User Review Instant Review Result   Shayy Saul B 9/15/2021  7:05 PM Reviewed PDMP [1]     Preferred Pharmacy:   [address] [id number] [phone] [facility] [phone]  [address] [place] [address] Road  [address] [place] [address]  Phone: [phone] Fax: [phone] [address] [address]  Phone: [phone] Fax: [phone]

## 2022-05-16 RX ORDER — BUPROPION HYDROCHLORIDE 300 MG/1
TABLET ORAL
Qty: 90 TABLET | Refills: 1 | Status: SHIPPED | OUTPATIENT
Start: 2022-05-16 | End: 2022-09-01

## 2022-05-16 RX ORDER — SERTRALINE HYDROCHLORIDE 25 MG/1
TABLET, FILM COATED ORAL
Qty: 90 TABLET | OUTPATIENT
Start: 2022-05-16

## 2022-05-16 RX ORDER — MECLIZINE HYDROCHLORIDE 25 MG/1
TABLET ORAL
Qty: 90 TABLET | Refills: 1 | Status: SHIPPED | OUTPATIENT
Start: 2022-05-16 | End: 2022-05-23 | Stop reason: SDUPTHER

## 2022-05-19 ENCOUNTER — TELEPHONE (OUTPATIENT)
Dept: FAMILY MEDICINE CLINIC | Age: 77
End: 2022-05-19

## 2022-05-19 DIAGNOSIS — D50.9 IRON DEFICIENCY ANEMIA, UNSPECIFIED IRON DEFICIENCY ANEMIA TYPE: Primary | ICD-10-CM

## 2022-05-19 DIAGNOSIS — R11.0 NAUSEA: ICD-10-CM

## 2022-05-19 DIAGNOSIS — R73.9 HYPERGLYCEMIA: ICD-10-CM

## 2022-05-19 RX ORDER — ONDANSETRON 4 MG/1
4 TABLET, FILM COATED ORAL 3 TIMES DAILY PRN
Qty: 30 TABLET | Refills: 0 | Status: SHIPPED | OUTPATIENT
Start: 2022-05-19 | End: 2022-08-04

## 2022-05-19 NOTE — TELEPHONE ENCOUNTER
Okay to send Zofran prescription 4 mg tablet 3 times daily as needed for nausea. Needs a CMP and CBC soon and schedule her for next Thursday 30-minute appointment.

## 2022-05-19 NOTE — TELEPHONE ENCOUNTER
Patient stating she has been nauseous for five days and is experiencing a lot of fatigue. No other symptoms being reported. She is looking for recommendations. Please advise if patient should be scheduled.

## 2022-05-23 RX ORDER — FOLIC ACID/VIT B COMPLEX AND C 5 MG
1 TABLET ORAL DAILY
Qty: 30 TABLET | Refills: 0 | Status: SHIPPED | OUTPATIENT
Start: 2022-05-23 | End: 2022-06-27

## 2022-05-23 RX ORDER — MECLIZINE HYDROCHLORIDE 25 MG/1
TABLET ORAL
Qty: 90 TABLET | Refills: 0 | Status: SHIPPED | OUTPATIENT
Start: 2022-05-23 | End: 2022-09-01

## 2022-05-23 NOTE — TELEPHONE ENCOUNTER
folbee plus (FOLBEE PLUS) TABS        Sig - Route:  Take 1 tablet by mouth daily - Oral      meclizine (ANTIVERT) 25 MG tablet  90 tablet 1 1/4/2022 3/14/2022    Sig: TAKE ONE TABLET BY MOUTH THREE TIMES A DAY AS NEEDED FOR DIZZINESS      Refills Requested    [address] in Chart    Please Advise    Provider out of office

## 2022-05-23 NOTE — TELEPHONE ENCOUNTER
Medication:   Requested Prescriptions     Pending Prescriptions Disp Refills    sertraline (ZOLOFT) 25 MG tablet [Pharmacy Med Name: SERTRALINE HCL 25 MG TABLET] 90 tablet      Sig: TAKE ONE TABLET BY MOUTH DAILY      States this was discontinued Jan 2022, please advise. Patient Phone Number: [phone] (home)     Last appt: 4/29/2022   Next appt: 5/26/2022    Last OARRS:   RX Monitoring 9/27/2019   Attestation -   Periodic Controlled Substance Monitoring Possible medication side effects, risk of tolerance/dependence & alternative treatments discussed. Chronic Pain > 50 MEDD Re-evaluated the status of the patient's underlying condition causing pain.      PDMP Monitoring:    Last PDMP Serena Norris as Reviewed [facility]):  Review User Review Instant Review Result   Susan BARCENAS 9/15/2021  7:05 PM Reviewed PDMP [1]     Preferred Pharmacy:   [place] [id number] Yair Godoy[address] [phone] [facility] [phone]  [address] [place] [address] Road  [address] [place] [address]  Phone: [phone] Fax: [phone] [place] [address]  Phone: [phone] Fax: [phone]

## 2022-05-24 ENCOUNTER — HOSPITAL ENCOUNTER (OUTPATIENT)
Age: 77
Discharge: HOME | End: 2022-05-24
Payer: MEDICARE

## 2022-05-24 DIAGNOSIS — Z13.220 SCREENING FOR LIPID DISORDERS: ICD-10-CM

## 2022-05-24 DIAGNOSIS — R73.9 HYPERGLYCEMIA: ICD-10-CM

## 2022-05-24 LAB
A/G RATIO: 1.4 (ref 1.1–2.2)
ALBUMIN SERPL-MCNC: 4.2 G/DL (ref 3.4–5)
ALP BLD-CCNC: 119 U/L (ref 40–129)
ALT SERPL-CCNC: 24 U/L (ref 10–40)
ANION GAP SERPL CALCULATED.3IONS-SCNC: 18 MMOL/L (ref 3–16)
AST SERPL-CCNC: 25 U/L (ref 15–37)
BASOPHILS ABSOLUTE: 0 K/UL (ref 0–0.2)
BASOPHILS RELATIVE PERCENT: 0.6 %
BILIRUB SERPL-MCNC: 0.5 MG/DL (ref 0–1)
BUN BLDV-MCNC: 16 MG/DL (ref 7–20)
CALCIUM SERPL-MCNC: 9.7 MG/DL (ref 8.3–10.6)
CHLORIDE BLD-SCNC: 102 MMOL/L (ref 99–110)
CHOLESTEROL, FASTING: 173 MG/DL (ref 0–199)
CO2: 22 MMOL/L (ref 21–32)
CREAT SERPL-MCNC: 0.9 MG/DL (ref 0.6–1.2)
EOSINOPHILS ABSOLUTE: 0.1 K/UL (ref 0–0.6)
EOSINOPHILS RELATIVE PERCENT: 3.3 %
GFR AFRICAN AMERICAN: >60
GFR NON-AFRICAN AMERICAN: >60
GLUCOSE FASTING: 80 MG/DL (ref 70–99)
HCT VFR BLD CALC: 36.9 % (ref 36–48)
HDLC SERPL-MCNC: 69 MG/DL (ref 40–60)
HEMOGLOBIN: 12.2 G/DL (ref 12–16)
LDL CHOLESTEROL CALCULATED: 86 MG/DL
LYMPHOCYTES ABSOLUTE: 1 K/UL (ref 1–5.1)
LYMPHOCYTES RELATIVE PERCENT: 23.1 %
MCH RBC QN AUTO: 29.9 PG (ref 26–34)
MCHC RBC AUTO-ENTMCNC: 33 G/DL (ref 31–36)
MCV RBC AUTO: 90.6 FL (ref 80–100)
MONOCYTES ABSOLUTE: 0.6 K/UL (ref 0–1.3)
MONOCYTES RELATIVE PERCENT: 15 %
NEUTROPHILS ABSOLUTE: 2.4 K/UL (ref 1.7–7.7)
NEUTROPHILS RELATIVE PERCENT: 58 %
PDW BLD-RTO: 14.6 % (ref 12.4–15.4)
PLATELET # BLD: 204 K/UL (ref 135–450)
PMV BLD AUTO: 8.7 FL (ref 5–10.5)
POTASSIUM SERPL-SCNC: 4.4 MMOL/L (ref 3.5–5.1)
RBC # BLD: 4.07 M/UL (ref 4–5.2)
SODIUM BLD-SCNC: 142 MMOL/L (ref 136–145)
TOTAL PROTEIN: 7.3 G/DL (ref 6.4–8.2)
TRIGLYCERIDE, FASTING: 88 MG/DL (ref 0–150)
TSH REFLEX: 3.43 UIU/ML (ref 0.27–4.2)
VLDLC SERPL CALC-MCNC: 18 MG/DL
WBC # BLD: 4.2 K/UL (ref 4–11)

## 2022-05-24 PROCEDURE — 85025 COMPLETE CBC W/AUTO DIFF WBC: CPT

## 2022-05-24 PROCEDURE — 84443 ASSAY THYROID STIM HORMONE: CPT

## 2022-05-24 PROCEDURE — 83036 HEMOGLOBIN GLYCOSYLATED A1C: CPT

## 2022-05-24 PROCEDURE — 80061 LIPID PANEL: CPT

## 2022-05-24 PROCEDURE — 36415 COLL VENOUS BLD VENIPUNCTURE: CPT

## 2022-05-24 PROCEDURE — 80053 COMPREHEN METABOLIC PANEL: CPT

## 2022-05-24 RX ORDER — SERTRALINE HYDROCHLORIDE 25 MG/1
TABLET, FILM COATED ORAL
Qty: 90 TABLET | Refills: 0 | Status: SHIPPED | OUTPATIENT
Start: 2022-05-24 | End: 2022-08-04

## 2022-05-25 LAB
ESTIMATED AVERAGE GLUCOSE: 102.5 MG/DL
HBA1C MFR BLD: 5.2 %

## 2022-05-26 ENCOUNTER — OFFICE VISIT (OUTPATIENT)
Dept: FAMILY MEDICINE CLINIC | Age: 77
End: 2022-05-26
Payer: MEDICARE

## 2022-05-26 VITALS
HEART RATE: 78 BPM | BODY MASS INDEX: 36.52 KG/M2 | OXYGEN SATURATION: 90 % | TEMPERATURE: 97.7 F | SYSTOLIC BLOOD PRESSURE: 118 MMHG | WEIGHT: 187 LBS | DIASTOLIC BLOOD PRESSURE: 80 MMHG

## 2022-05-26 DIAGNOSIS — D68.59 HYPERCOAGULABLE STATE (HCC): ICD-10-CM

## 2022-05-26 DIAGNOSIS — R26.89 BALANCE PROBLEM: ICD-10-CM

## 2022-05-26 DIAGNOSIS — E78.5 HYPERLIPIDEMIA, UNSPECIFIED HYPERLIPIDEMIA TYPE: ICD-10-CM

## 2022-05-26 DIAGNOSIS — F11.20 NARCOTIC DEPENDENCE (HCC): ICD-10-CM

## 2022-05-26 DIAGNOSIS — I20.8 STABLE ANGINA (HCC): ICD-10-CM

## 2022-05-26 DIAGNOSIS — F33.41 RECURRENT MAJOR DEPRESSIVE DISORDER, IN PARTIAL REMISSION (HCC): ICD-10-CM

## 2022-05-26 DIAGNOSIS — J44.9 MODERATE COPD (CHRONIC OBSTRUCTIVE PULMONARY DISEASE) (HCC): ICD-10-CM

## 2022-05-26 DIAGNOSIS — I48.19 PERSISTENT ATRIAL FIBRILLATION (HCC): ICD-10-CM

## 2022-05-26 DIAGNOSIS — I10 ESSENTIAL HYPERTENSION: Primary | ICD-10-CM

## 2022-05-26 PROCEDURE — 99214 OFFICE O/P EST MOD 30 MIN: CPT | Performed by: FAMILY MEDICINE

## 2022-05-26 PROCEDURE — 1123F ACP DISCUSS/DSCN MKR DOCD: CPT | Performed by: FAMILY MEDICINE

## 2022-05-26 ASSESSMENT — PATIENT HEALTH QUESTIONNAIRE - PHQ9
4. FEELING TIRED OR HAVING LITTLE ENERGY: 3
9. THOUGHTS THAT YOU WOULD BE BETTER OFF DEAD, OR OF HURTING YOURSELF: 0
3. TROUBLE FALLING OR STAYING ASLEEP: 0
SUM OF ALL RESPONSES TO PHQ QUESTIONS 1-9: 11
SUM OF ALL RESPONSES TO PHQ9 QUESTIONS 1 & 2: 6
5. POOR APPETITE OR OVEREATING: 0
1. LITTLE INTEREST OR PLEASURE IN DOING THINGS: 3
SUM OF ALL RESPONSES TO PHQ QUESTIONS 1-9: 11
2. FEELING DOWN, DEPRESSED OR HOPELESS: 3
8. MOVING OR SPEAKING SO SLOWLY THAT OTHER PEOPLE COULD HAVE NOTICED. OR THE OPPOSITE, BEING SO FIGETY OR RESTLESS THAT YOU HAVE BEEN MOVING AROUND A LOT MORE THAN USUAL: 0
SUM OF ALL RESPONSES TO PHQ QUESTIONS 1-9: 11
SUM OF ALL RESPONSES TO PHQ QUESTIONS 1-9: 11
7. TROUBLE CONCENTRATING ON THINGS, SUCH AS READING THE NEWSPAPER OR WATCHING TELEVISION: 1
6. FEELING BAD ABOUT YOURSELF - OR THAT YOU ARE A FAILURE OR HAVE LET YOURSELF OR YOUR FAMILY DOWN: 1

## 2022-05-26 ASSESSMENT — ENCOUNTER SYMPTOMS
BACK PAIN: 1
DIARRHEA: 0
SHORTNESS OF BREATH: 1
RHINORRHEA: 1
CONSTIPATION: 0

## 2022-05-26 NOTE — PROGRESS NOTES
SUBJECTIVE:    Varinder Pennington is a 77 y.o. female who presents for a follow up visit. Chief Complaint   Patient presents with    Follow-up     Patient is here for a follow up. C/O nausea off and on. Having frequent falls, no energy, weak, off balance for the past few weeks. Fall  The fall occurred while standing. She landed on carpet. Point of impact: left hand. Pain location: left 5th finger. The symptoms are aggravated by movement and pressure on injury. She has tried nothing for the symptoms. Back Pain  This is a chronic problem. The current episode started more than 1 year ago. The pain is present in the lumbar spine. The pain radiates to the left knee, left foot and left thigh. The pain is moderate. The symptoms are aggravated by standing (walking). Pertinent negatives include no chest pain. Risk factors include obesity, sedentary lifestyle and lack of exercise. She has tried muscle relaxant and analgesics for the symptoms. The treatment provided mild relief. Mental Health Problem  The primary symptoms include dysphoric mood. This is a chronic problem. The onset of the illness is precipitated by emotional stress and a stressful event. The degree of incapacity that she is experiencing as a consequence of her illness is mild. Additional symptoms of the illness include anhedonia, insomnia, fatigue and psychomotor retardation. She does not admit to suicidal ideas. She does not contemplate harming herself. She has not already injured self. Risk factors that are present for mental illness include a history of mental illness. Hypertension  This is a chronic problem. The current episode started more than 1 year ago. The problem is controlled. Associated symptoms include shortness of breath (using O2 at night). Pertinent negatives include no chest pain. Risk factors for coronary artery disease include obesity, sedentary lifestyle and post-menopausal state.  Past treatments include calcium channel blockers and diuretics. Patient's medications, allergies, past medical,surgical, social and family histories were reviewed and updated as appropriate.      Past Medical History:   Diagnosis Date    AVM     Chronic back pain     Depression     Gastritis     History of blood transfusion     secondary to GI bleed    History of GI bleed     Hypertension     Personal history of MRSA (methicillin resistant Staphylococcus aureus)     recurrent, last 2-3 years ago finger (doesnt remember which one)     Past Surgical History:   Procedure Laterality Date    ABDOMINAL EXPLORATION SURGERY  09/07/2017    Lysis of adhesions, removal of pelvic mass, total abdominal hysterectomy with BSO, partial omentectomy, appendectomy, and pelvic lymphadenectomy    BACK SURGERY      multiple lumbar spine surgeries including laminectomy and fusion    CERVICAL FUSION      COLONOSCOPY      ENDOSCOPY, COLON, DIAGNOSTIC      HIATAL HERNIA REPAIR  1999    OTHER SURGICAL HISTORY Left [id number]    SPINAL CORD STIMULATOR BATTERY REPLACMENT     Family History   Problem Relation Age of Onset    Diabetes Mother     Heart Disease Mother         cad, aortic aneurysm    Diabetes Father     Heart Disease Father         heart attack, chf    Other Sister         abdominal aneurysm     Social History     Tobacco Use    Smoking status: Never Smoker    Smokeless tobacco: Never Used   Substance Use Topics    Alcohol use: Yes     Comment: rare      Allergies   Allergen Reactions    Penicillins      Current Outpatient Medications on File Prior to Visit   Medication Sig Dispense Refill    sertraline (ZOLOFT) 25 MG tablet TAKE ONE TABLET BY MOUTH DAILY 90 tablet 0    meclizine (ANTIVERT) 25 MG tablet TAKE ONE TABLET BY MOUTH THREE TIMES A DAY AS NEEDED FOR DIZZINESS 90 tablet 0    folbee plus (FOLBEE PLUS) TABS Take 1 tablet by mouth daily 30 tablet 0    ondansetron (ZOFRAN) 4 MG tablet Take 1 tablet by mouth 3 times daily as needed for Head: Normocephalic and atraumatic. Right Ear: There is impacted cerumen. Left Ear: There is impacted cerumen. Nose: No congestion. Mouth/Throat:      Pharynx: No posterior oropharyngeal erythema. Cardiovascular:      Rate and Rhythm: Normal rate and regular rhythm. Pulmonary:      Effort: Pulmonary effort is normal.      Breath sounds: Normal breath sounds. Musculoskeletal:      Right lower leg: No edema. Left lower leg: No edema. Comments: Left fifth finger with contracture at the PIP joint which is old. There is also hematoma over the distal phalanx. No palpable step-off noted but there is tenderness to palpation over the distal phalanx. Neurological:      General: No focal deficit present. Mental Status: She is alert and oriented to person, place, and time. Gait: Gait abnormal ( Slow and cautious using her cane). Psychiatric:         Mood and Affect: Mood normal.         Behavior: Behavior normal.         ASSESSMENT/PLAN:    Anabella Schultz was seen today for follow-up. Diagnoses and all orders for this visit:    Essential hypertension  Good control on current medications  -     Comprehensive Metabolic Panel, Fasting; Future  -     CBC with Auto Differential; Future    Persistent atrial fibrillation (HCC)  Nothing for rate control. Patient is on Xarelto 20 mg daily for anticoagulation.   She is followed by cardiology    Moderate COPD (chronic obstructive pulmonary disease) ([facility] [address]  Followed by pulmonology    Narcotic dependence ([address]  Patient is seeing pain management for her chronic back pain    Recurrent major depressive disorder, in partial remission ([address]  Patient symptoms are fairly stable on combination of sertraline and Wellbutrin    Hypercoagulable state ([address]  Using Xarelto 20 mg daily    Stable angina ([address]  Followed by cardiology    Balance problem  -     [address] [place], [place] [address]plex    Hyperlipidemia, unspecified hyperlipidemia type  - Lipid, Fasting; Future        Return in about 6 months (around 11/26/2022). Please note portions of this note were completed with a voicerecognition program.  Efforts were made to edit the dictations but occasionally words are mis-transcribed.

## 2022-06-03 DIAGNOSIS — I48.19 PERSISTENT ATRIAL FIBRILLATION (HCC): ICD-10-CM

## 2022-06-06 RX ORDER — RIVAROXABAN 20 MG/1
TABLET, FILM COATED ORAL
Qty: 90 TABLET | Refills: 1 | Status: SHIPPED | OUTPATIENT
Start: 2022-06-06 | End: 2022-09-01

## 2022-06-23 ENCOUNTER — HOSPITAL ENCOUNTER (OUTPATIENT)
Dept: PHYSICAL THERAPY | Age: 77
Setting detail: THERAPIES SERIES
Discharge: HOME | End: 2022-06-23
Payer: MEDICARE

## 2022-06-23 PROCEDURE — 97161 PT EVAL LOW COMPLEX 20 MIN: CPT

## 2022-06-23 PROCEDURE — 97110 THERAPEUTIC EXERCISES: CPT

## 2022-06-23 NOTE — FLOWSHEET NOTE
[address]  Phone: [phone]   Fax: [phone]    Physical Therapy Treatment Note/ Progress Report:     Date:  2022    Patient Name:  Kelby Maloney    :  1945  MRN: [id number]  Restrictions/Precautions:    Medical/Treatment Diagnosis Information:  Diagnosis: R26.89 (ICD-10-CM) - Balance problem  Treatment Diagnosis: decreased gait and balance abilities, decreased BLE strength, chronic low back pain, decreased functional status. Insurance/Certification information:  PT Insurance Information: [employer] [place]  Physician Information:  Kev Gonzales 469 of care signed (Y/N): []  Yes [x]  No     Date of Patient follow up with Physician:      Progress Report: []  Yes  [x]  No     Date Range for reporting period:  Beginnin22  Ending:     Progress report due (10 Rx/or 30 days whichever is less): visit #10 or     Recertification due (POC duration/ or 90 days whichever is less): visit #16 or 22    Visit # Insurance Allowable Auth required?  Date Range   1 mn []  Yes  [x]  No $40 copay       Latex Allergy:  [x]NO      []YES  Preferred Language for Healthcare:   [x]English       []other:    Functional Scale:           Date assessed:  TO physical FS primary measure score = 46; risk adjusted = 48  22    Pain level:  5-8/10     SUBJECTIVE:  See eval    OBJECTIVE: See eval      RESTRICTIONS/PRECAUTIONS: chronic low back pain, fall risk     Exercises/Interventions:     Therapeutic Exercise (92170)  Resistance / level Sets/sec Reps Notes / Cues   stepper       IB       HSS       HIP FLEXOR S              SLR       BRIDGE                            Therapeutic Activities (22855)       Step ups                            Neuromuscular Re-ed (68448)       Balance in // bars                     Manual Intervention (24958)       Knee mobs/PROM       Tib/Fem Mobs       Patella Mobs       Ankle mobs Modalities:     Pt. Education:  -pt educated on diagnosis, prognosis and expectations for rehab  -all pt questions were answered    Home Exercise Program:  Access Code: [email or web address]  URL: [employer]/  Date: 06/23/2022  Prepared by: Karen De León    Exercises  Supine Bridge - 1 x daily - 7 x weekly - 3 sets - 10 reps  Supine Lower Trunk Rotation - 1 x daily - 7 x weekly - 3 sets - 10 reps  Seated Hamstring Stretch - 1 x daily - 7 x weekly - 3 sets - 10 reps  Supine Active Straight Leg Raise - 1 x daily - 7 x weekly - 3 sets - 10 reps      Therapeutic Exercise and NMR EXR  [x] (42689) Provided verbal/tactile cueing for activities related to strengthening, flexibility, endurance, ROM for improvements in LE, proximal hip, and core control with self care, mobility, lifting, ambulation.  [] (89888) Provided verbal/tactile cueing for activities related to improving balance, coordination, kinesthetic sense, posture, motor skill, proprioception  to assist with LE, proximal hip, and core control in self care, mobility, lifting, ambulation and eccentric single leg control.   [] (98097) Therapist is in constant attendance of 2 or more patients providing skilled therapy interventions, but not providing any significant amount of measurable one-on-one time to either patient, for improvements in LE, proximal hip, and core control in self care, mobility, lifting, ambulation and eccentric single leg control.      NMR and Therapeutic Activities:    [] (24435 or 07271) Provided verbal/tactile cueing for activities related to improving balance, coordination, kinesthetic sense, posture, motor skill, proprioception and motor activation to allow for proper function of core, proximal hip and LE with self care and ADLs  [] (28882) Gait Re-education- Provided training and instruction to the patient for proper LE, core and proximal hip recruitment and positioning and eccentric body weight control with ambulation re-education including up and down stairs     Home Exercise Program:    [x] (04886) Reviewed/Progressed HEP activities related to strengthening, flexibility, endurance, ROM of core, proximal hip and LE for functional self-care, mobility, lifting and ambulation/stair navigation   [] (34333)Reviewed/Progressed HEP activities related to improving balance, coordination, kinesthetic sense, posture, motor skill, proprioception of core, proximal hip and LE for self care, mobility, lifting, and ambulation/stair navigation      Manual Treatments:  PROM / STM / Oscillations-Mobs:  G-I, II, III, IV (PA's, Inf., Post.)  [] (95408) Provided manual therapy to mobilize LE, proximal hip and/or LS spine soft tissue/joints for the purpose of modulating pain, promoting relaxation,  increasing ROM, reducing/eliminating soft tissue swelling/inflammation/restriction, improving soft tissue extensibility and allowing for proper ROM for normal function with self care, mobility, lifting and ambulation. Modalities:  [] (84872) Vasopneumatic compression: Utilized vasopneumatic compression to decrease edema / swelling for the purpose of improving mobility and quad tone / recruitment which will allow for increased overall function including but not limited to self-care, transfers, ambulation, and ascending / descending stairs. Charges:  Timed Code Treatment Minutes: 15   Total Treatment Minutes: 40     [x] EVAL - LOW (87627)   [] EVAL - MOD (53767)  [] EVAL - HIGH (09205)  [] RE-EVAL (74823)  [x] NW(89850) x  1     [] Ionto  [] NMR (24963) x       [] Vaso  [] Manual (13143) x       [] Ultrasound  [] TA x        [] Mech Traction (18784)  [] Aquatic Therapy x     [] ES (un) (76610):   [] Home Management Training x  [] ES(attended) (35687)   [] Dry Needling 1-2 muscles (43583):  [] Dry Needling 3+ muscles (283916  [] Group:      [] Other:     GOALS:  Patient stated goal:  strengthening the legs   []? Progressing: []? Met: []?  Not Met: []? Functional Limitations/Impairments:  []Sitting []Standing   []Walking []Stairs   []Transfers []ADLs   []Squatting/bending []Kneeling  []Housework []Job related tasks  []Driving []Sports/Recreation   []Sleeping []Other:    ASSESSMENT:  See levon  Treatment/Activity Tolerance:  [x] Pt able to complete treatment [] Patient limited by fatique  [x] Patient limited by pain  [] Patient limited by other medical complications  [] Other:     Prognosis:  [] Good [x] Fair  [] Poor    Patient Requires Follow-up: [x] Yes  [] No          PLAN: See levon. PT 1-2x / week for 8 weeks. [] Continue per plan of care [] Alter current plan (see comments)  [x] Plan of care initiated [] Hold pending MD visit [] Discharge    Electronically signed by: Lashay Zuleta PT, DPT      Note: If patient does not return for scheduled/ recommended follow up visits, this note will serve as a discharge from care along with most recent update on progress.

## 2022-06-23 NOTE — PLAN OF CARE
[address] [address], [facility] [address]  Phone: [phone]   Fax: [phone]                                                       Physical Therapy Certification    Dear Monica Johnston*  ,    We had the pleasure of evaluating the following patient for physical therapy services at [address] [place] [address]. A summary of our findings can be found in the initial assessment below. This includes our plan of care. If you have any questions or concerns regarding these findings, please do not hesitate to contact me at the office phone number checked above. Thank you for the referral.       Physician Signature:_______________________________Date:__________________  By signing above (or electronic signature), therapists plan is approved by physician      Patient: Torsten Miles   : 1945   MRN: [id number]  Referring Physician: Monica Johnston*        Evaluation Date: 2022      Medical Diagnosis Information:  Diagnosis: R26.89 (ICD-10-CM) - Balance problem   Treatment Diagnosis: decreased gait and balance abilities, decreased BLE strength, chronic low back pain, decreased functional status. Insurance information: PT Insurance Information: Medical [place]     Precautions/ Contra-indications:   Latex Allergy:  [x]NO      []YES  Preferred Language for Healthcare:   [x]English       []Other:    C-SSRS Triggered by Intake questionnaire (Past 2 wk assessment ):   [] No, Questionnaire did not trigger screening. [x] Yes, Patient intake triggered C-SSRS Screening     [x] Completed, no further action required. [] Completed, PCP notified via Epic    SUBJECTIVE: Patient stated complaint: Pt was referred to PT due to balance issues.  Pt reports multiple falls lately but did not hurt herself , Pt states she is  getting weaker in her legs. Chronic Back pain has also been getting worse. Uses SPC for ambulation. Does have a walker but not using it. Pt wants to improve strength and start walking more. Relevant Medical History: hx of back sx with spinal stimulator   Functional Outcome: FOTO physical FS primary measure score =46 ; Risk adjusted = 48    Pain Scale: 8/10  Easing factors: rest   Provocative factors: walking      Type: []Constant   [x]Intermittent  []Radiating []Localized []other:     Numbness/Tingling: Occasional LLE tingling in toes     Occupation/School: retired     Living Status/Prior Level of Function:Prior to this injury / incident, pt was independent with ADLs and IADLs, . 2 ROSE home , laundry in basement, lives with .        OBJECTIVE:   Palpation: TTP around lumbar spine     Functional Mobility/Transfers: Radha - needs increased time     Posture: decreased lumbar lordosis     Bandages/Dressings/Incisions: n/a    Gait: (include devices/WB status) decreased speed, SPC, mildly antalgic     Dermatomes Normal Abnormal Comments   inguinal area (L1)       anterior mid-thigh (L2)      distal ant thigh/med knee (L3)      medial lower leg and foot (L4) x     lateral lower leg and foot (L5) x     posterior calf (S1) x     medial calcaneus (S2) x         Reflexes Normal Abnormal Comments   S1-2 Seated achilles x     S1-2 Prone knee bend      L3-4 Patellar tendon x     Clonus x     Babinski        Lumbar AROM screen: [] WFL  [x] abnormal: limited due to pain      PROM AROM    L R L R   Hip Flexion [facility]     Hip Abduction       Hip ER [facility]     Hip IR [facility] WFL     Knee Flexion [facility]     Knee Extension [facility] WFL     Dorsiflexion        Plantarflexion        Inversion        Eversion            Strength (0-5) / Myotomes Left Right   Hip Flexion - supine     Hip Flexion - seated (L1-2) 3+ 3+   Hip Abduction 3- 3-   Hip Adduction     Hip ER     Hip IR     Quads (L2-4) 3+ 3+   Hamstrings 4- 4-   Ankle Dorsiflexion (L4-5) 4 4 Ankle Plantarflexion (S1-2) 4 4   Ankle Inversion     Ankle Eversion (S1-2)     Great Toe Extension (L5)          Flexibility     Hamstrings (90/90) + +   ITB (Olga Lidia) + +   Quads (Ely's) + +   Hip Flexor Timbo Actis) + +        Girth (cm)     Mid patella     Suprapatellar     Figure 8     Transmalleolar     Metatarsal Heads         Joint mobility: LUMBAR SPINE    []Normal    [x]Hypo   []Hyper    Orthopaedic Special Tests  Positive  Negative  NT Comments    Hip       SANTIAGO / Zaid's  X     FADIR X      Scour       Trendelenburg              Knee       Lachman's / Anterior Drawer       Posterior Drawer       Varus Stress       Valgus Stress       Min's        Appley's       Thessaly's       Patellar Tracking              Ankle       Anterior Drawer       Talar Tilt       Landers       Martha's                   Balance: TUG - 20 seconds, SLS - unable , tandem stance - 8 seconds                          [x] Patient history, allergies, meds reviewed. Medical chart reviewed. See intake form. Review Of Systems (ROS):  [x]Performed Review of systems (Integumentary, CardioPulmonary, Neurological) by intake and observation. Intake form has been scanned into medical record. Patient has been instructed to contact their primary care physician regarding ROS issues if not already being addressed at this time.       Co-morbidities/Complexities (which will affect course of rehabilitation):   []None        []Hx of COVID   Arthritic conditions   []Rheumatoid arthritis (M05.9)  []Osteoarthritis (M19.91)  []Gout   Cardiovascular conditions   [x]Hypertension (I10)  []Hyperlipidemia (E78.5)  []Angina pectoris (I20)  []Atherosclerosis (I70)  []Pacemaker  []Hx of CABG/stent/  cardiac surgeries   Musculoskeletal conditions   []Disc pathology   []Congenital spine pathologies   []Osteoporosis (M81.8)  []Osteopenia (M85.8)  []Scoliosis       Endocrine conditions   []Hypothyroid (E03.9)  []Hyperthyroid Gastrointestinal conditions []Constipation (Y62.33)   Metabolic conditions   []Morbid obesity (E66.01)  []Diabetes type 1(E10.65) or 2 (E11.65)   []Neuropathy (G60.9)     Cardio/Pulmonary conditions   []Asthma (J45)  []Coughing   []COPD (J44.9)  []CHF  []A-fib   Psychological Disorders  []Anxiety (F41.9)  []Depression (F32.9)   []Other:   Developmental Disorders  []Autism (F84.0)  []CP (G80)  []Down Syndrome (Q90.9)  []Developmental delay     Neurological conditions  []Prior Stroke (I69.30)  []Parkinson's (G20)  []Encephalopathy (G93.40)  []MS (G35)  []Post-polio (G14)  []SCI  []TBI  []ALS Other conditions  []Fibromyalgia (M79.7)  []Vertigo  []Syncope  []Kidney Failure  []Cancer      []currently undergoing                treatment  []Pregnancy  []Incontinence   Prior surgeries  []involved limb  [x]previous spinal surgery  [] section birth  []hysterectomy  []bowel / bladder surgery  []other relevant surgeries   []Other:              Barriers to/and or personal factors that will affect rehab potential:              [x]Age  []Sex    []Smoker              []Motivation/Lack of Motivation                        [x]Co-Morbidities              []Cognitive Function, education/learning barriers              []Environmental, home barriers              []profession/work barriers  []past PT/medical experience  [x]other: chronic low back pain   Justification:     Falls Risk Assessment (30 days):   [x] Falls Risk assessed and no intervention required.   [] Falls Risk assessed and Patient requires intervention due to being higher risk   TUG score (>12s at risk):     [] Falls education provided, including        ASSESSMENT:   Functional Impairments:     [x]Noted lumbar/proximal hip/LE joint hypomobility   [x]Decreased LE functional ROM   [x]Decreased core/proximal hip strength and neuromuscular control   [x]Decreased LE functional strength   [x]Reduced balance/proprioceptive control   []other:      Functional Activity Limitations (from functional questionnaire and intake)   [x]Reduced ability to tolerate prolonged functional positions   [x]Reduced ability or difficulty with changes of positions or transfers between positions   [x]Reduced ability to maintain good posture and demonstrate good body mechanics with sitting, bending, and lifting   [x]Reduced ability to sleep   [x] Reduced ability or tolerance with driving and/or computer work   [x]Reduced ability to perform lifting, carrying tasks   [x]Reduced ability to squat   [x]Reduced ability to forward bend   [x]Reduced ability to ambulate prolonged functional periods/distances/surfaces   [x]Reduced ability to ascend/descend stairs   [x]Reduced ability to run, hop, cut or jump   []other:    Participation Restrictions   [x]Reduced participation in self care activities   [x]Reduced participation in home management activities   [x]Reduced participation in work activities   [x]Reduced participation in social activities. [x]Reduced participation in sport/recreation activities. Classification :    [x]Signs/symptoms consistent with decreased ROM, strength and function.    []Signs/symptoms consistent with joint sprain/strain   []Signs/symptoms consistent with patella-femoral syndrome   []Signs/symptoms consistent with knee OA/hip OA   []Signs/symptoms consistent with internal derangement of knee/Hip   [x]Signs/symptoms consistent with functional hip weakness/NMR control      []Signs/symptoms consistent with tendinitis/tendinosis    []signs/symptoms consistent with pathology which may benefit from Dry needling      []other:      Prognosis/Rehab Potential:      []Excellent   []Good    [x]Fair   []Poor    Tolerance of evaluation/treatment:    []Excellent   []Good    [x]Fair   []Poor    Physical Therapy Evaluation Complexity Justification  [x] A history of present problem with:  [] no personal factors and/or comorbidities that impact the plan of care;  [x]1-2 personal factors and/or comorbidities that impact the plan of care  []3 personal factors and/or comorbidities that impact the plan of care  [x] An examination of body systems using standardized tests and measures addressing any of the following: body structures and functions (impairments), activity limitations, and/or participation restrictions;:  [] a total of 1-2 or more elements   [] a total of 3 or more elements   [x] a total of 4 or more elements   [x] A clinical presentation with:  [x] stable and/or uncomplicated characteristics   [] evolving clinical presentation with changing characteristics  [] unstable and unpredictable characteristics;   [x] Clinical decision making of [x] Low, [] moderate, [] high complexity using standardized patient assessment instrument and/or measurable assessment of functional outcome. [x] EVAL (LOW) 89879 (typically 15 minutes face-to-face)  [] EVAL (MOD) 29969 (typically 30 minutes face-to-face)  [] EVAL (HIGH) 21010 (typically 45 minutes face-to-face)  [] RE-EVAL     PLAN:   Frequency/Duration:  1-2 days per week for 8 Weeks:  Interventions:  [x]  Therapeutic exercise including: strength training, ROM, for Lower extremity and core   [x]  NMR activation and proprioception for LE, Glutes and Core   [x]  Manual therapy as indicated for LE, Hip and spine to include: Dry Needling/IASTM, STM, PROM, Gr I-IV mobilizations, manipulation. [x] Modalities as needed that may include: thermal agents, E-stim, Biofeedback, US, iontophoresis as indicated  [x] Patient education on joint protection, postural re-education, activity modification, progression of HEP. HEP instruction: Written HEP instructions provided and reviewed. GOALS:  Patient stated goal:  strengthening the legs   [] Progressing: [] Met: [] Not Met: [] Adjusted    Therapist goals for Patient:   Short Term Goals: To be achieved in: 2 weeks  1. Independent in HEP and progression per patient tolerance, in order to prevent re-injury.    [] Progressing: [] Met: [] Not Met: [] Adjusted  2. Patient will have a decrease in pain to facilitate improvement in movement, function, and ADLs as indicated by Functional Deficits. [] Progressing: [] Met: [] Not Met: [] Adjusted    Long Term Goals: To be achieved in: 8 weeks  1. FOTO score of at least 55 to assist with reaching prior level of function. [] Progressing: [] Met: [] Not Met: [] Adjusted  2. Patient will demonstrate increased AROM to [facility] without pain in lumbar spine and hips to allow for proper joint functioning as indicated by patients Functional Deficits. [] Progressing: [] Met: [] Not Met: [] Adjusted  3. Patient will demonstrate an increase in Strength to at least 4+/5 as well as good proximal hip strength and control to allow for proper functional mobility as indicated by patients Functional Deficits. [] Progressing: [] Met: [] Not Met: [] Adjusted  4. Patient will return to functional activities including ambulating with SPC, ADLs, stairs without increased symptoms or restriction. [] Progressing: [] Met: [] Not Met: [] Adjusted  5. Pt will demonstrate improvement in balance and mobility by scoring 13 seconds or less on TUG.    [] Progressing: [] Met: [] Not Met: [] Adjusted     Electronically signed by:  Shahnaz Traore, PT

## 2022-06-24 DIAGNOSIS — N30.00 ACUTE CYSTITIS WITHOUT HEMATURIA: ICD-10-CM

## 2022-06-27 RX ORDER — BETHANECHOL CHLORIDE 25 MG/1
TABLET ORAL
Qty: 90 TABLET | Refills: 1 | Status: SHIPPED | OUTPATIENT
Start: 2022-06-27 | End: 2022-09-01

## 2022-06-27 NOTE — TELEPHONE ENCOUNTER
Medication:   Requested Prescriptions     Pending Prescriptions Disp Refills    folbee plus (Cleaster Alosa) TABS [Pharmacy Med Name: Cleaster Alosa TABLET] 30 tablet 0     Sig: TAKE ONE TABLET BY MOUTH ONCE DAILY      Last Filled:      Patient Phone Number: [phone] (home)     Last appt: 5/26/2022   Next appt:     Last OARRS:   RX Monitoring 9/27/2019   Attestation -   Periodic Controlled Substance Monitoring Possible medication side effects, risk of tolerance/dependence & alternative treatments discussed. Chronic Pain > 50 MEDD Re-evaluated the status of the patient's underlying condition causing pain.      PDMP Monitoring:    Last PDMP Sherald Spurling as Reviewed [facility]):  Review User Review Instant Review Result   Abdi BARCENAS 9/15/2021  7:05 PM Reviewed PDMP [1]     Preferred Pharmacy:   [facility] [id number] [place] [phone] [place] [phone]  [address] [place] [address] Road  [address] [place] [address]  Phone: [phone] Fax: [phone] [place] [phone]  Phone: [phone] Fax: [phone]

## 2022-06-28 RX ORDER — FOLIC ACID/VIT B COMPLEX AND C 5 MG
TABLET ORAL
Qty: 30 TABLET | Refills: 5 | Status: SHIPPED | OUTPATIENT
Start: 2022-06-28

## 2022-06-30 ENCOUNTER — HOSPITAL ENCOUNTER (OUTPATIENT)
Dept: PHYSICAL THERAPY | Age: 77
Setting detail: THERAPIES SERIES
Discharge: HOME | End: 2022-06-30
Payer: MEDICARE

## 2022-07-06 ENCOUNTER — HOSPITAL ENCOUNTER (OUTPATIENT)
Dept: PHYSICAL THERAPY | Age: 77
Setting detail: THERAPIES SERIES
Discharge: HOME | End: 2022-07-06
Payer: MEDICARE

## 2022-07-06 NOTE — FLOWSHEET NOTE
Physical Therapy  Cancellation/No-show Note  Patient Name:  Clay Burkett  :  1945   Date:  2022  Cancelled visits to date: 1  No-shows to date: 1    Patient status for today's appointment patient:  [x]  Cancelled   []  Rescheduled appointment  []  No-show      Reason given by patient:  [x]  Patient ill  []  Conflicting appointment  []  No transportation    []  Conflict with work  []  No reason given  []  Other:     Comments:      Phone call information:   []  Phone call made today to patient at _ time at number provided:      []  Patient answered, conversation as follows:    []  Patient did not answer, message left as follows:  []  Phone call not made today  [x]  Phone call not needed - pt contacted us to cancel and provided reason for cancellation. Electronically signed by:  Pravin Garcia, SPT  Therapist was present, directed the patient's care, made skilled judgement, and was responsible for assessment and treatment of the patient.   Sae Garland, PT

## 2022-07-08 ENCOUNTER — HOSPITAL ENCOUNTER (OUTPATIENT)
Dept: PHYSICAL THERAPY | Age: 77
Setting detail: THERAPIES SERIES
Discharge: HOME | End: 2022-07-08
Payer: MEDICARE

## 2022-07-08 PROCEDURE — 97530 THERAPEUTIC ACTIVITIES: CPT

## 2022-07-08 PROCEDURE — 97112 NEUROMUSCULAR REEDUCATION: CPT

## 2022-07-08 PROCEDURE — 97110 THERAPEUTIC EXERCISES: CPT

## 2022-07-08 NOTE — FLOWSHEET NOTE
[address]  Phone: [phone]   Fax: [phone]    Physical Therapy Treatment Note/ Progress Report:     Date:  2022    Patient Name:  Martine Hernández    :  1945  MRN: [id number]  Restrictions/Precautions:    Medical/Treatment Diagnosis Information:  Diagnosis: R26.89 (ICD-10-CM) - Balance problem  Treatment Diagnosis: decreased gait and balance abilities, decreased BLE strength, chronic low back pain, decreased functional status. Insurance/Certification information:  PT Insurance Information: Medical [place]  Physician Information:  Kev Gonzales 47Martin of care signed (Y/N): []  Yes [x]  No     Date of Patient follow up with Physician:      Progress Report: []  Yes  [x]  No     Date Range for reporting period:  Beginnin22  Ending:     Progress report due (10 Rx/or 30 days whichever is less): visit #10 or     Recertification due (POC duration/ or 90 days whichever is less): visit #16 or 22    Visit # Insurance Allowable Auth required? Date Range   2 mn []  Yes  [x]  No $40 copay       Latex Allergy:  [x]NO      []YES  Preferred Language for Healthcare:   [x]English       []other:    Functional Scale:           Date assessed:  [facility] physical FS primary measure score = 46; risk adjusted = 48  22    Pain level:  5-8/10     SUBJECTIVE:  5/10 pain in the back today. Caught her shoe on the way in and reports that this occurs often. Pt had to miss two appointments because she was sick, hasn't been able to do her HEP frequently because of the illness. Has fallen since last session, fell going into her house.        OBJECTIVE: See eval      RESTRICTIONS/PRECAUTIONS: chronic low back pain, fall risk     Exercises/Interventions:     Therapeutic Exercise (81296)  Resistance / level Sets/sec Reps Notes / Cues   stepper  x5'     IB  3 way HR  2 x 30\"   [facility] Standing hip 3 way  2 10 B           Therapeutic Activities (83810)       Step ups:  fwd   4\"    10 B           Gait training  x133'  7/8: foot prop improved back pain after prolonged ambulation, PT adjusted cane as it was too tall. Neuromuscular Re-ed (29045)       Balance in // bars       Step taps:  fwd   4\"   2   5 B    Rhomberg:  EO  EC    Semi-Tandem    2 x 30\"  2 x 30\"    2 x 30\" B                                        Manual Intervention (95591)       Knee mobs/PROM       Tib/Fem Mobs       Patella Mobs       Ankle mobs                         Modalities:     Pt. Education:  -pt educated on diagnosis, prognosis and expectations for rehab  -all pt questions were answered    Home Exercise Program:  Access Code: [email or web address]  URL: [email or web address]  Date: 06/23/2022  Prepared by: Kashif Jamison    Exercises  Supine Bridge - 1 x daily - 7 x weekly - 3 sets - 10 reps  Supine Lower Trunk Rotation - 1 x daily - 7 x weekly - 3 sets - 10 reps  Seated Hamstring Stretch - 1 x daily - 7 x weekly - 3 sets - 10 reps  Supine Active Straight Leg Raise - 1 x daily - 7 x weekly - 3 sets - 10 reps       Therapeutic Exercise and NMR EXR  [x] (96822) Provided verbal/tactile cueing for activities related to strengthening, flexibility, endurance, ROM for improvements in LE, proximal hip, and core control with self care, mobility, lifting, ambulation.   [x] (68990) Provided verbal/tactile cueing for activities related to improving balance, coordination, kinesthetic sense, posture, motor skill, proprioception  to assist with LE, proximal hip, and core control in self care, mobility, lifting, ambulation and eccentric single leg control.   [] (51441) Therapist is in constant attendance of 2 or more patients providing skilled therapy interventions, but not providing any significant amount of measurable one-on-one time to either patient, for improvements in LE, proximal hip, and core control in self care, mobility, Minutes: 45     [] EVAL - LOW (75504)   [] EVAL - MOD (29579)  [] EVAL - HIGH (36841)  [] RE-EVAL (89923)  [x] RM(96759) x  1     [] Ionto  [x] NMR (91035) x 1      [] Vaso  [] Manual (36535) x       [] Ultrasound  [x] TA x  1      [] Mech Traction (22416)  [] Aquatic Therapy x     [] ES (un) (20441):   [] Home Management Training x  [] ES(attended) (29697)   [] Dry Needling 1-2 muscles (60475):  [] Dry Needling 3+ muscles (884294  [] Group:      [] Other: Gait    GOALS:  Patient stated goal:  strengthening the legs   []? Progressing: []? Met: []? Not Met: []? Adjusted     Therapist goals for Patient:   Short Term Goals: To be achieved in: 2 weeks  1. Independent in HEP and progression per patient tolerance, in order to prevent re-injury. []? Progressing: []? Met: []? Not Met: []? Adjusted  2. Patient will have a decrease in pain to facilitate improvement in movement, function, and ADLs as indicated by Functional Deficits. []? Progressing: []? Met: []? Not Met: []? Adjusted     Long Term Goals: To be achieved in: 8 weeks  1. FOTO score of at least 55 to assist with reaching prior level of function. []? Progressing: []? Met: []? Not Met: []? Adjusted  2. Patient will demonstrate increased AROM to [facility] without pain in lumbar spine and hips to allow for proper joint functioning as indicated by patients Functional Deficits. []? Progressing: []? Met: []? Not Met: []? Adjusted  3. Patient will demonstrate an increase in Strength to at least 4+/5 as well as good proximal hip strength and control to allow for proper functional mobility as indicated by patients Functional Deficits. []? Progressing: []? Met: []? Not Met: []? Adjusted  4. Patient will return to functional activities including ambulating with SPC, ADLs, stairs without increased symptoms or restriction. []? Progressing: []? Met: []? Not Met: []? Adjusted  5. Pt will demonstrate improvement in balance and mobility by scoring 13 seconds or less on TUG. []? Progressing: []? Met: []? Not Met: []? Adjusted    Overall Progression Towards Functional goals/ Treatment Progress Update:  [] Patient is progressing as expected towards functional goals listed. [] Progression is slowed due to complexities/Impairments listed. [] Progression has been slowed due to co-morbidities. [x] Plan just implemented, too soon to assess goals progression <30days   [] Goals require adjustment due to lack of progress  [] Patient is not progressing as expected and requires additional follow up with physician  [] Other    Persisting Functional Limitations/Impairments:  []Sitting []Standing   []Walking []Stairs   []Transfers []ADLs   []Squatting/bending []Kneeling  []Housework []Job related tasks  []Driving []Sports/Recreation   []Sleeping []Other:    ASSESSMENT:  Pt tolerated session well however reports increased incidence of back pain with standing exercises. Trialed foot prop which helped reduce back pain, will benefit from balance training in this position so that she is able to use it in the community where UE assist is not possible. Will continue with LE strengthening and balance training. Treatment/Activity Tolerance:  [x] Pt able to complete treatment [] Patient limited by fatique  [x] Patient limited by pain  [] Patient limited by other medical complications  [] Other:     Prognosis:  [] Good [x] Fair  [] Poor    Patient Requires Follow-up: [x] Yes  [] No          PLAN: See eval. PT 1-2x / week for 8 weeks. [] Continue per plan of care [] Alter current plan (see comments)  [x] Plan of care initiated [] Hold pending MD visit [] Discharge    Electronically signed by: Carlos Mesa, PT, DPT      Note: If patient does not return for scheduled/ recommended follow up visits, this note will serve as a discharge from care along with most recent update on progress.

## 2022-07-13 ENCOUNTER — HOSPITAL ENCOUNTER (OUTPATIENT)
Dept: PHYSICAL THERAPY | Age: 77
Setting detail: THERAPIES SERIES
Discharge: HOME | End: 2022-07-13
Payer: MEDICARE

## 2022-07-13 NOTE — FLOWSHEET NOTE
Physical Therapy  Cancellation/No-show Note  Patient Name:  Latrice Reyes  :  1945   Date:  2022  Cancelled visits to date: 2  No-shows to date: 1    Patient status for today's appointment patient:  [x]  Cancelled ,   []  Rescheduled appointment  []  No-show      Reason given by patient:  [x]  Patient ill  []  Conflicting appointment  []  No transportation    []  Conflict with work  []  No reason given  []  Other:     Comments:      Phone call information:   []  Phone call made today to patient at _ time at number provided:      []  Patient answered, conversation as follows:    []  Patient did not answer, message left as follows:  []  Phone call not made today  [x]  Phone call not needed - pt contacted us to cancel and provided reason for cancellation.      Electronically signed by:  Milton Luis, PT, DPT

## 2022-07-15 ENCOUNTER — HOSPITAL ENCOUNTER (OUTPATIENT)
Dept: PHYSICAL THERAPY | Age: 77
Setting detail: THERAPIES SERIES
Discharge: HOME | End: 2022-07-15
Payer: MEDICARE

## 2022-07-15 PROCEDURE — 97110 THERAPEUTIC EXERCISES: CPT

## 2022-07-15 PROCEDURE — 97530 THERAPEUTIC ACTIVITIES: CPT

## 2022-07-15 PROCEDURE — 97112 NEUROMUSCULAR REEDUCATION: CPT

## 2022-07-15 NOTE — FLOWSHEET NOTE
[address]  Phone: [phone]   Fax: [phone]    Physical Therapy Treatment Note/ Progress Report:     Date:  7/15/2022    Patient Name:  Gael Garcia    :  1945  MRN: [id number]  Restrictions/Precautions:    Medical/Treatment Diagnosis Information:  Diagnosis: R26.89 (ICD-10-CM) - Balance problem  Treatment Diagnosis: decreased gait and balance abilities, decreased BLE strength, chronic low back pain, decreased functional status. Insurance/Certification information:  PT Insurance Information: Medical [place]  Physician Information:  Kev Gonzales 47Martin of care signed (Y/N): []  Yes [x]  No     Date of Patient follow up with Physician:      Progress Report: []  Yes  [x]  No     Date Range for reporting period:  Beginnin22  Ending:     Progress report due (10 Rx/or 30 days whichever is less): visit #10 or 6/15/02    Recertification due (POC duration/ or 90 days whichever is less): visit #16 or 22    Visit # Insurance Allowable Auth required? Date Range   3 mn []  Yes  [x]  No $40 copay       Latex Allergy:  [x]NO      []YES  Preferred Language for Healthcare:   [x]English       []other:    Functional Scale:           Date assessed:  [facility] physical FS primary measure score = 46; risk adjusted = 48  22    Pain level:  5-8/10     SUBJECTIVE:  Pt reports that she has been feeling sick. Has taken many covid tests that were negative but has had a sore throat and congestion/shortness of breath. OBJECTIVE: See eval      RESTRICTIONS/PRECAUTIONS: chronic low back pain, fall risk, WEARS SUPPLEMENTAL O2 AT HOME, vertigo.     Exercises/Interventions:     Therapeutic Exercise (12289)  Resistance / level Sets/sec Reps Notes / Cues   stepper  x5'  O2 sat dropped to 88% after 2 min however increased to 93% within 20 seconds of rest.    IB  3 way HR  2 x 30\"   [facility] or more patients providing skilled therapy interventions, but not providing any significant amount of measurable one-on-one time to either patient, for improvements in LE, proximal hip, and core control in self care, mobility, lifting, ambulation and eccentric single leg control. NMR and Therapeutic Activities:    [x] (29044 or 38210) Provided verbal/tactile cueing for activities related to improving balance, coordination, kinesthetic sense, posture, motor skill, proprioception and motor activation to allow for proper function of core, proximal hip and LE with self care and ADLs  [x] (47497) Gait Re-education- Provided training and instruction to the patient for proper LE, core and proximal hip recruitment and positioning and eccentric body weight control with ambulation re-education including up and down stairs     Home Exercise Program:    [] (37110) Reviewed/Progressed HEP activities related to strengthening, flexibility, endurance, ROM of core, proximal hip and LE for functional self-care, mobility, lifting and ambulation/stair navigation   [] (60649)Reviewed/Progressed HEP activities related to improving balance, coordination, kinesthetic sense, posture, motor skill, proprioception of core, proximal hip and LE for self care, mobility, lifting, and ambulation/stair navigation      Manual Treatments:  PROM / STM / Oscillations-Mobs:  G-I, II, III, IV (PA's, Inf., Post.)  [] (77791) Provided manual therapy to mobilize LE, proximal hip and/or LS spine soft tissue/joints for the purpose of modulating pain, promoting relaxation,  increasing ROM, reducing/eliminating soft tissue swelling/inflammation/restriction, improving soft tissue extensibility and allowing for proper ROM for normal function with self care, mobility, lifting and ambulation.      Modalities:  [] (41151) Vasopneumatic compression: Utilized vasopneumatic compression to decrease edema / swelling for the purpose of improving mobility and quad tone / recruitment which will allow for increased overall function including but not limited to self-care, transfers, ambulation, and ascending / descending stairs. Charges:  Timed Code Treatment Minutes: 45   Total Treatment Minutes: 45     [] EVAL - LOW (94135)   [] EVAL - MOD (12565)  [] EVAL - HIGH (50774)  [] RE-EVAL (46996)  [x] HA(09430) x  1     [] Ionto  [x] NMR (03080) x 1      [] Vaso  [] Manual (97370) x       [] Ultrasound  [x] TA x  1      [] Mech Traction (50063)  [] Aquatic Therapy x     [] ES (un) (91733):   [] Home Management Training x  [] ES(attended) (43188)   [] Dry Needling 1-2 muscles (21628):  [] Dry Needling 3+ muscles (925079  [] Group:      [] Other: Gait    GOALS:  Patient stated goal:  strengthening the legs   [] Progressing: [] Met: [] Not Met: [] Adjusted     Therapist goals for Patient:   Short Term Goals: To be achieved in: 2 weeks  1. Independent in HEP and progression per patient tolerance, in order to prevent re-injury. [] Progressing: [] Met: [] Not Met: [] Adjusted  2. Patient will have a decrease in pain to facilitate improvement in movement, function, and ADLs as indicated by Functional Deficits. [] Progressing: [] Met: [] Not Met: [] Adjusted     Long Term Goals: To be achieved in: 8 weeks  1. FOTO score of at least 55 to assist with reaching prior level of function. [] Progressing: [] Met: [] Not Met: [] Adjusted  2. Patient will demonstrate increased AROM to [facility] without pain in lumbar spine and hips to allow for proper joint functioning as indicated by patients Functional Deficits. [] Progressing: [] Met: [] Not Met: [] Adjusted  3. Patient will demonstrate an increase in Strength to at least 4+/5 as well as good proximal hip strength and control to allow for proper functional mobility as indicated by patients Functional Deficits. [] Progressing: [] Met: [] Not Met: [] Adjusted  4.  Patient will return to functional activities including ambulating with [facility], ADLs, this note will serve as a discharge from care along with most recent update on progress.

## 2022-07-19 ENCOUNTER — NURSE TRIAGE (OUTPATIENT)
Dept: OTHER | Facility: CLINIC | Age: 77
End: 2022-07-19

## 2022-07-19 NOTE — TELEPHONE ENCOUNTER
eceived call from [facility] with [employer]. Limited triage as caller not with patient, however caller was unable to give much information. Writer called patient. Subjective: Caller states \"she's has been a little confused and had gargled speech for a few seconds\"     Current Symptoms: Patient states she has been more confused off/on for the past 2 weeks. Sometimes she forgets what she is about to do or what she was going to do. Is in PT for balance issues. Denies headache or difficulty breathing. Denies alcohol or drug use, takes Morphine daily. Says she has been seeing things that aren't there for the past week, no paranoia or hearing voices. Onset: 2 weeks ago; gradual    Associated Symptoms: NA    Pain Severity: 0/10; N/A; none    Temperature: no fever       What has been tried: nothing    LMP: NA Pregnant: NA    Recommended disposition: See in Office Today or Tomorrow    Care advice provided, patient verbalizes understanding; denies any other questions or concerns; instructed to call back for any new or worsening symptoms. Patient/Caller agrees with recommended disposition; writer provided warm transfer to [employer] at [facility] for appointment scheduling     Attention Provider: Thank you for allowing me to participate in the care of your patient. The patient was connected to triage in response to information provided to the ECC/PSC. Please do not respond through this encounter as the response is not directed to a shared pool.           Reason for Disposition   Longstanding confusion (e.g., dementia, stroke) and worsening    Protocols used: Confusion - Delirium-ADULT-[place]

## 2022-07-20 ENCOUNTER — HOSPITAL ENCOUNTER (OUTPATIENT)
Dept: PHYSICAL THERAPY | Age: 77
Setting detail: THERAPIES SERIES
Discharge: HOME | End: 2022-07-20
Payer: MEDICARE

## 2022-07-20 NOTE — FLOWSHEET NOTE
Physical Therapy  Cancellation/No-show Note  Patient Name:  Clay Burkett  :  1945   Date:  2022  Cancelled visits to date: 2  No-shows to date: 2    Patient status for today's appointment patient:  []  [address] ,   []  Rescheduled appointment  [x]  No-show ,      Reason given by patient:  []  Patient ill  []  Conflicting appointment  []  No transportation    []  Conflict with work  []  No reason given  [x]  Other:  Note in pt's chart reports altered mental status via phone call to PCP's office. Comments:      Phone call information:   []  Phone call made today to patient at _ time at number provided:      []  Patient answered, conversation as follows:    []  Patient did not answer, message left as follows:  [x]  Phone call not made today  []  Phone call not needed - pt contacted us to cancel and provided reason for cancellation.      Electronically signed by:  Masha Beckman, PT, DPT

## 2022-07-21 ENCOUNTER — TELEPHONE (OUTPATIENT)
Dept: FAMILY MEDICINE CLINIC | Age: 77
End: 2022-07-21

## 2022-07-21 RX ORDER — DULOXETIN HYDROCHLORIDE 30 MG/1
CAPSULE, DELAYED RELEASE ORAL
Qty: 90 CAPSULE | Refills: 1 | Status: SHIPPED | OUTPATIENT
Start: 2022-07-21 | End: 2022-08-04

## 2022-07-21 NOTE — TELEPHONE ENCOUNTER
[facility] calling on behalf of patient for a medication refill. DULoxetine (CYMBALTA) 30 MG extended release capsule [[id number]]  DISCONTINUED    Order Details  Dose, Route, Frequency: As Directed   Dispense Quantity: 90 capsule Refills: 1          Sig: TAKE ONE CAPSULE BY MOUTH DAILY   [facility]. Phone number [phone].

## 2022-07-21 NOTE — TELEPHONE ENCOUNTER
----- Message from Lentz Cheryl sent at 7/21/2022 12:01 PM EDT -----  Subject: Appointment Request    Reason for Call: Established Patient Appointment needed: Routine Existing   Condition Follow Up    QUESTIONS    Reason for appointment request? Available appointments did not meet   patient need     Additional Information for Provider? Pt's daughter Cali Castro is calling to   schedule a follow up for the pt for increased depression-death of   grandson, decreased interest of activity, saying that she is seeing   animals that aren't there, discuss meds, referral to psych.  She would like   to see if she can be seen before the 1st available on 8/18, pls give her a   call.  ---------------------------------------------------------------------------  --------------  [address] [employer]  [phone]; OK to leave message on voicemail  ---------------------------------------------------------------------------  --------------  SCRIPT ANSWERS  COVID Screen: Masha Holman

## 2022-07-22 ENCOUNTER — TELEPHONE (OUTPATIENT)
Dept: FAMILY MEDICINE CLINIC | Age: 77
End: 2022-07-22

## 2022-07-22 ENCOUNTER — HOSPITAL ENCOUNTER (OUTPATIENT)
Dept: PHYSICAL THERAPY | Age: 77
Setting detail: THERAPIES SERIES
Discharge: HOME | End: 2022-07-22
Payer: MEDICARE

## 2022-07-22 DIAGNOSIS — R26.89 BALANCE PROBLEM: Primary | ICD-10-CM

## 2022-07-22 DIAGNOSIS — R42 DIZZINESS: ICD-10-CM

## 2022-07-22 PROCEDURE — 97530 THERAPEUTIC ACTIVITIES: CPT

## 2022-07-22 PROCEDURE — 97110 THERAPEUTIC EXERCISES: CPT

## 2022-07-22 RX ORDER — AMLODIPINE BESYLATE 10 MG/1
TABLET ORAL
Qty: 90 TABLET | Refills: 1 | Status: SHIPPED | OUTPATIENT
Start: 2022-07-22 | End: 2022-10-04

## 2022-07-22 NOTE — FLOWSHEET NOTE
[address]  Phone: [phone]   Fax: [phone]    Physical Therapy Treatment Note/ Progress Report:     Date:  2022    Patient Name:  Dariel Guzman    :  1945  MRN: [id number]  Restrictions/Precautions:    Medical/Treatment Diagnosis Information:  Diagnosis: R26.89 (ICD-10-CM) - Balance problem  Treatment Diagnosis: decreased gait and balance abilities, decreased BLE strength, chronic low back pain, decreased functional status. Insurance/Certification information:  PT Insurance Information: Medical [place]  Physician Information:  Kev Gonzales 47Martin of care signed (Y/N): []  Yes [x]  No     Date of Patient follow up with Physician:      Progress Report: [x]  Yes  []  No     Date Range for reporting period:  Beginnin22  PN: 22  Ending:     Progress report due (10 Rx/or 30 days whichever is less): visit #10 or 76    Recertification due (POC duration/ or 90 days whichever is less): visit #16 or 22    Visit # Insurance Allowable Auth required? Date Range   4 mn []  Yes  [x]  No $40 copay       Latex Allergy:  [x]NO      []YES  Preferred Language for Healthcare:   [x]English       []other:    Functional Scale:           Date assessed:  FOTO physical FS primary measure score = 46; risk adjusted = 48  22  FOTO physical FS primary measure score = 55; risk adjusted = 48  22    Pain level:  5-8/10 (chronic back pain)     SUBJECTIVE:  Pt report that she is feeling a little better overall in term of her illness (not covid, taken multiple tests). States that therapy has helped a little but continues to have balance issues and vertigo. Vertigo has improved a little recently but continues to limit her. Pt reports vertigo (room spinning to the point that she get nauseous). Turning the head to the R causes the vertigo. Spinning will last ~ 3 min (in testing lasted <30 sec).  Feels like her balance issues and vertigo are related as she never had balance issues until the vertigo began. Does not recall any fall, sickness, or car accidents that may have precipitated the vertigo. OBJECTIVE: See eval  7/22: Mary hallpike (+) B (R worse than L)  Strength (0-5) / Myotomes Left Right   Hip Flexion - supine       Hip Flexion - seated (L1-2) 4- 4-   Hip Abduction 4 4   Hip Adduction       Hip ER       Hip IR       Quads (L2-4) 4- 4-   Hamstrings 4- 4   Ankle Dorsiflexion (L4-5) 4+ 4+   Ankle Plantarflexion (S1-2) 4+ in sitting 4+ in sitting       RESTRICTIONS/PRECAUTIONS: chronic low back pain, fall risk, WEARS SUPPLEMENTAL O2 AT HOME, vertigo. Exercises/Interventions:     Therapeutic Exercise (09099)  Resistance / level Sets/sec Reps Notes / Cues   stepper  x5'  O2 sat dropped to 88% after 2 min however increased to 93% within 20 seconds of rest.    IB  3 way HR     HSS       HIP FLEXOR S              SLR       BRIDGE              Standing hip 3 way  1 10 B           Therapeutic Activities (52033)       Step ups:  fwd        Gait training            Retro ambulation  Lateral stepping 7/8: foot prop improved back pain after prolonged ambulation, PT adjusted cane as it was too tall. 7/15: cueing for widened ERNESTO and increased step length. PN  25'            Neuromuscular Re-ed (84070)       Balance in // bars       Step taps:  fwd    Rhomberg:  EO  EC  Foam  Foam EC    Semi-Tandem          Step taps:  Fwd  lat    Step up onto foam   10 B    X1 viewing  2 x 30\"            Manual Intervention (41275)       Knee mobs/PROM       Tib/Fem Mobs       Patella Mobs       Ankle mobs                         Modalities:     Pt. Education:  -pt educated on diagnosis, prognosis and expectations for rehab  -all pt questions were answered    Home Exercise Program:  7/22:  Access Code: [email or web address]  URL: [email or web address]  Date: 07/22/2022  Prepared by: David Gill    Exercises  Standing Hip Flexion with Counter Support - 1 x daily - 7 x weekly - 2 sets - 10 reps  Standing Hip Abduction with Counter Support - 1 x daily - 7 x weekly - 2 sets - 10 reps  Standing Hip Extension with Counter Support - 1 x daily - 7 x weekly - 2 sets - 10 reps  Seated Heel Raise - 1 x daily - 7 x weekly - 3 sets - 10 reps      Access Code: [email or web address]  URL: [email or web address]  Date: 06/23/2022  Prepared by: Martha Viramontes    Exercises  Supine Bridge - 1 x daily - 7 x weekly - 3 sets - 10 reps  Supine Lower Trunk Rotation - 1 x daily - 7 x weekly - 3 sets - 10 reps  Seated Hamstring Stretch - 1 x daily - 7 x weekly - 3 sets - 10 reps  Supine Active Straight Leg Raise - 1 x daily - 7 x weekly - 3 sets - 10 reps       Therapeutic Exercise and NMR EXR  [x] (49012) Provided verbal/tactile cueing for activities related to strengthening, flexibility, endurance, ROM for improvements in LE, proximal hip, and core control with self care, mobility, lifting, ambulation. [x] (90777) Provided verbal/tactile cueing for activities related to improving balance, coordination, kinesthetic sense, posture, motor skill, proprioception  to assist with LE, proximal hip, and core control in self care, mobility, lifting, ambulation and eccentric single leg control.   [] (62103) Therapist is in constant attendance of 2 or more patients providing skilled therapy interventions, but not providing any significant amount of measurable one-on-one time to either patient, for improvements in LE, proximal hip, and core control in self care, mobility, lifting, ambulation and eccentric single leg control.      NMR and Therapeutic Activities:    [x] (35099 or 73624) Provided verbal/tactile cueing for activities related to improving balance, coordination, kinesthetic sense, posture, motor skill, proprioception and motor activation to allow for proper function of core, proximal hip and LE with self care and ADLs  [x] (67492) Gait Re-education- Provided training and instruction to the patient for proper LE, core and proximal hip recruitment and positioning and eccentric body weight control with ambulation re-education including up and down stairs     Home Exercise Program:    [] (11298) Reviewed/Progressed HEP activities related to strengthening, flexibility, endurance, ROM of core, proximal hip and LE for functional self-care, mobility, lifting and ambulation/stair navigation   [] (45491)Reviewed/Progressed HEP activities related to improving balance, coordination, kinesthetic sense, posture, motor skill, proprioception of core, proximal hip and LE for self care, mobility, lifting, and ambulation/stair navigation      Manual Treatments:  PROM / STM / Oscillations-Mobs:  G-I, II, III, IV (PA's, Inf., Post.)  [] (92395) Provided manual therapy to mobilize LE, proximal hip and/or LS spine soft tissue/joints for the purpose of modulating pain, promoting relaxation,  increasing ROM, reducing/eliminating soft tissue swelling/inflammation/restriction, improving soft tissue extensibility and allowing for proper ROM for normal function with self care, mobility, lifting and ambulation. Modalities:  [] (37649) Vasopneumatic compression: Utilized vasopneumatic compression to decrease edema / swelling for the purpose of improving mobility and quad tone / recruitment which will allow for increased overall function including but not limited to self-care, transfers, ambulation, and ascending / descending stairs.        Charges:  Timed Code Treatment Minutes: 45   Total Treatment Minutes: 45     [] EVAL - LOW (10769)   [] EVAL - MOD (52311)  [] EVAL - HIGH (25948)  [] RE-EVAL (64669)  [x] TO(44753) x  1     [] Ionto  [] NMR (52678) x 1      [] Vaso  [] Manual (45485) x       [] Ultrasound  [x] TA x  2      [] Mech Traction (51090)  [] Aquatic Therapy x     [] ES (un) (53363):   [] Home Management Training x  [] ES(attended) (26710)   [] Dry Needling 1-2 muscles (74758):  [] Dry Needling 3+ muscles (100228  [] Group:      [] Other: Gait    GOALS:  Patient stated goal:  strengthening the legs   [] Progressing: [] Met: [] Not Met: [] Adjusted     Therapist goals for Patient:   Short Term Goals: To be achieved in: 2 weeks  1. Independent in HEP and progression per patient tolerance, in order to prevent re-injury. [x] Progressing: [] Met: [] Not Met: [] Adjusted  2. Patient will have a decrease in pain to facilitate improvement in movement, function, and ADLs as indicated by Functional Deficits. [x] Progressing: [] Met: [] Not Met: [] Adjusted     Long Term Goals: To be achieved in: 8 weeks  1. FOTO score of at least 55 to assist with reaching prior level of function. [] Progressing: [x] Met: [] Not Met: [] Adjusted  2. Patient will demonstrate increased AROM to [employer] without pain in lumbar spine and hips to allow for proper joint functioning as indicated by patients Functional Deficits. [x] Progressing: [] Met: [] Not Met: [] Adjusted  3. Patient will demonstrate an increase in Strength to at least 4+/5 as well as good proximal hip strength and control to allow for proper functional mobility as indicated by patients Functional Deficits. [x] Progressing: [] Met: [] Not Met: [] Adjusted  4. Patient will return to functional activities including ambulating with SPC, ADLs, stairs without increased symptoms or restriction. [] Progressing: [] Met: [x] Not Met: [] Adjusted  5. Pt will demonstrate improvement in balance and mobility by scoring 13 seconds or less on TUG. (7/22/22: NT)  [] Progressing: [] Met: [] Not Met: [] Adjusted    Overall Progression Towards Functional goals/ Treatment Progress Update:  [] Patient is progressing as expected towards functional goals listed. [] Progression is slowed due to complexities/Impairments listed. [] Progression has been slowed due to co-morbidities.   [x] Plan just implemented, too soon to assess goals progression <30days   [] Goals require adjustment due to lack of progress  [] Patient is not progressing as expected and requires additional follow up with physician  [] Other    Persisting Functional Limitations/Impairments:  []Sitting []Standing   []Walking []Stairs   []Transfers []ADLs   []Squatting/bending []Kneeling  []Housework []Job related tasks  []Driving []Sports/Recreation   []Sleeping []Other:    ASSESSMENT:  PN completed this date. Attendance to therapy has been limited as pt has been ill for the past few weeks. Was not covid but had similar symptoms and pt did not want to risk exposing the clinic. Pt's balance symptoms appear to be vestibular related. On initial evaluation pt's strength was significantly decreased. Strength testing today showed strength WFL for the LEs. Issued HEP to continue to strengthen to full potential.  Vestibular screen performed. Symptoms consistent with BPPV however pt would benefit from full vestibular assessment. Pt scheduled with vestibular therapist NV. Treatment/Activity Tolerance:  [x] Pt able to complete treatment [] Patient limited by fatique  [x] Patient limited by pain  [] Patient limited by other medical complications  [] Other:     Prognosis:  [] Good [x] Fair  [] Poor    Patient Requires Follow-up: [x] Yes  [] No          PLAN: See eval. PT 1-2x / week for 8 weeks. [] Continue per plan of care [x] Alter current plan (vestibular assessment)  [x] Plan of care initiated [] Hold pending MD visit [] Discharge    Electronically signed by: Lyric Romero PT, DPT      Note: If patient does not return for scheduled/ recommended follow up visits, this note will serve as a discharge from care along with most recent update on progress.

## 2022-07-22 NOTE — TELEPHONE ENCOUNTER
Domitila with [facility]) PT calling to see if Dr. Rick Aguilar could put in orders for a vestibular evaluation for the patient. Any questions, call back number [phone].     Please advise

## 2022-07-25 ENCOUNTER — HOSPITAL ENCOUNTER (OUTPATIENT)
Dept: PHYSICAL THERAPY | Age: 77
Setting detail: THERAPIES SERIES
Discharge: HOME | End: 2022-07-25
Payer: MEDICARE

## 2022-07-25 PROCEDURE — 97530 THERAPEUTIC ACTIVITIES: CPT

## 2022-07-25 PROCEDURE — 95992 CANALITH REPOSITIONING PROC: CPT

## 2022-07-25 PROCEDURE — 97112 NEUROMUSCULAR REEDUCATION: CPT

## 2022-07-25 NOTE — FLOWSHEET NOTE
[address]  Phone: [phone]   Fax: [phone]    Physical Therapy Treatment Note/ Progress Report:     Date:  2022    Patient Name:  Fanny Bautista    :  1945  MRN: [id number]  Restrictions/Precautions:    Medical/Treatment Diagnosis Information:  Diagnosis: R26.89 (ICD-10-CM) - Balance problem  Treatment Diagnosis: decreased gait and balance abilities, decreased BLE strength, chronic low back pain, decreased functional status. Insurance/Certification information:  PT Insurance Information: [employer]  Physician Information:  Kev Gonzales 469 of care signed (Y/N): []  Yes [x]  No     Date of Patient follow up with Physician:      Progress Report: [x]  Yes  []  No     Date Range for reporting period:  Beginnin22  PN: 22  Ending:     Progress report due (10 Rx/or 30 days whichever is less): visit #10 or     Recertification due (POC duration/ or 90 days whichever is less): visit #16 or 22    Visit # Insurance Allowable Auth required? Date Range   5 mn []  Yes  [x]  No $40 copay       Latex Allergy:  [x]NO      []YES  Preferred Language for Healthcare:   [x]English       []other:    Functional Scale:           Date assessed:  FOTO physical FS primary measure score = 46; risk adjusted = 48  22  FOTO physical FS primary measure score = 55; risk adjusted = 48  22    Pain level:  5-8/10 (chronic back pain)     SUBJECTIVE:  Patient reports that she experience dizziness with lying down/bending. Says bending causes LOB . States that she has had > 3 falls having the vertigo symptoms. Pt report that she is feeling a little better overall in term of her illness (not covid, taken multiple tests). States that therapy has helped a little but continues to have balance issues and vertigo. Vertigo has improved a little recently but continues to limit her.   Pt reports vertigo (room spinning to the point that she get nauseous). Turning the head to the R causes the vertigo. Spinning will last ~ 3 min (in testing lasted <30 sec). Feels like her balance issues and vertigo are related as she never had balance issues until the vertigo began. Does not recall any fall, sickness, or car accidents that may have precipitated the vertigo. OBJECTIVE:   Vestibular screen:  Sacc- slight delayed  V/H  Smooth pursuit-norm  Convergence-  VOR- Difficulty w/ VOR V maintaining gaze  V    7/22: [place] hallpike (+) B (R worse than L)  Strength (0-5) / Myotomes Left Right   Hip Flexion - supine       Hip Flexion - seated (L1-2) 4- 4-   Hip Abduction 4 4   Hip Adduction       Hip ER       Hip IR       Quads (L2-4) 4- 4-   Hamstrings 4- 4   Ankle Dorsiflexion (L4-5) 4+ 4+   Ankle Plantarflexion (S1-2) 4+ in sitting 4+ in sitting       RESTRICTIONS/PRECAUTIONS: chronic low back pain, fall risk, WEARS SUPPLEMENTAL O2 AT HOME, vertigo. Exercises/Interventions:     Therapeutic Exercise (57794)  Resistance / level Sets/sec Reps Notes / Cues   stepper  x5'  O2 sat dropped to 88% after 2 min however increased to 93% within 20 seconds of rest.    IB  3 way HR     HSS       HIP FLEXOR S              SLR       BRIDGE              Standing hip 3 way  1 10 B           Therapeutic Activities (80495)       Step ups:  fwd        Gait training            Retro ambulation  Lateral stepping 7/8: foot prop improved back pain after prolonged ambulation, PT adjusted cane as it was too tall. 7/15: cueing for widened ERNESTO and increased step length.     PN  22'     Education provided for        Neuromuscular Re-ed (78356)       [facility] R+ & L +   R CRT x 2     7/25 R upbeat nystagmus    Balance in // bars       Step taps:  fwd    Rhomberg:  EO  EC  Foam  Foam EC    Semi-Tandem          Step taps:  Fwd  lat    Step up onto foam   10 B    X1 viewing  2 x 30\"            Manual Intervention (06071)       Knee mobs/PROM       Tib/Fem Mobs Patella Mobs       Ankle mobs                         Modalities:     Pt. Education:  -pt educated on diagnosis, prognosis and expectations for rehab  -all pt questions were answered    Home Exercise Program:  7/22:  Access Code: [email or web address]  URL: [email or web address]  Date: 07/22/2022  Prepared by: Timmy Avitia    Exercises  Standing Hip Flexion with Counter Support - 1 x daily - 7 x weekly - 2 sets - 10 reps  Standing Hip Abduction with Counter Support - 1 x daily - 7 x weekly - 2 sets - 10 reps  Standing Hip Extension with Counter Support - 1 x daily - 7 x weekly - 2 sets - 10 reps  Seated Heel Raise - 1 x daily - 7 x weekly - 3 sets - 10 reps      Access Code: [email or web address]  URL: [employer]/  Date: 06/23/2022  Prepared by: Rafael Levin    Exercises  Supine Bridge - 1 x daily - 7 x weekly - 3 sets - 10 reps  Supine Lower Trunk Rotation - 1 x daily - 7 x weekly - 3 sets - 10 reps  Seated Hamstring Stretch - 1 x daily - 7 x weekly - 3 sets - 10 reps  Supine Active Straight Leg Raise - 1 x daily - 7 x weekly - 3 sets - 10 reps       Therapeutic Exercise and NMR EXR  [x] (82620) Provided verbal/tactile cueing for activities related to strengthening, flexibility, endurance, ROM for improvements in LE, proximal hip, and core control with self care, mobility, lifting, ambulation. [x] (80813) Provided verbal/tactile cueing for activities related to improving balance, coordination, kinesthetic sense, posture, motor skill, proprioception  to assist with LE, proximal hip, and core control in self care, mobility, lifting, ambulation and eccentric single leg control.   [] (41116) Therapist is in constant attendance of 2 or more patients providing skilled therapy interventions, but not providing any significant amount of measurable one-on-one time to either patient, for improvements in LE, proximal hip, and core control in self care, mobility, lifting, ambulation and eccentric single leg control.      NMR and Therapeutic Activities:    [x] (06770 or 09004) Provided verbal/tactile cueing for activities related to improving balance, coordination, kinesthetic sense, posture, motor skill, proprioception and motor activation to allow for proper function of core, proximal hip and LE with self care and ADLs  [x] (82413) Gait Re-education- Provided training and instruction to the patient for proper LE, core and proximal hip recruitment and positioning and eccentric body weight control with ambulation re-education including up and down stairs     Home Exercise Program:    [] (39993) Reviewed/Progressed HEP activities related to strengthening, flexibility, endurance, ROM of core, proximal hip and LE for functional self-care, mobility, lifting and ambulation/stair navigation   [] (97985)Reviewed/Progressed HEP activities related to improving balance, coordination, kinesthetic sense, posture, motor skill, proprioception of core, proximal hip and LE for self care, mobility, lifting, and ambulation/stair navigation      Manual Treatments:  PROM / STM / Oscillations-Mobs:  G-I, II, III, IV (PA's, Inf., Post.)  [] (15692) Provided manual therapy to mobilize LE, proximal hip and/or LS spine soft tissue/joints for the purpose of modulating pain, promoting relaxation,  increasing ROM, reducing/eliminating soft tissue swelling/inflammation/restriction, improving soft tissue extensibility and allowing for proper ROM for normal function with self care, mobility, lifting and ambulation. Modalities:  [] (83787) Vasopneumatic compression: Utilized vasopneumatic compression to decrease edema / swelling for the purpose of improving mobility and quad tone / recruitment which will allow for increased overall function including but not limited to self-care, transfers, ambulation, and ascending / descending stairs.        Charges:  Timed Code Treatment Minutes: 42   Total Treatment Minutes: 42     [] EVAL - LOW (88101)   [] EVAL - MOD (31748)  [] EVAL - HIGH (38358)  [] RE-EVAL (20339)  [] HH(25785) x       [] Ionto  [x] NMR (48642) x 1      [] Vaso  [] Manual (32242) x       [] Ultrasound  [x] TA x  1     [] Mech Traction (33794)  [] Aquatic Therapy x     [] ES (un) (21359):   [] Home Management Training x  [] ES(attended) (93270)   [] Dry Needling 1-2 muscles (26724):  [] Dry Needling 3+ muscles (772707  [] Group:      [x] Other: CRP x1    GOALS:  Patient stated goal:  strengthening the legs   [] Progressing: [] Met: [] Not Met: [] Adjusted     Therapist goals for Patient:   Short Term Goals: To be achieved in: 2 weeks  1. Independent in HEP and progression per patient tolerance, in order to prevent re-injury. [x] Progressing: [] Met: [] Not Met: [] Adjusted  2. Patient will have a decrease in pain to facilitate improvement in movement, function, and ADLs as indicated by Functional Deficits. [x] Progressing: [] Met: [] Not Met: [] Adjusted     Long Term Goals: To be achieved in: 8 weeks  1. FOTO score of at least 55 to assist with reaching prior level of function. [] Progressing: [x] Met: [] Not Met: [] Adjusted  2. Patient will demonstrate increased AROM to [facility] without pain in lumbar spine and hips to allow for proper joint functioning as indicated by patients Functional Deficits. [x] Progressing: [] Met: [] Not Met: [] Adjusted  3. Patient will demonstrate an increase in Strength to at least 4+/5 as well as good proximal hip strength and control to allow for proper functional mobility as indicated by patients Functional Deficits. [x] Progressing: [] Met: [] Not Met: [] Adjusted  4. Patient will return to functional activities including ambulating with SPC, ADLs, stairs without increased symptoms or restriction. [] Progressing: [] Met: [x] Not Met: [] Adjusted  5.  Pt will demonstrate improvement in balance and mobility by scoring 13 seconds or less on TUG. (7/22/22: NT)  [] Progressing: [] Met: [] Not Met: [] Adjusted    Overall Progression Towards Functional goals/ Treatment Progress Update:  [] Patient is progressing as expected towards functional goals listed. [] Progression is slowed due to complexities/Impairments listed. [] Progression has been slowed due to co-morbidities. [x] Plan just implemented, too soon to assess goals progression <30days   [] Goals require adjustment due to lack of progress  [] Patient is not progressing as expected and requires additional follow up with physician  [] Other    Persisting Functional Limitations/Impairments:  []Sitting []Standing   []Walking []Stairs   []Transfers []ADLs   []Squatting/bending []Kneeling  []Housework []Job related tasks  []Driving []Sports/Recreation   []Sleeping []Other:    ASSESSMENT:  The patient was screened for vestibular dysfunction this date. She presented with a BPPV bilaterally > Right vs the left. R CRT was performed x 2 with good response. Patient received education on vestibular pathology and preventative measures. She will further benefit from skilled PT services to address vestibular issues. PTreatment/Activity Tolerance:  [x] Pt able to complete treatment [] Patient limited by fatique  [x] Patient limited by pain  [] Patient limited by other medical complications  [] Other:     Prognosis:  [] Good [x] Fair  [] Poor    Patient Requires Follow-up: [x] Yes  [] No          PLAN: See eval. PT 1-2x / week for 8 weeks. [] Continue per plan of care [x] Alter current plan (vestibular assessment)  [x] Plan of care initiated [] Hold pending MD visit [] Discharge    Electronically signed by: Lefty Heller, PT, DPT      Note: If patient does not return for scheduled/ recommended follow up visits, this note will serve as a discharge from care along with most recent update on progress.

## 2022-07-27 ENCOUNTER — OFFICE VISIT (OUTPATIENT)
Dept: FAMILY MEDICINE CLINIC | Age: 77
End: 2022-07-27
Payer: MEDICARE

## 2022-07-27 VITALS
DIASTOLIC BLOOD PRESSURE: 94 MMHG | SYSTOLIC BLOOD PRESSURE: 136 MMHG | BODY MASS INDEX: 34.18 KG/M2 | OXYGEN SATURATION: 95 % | HEART RATE: 80 BPM | WEIGHT: 175 LBS

## 2022-07-27 DIAGNOSIS — F33.1 MODERATE EPISODE OF RECURRENT MAJOR DEPRESSIVE DISORDER (HCC): ICD-10-CM

## 2022-07-27 DIAGNOSIS — M15.9 GENERALIZED OSTEOARTHROSIS, INVOLVING MULTIPLE SITES: Primary | ICD-10-CM

## 2022-07-27 DIAGNOSIS — M54.40 BILATERAL LOW BACK PAIN WITH SCIATICA, SCIATICA LATERALITY UNSPECIFIED, UNSPECIFIED CHRONICITY: ICD-10-CM

## 2022-07-27 PROCEDURE — 99213 OFFICE O/P EST LOW 20 MIN: CPT | Performed by: FAMILY MEDICINE

## 2022-07-27 PROCEDURE — 1123F ACP DISCUSS/DSCN MKR DOCD: CPT | Performed by: FAMILY MEDICINE

## 2022-07-27 ASSESSMENT — PATIENT HEALTH QUESTIONNAIRE - PHQ9
1. LITTLE INTEREST OR PLEASURE IN DOING THINGS: 3
SUM OF ALL RESPONSES TO PHQ QUESTIONS 1-9: 23
8. MOVING OR SPEAKING SO SLOWLY THAT OTHER PEOPLE COULD HAVE NOTICED. OR THE OPPOSITE, BEING SO FIGETY OR RESTLESS THAT YOU HAVE BEEN MOVING AROUND A LOT MORE THAN USUAL: 2
7. TROUBLE CONCENTRATING ON THINGS, SUCH AS READING THE NEWSPAPER OR WATCHING TELEVISION: 2
SUM OF ALL RESPONSES TO PHQ9 QUESTIONS 1 & 2: 6
SUM OF ALL RESPONSES TO PHQ QUESTIONS 1-9: 23
10. IF YOU CHECKED OFF ANY PROBLEMS, HOW DIFFICULT HAVE THESE PROBLEMS MADE IT FOR YOU TO DO YOUR WORK, TAKE CARE OF THINGS AT HOME, OR GET ALONG WITH OTHER PEOPLE: 2
4. FEELING TIRED OR HAVING LITTLE ENERGY: 3
5. POOR APPETITE OR OVEREATING: 3
2. FEELING DOWN, DEPRESSED OR HOPELESS: 3
SUM OF ALL RESPONSES TO PHQ QUESTIONS 1-9: 21
6. FEELING BAD ABOUT YOURSELF - OR THAT YOU ARE A FAILURE OR HAVE LET YOURSELF OR YOUR FAMILY DOWN: 2
SUM OF ALL RESPONSES TO PHQ QUESTIONS 1-9: 23
3. TROUBLE FALLING OR STAYING ASLEEP: 3
9. THOUGHTS THAT YOU WOULD BE BETTER OFF DEAD, OR OF HURTING YOURSELF: 2

## 2022-07-27 ASSESSMENT — ENCOUNTER SYMPTOMS
CONSTIPATION: 1
SHORTNESS OF BREATH: 1

## 2022-07-27 NOTE — PROGRESS NOTES
Laterality Date    ABDOMINAL EXPLORATION SURGERY  09/07/2017    Lysis of adhesions, removal of pelvic mass, total abdominal hysterectomy with BSO, partial omentectomy, appendectomy, and pelvic lymphadenectomy    BACK SURGERY      multiple lumbar spine surgeries including laminectomy and fusion    CERVICAL FUSION      COLONOSCOPY      ENDOSCOPY, COLON, DIAGNOSTIC      HIATAL HERNIA REPAIR  1999    OTHER SURGICAL HISTORY Left [id number]    SPINAL CORD STIMULATOR BATTERY REPLACMENT     Family History   Problem Relation Age of Onset    Diabetes Mother     Heart Disease Mother         cad, aortic aneurysm    Diabetes Father     Heart Disease Father         heart attack, chf    Other Sister         abdominal aneurysm     Social History     Tobacco Use    Smoking status: Never    Smokeless tobacco: Never   Substance Use Topics    Alcohol use: Yes     Comment: rare      Allergies   Allergen Reactions    Penicillins      Current Outpatient Medications on File Prior to Visit   Medication Sig Dispense Refill    amLODIPine (NORVASC) 10 MG tablet TAKE ONE TABLET BY MOUTH DAILY 90 tablet 1    DULoxetine (CYMBALTA) 30 MG extended release capsule TAKE ONE CAPSULE BY MOUTH DAILY 90 capsule 1    folbee plus (FOLBEE PLUS) TABS TAKE ONE TABLET BY MOUTH ONCE DAILY 30 tablet 5    bethanechol (URECHOLINE) 25 MG tablet TAKE ONE TABLET BY MOUTH THREE TIMES A DAY 90 tablet 1    XARELTO 20 MG TABS tablet TAKE ONE TABLET BY MOUTH DAILY WITH SUPPER 90 tablet 1    sertraline (ZOLOFT) 25 MG tablet TAKE ONE TABLET BY MOUTH DAILY 90 tablet 0    meclizine (ANTIVERT) 25 MG tablet TAKE ONE TABLET BY MOUTH THREE TIMES A DAY AS NEEDED FOR DIZZINESS 90 tablet 0    ondansetron (ZOFRAN) 4 MG tablet Take 1 tablet by mouth 3 times daily as needed for Nausea or Vomiting 30 tablet 0    buPROPion (WELLBUTRIN XL) 300 MG extended release tablet TAKE ONE TABLET BY MOUTH EVERY MORNING 90 tablet 1    spironolactone (ALDACTONE) 50 MG tablet 1 tablet      furosemide (LASIX) 20 MG tablet TAKE ONE TABLET BY MOUTH DAILY AS NEEDED FOR WEIGHT GAIN OF 2LBS OR LEG EDEMA 90 tablet 0    rOPINIRole (REQUIP) 0.25 MG tablet Take 0.25 mg by mouth nightly      morphine (MSIR) 15 MG tablet Take 15 mg by mouth 3 times daily. acetaminophen (TYLENOL) 500 MG tablet Take 500 mg by mouth every 6 hours as needed for Pain      tiZANidine (ZANAFLEX) 4 MG tablet Take 4 mg by mouth 2 times daily       pregabalin (LYRICA) 150 MG capsule Take 150 mg by mouth three times daily. No current facility-administered medications on file prior to visit. Review of Systems   Constitutional:  Positive for appetite change and fatigue. Respiratory:  Positive for shortness of breath. Cardiovascular:  Negative for chest pain and leg swelling. Gastrointestinal:  Positive for constipation. Neurological:  Positive for dizziness. Psychiatric/Behavioral:  Positive for confusion and dysphoric mood. The patient has insomnia. OBJECTIVE:    BP (!) 136/94   Pulse 80   Wt 175 lb (79.4 kg)   SpO2 95%   BMI 34.18 kg/m²    Physical Exam  Constitutional:       General: She is not in acute distress. Appearance: She is well-developed. She is obese. HENT:      Head: Normocephalic and atraumatic. Right Ear: Tympanic membrane and external ear normal.      Left Ear: Tympanic membrane and external ear normal.      Nose: Nose normal.      Mouth/Throat:      Mouth: Mucous membranes are moist.      Pharynx: No posterior oropharyngeal erythema. Eyes:      General:         Right eye: No discharge. Conjunctiva/sclera: Conjunctivae normal.   Neck:      Thyroid: No thyromegaly. Vascular: No JVD. Trachea: No tracheal deviation. Cardiovascular:      Rate and Rhythm: Normal rate and regular rhythm. Heart sounds: Normal heart sounds. Pulmonary:      Effort: Pulmonary effort is normal. No respiratory distress. Breath sounds: Normal breath sounds. No rales.    Musculoskeletal: Cervical back: Normal range of motion and neck supple. Right lower leg: No edema. Left lower leg: No edema. Lymphadenopathy:      Cervical: No cervical adenopathy. Skin:     General: Skin is warm and dry. Neurological:      Mental Status: She is alert and oriented to person, place, and time. Gait: Gait abnormal (Walking with a cane). Psychiatric:         Mood and Affect: Mood normal.         Behavior: Behavior normal.       ASSESSMENT/PLAN:    [facility] was seen today for depression. Diagnoses and all orders for this visit:    Generalized osteoarthrosis, involving multiple sites    Moderate episode of recurrent major depressive disorder [facility])  Patient will return in about 1 week for reevaluation. We will have her bring all her medications and have asked that she be accompanied by one of her children. Bilateral low back pain with sciatica, sciatica laterality unspecified, unspecified chronicity  Currently followed in pain management. Return in about 8 days (around 8/4/2022), or at 12:30, 30 min appt. Please note portions of this note were completed with a voicerecognition program.  Efforts were made to edit the dictations but occasionally words are mis-transcribed.

## 2022-07-28 ENCOUNTER — HOSPITAL ENCOUNTER (OUTPATIENT)
Dept: PHYSICAL THERAPY | Age: 77
Setting detail: THERAPIES SERIES
Discharge: HOME | End: 2022-07-28
Payer: MEDICARE

## 2022-07-30 PROBLEM — F33.1 MODERATE EPISODE OF RECURRENT MAJOR DEPRESSIVE DISORDER (HCC): Status: ACTIVE | Noted: 2022-07-30

## 2022-08-02 ENCOUNTER — HOSPITAL ENCOUNTER (OUTPATIENT)
Dept: PHYSICAL THERAPY | Age: 77
Setting detail: THERAPIES SERIES
Discharge: HOME | End: 2022-08-02
Payer: MEDICARE

## 2022-08-02 NOTE — FLOWSHEET NOTE
Physical Therapy  Cancellation/No-show Note  Patient Name:  True Mock  :  1945   Date:  2022  Cancelled visits to date: 3  No-shows to date: 3    Patient status for today's appointment patient:  []  Cancelled , ,   []  Rescheduled appointment  [x]  No-show , ,      Reason given by patient:  []  Patient ill   pt sick all day   []  Conflicting appointment  []  No transportation    []  Conflict with work  []  No reason given  []  Other:  Note in pt's chart reports altered mental status via phone call to PCP's office. Comments:      Phone call information:   [x]  Phone call made today to patient at  2:56 pm at number provided:      [x]  Patient answered, conversation as follows: Pt forgot about appointment. She said the CRT done by vestibular PT last session helped with her dizziness. She was told about her next appt on Thursday, and said she would be there.    []  Patient did not answer, message left as follows:  []  Phone call not made today  []  Phone call not needed - pt contacted us to cancel and provided reason for cancellation.      Electronically signed by:  Darian Olvera, PT, PT

## 2022-08-04 ENCOUNTER — OFFICE VISIT (OUTPATIENT)
Dept: FAMILY MEDICINE CLINIC | Age: 77
End: 2022-08-04
Payer: MEDICARE

## 2022-08-04 ENCOUNTER — HOSPITAL ENCOUNTER (OUTPATIENT)
Dept: PHYSICAL THERAPY | Age: 77
Setting detail: THERAPIES SERIES
Discharge: HOME | End: 2022-08-04
Payer: MEDICARE

## 2022-08-04 VITALS
BODY MASS INDEX: 34.76 KG/M2 | TEMPERATURE: 97.2 F | HEART RATE: 80 BPM | SYSTOLIC BLOOD PRESSURE: 124 MMHG | DIASTOLIC BLOOD PRESSURE: 88 MMHG | WEIGHT: 178 LBS

## 2022-08-04 DIAGNOSIS — R29.898 WEAKNESS OF BOTH LOWER EXTREMITIES: ICD-10-CM

## 2022-08-04 DIAGNOSIS — F33.1 MODERATE EPISODE OF RECURRENT MAJOR DEPRESSIVE DISORDER (HCC): Primary | ICD-10-CM

## 2022-08-04 DIAGNOSIS — I10 ESSENTIAL HYPERTENSION: ICD-10-CM

## 2022-08-04 DIAGNOSIS — I48.19 PERSISTENT ATRIAL FIBRILLATION (HCC): ICD-10-CM

## 2022-08-04 PROCEDURE — 99213 OFFICE O/P EST LOW 20 MIN: CPT | Performed by: FAMILY MEDICINE

## 2022-08-04 PROCEDURE — 1123F ACP DISCUSS/DSCN MKR DOCD: CPT | Performed by: FAMILY MEDICINE

## 2022-08-04 PROCEDURE — 97112 NEUROMUSCULAR REEDUCATION: CPT

## 2022-08-04 PROCEDURE — 97530 THERAPEUTIC ACTIVITIES: CPT

## 2022-08-04 RX ORDER — DULOXETIN HYDROCHLORIDE 60 MG/1
60 CAPSULE, DELAYED RELEASE ORAL DAILY
Qty: 90 CAPSULE | Refills: 1 | Status: SHIPPED | OUTPATIENT
Start: 2022-08-04 | End: 2022-11-01

## 2022-08-04 ASSESSMENT — ENCOUNTER SYMPTOMS
CONSTIPATION: 1
BACK PAIN: 1

## 2022-08-04 NOTE — FLOWSHEET NOTE
[address] [place] [address]  Phone: [phone]   Fax: [phone]    Physical Therapy Treatment Note/ Progress Report:     Date:  2022    Patient Name:  Christine Fink    :  1945  MRN: [id number]  Restrictions/Precautions:    Medical/Treatment Diagnosis Information:  Diagnosis: R26.89 (ICD-10-CM) - Balance problem  Treatment Diagnosis: decreased gait and balance abilities, decreased BLE strength, chronic low back pain, decreased functional status. Insurance/Certification information:  PT Insurance Information: [employer] [place]  Physician Information:  Kev Gonzales 469 of care signed (Y/N): []  Yes [x]  No     Date of Patient follow up with Physician:      Progress Report: []  Yes  [x]  No     Date Range for reporting period:  Beginnin22  PN: 22  Ending:     Progress report due (10 Rx/or 30 days whichever is less): visit #10 or 07    Recertification due (POC duration/ or 90 days whichever is less): visit #16 or 22    Visit # Insurance Allowable Auth required? Date Range   6 mn []  Yes  [x]  No $40 copay       Latex Allergy:  [x]NO      []YES  Preferred Language for Healthcare:   [x]English       []other:    Functional Scale:           Date assessed:  FOTO physical FS primary measure score = 46; risk adjusted = 48  22  FOTO physical FS primary measure score = 55;      22    Pain level:  5/10 (chronic back pain)     SUBJECTIVE:  Not having any dizziness today. Reports of low back pain. Was sick earlier this week, but is feeling better.         OBJECTIVE:   Vestibular screen:  Sacc- slight delayed  V/H  Smooth pursuit-norm  Convergence-  VOR- Difficulty w/ VOR V maintaining gaze  V    : Mary hallpike (+) B (R worse than L)  Strength (0-5) / Myotomes Left Right   Hip Flexion - supine       Hip Flexion - seated (L1-2) 4- 4-   Hip Abduction 4 4   Hip Adduction       Hip ER       Hip IR       Quads (L2-4) 4- 4- Hamstrings 4- 4   Ankle Dorsiflexion (L4-5) 4+ 4+   Ankle Plantarflexion (S1-2) 4+ in sitting 4+ in sitting       RESTRICTIONS/PRECAUTIONS: chronic low back pain, fall risk, WEARS SUPPLEMENTAL O2 AT HOME, vertigo. Exercises/Interventions:     Therapeutic Exercise (11006)  Resistance / level Sets/sec Reps Notes / Cues   stepper  x5'  O2 sat dropped to 88% after 2 min however increased to 93% within 20 seconds of rest.    IB / HR  3 way HR  2x30\"/2x10    HSS       HIP FLEXOR S              SLR       BRIDGE              Standing hip 3 way  1 10 B 8/4: //bars          Therapeutic Activities (52093)       Step ups:  -fwd  -lat   4\"  4\"   1  1   10 B  10 B 8/4: in //bars       Gait training            Retro ambulation  Lateral stepping 7/8: foot prop improved back pain after prolonged ambulation, PT adjusted cane as it was too tall. 7/15: cueing for widened ERNESTO and increased step length. PN      Review of [facility]   3'    Neuromuscular Re-ed (94269)       [facility] R+ & L +   R CRT x 2     7/25 R upbeat nystagmus    Balance in // bars  AirEx:  -NBOS EO  -NBOS EC  -Trunk rotation  -Head turns/ head nods with NBOS  -Side stepping      20\"x2  20\"x2         x10 B  X10 ea  x2 laps B           Rhomberg:  EO  EC  Foam  Foam EC    Semi-Tandem          Step taps:  Fwd  lat    Step up onto foam     X1 viewing            Manual Intervention (40817)       Knee mobs/PROM       Tib/Fem Mobs       Patella Mobs       Ankle mobs                         Modalities:     Pt. Education:  -pt educated on diagnosis, prognosis and expectations for rehab  -all pt questions were answered    Home Exercise Program:  7/22:  Access Code: [email or web address]  URL: [email or web address]  Date: 07/22/2022  Prepared by: Kathy Powers    Exercises  Standing Hip Flexion with Counter Support - 1 x daily - 7 x weekly - 2 sets - 10 reps  Standing Hip Abduction with Counter Support - 1 x daily - 7 x weekly - 2 sets - 10 reps  Standing Hip Extension with Counter Support - 1 x daily - 7 x weekly - 2 sets - 10 reps  Seated Heel Raise - 1 x daily - 7 x weekly - 3 sets - 10 reps      Access Code: [email or web address]  URL: [employer]/  Date: 06/23/2022  Prepared by: Piter Garcia    Exercises  Supine Bridge - 1 x daily - 7 x weekly - 3 sets - 10 reps  Supine Lower Trunk Rotation - 1 x daily - 7 x weekly - 3 sets - 10 reps  Seated Hamstring Stretch - 1 x daily - 7 x weekly - 3 sets - 10 reps  Supine Active Straight Leg Raise - 1 x daily - 7 x weekly - 3 sets - 10 reps       Therapeutic Exercise and NMR EXR  [x] (28177) Provided verbal/tactile cueing for activities related to strengthening, flexibility, endurance, ROM for improvements in LE, proximal hip, and core control with self care, mobility, lifting, ambulation. [x] (94719) Provided verbal/tactile cueing for activities related to improving balance, coordination, kinesthetic sense, posture, motor skill, proprioception  to assist with LE, proximal hip, and core control in self care, mobility, lifting, ambulation and eccentric single leg control.   [] (14162) Therapist is in constant attendance of 2 or more patients providing skilled therapy interventions, but not providing any significant amount of measurable one-on-one time to either patient, for improvements in LE, proximal hip, and core control in self care, mobility, lifting, ambulation and eccentric single leg control.      NMR and Therapeutic Activities:    [x] (23607 or 58006) Provided verbal/tactile cueing for activities related to improving balance, coordination, kinesthetic sense, posture, motor skill, proprioception and motor activation to allow for proper function of core, proximal hip and LE with self care and ADLs  [x] (61184) Gait Re-education- Provided training and instruction to the patient for proper LE, core and proximal hip recruitment and positioning and eccentric body weight control with ambulation re-education including up and down stairs Short Term Goals: To be achieved in: 2 weeks  1. Independent in HEP and progression per patient tolerance, in order to prevent re-injury. [x] Progressing: [] Met: [] Not Met: [] Adjusted  2. Patient will have a decrease in pain to facilitate improvement in movement, function, and ADLs as indicated by Functional Deficits. [x] Progressing: [] Met: [] Not Met: [] Adjusted     Long Term Goals: To be achieved in: 8 weeks  1. FOTO score of at least 55 to assist with reaching prior level of function. [] Progressing: [x] Met: [] Not Met: [] Adjusted  2. Patient will demonstrate increased AROM to [facility] without pain in lumbar spine and hips to allow for proper joint functioning as indicated by patients Functional Deficits. [x] Progressing: [] Met: [] Not Met: [] Adjusted  3. Patient will demonstrate an increase in Strength to at least 4+/5 as well as good proximal hip strength and control to allow for proper functional mobility as indicated by patients Functional Deficits. [x] Progressing: [] Met: [] Not Met: [] Adjusted  4. Patient will return to functional activities including ambulating with SPC, ADLs, stairs without increased symptoms or restriction. [] Progressing: [] Met: [x] Not Met: [] Adjusted  5. Pt will demonstrate improvement in balance and mobility by scoring 13 seconds or less on TUG. (7/22/22: NT)  [] Progressing: [] Met: [] Not Met: [] Adjusted    Overall Progression Towards Functional goals/ Treatment Progress Update:  [] Patient is progressing as expected towards functional goals listed. [x] Progression is slowed due to complexities/Impairments listed. [] Progression has been slowed due to co-morbidities.   [] Plan just implemented, too soon to assess goals progression <30days   [] Goals require adjustment due to lack of progress  [] Patient is not progressing as expected and requires additional follow up with physician  [] Other    Persisting Functional Limitations/Impairments:  []Sitting []Standing   []Walking []Stairs   []Transfers []ADLs   []Squatting/bending []Kneeling  []Housework []Job related tasks  []Driving []Sports/Recreation   []Sleeping []Other:    ASSESSMENT:  Worked on exercises on compliant surfaces and with eyes closed today to challenge pt's vestibular system. Pt continues to demo imbalance with activities, requiring the use of B UEs. Pt reported that BPPV symptoms have improved since her last visit. Continue to strengthen B LEs, work on balance, and challenge vestibular system. Treatment/Activity Tolerance:  [x] Pt able to complete treatment [x] Patient limited by fatique  [x] Patient limited by pain  [] Patient limited by other medical complications  [] Other:     Prognosis:  [] Good [x] Fair  [] Poor    Patient Requires Follow-up: [x] Yes  [] No          PLAN: See eval. PT 1-2x / week for 8 weeks. [x] Continue per plan of care [x] Alter current plan (vestibular assessment)  [] Plan of care initiated [] Hold pending MD visit [] Discharge    Electronically signed by:   Stephanie Rojo, SPT  Therapist was present, directed the patient's care, made skilled judgement, and was responsible for assessment and treatment of the patient. Connor Carrillo, PT, DPT      Note: If patient does not return for scheduled/ recommended follow up visits, this note will serve as a discharge from care along with most recent update on progress.

## 2022-08-04 NOTE — PROGRESS NOTES
SUBJECTIVE:    Dena Diaz is a 77 y.o. female who presents for a follow up visit. Chief Complaint   Patient presents with    Follow-up        Mental Health Problem  The primary symptoms include dysphoric mood. Additional symptoms of the illness include anhedonia, insomnia, appetite change, fatigue, psychomotor retardation, feelings of worthlessness and attention impairment. She admits to suicidal ideas. She does not have a plan to attempt suicide. She does not contemplate harming herself. She has not already injured self. Risk factors that are present for mental illness include a history of mental illness. Patient was brought in a week ago by her son who is concerned that his mom had become more confused. Patient does have a history of chronic pain and is on multiple medications. She has had increased sedation and falls. She also has had some vertigo that is actually responding to therapy. Patient did bring in all her medications in their original bottles. She states that she does have to have her pain medications that are administered through pain medicine clinic. Her sister is still helping her with her medications. She has a number of medications that are sedating. Now that her vertigo is improving I have asked her to try not using the meclizine. I have also asked her to try and decrease her use of the muscle relaxant. There is some confusion over her antidepressants. She is taking Wellbutrin 300 mg daily, Cymbalta 30 mg daily, and Zoloft 25 mg. She is agreeable to increase her Cymbalta to 60 mg and discontinue the Zoloft. Patient's medications, allergies, past medical,surgical, social and family histories were reviewed and updated as appropriate.      Past Medical History:   Diagnosis Date    AVM     Chronic back pain     Depression     Gastritis     History of blood transfusion     secondary to GI bleed    History of GI bleed     Hypertension     Personal history of MRSA (methicillin resistant Staphylococcus aureus)     recurrent, last 2-3 years ago finger (doesnt remember which one)     Past Surgical History:   Procedure Laterality Date    ABDOMINAL EXPLORATION SURGERY  09/07/2017    Lysis of adhesions, removal of pelvic mass, total abdominal hysterectomy with BSO, partial omentectomy, appendectomy, and pelvic lymphadenectomy    BACK SURGERY      multiple lumbar spine surgeries including laminectomy and fusion    CERVICAL FUSION      COLONOSCOPY      ENDOSCOPY, COLON, DIAGNOSTIC      HIATAL HERNIA REPAIR  1999    OTHER SURGICAL HISTORY Left [id number]    SPINAL CORD STIMULATOR BATTERY REPLACMENT     Family History   Problem Relation Age of Onset    Diabetes Mother     Heart Disease Mother         cad, aortic aneurysm    Diabetes Father     Heart Disease Father         heart attack, chf    Other Sister         abdominal aneurysm     Social History     Tobacco Use    Smoking status: Never    Smokeless tobacco: Never   Substance Use Topics    Alcohol use: Yes     Comment: rare      Allergies   Allergen Reactions    Penicillins      Current Outpatient Medications on File Prior to Visit   Medication Sig Dispense Refill    UNABLE TO FIND Zyquil      amLODIPine (NORVASC) 10 MG tablet TAKE ONE TABLET BY MOUTH DAILY 90 tablet 1    folbee plus (FOLBEE PLUS) TABS TAKE ONE TABLET BY MOUTH ONCE DAILY 30 tablet 5    bethanechol (URECHOLINE) 25 MG tablet TAKE ONE TABLET BY MOUTH THREE TIMES A DAY 90 tablet 1    XARELTO 20 MG TABS tablet TAKE ONE TABLET BY MOUTH DAILY WITH SUPPER 90 tablet 1    meclizine (ANTIVERT) 25 MG tablet TAKE ONE TABLET BY MOUTH THREE TIMES A DAY AS NEEDED FOR DIZZINESS 90 tablet 0    buPROPion (WELLBUTRIN XL) 300 MG extended release tablet TAKE ONE TABLET BY MOUTH EVERY MORNING 90 tablet 1    furosemide (LASIX) 20 MG tablet TAKE ONE TABLET BY MOUTH DAILY AS NEEDED FOR WEIGHT GAIN OF 2LBS OR LEG EDEMA 90 tablet 0    rOPINIRole (REQUIP) 0.25 MG tablet Take 0.25 mg by mouth nightly      morphine (MSIR) 15 MG tablet Take 15 mg by mouth in the morning and 15 mg before bedtime. acetaminophen (TYLENOL) 500 MG tablet Take 500 mg by mouth every 6 hours as needed for Pain      tiZANidine (ZANAFLEX) 4 MG tablet Take 4 mg by mouth 2 times daily       pregabalin (LYRICA) 150 MG capsule Take 150 mg by mouth three times daily. No current facility-administered medications on file prior to visit. Review of Systems   Constitutional:  Positive for appetite change and fatigue. Cardiovascular:  Positive for chest pain and leg swelling. Gastrointestinal:  Positive for constipation. Musculoskeletal:  Positive for back pain (with left radiculopathy). Neurological:  Positive for dizziness (improved). Psychiatric/Behavioral:  Positive for dysphoric mood. Negative for sleep disturbance. The patient has insomnia. OBJECTIVE:    /88   Pulse 80   Temp 97.2 °F (36.2 °C)   Wt 178 lb (80.7 kg)   BMI 34.76 kg/m²    Vitals:    08/04/22 1217 08/04/22 1257   BP: (!) 142/95 124/88   Pulse: 80    Temp: 97.2 °F (36.2 °C)    Weight: 178 lb (80.7 kg)        Physical Exam  Constitutional:       General: She is not in acute distress. Appearance: She is well-developed. She is obese. HENT:      Head: Normocephalic and atraumatic. Right Ear: Tympanic membrane and external ear normal.      Left Ear: Tympanic membrane and external ear normal.      Nose: Nose normal.      Mouth/Throat:      Mouth: Mucous membranes are moist.      Pharynx: No posterior oropharyngeal erythema. Eyes:      General:         Right eye: No discharge. Conjunctiva/sclera: Conjunctivae normal.   Neck:      Thyroid: No thyromegaly. Vascular: No JVD. Trachea: No tracheal deviation. Cardiovascular:      Rate and Rhythm: Normal rate and regular rhythm. Heart sounds: Normal heart sounds. Pulmonary:      Effort: Pulmonary effort is normal. No respiratory distress.       Breath sounds: Normal breath sounds. No rales. Musculoskeletal:      Cervical back: Normal range of motion and neck supple. Lymphadenopathy:      Cervical: No cervical adenopathy. Skin:     General: Skin is warm and dry. Neurological:      Mental Status: She is alert and oriented to person, place, and time. ASSESSMENT/PLAN:    Jacklyn García was seen today for follow-up. Diagnoses and all orders for this visit:    Moderate episode of recurrent major depressive disorder (HCC)  -     DULoxetine (CYMBALTA) 60 MG extended release capsule; Take 1 capsule by mouth in the morning. Discontinue the Sertraline    Weakness of both lower extremities  Patient has had recurrent falls possibly secondary to both intermittent vertigo, excessive sedation from medications, and her weakness in lower extremities. She is encouraged to continue therapy for now. Also encouraged to avoid sedating medications. Patient is on bethanechol for urinary incontinence and I have asked her to talk with her urologist to consider an alternative medication or possibly even therapy. Persistent atrial fibrillation (HCC)  We will continue Xarelto in spite of her increased fall risk as it is important to prevent stroke from A. fib. Essential hypertension  Well-controlled as noted with repeat blood pressure today      Return for  scheduled follow-up. Return sooner if symptoms or not improving with the change in medications. .    Please note portions of this note were completed with a voicerecognition program.  Efforts were made to edit the dictations but occasionally words are mis-transcribed.

## 2022-08-09 ENCOUNTER — HOSPITAL ENCOUNTER (OUTPATIENT)
Dept: PHYSICAL THERAPY | Age: 77
Setting detail: THERAPIES SERIES
Discharge: HOME | End: 2022-08-09
Payer: MEDICARE

## 2022-08-09 NOTE — FLOWSHEET NOTE
Physical Therapy  Cancellation/No-show Note  Patient Name:  Quang Canales  :  1945   Date:  2022  Cancelled visits to date: 3  No-shows to date: 3    Patient status for today's appointment patient:  [x]  Cancelled , ,  ,   []  Rescheduled appointment  [x]  No-show , ,      Reason given by patient:  [x]  Patient ill   pt sick all day   []  Conflicting appointment  []  No transportation    []  Conflict with work  []  No reason given  []  Other:  Note   Comments:      Phone call information:   []  Phone call made today to patient at   at number provided:      []  Patient answered, conversation as follows:    []  Patient did not answer, message left as follows:  []  Phone call not made today  [x]  Phone call not needed - pt contacted us to cancel and provided reason for cancellation.      Electronically signed by:  Seth Crockett PT, PT

## 2022-08-11 ENCOUNTER — HOSPITAL ENCOUNTER (OUTPATIENT)
Dept: PHYSICAL THERAPY | Age: 77
Setting detail: THERAPIES SERIES
Discharge: HOME | End: 2022-08-11
Payer: MEDICARE

## 2022-08-11 PROCEDURE — 97112 NEUROMUSCULAR REEDUCATION: CPT

## 2022-08-11 NOTE — FLOWSHEET NOTE
Flexion - seated (L1-2) 4- 4-   Hip Abduction 4 4   Hip Adduction       Hip ER       Hip IR       Quads (L2-4) 4- 4-   Hamstrings 4- 4   Ankle Dorsiflexion (L4-5) 4+ 4+   Ankle Plantarflexion (S1-2) 4+ in sitting 4+ in sitting       RESTRICTIONS/PRECAUTIONS: chronic low back pain, fall risk, WEARS SUPPLEMENTAL O2 AT HOME, vertigo. Exercises/Interventions:   Therapeutic Exercise (32477)  Resistance / level Sets/sec Reps Notes / Cues   stepper  x5'     IB / HR  3 way HR  2x30\"/2x10    HSS       HIP FLEXOR S              SLR       BRIDGE              Standing hip 3 way  1 10 B 8/4: //bars          Therapeutic Activities (43278)       Step ups:  -fwd  -lat   6\"  6\"   1  1   10 B  10 B        Gait training            Retro ambulation  Lateral stepping 7/8: foot prop improved back pain after prolonged ambulation, PT adjusted cane as it was too tall. 7/15: cueing for widened ERNESTO and increased step length. PN      Review of HEP      Neuromuscular Re-ed (52704)       [facility] R+ & L +   R CRT x 2     7/25 R upbeat nystagmus    Balance in // bars  AirEx:  -NBOS EO  -NBOS EC  -Trunk rotation  -Head turns/ head nods with NBOS  -Step up and over AirEx  -Side stepping  -Step over 3 black half bolster      20\"x2  20\"x2         x10 B  X10 ea  x5  X 2 laps                 8/11: flat foot with step over          Rhomberg:  EO  EC  Foam  Foam EC    Semi-Tandem          Step taps:  Fwd  lat    Step up onto foam     X1 viewing            Manual Intervention (90804)       Knee mobs/PROM       Tib/Fem Mobs       Patella Mobs       Ankle mobs                         Modalities:     Pt. Education:  -pt educated on diagnosis, prognosis and expectations for rehab  -all pt questions were answered    Home Exercise Program:  7/22:  Access Code: [email or web address]  URL: [email or web address]  Date: 07/22/2022  Prepared by: Weston Kayser    Exercises  Standing Hip Flexion with Counter Support - 1 x daily - 7 x weekly - 2 sets - 10 reps  Standing Hip Abduction with Counter Support - 1 x daily - 7 x weekly - 2 sets - 10 reps  Standing Hip Extension with Counter Support - 1 x daily - 7 x weekly - 2 sets - 10 reps  Seated Heel Raise - 1 x daily - 7 x weekly - 3 sets - 10 reps      Access Code: [email or web address]  URL: [employer]/  Date: 06/23/2022  Prepared by: Suraj Light    Exercises  Supine Bridge - 1 x daily - 7 x weekly - 3 sets - 10 reps  Supine Lower Trunk Rotation - 1 x daily - 7 x weekly - 3 sets - 10 reps  Seated Hamstring Stretch - 1 x daily - 7 x weekly - 3 sets - 10 reps  Supine Active Straight Leg Raise - 1 x daily - 7 x weekly - 3 sets - 10 reps       Therapeutic Exercise and NMR EXR  [x] (60756) Provided verbal/tactile cueing for activities related to strengthening, flexibility, endurance, ROM for improvements in LE, proximal hip, and core control with self care, mobility, lifting, ambulation. [x] (85570) Provided verbal/tactile cueing for activities related to improving balance, coordination, kinesthetic sense, posture, motor skill, proprioception  to assist with LE, proximal hip, and core control in self care, mobility, lifting, ambulation and eccentric single leg control.   [] (72663) Therapist is in constant attendance of 2 or more patients providing skilled therapy interventions, but not providing any significant amount of measurable one-on-one time to either patient, for improvements in LE, proximal hip, and core control in self care, mobility, lifting, ambulation and eccentric single leg control.      NMR and Therapeutic Activities:    [x] (72720 or 40695) Provided verbal/tactile cueing for activities related to improving balance, coordination, kinesthetic sense, posture, motor skill, proprioception and motor activation to allow for proper function of core, proximal hip and LE with self care and ADLs  [x] (42643) Gait Re-education- Provided training and instruction to the patient for proper LE, core and proximal hip recruitment and positioning and eccentric body weight control with ambulation re-education including up and down stairs     Home Exercise Program:    [] (00299) Reviewed/Progressed HEP activities related to strengthening, flexibility, endurance, ROM of core, proximal hip and LE for functional self-care, mobility, lifting and ambulation/stair navigation   [] (16294)Reviewed/Progressed HEP activities related to improving balance, coordination, kinesthetic sense, posture, motor skill, proprioception of core, proximal hip and LE for self care, mobility, lifting, and ambulation/stair navigation      Manual Treatments:  PROM / STM / Oscillations-Mobs:  G-I, II, III, IV (PA's, Inf., Post.)  [] (03814) Provided manual therapy to mobilize LE, proximal hip and/or LS spine soft tissue/joints for the purpose of modulating pain, promoting relaxation,  increasing ROM, reducing/eliminating soft tissue swelling/inflammation/restriction, improving soft tissue extensibility and allowing for proper ROM for normal function with self care, mobility, lifting and ambulation. Modalities:  [] (31934) Vasopneumatic compression: Utilized vasopneumatic compression to decrease edema / swelling for the purpose of improving mobility and quad tone / recruitment which will allow for increased overall function including but not limited to self-care, transfers, ambulation, and ascending / descending stairs.        Charges:  Timed Code Treatment Minutes: 30   Total Treatment Minutes: 30     [] EVAL - LOW (47654)   [] EVAL - MOD (00011)  [] EVAL - HIGH (16666)  [] RE-EVAL (34925)  [] YN(83578) x       [] Ionto  [x] NMR (48284) x 2      [] Vaso  [] Manual (56168) x       [] Ultrasound  [] TA x       [] Mech Traction (10619)  [] Aquatic Therapy x     [] ES (un) (69877):   [] Home Management Training x  [] ES(attended) (56114)   [] Dry Needling 1-2 muscles (77559):  [] Dry Needling 3+ muscles (972539  [] Group:      [] Other: CRP x    GOALS:  Patient stated goal:  strengthening the legs   [] Progressing: [] Met: [] Not Met: [] Adjusted     Therapist goals for Patient:   Short Term Goals: To be achieved in: 2 weeks  1. Independent in HEP and progression per patient tolerance, in order to prevent re-injury. [x] Progressing: [] Met: [] Not Met: [] Adjusted  2. Patient will have a decrease in pain to facilitate improvement in movement, function, and ADLs as indicated by Functional Deficits. [x] Progressing: [] Met: [] Not Met: [] Adjusted     Long Term Goals: To be achieved in: 8 weeks  1. FOTO score of at least 55 to assist with reaching prior level of function. [] Progressing: [x] Met: [] Not Met: [] Adjusted  2. Patient will demonstrate increased [facility] to [facility] without pain in lumbar spine and hips to allow for proper joint functioning as indicated by patients Functional Deficits. [x] Progressing: [] Met: [] Not Met: [] Adjusted  3. Patient will demonstrate an increase in Strength to at least 4+/5 as well as good proximal hip strength and control to allow for proper functional mobility as indicated by patients Functional Deficits. [x] Progressing: [] Met: [] Not Met: [] Adjusted  4. Patient will return to functional activities including ambulating with SPC, ADLs, stairs without increased symptoms or restriction. [] Progressing: [] Met: [x] Not Met: [] Adjusted  5. Pt will demonstrate improvement in balance and mobility by scoring 13 seconds or less on TUG. (7/22/22: NT)  [] Progressing: [] Met: [] Not Met: [] Adjusted    Overall Progression Towards Functional goals/ Treatment Progress Update:  [] Patient is progressing as expected towards functional goals listed. [x] Progression is slowed due to complexities/Impairments listed. [] Progression has been slowed due to co-morbidities.   [] Plan just implemented, too soon to assess goals progression <30days   [] Goals require adjustment due to lack of progress  [] Patient is not progressing as expected and requires additional follow up with physician  [] Other    Persisting Functional Limitations/Impairments:  []Sitting []Standing   []Walking []Stairs   []Transfers []ADLs   []Squatting/bending []Kneeling  []Housework []Job related tasks  []Driving []Sports/Recreation   []Sleeping []Other:    ASSESSMENT:  Demo'd decreased standing and walking endurance today, requiring frequent seated rest breaks. Worked on exercises on compliant surfaces and with eyes closed today to challenge pt's vestibular system. Pt continues to demo imbalance with activities, requiring the use of B UEs. Continue to strengthen B LEs, work on balance, and challenge vestibular system. Treatment/Activity Tolerance:  [x] Pt able to complete treatment [x] Patient limited by fatique  [x] Patient limited by pain  [] Patient limited by other medical complications  [] Other:     Prognosis:  [] Good [x] Fair  [] Poor    Patient Requires Follow-up: [x] Yes  [] No          PLAN: See eval. PT 1-2x / week for 8 weeks. [x] Continue per plan of care [] Alter current plan (vestibular assessment)  [] Plan of care initiated [] Hold pending MD visit [] Discharge    Electronically signed by:   Ashwin Diaz SPT  Therapist was present, directed the patient's care, made skilled judgement, and was responsible for assessment and treatment of the patient. Art Escamilla, PT, DPT      Note: If patient does not return for scheduled/ recommended follow up visits, this note will serve as a discharge from care along with most recent update on progress.

## 2022-08-18 ENCOUNTER — HOSPITAL ENCOUNTER (OUTPATIENT)
Dept: PHYSICAL THERAPY | Age: 77
Setting detail: THERAPIES SERIES
Discharge: HOME | End: 2022-08-18
Payer: MEDICARE

## 2022-08-18 NOTE — FLOWSHEET NOTE
Physical Therapy  Cancellation/No-show Note  Patient Name:  Kwesi Larkin  :     Date:  2022  Cancelled visits to date: 4  No-shows to date: 3    Patient status for today's appointment patient:  [x]  Cancelled , ,  ,   []  Rescheduled appointment  [x]  No-show , , ,      Reason given by patient:  [x]  Patient ill   pt sick all day   []  Conflicting appointment  []  No transportation    []  Conflict with work  []  No reason given  []  Other:  Note   Comments:      Phone call information:   [x]  Phone call made today to patient at   at number provided:      [x]  Patient answered, conversation as follows: Pt informed of missed session, reports that she thought her appointment was Friday, very apologetic. Reminded of next appointment, next Thursday, 22. PT inquired regarding vertigo, pt reports that it was improving but now has returned. Pt advised pt to make an appointment with ENT to address further. Will continue therapy and add vestibular as needed. []  Patient did not answer, message left as follows:  []  Phone call not made today  []  Phone call not needed - pt contacted us to cancel and provided reason for cancellation.      Electronically signed by:  Marely Garsia, PT, DPT

## 2022-08-25 ENCOUNTER — HOSPITAL ENCOUNTER (OUTPATIENT)
Dept: PHYSICAL THERAPY | Age: 77
Setting detail: THERAPIES SERIES
Discharge: HOME | End: 2022-08-25
Payer: MEDICARE

## 2022-08-25 NOTE — FLOWSHEET NOTE
Physical Therapy  Cancellation/No-show Note  Patient Name:  Ramesh Brewer  :     Date:  2022  Cancelled visits to date: 4  No-shows to date: 4    Patient status for today's appointment patient:  [x]  Cancelled , ,  ,   []  Rescheduled appointment  [x]  No-show , , , .      Reason given by patient:  []  Patient ill   pt sick all day   []  Conflicting appointment  []  No transportation    []  Conflict with work  [x]  No reason given  []  Other:  Note   Comments:      Phone call information:   []  Phone call made today to patient at   at number provided:      []  Patient answered, conversation as follows: Pt informed of missed session, reports that she thought her appointment was Friday, very apologetic. Reminded of next appointment, next Thursday, 22. PT inquired regarding vertigo, pt reports that it was improving but now has returned. Pt advised pt to make an appointment with ENT to address further. Will continue therapy and add vestibular as needed. []  Patient did not answer, message left as follows:  [x]  Phone call not made today  []  Phone call not needed - pt contacted us to cancel and provided reason for cancellation.      Electronically signed by:  Lyric Romero, PT, DPT

## 2022-08-30 ENCOUNTER — TELEPHONE (OUTPATIENT)
Dept: FAMILY MEDICINE CLINIC | Age: 77
End: 2022-08-30

## 2022-08-30 ENCOUNTER — HOSPITAL ENCOUNTER (OUTPATIENT)
Dept: PHYSICAL THERAPY | Age: 77
Setting detail: THERAPIES SERIES
Discharge: HOME | End: 2022-08-30
Payer: MEDICARE

## 2022-08-30 RX ORDER — ONDANSETRON 4 MG/1
4 TABLET, FILM COATED ORAL 3 TIMES DAILY PRN
Qty: 12 TABLET | Refills: 0 | Status: SHIPPED | OUTPATIENT
Start: 2022-08-30

## 2022-08-30 NOTE — FLOWSHEET NOTE
Physical Therapy  Cancellation/No-show Note  Patient Name:  Kristin Draper  :     Date:  2022  Cancelled visits to date: 4  No-shows to date: 5    Patient status for today's appointment patient:  []  Cancelled , ,  ,   []  Rescheduled appointment  [x]  No-show , , , . ,      Reason given by patient:  []  Patient ill   pt sick all day   []  Conflicting appointment  []  No transportation    []  Conflict with work  [x]  No reason given  []  Other:  Note   Comments:      Phone call information:   []  Phone call made today to patient at   at number provided:      []  Patient answered, conversation as follows: Pt informed of missed session, reports that she thought her appointment was Friday, very apologetic. Reminded of next appointment, next Thursday, 22. PT inquired regarding vertigo, pt reports that it was improving but now has returned. Pt advised pt to make an appointment with ENT to address further. Will continue therapy and add vestibular as needed. []  Patient did not answer, message left as follows:  [x]  Phone call not made today  []  Phone call not needed - pt contacted us to cancel and provided reason for cancellation.      Electronically signed by:  Ghislaine Singh, PT, DPT

## 2022-08-30 NOTE — TELEPHONE ENCOUNTER
----- Message from Angelitokennedy Galindo sent at 8/30/2022 10:44 AM EDT -----  Subject: Message to Provider    QUESTIONS  Information for Provider? Layton Head is calling because for the past couple   days she has been feeling sick to her stomach and was wondering if   something could be called in for her to take. She feels nauseous and threw   up a few times the past couple of days and is wondering if she could get   something called in without an appointment.   ---------------------------------------------------------------------------  --------------  [employer]  [phone]; OK to leave message on voicemail  ---------------------------------------------------------------------------  --------------  SCRIPT ANSWERS  Relationship to Patient?  Self

## 2022-08-31 ENCOUNTER — TELEPHONE (OUTPATIENT)
Dept: PHYSICAL THERAPY | Age: 77
End: 2022-08-31

## 2022-08-31 NOTE — TELEPHONE ENCOUNTER
Spoke with patient regarding missed visit on 8/30. Pt reported thought next visit was on Thursday, 9/1. Informed pt of 3 missed visits in a row, 9 missed visits since beginning episode of care and planned discharge if visit on 9/1 is cancelled or no-showed. Pt reported understanding and plans to be there this Thursday.   Agus Castillo, PT

## 2022-09-01 ENCOUNTER — HOSPITAL ENCOUNTER (OUTPATIENT)
Dept: PHYSICAL THERAPY | Age: 77
Setting detail: THERAPIES SERIES
Discharge: HOME | End: 2022-09-01

## 2022-09-01 DIAGNOSIS — N30.00 ACUTE CYSTITIS WITHOUT HEMATURIA: ICD-10-CM

## 2022-09-01 DIAGNOSIS — I48.19 PERSISTENT ATRIAL FIBRILLATION (HCC): ICD-10-CM

## 2022-09-01 RX ORDER — BETHANECHOL CHLORIDE 25 MG/1
TABLET ORAL
Qty: 90 TABLET | Refills: 3 | Status: SHIPPED | OUTPATIENT
Start: 2022-09-01 | End: 2022-11-01

## 2022-09-01 RX ORDER — MECLIZINE HYDROCHLORIDE 25 MG/1
TABLET ORAL
Qty: 90 TABLET | Refills: 3 | Status: SHIPPED | OUTPATIENT
Start: 2022-09-01 | End: 2022-11-01

## 2022-09-01 RX ORDER — BUPROPION HYDROCHLORIDE 300 MG/1
TABLET ORAL
Qty: 90 TABLET | Refills: 1 | Status: SHIPPED | OUTPATIENT
Start: 2022-09-01 | End: 2022-11-01

## 2022-09-01 RX ORDER — RIVAROXABAN 20 MG/1
TABLET, FILM COATED ORAL
Qty: 30 TABLET | Refills: 3 | Status: SHIPPED | OUTPATIENT
Start: 2022-09-01

## 2022-09-01 NOTE — FLOWSHEET NOTE
Physical Therapy  Cancellation/No-show Note  Patient Name:  Fanny Bautista  :     Date:  2022  Cancelled visits to date: 4  No-shows to date: 5    Patient status for today's appointment patient:  []  Cancelled , ,  ,   []  Rescheduled appointment  [x]  No-show , , , . , ,      Reason given by patient:  []  Patient ill   pt sick all day   []  Conflicting appointment  []  No transportation    []  Conflict with work  [x]  No reason given  []  Other:  Note   Comments:      Phone call information:   []  Phone call made today to patient at   at number provided:      []  Patient answered, conversation as follows: []  Patient did not answer, message left as follows:  [x]  Phone call not made today (cancelling remaining sessions per no show policy)  []  Phone call not needed - pt contacted us to cancel and provided reason for cancellation.      Electronically signed by:  Xiomy Cole, PT, DPT

## 2022-09-02 RX ORDER — BUPROPION HYDROCHLORIDE 300 MG/1
TABLET ORAL
Refills: 0 | OUTPATIENT
Start: 2022-09-02

## 2022-09-02 NOTE — TELEPHONE ENCOUNTER
Medication:   Requested Prescriptions     Refused Prescriptions Disp Refills    buPROPion (WELLBUTRIN XL) 300 MG extended release tablet [Pharmacy Med Name: BUPROPION HYDROCHLORIDE ER (XL) 300MG XL TABLET ER 24HR]  0     Sig: TAKE ONE TABLET BY MOUTH EVERY MORNING     Refused By: Nirav Don     Reason for Refusal: Duplicate Request          Patient Phone Number: [phone] (home)     Last appt: 8/4/2022   Next appt: 11/29/2022    Last OARRS:   RX Monitoring 9/27/2019   Attestation -   Periodic Controlled Substance Monitoring Possible medication side effects, risk of tolerance/dependence & alternative treatments discussed. Chronic Pain > 50 MEDD Re-evaluated the status of the patient's underlying condition causing pain.      PDMP Monitoring:    Last PDMP [facility] as Reviewed [facility]):  Review User Review Instant Review Result   Ria BARCENAS 9/15/2021  7:05 PM Reviewed PDMP [1]     Preferred Pharmacy:   [facility] [id number] [address] Haywood  - P [phone] Cheryl Young [phone]  [facility] [address]  [address] [place] [address]  Phone: [phone] Fax: [phone]    [place] [address]  Phone: [phone] Fax: [phone]    [facility] [address] [phone] - F [phone]  [address] [place] [id number]  Phone: [phone] Fax: [phone]

## 2022-10-04 RX ORDER — AMLODIPINE BESYLATE 10 MG/1
TABLET ORAL
Qty: 90 TABLET | Refills: 1 | Status: SHIPPED | OUTPATIENT
Start: 2022-10-04

## 2022-10-28 ENCOUNTER — TELEPHONE (OUTPATIENT)
Dept: FAMILY MEDICINE CLINIC | Age: 77
End: 2022-10-28

## 2022-10-31 DIAGNOSIS — F33.1 MODERATE EPISODE OF RECURRENT MAJOR DEPRESSIVE DISORDER (HCC): ICD-10-CM

## 2022-10-31 DIAGNOSIS — N30.00 ACUTE CYSTITIS WITHOUT HEMATURIA: ICD-10-CM

## 2022-11-01 RX ORDER — BUPROPION HYDROCHLORIDE 300 MG/1
TABLET ORAL
Qty: 90 TABLET | Refills: 0 | Status: SHIPPED | OUTPATIENT
Start: 2022-11-01

## 2022-11-01 RX ORDER — BETHANECHOL CHLORIDE 25 MG/1
TABLET ORAL
Qty: 90 TABLET | Refills: 0 | Status: SHIPPED | OUTPATIENT
Start: 2022-11-01

## 2022-11-01 RX ORDER — DULOXETIN HYDROCHLORIDE 60 MG/1
CAPSULE, DELAYED RELEASE ORAL
Qty: 90 CAPSULE | Refills: 0 | Status: SHIPPED | OUTPATIENT
Start: 2022-11-01

## 2022-11-01 RX ORDER — MECLIZINE HYDROCHLORIDE 25 MG/1
TABLET ORAL
Qty: 90 TABLET | Refills: 0 | Status: SHIPPED | OUTPATIENT
Start: 2022-11-01 | End: 2022-11-25

## 2022-11-11 ENCOUNTER — OFFICE VISIT (OUTPATIENT)
Dept: FAMILY MEDICINE CLINIC | Age: 77
End: 2022-11-11
Payer: MEDICARE

## 2022-11-11 VITALS
WEIGHT: 177 LBS | DIASTOLIC BLOOD PRESSURE: 80 MMHG | BODY MASS INDEX: 34.57 KG/M2 | OXYGEN SATURATION: 97 % | HEART RATE: 62 BPM | SYSTOLIC BLOOD PRESSURE: 116 MMHG

## 2022-11-11 DIAGNOSIS — Z23 NEED FOR PROPHYLACTIC VACCINATION AND INOCULATION AGAINST INFLUENZA: Primary | ICD-10-CM

## 2022-11-11 DIAGNOSIS — J44.9 CHRONIC OBSTRUCTIVE PULMONARY DISEASE, UNSPECIFIED COPD TYPE (HCC): ICD-10-CM

## 2022-11-11 DIAGNOSIS — G25.81 RLS (RESTLESS LEGS SYNDROME): ICD-10-CM

## 2022-11-11 DIAGNOSIS — Z09 HOSPITAL DISCHARGE FOLLOW-UP: ICD-10-CM

## 2022-11-11 DIAGNOSIS — R09.02 HYPOXIA: ICD-10-CM

## 2022-11-11 PROCEDURE — G8417 CALC BMI ABV UP PARAM F/U: HCPCS | Performed by: FAMILY MEDICINE

## 2022-11-11 PROCEDURE — 3023F SPIROM DOC REV: CPT | Performed by: FAMILY MEDICINE

## 2022-11-11 PROCEDURE — G8399 PT W/DXA RESULTS DOCUMENT: HCPCS | Performed by: FAMILY MEDICINE

## 2022-11-11 PROCEDURE — 90694 VACC AIIV4 NO PRSRV 0.5ML IM: CPT | Performed by: FAMILY MEDICINE

## 2022-11-11 PROCEDURE — 3074F SYST BP LT 130 MM HG: CPT | Performed by: FAMILY MEDICINE

## 2022-11-11 PROCEDURE — 99214 OFFICE O/P EST MOD 30 MIN: CPT | Performed by: FAMILY MEDICINE

## 2022-11-11 PROCEDURE — G8427 DOCREV CUR MEDS BY ELIG CLIN: HCPCS | Performed by: FAMILY MEDICINE

## 2022-11-11 PROCEDURE — G8484 FLU IMMUNIZE NO ADMIN: HCPCS | Performed by: FAMILY MEDICINE

## 2022-11-11 PROCEDURE — 1090F PRES/ABSN URINE INCON ASSESS: CPT | Performed by: FAMILY MEDICINE

## 2022-11-11 PROCEDURE — 1111F DSCHRG MED/CURRENT MED MERGE: CPT | Performed by: FAMILY MEDICINE

## 2022-11-11 PROCEDURE — 1036F TOBACCO NON-USER: CPT | Performed by: FAMILY MEDICINE

## 2022-11-11 PROCEDURE — 1123F ACP DISCUSS/DSCN MKR DOCD: CPT | Performed by: FAMILY MEDICINE

## 2022-11-11 PROCEDURE — G0008 ADMIN INFLUENZA VIRUS VAC: HCPCS | Performed by: FAMILY MEDICINE

## 2022-11-11 PROCEDURE — 3078F DIAST BP <80 MM HG: CPT | Performed by: FAMILY MEDICINE

## 2022-11-11 RX ORDER — ROPINIROLE 0.25 MG/1
0.25 TABLET, FILM COATED ORAL NIGHTLY
Qty: 90 TABLET | Refills: 3 | Status: SHIPPED | OUTPATIENT
Start: 2022-11-11

## 2022-11-11 ASSESSMENT — PATIENT HEALTH QUESTIONNAIRE - PHQ9
SUM OF ALL RESPONSES TO PHQ QUESTIONS 1-9: 2
6. FEELING BAD ABOUT YOURSELF - OR THAT YOU ARE A FAILURE OR HAVE LET YOURSELF OR YOUR FAMILY DOWN: 0
SUM OF ALL RESPONSES TO PHQ QUESTIONS 1-9: 2
9. THOUGHTS THAT YOU WOULD BE BETTER OFF DEAD, OR OF HURTING YOURSELF: 0
1. LITTLE INTEREST OR PLEASURE IN DOING THINGS: 1
4. FEELING TIRED OR HAVING LITTLE ENERGY: 0
7. TROUBLE CONCENTRATING ON THINGS, SUCH AS READING THE NEWSPAPER OR WATCHING TELEVISION: 0
SUM OF ALL RESPONSES TO PHQ9 QUESTIONS 1 & 2: 2
3. TROUBLE FALLING OR STAYING ASLEEP: 0
2. FEELING DOWN, DEPRESSED OR HOPELESS: 1
SUM OF ALL RESPONSES TO PHQ QUESTIONS 1-9: 2
8. MOVING OR SPEAKING SO SLOWLY THAT OTHER PEOPLE COULD HAVE NOTICED. OR THE OPPOSITE, BEING SO FIGETY OR RESTLESS THAT YOU HAVE BEEN MOVING AROUND A LOT MORE THAN USUAL: 0
SUM OF ALL RESPONSES TO PHQ QUESTIONS 1-9: 2
5. POOR APPETITE OR OVEREATING: 0

## 2022-11-11 NOTE — PROGRESS NOTES
Post-Discharge Transitional Care  Follow Up      Keisha Larkin   YOB: 1945    Date of Office Visit:  11/11/2022  Date of Hospital Admission: 10/12/22  Date of Hospital Discharge: 10/14/22  Risk of hospital readmission (high >=14%. Medium >=10%) :No data recorded    Care management risk score Rising risk (score 2-5) and Complex Care (Scores >=6): No Risk Score On File     Non face to face  following discharge, date last encounter closed (first attempt may have been earlier): *No documented post hospital discharge outreach found in the last 14 days    Call initiated 2 business days of discharge: *No response recorded in the last 14 days    ASSESSMENT/PLAN:   Need for prophylactic vaccination and inoculation against influenza  -     Influenza, FLUAD, (age 72 y+), IM, PF, 0.5 mL  Hospital discharge follow-up  -     NE DISCHARGE MEDS RECONCILED W/ CURRENT OUTPATIENT MED LIST  Chronic obstructive pulmonary disease, unspecified COPD type ([facility]  -     Federica Ace MD, Pulmonary, [facility]  Hypoxia  -     Federica Ace MD, Pulmonary, [facility]  RLS (restless legs syndrome)  -     rOPINIRole (REQUIP) 0.25 MG tablet; Take 1 tablet by mouth nightly, Disp-90 tablet, R-3Normal      Medical Decision Making: moderate complexity  Return in 3 months (on 2/11/2023), or if symptoms worsen or fail to improve. Subjective:   HPI:  Follow up of Hospital problems/diagnosis(es): #1 pneumonia, #2 acute on chronic respiratory failure with hypoxia and hypercapnia, history of hypertension, atrial fibrillation, chronic back pain, COPD, depression, hyperlipidemia, opioid dependence    Inpatient course: Discharge summary reviewed- see chart. Interval history/Current status: Patient was sent home to finish her course of Augmentin. He was also supposed to be on oxygen. Today she is accompanied by her  and her daughter.   The daughter gives some of the history and states the patient has episodes of her oxygen saturation dropping into the 80s. She apparently does have an oxygen concentrator but does not have the capability of portable oxygen. Patient apparently does not have a pulmonologist and she would like to see someone affiliated with [facility]. 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    Chronic pain syndrome    Right bundle branch block (RBBB)    B12 deficiency    Vertigo    Mixed hyperlipidemia    Unsteady gait    Depression    SHITAL (acute kidney injury) ([address]    Acute embolic stroke ([address]    Acute CVA (cerebrovascular accident) ([address]    Acute pulmonary insufficiency    Abnormal chest x-ray    Pneumonia of both lower lobes due to infectious organism    Moderate COPD (chronic obstructive pulmonary disease) ([address]    Narcotic dependence ([address]    Recurrent major depressive disorder, in partial remission ([address]    Obesity, morbid, BMI 40.0-49.9 ([address]    Hypercoagulable state ([address]    Stable angina ([address]    Acute respiratory failure with hypoxia and hypercapnia ([address]    Physical deconditioning    Lower extremity weakness    Decreased activities of daily living (ADL)    COVID-19    Persistent atrial fibrillation (HCC)    Moderate episode of recurrent major depressive disorder ([address]       Medications listed as ordered at the time of discharge from hospital     Medication List            Accurate as of November 11, 2022 11:50 AM. If you have any questions, ask your nurse or doctor.                 CONTINUE taking these medications      acetaminophen 500 MG tablet  Commonly known as: TYLENOL     amLODIPine 10 MG tablet  Commonly known as: NORVASC  TAKE ONE TABLET BY MOUTH DAILY     bethanechol 25 MG tablet  Commonly known as: URECHOLINE  TAKE ONE TABLET BY MOUTH THREE TMES A DAY     buPROPion 300 MG extended release tablet  Commonly known as: WELLBUTRIN XL  TAKE ONE TABLET BY MOUTH EVERY MORNING     DULoxetine 60 MG extended release capsule  Commonly known as: CYMBALTA  TAKE ONE TABLET BY MOUTH IN THR MORNING     folbee plus Tabs  TAKE ONE TABLET BY MOUTH ONCE DAILY     furosemide 20 MG tablet  Commonly known as: LASIX  TAKE ONE TABLET BY MOUTH DAILY AS NEEDED FOR WEIGHT GAIN OF 2LBS OR LEG EDEMA     meclizine 25 MG tablet  Commonly known as: ANTIVERT  TAKE ONE TABLET BY MOUTH THREE TIMES A DAY AS NEEDED FOR DIZZINESS     morphine 15 MG tablet  Commonly known as: MSIR     ondansetron 4 MG tablet  Commonly known as: ZOFRAN  Take 1 tablet by mouth 3 times daily as needed for Nausea or Vomiting     pregabalin 150 MG capsule  Commonly known as: LYRICA     rOPINIRole 0.25 MG tablet  Commonly known as: REQUIP  Take 1 tablet by mouth nightly     tiZANidine 4 MG tablet  Commonly known as: ZANAFLEX     UNABLE TO FIND     Xarelto 20 MG Tabs tablet  Generic drug: rivaroxaban  TAKE ONE TABLET BY MOUTH DAILY WITH SUPPER               Where to Get Your Medications        These medications were sent to [address] [place] [address] [phone]  [address] [place] [address]      Phone: [phone]   rOPINIRole 0.25 MG tablet           Medications marked \"taking\" at this time  Outpatient Medications Marked as Taking for the 11/11/22 encounter (Office Visit) with Bre Penaloza MD   Medication Sig Dispense Refill    rOPINIRole (REQUIP) 0.25 MG tablet Take 1 tablet by mouth nightly 90 tablet 3    DULoxetine (CYMBALTA) 60 MG extended release capsule TAKE ONE TABLET BY MOUTH IN THR MORNING 90 capsule 0    bethanechol (URECHOLINE) 25 MG tablet TAKE ONE TABLET BY MOUTH THREE TMES A DAY 90 tablet 0    meclizine (ANTIVERT) 25 MG tablet TAKE ONE TABLET BY MOUTH THREE TIMES A DAY AS NEEDED FOR DIZZINESS 90 tablet 0    buPROPion (WELLBUTRIN XL) 300 MG extended release tablet TAKE ONE TABLET BY MOUTH EVERY MORNING 90 tablet 0    amLODIPine (NORVASC) 10 MG tablet TAKE ONE TABLET BY MOUTH DAILY 90 tablet 1    XARELTO 20 MG TABS tablet TAKE ONE TABLET BY MOUTH DAILY WITH SUPPER 30 tablet 3    ondansetron (ZOFRAN) 4 MG tablet Take 1 tablet by mouth 3 times daily as needed for Nausea or Vomiting 12 tablet 0    UNABLE TO FIND Zyquil      folbee plus (FOLBEE PLUS) TABS TAKE ONE TABLET BY MOUTH ONCE DAILY 30 tablet 5    furosemide (LASIX) 20 MG tablet TAKE ONE TABLET BY MOUTH DAILY AS NEEDED FOR WEIGHT GAIN OF 2LBS OR LEG EDEMA 90 tablet 0    morphine (MSIR) 15 MG tablet Take 15 mg by mouth in the morning and 15 mg before bedtime. acetaminophen (TYLENOL) 500 MG tablet Take 500 mg by mouth every 6 hours as needed for Pain      tiZANidine (ZANAFLEX) 4 MG tablet Take 4 mg by mouth 2 times daily       pregabalin (LYRICA) 150 MG capsule Take 150 mg by mouth three times daily. Medications patient taking as of now reconciled against medications ordered at time of hospital discharge: Yes    A comprehensive review of systems was negative except for what was noted in the HPI.     Objective:    /80   Pulse 62   Wt 177 lb (80.3 kg)   SpO2 97%   BMI 34.57 kg/m²   General Appearance: alert and oriented to person, place and time, well developed,obese, in no acute distress  Skin: warm and dry, no rash or erythema  Head: normocephalic and atraumatic  Eyes: pupils equal, round, and reactive to light, extraocular eye movements intact, conjunctivae normal  ENT: tympanic membrane, external ear and ear canal normal bilaterally, nose without deformity, nasal mucosa and turbinates normal without polyps  Neck: supple and non-tender without mass, no thyromegaly or thyroid nodules, no cervical lymphadenopathy  Pulmonary/Chest: Rare wheezes. rales or rhonchi noted in left base. normal air movement, no respiratory distress  Cardiovascular: normal rate, regular rhythm, normal S1 and S2, no murmurs, rubs, clicks, or gallops, distal pulses intact, no carotid bruits  Extremities: no cyanosis or clubbing. Trace edema both lower extremities. Musculoskeletal: normal range of motion, no joint swelling, deformity or tenderness  Neurologic: no cranial nerve deficit, she is ambulating with a cane and is slow in her gait. , coordination and speech normal      An electronic signature was used to authenticate this note.   --Vj Bob MD

## 2022-11-25 RX ORDER — MECLIZINE HYDROCHLORIDE 25 MG/1
TABLET ORAL
Qty: 90 TABLET | Refills: 1 | Status: SHIPPED | OUTPATIENT
Start: 2022-11-25

## 2022-11-25 RX ORDER — AMLODIPINE BESYLATE 10 MG/1
TABLET ORAL
Qty: 90 TABLET | Refills: 1 | Status: SHIPPED | OUTPATIENT
Start: 2022-11-25 | End: 2022-11-30

## 2022-11-30 RX ORDER — AMLODIPINE BESYLATE 10 MG/1
TABLET ORAL
Qty: 90 TABLET | Refills: 1 | Status: SHIPPED | OUTPATIENT
Start: 2022-11-30

## 2022-12-09 RX ORDER — AMLODIPINE BESYLATE 10 MG/1
TABLET ORAL
Qty: 90 TABLET | Refills: 1 | Status: SHIPPED | OUTPATIENT
Start: 2022-12-09

## 2022-12-20 DIAGNOSIS — I48.19 PERSISTENT ATRIAL FIBRILLATION (HCC): ICD-10-CM

## 2022-12-20 RX ORDER — RIVAROXABAN 20 MG/1
TABLET, FILM COATED ORAL
Qty: 30 TABLET | Refills: 1 | Status: SHIPPED | OUTPATIENT
Start: 2022-12-20

## 2022-12-21 DIAGNOSIS — N30.00 ACUTE CYSTITIS WITHOUT HEMATURIA: ICD-10-CM

## 2022-12-22 RX ORDER — BETHANECHOL CHLORIDE 25 MG/1
TABLET ORAL
Qty: 270 TABLET | Refills: 0 | Status: SHIPPED | OUTPATIENT
Start: 2022-12-22

## 2022-12-22 RX ORDER — BUPROPION HYDROCHLORIDE 300 MG/1
TABLET ORAL
Qty: 90 TABLET | Refills: 0 | Status: SHIPPED | OUTPATIENT
Start: 2022-12-22

## 2023-02-12 DIAGNOSIS — F33.1 MODERATE EPISODE OF RECURRENT MAJOR DEPRESSIVE DISORDER (HCC): ICD-10-CM

## 2023-02-13 RX ORDER — DULOXETIN HYDROCHLORIDE 60 MG/1
CAPSULE, DELAYED RELEASE ORAL
Qty: 90 CAPSULE | Refills: 0 | Status: SHIPPED | OUTPATIENT
Start: 2023-02-13

## 2023-02-26 DIAGNOSIS — N30.00 ACUTE CYSTITIS WITHOUT HEMATURIA: ICD-10-CM

## 2023-02-28 RX ORDER — BETHANECHOL CHLORIDE 25 MG/1
TABLET ORAL
Qty: 90 TABLET | Refills: 0 | Status: SHIPPED | OUTPATIENT
Start: 2023-02-28

## 2023-03-18 RX ORDER — AMLODIPINE BESYLATE 10 MG/1
TABLET ORAL
Qty: 90 TABLET | Refills: 1 | Status: CANCELLED | OUTPATIENT
Start: 2023-03-18

## 2023-03-20 RX ORDER — AMLODIPINE BESYLATE 10 MG/1
TABLET ORAL
Qty: 90 TABLET | Refills: 0 | Status: SHIPPED | OUTPATIENT
Start: 2023-03-20

## 2023-03-20 RX ORDER — BUPROPION HYDROCHLORIDE 300 MG/1
TABLET ORAL
Qty: 90 TABLET | Refills: 0 | Status: SHIPPED | OUTPATIENT
Start: 2023-03-20

## 2023-04-02 DIAGNOSIS — N30.00 ACUTE CYSTITIS WITHOUT HEMATURIA: ICD-10-CM

## 2023-04-02 DIAGNOSIS — F33.1 MODERATE EPISODE OF RECURRENT MAJOR DEPRESSIVE DISORDER (HCC): ICD-10-CM

## 2023-04-03 RX ORDER — DULOXETIN HYDROCHLORIDE 60 MG/1
CAPSULE, DELAYED RELEASE ORAL
Qty: 90 CAPSULE | Refills: 0 | Status: SHIPPED | OUTPATIENT
Start: 2023-04-03

## 2023-04-03 RX ORDER — MECLIZINE HYDROCHLORIDE 25 MG/1
TABLET ORAL
Qty: 90 TABLET | Refills: 5 | Status: SHIPPED | OUTPATIENT
Start: 2023-04-03

## 2023-04-03 RX ORDER — BETHANECHOL CHLORIDE 25 MG/1
TABLET ORAL
Qty: 90 TABLET | Refills: 5 | Status: SHIPPED | OUTPATIENT
Start: 2023-04-03

## 2023-04-03 RX ORDER — CYANOCOBALAMIN/FOLIC AC/VIT B6 1-2.5-25MG
TABLET ORAL
Qty: 30 TABLET | Refills: 5 | Status: SHIPPED | OUTPATIENT
Start: 2023-04-03

## 2023-04-04 ENCOUNTER — TELEPHONE (OUTPATIENT)
Dept: FAMILY MEDICINE CLINIC | Age: 78
End: 2023-04-04

## 2023-04-04 NOTE — TELEPHONE ENCOUNTER
----- Message from Margaret Sneed sent at 4/4/2023 11:13 AM EDT -----  Subject: Message to Provider    QUESTIONS  Information for Provider? Patient had to cx appt today due to her ride cx   appt. Needs another one back to back with  appt . If we can call   them and reschedule these appts. No back to back avail.   ---------------------------------------------------------------------------  --------------  Luis Carlos Kessler Counts include 234 beds at the [facility]  [phone]; OK to leave message on voicemail  ---------------------------------------------------------------------------  --------------  SCRIPT ANSWERS  Relationship to Patient?  Self

## 2023-04-28 RX ORDER — AMLODIPINE BESYLATE 10 MG/1
TABLET ORAL
Qty: 90 TABLET | Refills: 1 | OUTPATIENT
Start: 2023-04-28

## 2023-05-14 DIAGNOSIS — I48.19 PERSISTENT ATRIAL FIBRILLATION (HCC): ICD-10-CM

## 2023-05-14 DIAGNOSIS — G25.81 RLS (RESTLESS LEGS SYNDROME): ICD-10-CM

## 2023-05-16 RX ORDER — ROPINIROLE 0.25 MG/1
TABLET, FILM COATED ORAL
Qty: 90 TABLET | Refills: 3 | Status: SHIPPED | OUTPATIENT
Start: 2023-05-16

## 2023-05-16 RX ORDER — RIVAROXABAN 20 MG/1
TABLET, FILM COATED ORAL
Qty: 30 TABLET | Refills: 1 | Status: SHIPPED | OUTPATIENT
Start: 2023-05-16

## 2023-05-24 ENCOUNTER — OFFICE VISIT (OUTPATIENT)
Dept: FAMILY MEDICINE CLINIC | Age: 78
End: 2023-05-24

## 2023-05-24 VITALS
SYSTOLIC BLOOD PRESSURE: 122 MMHG | WEIGHT: 152.2 LBS | HEIGHT: 60 IN | RESPIRATION RATE: 16 BRPM | TEMPERATURE: 97.4 F | OXYGEN SATURATION: 96 % | DIASTOLIC BLOOD PRESSURE: 84 MMHG | BODY MASS INDEX: 29.88 KG/M2 | HEART RATE: 94 BPM

## 2023-05-24 DIAGNOSIS — F11.20 NARCOTIC DEPENDENCE (HCC): ICD-10-CM

## 2023-05-24 DIAGNOSIS — H10.13 ALLERGIC CONJUNCTIVITIS OF BOTH EYES: ICD-10-CM

## 2023-05-24 DIAGNOSIS — F33.1 MODERATE EPISODE OF RECURRENT MAJOR DEPRESSIVE DISORDER (HCC): ICD-10-CM

## 2023-05-24 DIAGNOSIS — Z00.00 MEDICARE ANNUAL WELLNESS VISIT, SUBSEQUENT: Primary | ICD-10-CM

## 2023-05-24 DIAGNOSIS — J44.9 CHRONIC OBSTRUCTIVE PULMONARY DISEASE, UNSPECIFIED COPD TYPE (HCC): ICD-10-CM

## 2023-05-24 DIAGNOSIS — I48.19 PERSISTENT ATRIAL FIBRILLATION (HCC): ICD-10-CM

## 2023-05-24 DIAGNOSIS — D68.59 HYPERCOAGULABLE STATE (HCC): ICD-10-CM

## 2023-05-24 DIAGNOSIS — I20.8 STABLE ANGINA (HCC): ICD-10-CM

## 2023-05-24 RX ORDER — AZELASTINE HYDROCHLORIDE 0.5 MG/ML
1 SOLUTION/ DROPS OPHTHALMIC 2 TIMES DAILY
Qty: 6 ML | Refills: 2 | Status: SHIPPED | OUTPATIENT
Start: 2023-05-24 | End: 2023-06-23

## 2023-05-24 SDOH — ECONOMIC STABILITY: HOUSING INSECURITY
IN THE LAST 12 MONTHS, WAS THERE A TIME WHEN YOU DID NOT HAVE A STEADY PLACE TO SLEEP OR SLEPT IN A SHELTER (INCLUDING NOW)?: NO

## 2023-05-24 SDOH — ECONOMIC STABILITY: FOOD INSECURITY: WITHIN THE PAST 12 MONTHS, YOU WORRIED THAT YOUR FOOD WOULD RUN OUT BEFORE YOU GOT MONEY TO BUY MORE.: NEVER TRUE

## 2023-05-24 SDOH — ECONOMIC STABILITY: INCOME INSECURITY: HOW HARD IS IT FOR YOU TO PAY FOR THE VERY BASICS LIKE FOOD, HOUSING, MEDICAL CARE, AND HEATING?: NOT HARD AT ALL

## 2023-05-24 SDOH — ECONOMIC STABILITY: FOOD INSECURITY: WITHIN THE PAST 12 MONTHS, THE FOOD YOU BOUGHT JUST DIDN'T LAST AND YOU DIDN'T HAVE MONEY TO GET MORE.: NEVER TRUE

## 2023-05-24 ASSESSMENT — PATIENT HEALTH QUESTIONNAIRE - PHQ9
SUM OF ALL RESPONSES TO PHQ9 QUESTIONS 1 & 2: 0
9. THOUGHTS THAT YOU WOULD BE BETTER OFF DEAD, OR OF HURTING YOURSELF: 0
8. MOVING OR SPEAKING SO SLOWLY THAT OTHER PEOPLE COULD HAVE NOTICED. OR THE OPPOSITE, BEING SO FIGETY OR RESTLESS THAT YOU HAVE BEEN MOVING AROUND A LOT MORE THAN USUAL: 0
SUM OF ALL RESPONSES TO PHQ QUESTIONS 1-9: 0
7. TROUBLE CONCENTRATING ON THINGS, SUCH AS READING THE NEWSPAPER OR WATCHING TELEVISION: 0
SUM OF ALL RESPONSES TO PHQ QUESTIONS 1-9: 0
10. IF YOU CHECKED OFF ANY PROBLEMS, HOW DIFFICULT HAVE THESE PROBLEMS MADE IT FOR YOU TO DO YOUR WORK, TAKE CARE OF THINGS AT HOME, OR GET ALONG WITH OTHER PEOPLE: 0
6. FEELING BAD ABOUT YOURSELF - OR THAT YOU ARE A FAILURE OR HAVE LET YOURSELF OR YOUR FAMILY DOWN: 0
SUM OF ALL RESPONSES TO PHQ QUESTIONS 1-9: 0
5. POOR APPETITE OR OVEREATING: 0
4. FEELING TIRED OR HAVING LITTLE ENERGY: 0
SUM OF ALL RESPONSES TO PHQ QUESTIONS 1-9: 0
3. TROUBLE FALLING OR STAYING ASLEEP: 0
1. LITTLE INTEREST OR PLEASURE IN DOING THINGS: 0
2. FEELING DOWN, DEPRESSED OR HOPELESS: 0

## 2023-05-24 ASSESSMENT — LIFESTYLE VARIABLES
HOW OFTEN DO YOU HAVE A DRINK CONTAINING ALCOHOL: MONTHLY OR LESS
HOW MANY STANDARD DRINKS CONTAINING ALCOHOL DO YOU HAVE ON A TYPICAL DAY: 1 OR 2

## 2023-05-24 ASSESSMENT — ENCOUNTER SYMPTOMS
EYE ITCHING: 1
RHINORRHEA: 0
BACK PAIN: 1

## 2023-05-24 NOTE — PATIENT INSTRUCTIONS
Learning About Emotional Support  When do you need emotional support? You might find getting support from others helpful when you have a long-term health problem. Often people feel alone, confused, or scared when coping with an illness. But you aren't alone. Other people are going through the same thing you are and know how you feel. Talking with others about your feelings can help you feel better. Your family and friends can give you support. So can your doctor, a support group, or a Sikhism. If you have a support network, you will not feel as alone. You will learn new ways to deal with your situation, and you may try harder to overcome it. Where you can get support  Family and friends: They can help you cope by giving you comfort and encouragement. Counseling: Professional counseling can help you cope with situations that interfere with your life and cause stress. Counseling can help you understand and deal with your illness. Your doctor: Find a doctor you trust and feel comfortable with. Be open and honest about your fears and concerns. Your doctor can help you get the right medical treatments, including counseling. Spiritual or Rastafari groups: They can provide comfort and may be able to help you find counseling or other social support services. Social groups: They can help you meet new people and get involved in activities you enjoy. Community support groups: In a support group, you can talk to others who have dealt with the same problems or illness as you. You can encourage one another and learn ways to cope with tough emotions. How to find a support group  Ask your doctor, counselor, or other health professional for suggestions. Contact your local Sikhism, Religious, Temple, or other Rastafari group. Ask your family and friends. Ask people who have the same health concerns. Go online. Forums and blogs let you read messages from others and leave your own messages.  You can exchange stories,

## 2023-05-24 NOTE — PROGRESS NOTES
depressive disorder ([address]  Symptoms are well controlled with her antidepressant    Persistent atrial fibrillation ([address]  Appears to be in sinus rhythm today. Continues to take Xarelto 20 mg daily    Hypercoagulable state (HCC)  On Xarelto 20 mg daily    Stable angina (HCC)  No recent episodes of chest pain. Patient leads a fairly sedentary life. Return in about 3 months (around 8/24/2023). Please note portions of this note were completed with a voicerecognition program.  Efforts were made to edit the dictations but occasionally words are mis-transcribed. Medicare Annual Wellness Visit    Nishant Ogden is here for Medicare AWV    Assessment & Plan   Medicare annual wellness visit, subsequent  Allergic conjunctivitis of both eyes  -     azelastine (OPTIVAR) 0.05 % ophthalmic solution; Place 1 drop into both eyes 2 times daily, Disp-6 mL, R-2Normal  Chronic obstructive pulmonary disease, unspecified COPD type ([address]  Narcotic dependence ([address]  Moderate episode of recurrent major depressive disorder ([address]  Persistent atrial fibrillation ([address]  Hypercoagulable state ([address]  Stable angina ([address]    Recommendations for Preventive Services Due: see orders and patient instructions/AVS.  Recommended screening schedule for the next 5-10 years is provided to the patient in written form: see Patient Instructions/AVS.     Return in about 3 months (around 8/24/2023). Subjective       Patient's complete Health Risk Assessment and screening values have been reviewed and are found in Flowsheets. The following problems were reviewed today and where indicated follow up appointments were made and/or referrals ordered.     Positive Risk Factor Screenings with Interventions:                Opioid Risk: (High risk score ?55) Opioid risk score: 59    Last PDMP Daren as Reviewed:  Review User Review Instant Review Result   JAYME JUSTICE 9/15/2021  7:05 PM @   Reviewed PDMP [1]     Last Controlled Substance Monitoring

## 2023-07-01 DIAGNOSIS — F33.1 MODERATE EPISODE OF RECURRENT MAJOR DEPRESSIVE DISORDER (HCC): ICD-10-CM

## 2023-07-05 RX ORDER — DULOXETIN HYDROCHLORIDE 60 MG/1
CAPSULE, DELAYED RELEASE ORAL
Qty: 90 CAPSULE | Refills: 0 | Status: SHIPPED | OUTPATIENT
Start: 2023-07-05

## 2023-07-22 DIAGNOSIS — I48.19 PERSISTENT ATRIAL FIBRILLATION (HCC): ICD-10-CM

## 2023-07-24 RX ORDER — RIVAROXABAN 20 MG/1
TABLET, FILM COATED ORAL
Qty: 30 TABLET | Refills: 1 | Status: SHIPPED | OUTPATIENT
Start: 2023-07-24

## 2023-08-09 DIAGNOSIS — G25.81 RLS (RESTLESS LEGS SYNDROME): ICD-10-CM

## 2023-08-09 RX ORDER — BUPROPION HYDROCHLORIDE 300 MG/1
TABLET ORAL
Qty: 90 TABLET | Refills: 0 | Status: SHIPPED | OUTPATIENT
Start: 2023-08-09

## 2023-08-09 RX ORDER — ROPINIROLE 0.25 MG/1
TABLET, FILM COATED ORAL
Qty: 90 TABLET | Refills: 3 | Status: SHIPPED | OUTPATIENT
Start: 2023-08-09

## 2023-08-15 DIAGNOSIS — I48.19 PERSISTENT ATRIAL FIBRILLATION (HCC): ICD-10-CM

## 2023-08-15 RX ORDER — RIVAROXABAN 20 MG/1
TABLET, FILM COATED ORAL
Qty: 30 TABLET | Refills: 1 | Status: SHIPPED | OUTPATIENT
Start: 2023-08-15

## 2023-09-02 DIAGNOSIS — N30.00 ACUTE CYSTITIS WITHOUT HEMATURIA: ICD-10-CM

## 2023-09-05 RX ORDER — MECLIZINE HYDROCHLORIDE 25 MG/1
TABLET ORAL
Qty: 30 TABLET | Refills: 5 | Status: SHIPPED | OUTPATIENT
Start: 2023-09-05

## 2023-09-05 RX ORDER — BETHANECHOL CHLORIDE 25 MG/1
TABLET ORAL
Qty: 60 TABLET | Refills: 5 | Status: SHIPPED | OUTPATIENT
Start: 2023-09-05

## 2023-09-16 DIAGNOSIS — I48.19 PERSISTENT ATRIAL FIBRILLATION (HCC): ICD-10-CM

## 2023-09-18 RX ORDER — RIVAROXABAN 20 MG/1
TABLET, FILM COATED ORAL
Qty: 30 TABLET | Refills: 0 | Status: SHIPPED | OUTPATIENT
Start: 2023-09-18

## 2023-09-29 RX ORDER — AMLODIPINE BESYLATE 10 MG/1
TABLET ORAL
Qty: 90 TABLET | Refills: 0 | Status: SHIPPED | OUTPATIENT
Start: 2023-09-29

## 2023-10-15 DIAGNOSIS — F33.1 MODERATE EPISODE OF RECURRENT MAJOR DEPRESSIVE DISORDER (HCC): ICD-10-CM

## 2023-10-16 ENCOUNTER — TELEPHONE (OUTPATIENT)
Dept: FAMILY MEDICINE CLINIC | Age: 78
End: 2023-10-16

## 2023-10-16 DIAGNOSIS — F33.1 MODERATE EPISODE OF RECURRENT MAJOR DEPRESSIVE DISORDER (HCC): ICD-10-CM

## 2023-10-16 RX ORDER — DULOXETIN HYDROCHLORIDE 60 MG/1
CAPSULE, DELAYED RELEASE ORAL
Qty: 90 CAPSULE | Refills: 0 | Status: SHIPPED | OUTPATIENT
Start: 2023-10-16

## 2023-10-16 NOTE — TELEPHONE ENCOUNTER
Medication:   Requested Prescriptions     Pending Prescriptions Disp Refills    DULoxetine (CYMBALTA) 60 MG extended release capsule 90 capsule 0     Sig: TAKE ONE TABLET BY MOUTH IN THE MORNING      Provider out of office. Patient Phone Number: [phone] (home)     Last appt: 5/24/2023   Next appt: 10/17/2023    Last OARRS:       9/27/2019    12:42 PM   RX Monitoring   Periodic Controlled Substance Monitoring Possible medication side effects, risk of tolerance/dependence & alternative treatments discussed. Chronic Pain > 50 MEDD Re-evaluated the status of the patient's underlying condition causing pain.      PDMP Monitoring:    Last PDMP Vance Points as Reviewed [facility]):  Review User Review Instant Review Result   Jermain BARCENAS 9/15/2021  7:05 PM Reviewed PDMP [1]     Preferred Pharmacy:   [facility] [id number] Davis Perez, [address] [place] [address] [phone] Liliya Rosario [phone]  [address]  [facility] [address]  Phone: [phone] Fax: [phone]    [facility] [address]  [address]  [address]  [address]  Phone: [phone] Fax: [phone]    [facility] - [address] [facility], [address] [place] [address] [phone] - F [phone]  [address] [place] [address]  Phone: [phone] Fax: [phone]

## 2023-10-16 NOTE — TELEPHONE ENCOUNTER
DULoxetine (CYMBALTA) 60 MG extended release capsule      [address] [facility], [address] [place] [address] [phone] - F [phone] [address] [facility] [address]   Phone:  [phone]  Fax:  [phone]

## 2023-10-17 RX ORDER — DULOXETIN HYDROCHLORIDE 60 MG/1
CAPSULE, DELAYED RELEASE ORAL
Qty: 90 CAPSULE | Refills: 0 | OUTPATIENT
Start: 2023-10-17

## 2023-10-17 NOTE — TELEPHONE ENCOUNTER
Medication:   Requested Prescriptions     Pending Prescriptions Disp Refills    DULoxetine (CYMBALTA) 60 MG extended release capsule [Pharmacy Med Name: DULOXETINE HYDROCHLORIDE 60MG CAPSULE DR PART] 90 capsule 0     Sig: TAKE ONECAPSULE BY MOUTH IN THE MORNING      Last Filled:      Patient Phone Number: [phone] (home)     Last appt: 5/24/2023   Next appt: 10/16/2023    Last OARRS:       9/27/2019    12:42 PM   RX Monitoring   Periodic Controlled Substance Monitoring Possible medication side effects, risk of tolerance/dependence & alternative treatments discussed. Chronic Pain > 50 MEDD Re-evaluated the status of the patient's underlying condition causing pain.      PDMP Monitoring:    Last PDMP Rosa Rodriguez as Reviewed [facility]):  Review User Review Instant Review Result   Kye BARCENAS 9/15/2021  7:05 PM Reviewed PDMP [1]     Preferred Pharmacy:   [facility] [id number] Janet Hernandez, [address] [place] [address] [phone] [facility] [phone]  [address] [place] [address] Road  [address]  Phone: [phone] Fax: [phone]    [facility] [place] [address]  Phone: [phone] Fax: [phone]    [facility] - [address] [phone] - F [phone]  [address] [place] [address]  Phone: [phone] Fax: [phone]

## 2023-10-27 DIAGNOSIS — I48.19 PERSISTENT ATRIAL FIBRILLATION (HCC): ICD-10-CM

## 2023-10-27 RX ORDER — RIVAROXABAN 20 MG/1
TABLET, FILM COATED ORAL
Qty: 90 TABLET | Refills: 0 | Status: SHIPPED | OUTPATIENT
Start: 2023-10-27

## 2023-10-27 RX ORDER — RIVAROXABAN 20 MG/1
TABLET, FILM COATED ORAL
Refills: 1 | OUTPATIENT
Start: 2023-10-27

## 2023-10-27 NOTE — TELEPHONE ENCOUNTER
Medication:   Requested Prescriptions     Pending Prescriptions Disp Refills    XARELTO 20 MG TABS tablet [Pharmacy Med Name: Cristian Gillespie 20MG TABLET] 30 tablet 1     Sig: TAKE ONE TABLET BY MOUTH DAILY WITH SUPPER      Last Filled:      Patient Phone Number: [phone] (home)     Last appt: 5/24/2023   Next appt: Visit date not found    Last OARRS:       9/27/2019    12:42 PM   RX Monitoring   Periodic Controlled Substance Monitoring Possible medication side effects, risk of tolerance/dependence & alternative treatments discussed. Chronic Pain > 50 MEDD Re-evaluated the status of the patient's underlying condition causing pain.      PDMP Monitoring:    Last PDMP Garett Martin as Reviewed [facility]):  Review User Review Instant Review Result   Lavell Morning 9/15/2021  7:05 PM Reviewed PDMP [1]     Preferred Pharmacy:     [address] [place] [address] [phone] - F [phone]  [address] [place] [address]  Phone: [phone] Fax: [phone]

## 2023-11-03 DIAGNOSIS — I48.19 PERSISTENT ATRIAL FIBRILLATION (HCC): ICD-10-CM

## 2023-11-03 RX ORDER — RIVAROXABAN 20 MG/1
TABLET, FILM COATED ORAL
Refills: 1 | OUTPATIENT
Start: 2023-11-03

## 2023-11-07 DIAGNOSIS — I48.19 PERSISTENT ATRIAL FIBRILLATION (HCC): ICD-10-CM

## 2023-11-07 RX ORDER — RIVAROXABAN 20 MG/1
TABLET, FILM COATED ORAL
Refills: 1 | OUTPATIENT
Start: 2023-11-07

## 2023-11-15 ENCOUNTER — OFFICE VISIT (OUTPATIENT)
Dept: FAMILY MEDICINE CLINIC | Age: 78
End: 2023-11-15
Payer: MEDICARE

## 2023-11-15 VITALS
BODY MASS INDEX: 31.72 KG/M2 | TEMPERATURE: 98.3 F | HEART RATE: 100 BPM | SYSTOLIC BLOOD PRESSURE: 116 MMHG | OXYGEN SATURATION: 93 % | DIASTOLIC BLOOD PRESSURE: 78 MMHG | WEIGHT: 162.4 LBS | RESPIRATION RATE: 20 BRPM

## 2023-11-15 DIAGNOSIS — R42 VERTIGO: Primary | ICD-10-CM

## 2023-11-15 DIAGNOSIS — E78.2 MIXED HYPERLIPIDEMIA: ICD-10-CM

## 2023-11-15 DIAGNOSIS — I10 ESSENTIAL HYPERTENSION: ICD-10-CM

## 2023-11-15 PROCEDURE — 3078F DIAST BP <80 MM HG: CPT | Performed by: FAMILY MEDICINE

## 2023-11-15 PROCEDURE — G8484 FLU IMMUNIZE NO ADMIN: HCPCS | Performed by: FAMILY MEDICINE

## 2023-11-15 PROCEDURE — G8427 DOCREV CUR MEDS BY ELIG CLIN: HCPCS | Performed by: FAMILY MEDICINE

## 2023-11-15 PROCEDURE — 1123F ACP DISCUSS/DSCN MKR DOCD: CPT | Performed by: FAMILY MEDICINE

## 2023-11-15 PROCEDURE — 3074F SYST BP LT 130 MM HG: CPT | Performed by: FAMILY MEDICINE

## 2023-11-15 PROCEDURE — G8399 PT W/DXA RESULTS DOCUMENT: HCPCS | Performed by: FAMILY MEDICINE

## 2023-11-15 PROCEDURE — 99214 OFFICE O/P EST MOD 30 MIN: CPT | Performed by: FAMILY MEDICINE

## 2023-11-15 PROCEDURE — 1090F PRES/ABSN URINE INCON ASSESS: CPT | Performed by: FAMILY MEDICINE

## 2023-11-15 PROCEDURE — G8417 CALC BMI ABV UP PARAM F/U: HCPCS | Performed by: FAMILY MEDICINE

## 2023-11-15 PROCEDURE — 1036F TOBACCO NON-USER: CPT | Performed by: FAMILY MEDICINE

## 2023-11-15 ASSESSMENT — ENCOUNTER SYMPTOMS
SHORTNESS OF BREATH: 0
NAUSEA: 1
CHEST TIGHTNESS: 0

## 2023-11-15 NOTE — PROGRESS NOTES
SUBJECTIVE:    Mylene Barker is a 69 y.o. female who presents for a follow up visit. Chief Complaint   Patient presents with    Dizziness     X3 weeks. Happens on and off. Breaks out in sweat and gets hot. Per pt gets nauseated. Denies headaches and blurred vision. Denies passing out. Dizziness  This is a new problem. The current episode started 1 to 4 weeks ago. The problem occurs every several days. The problem has been unchanged. Associated symptoms include chest pain (chronic), fatigue, nausea, vertigo and weakness. Exacerbated by: Looking up makes sx worse. She has tried rest for the symptoms. The treatment provided no relief. Patient's medications, allergies, past medical,surgical, social and family histories were reviewed and updated as appropriate.      Past Medical History:   Diagnosis Date    AVM     Chronic back pain     Depression     Gastritis     History of blood transfusion     secondary to GI bleed    History of GI bleed     Hypertension     Personal history of MRSA (methicillin resistant Staphylococcus aureus)     recurrent, last 2-3 years ago finger (doesnt remember which one)     Past Surgical History:   Procedure Laterality Date    ABDOMINAL EXPLORATION SURGERY  09/07/2017    Lysis of adhesions, removal of pelvic mass, total abdominal hysterectomy with BSO, partial omentectomy, appendectomy, and pelvic lymphadenectomy    BACK SURGERY      multiple lumbar spine surgeries including laminectomy and fusion    CERVICAL FUSION      COLONOSCOPY      ENDOSCOPY, COLON, DIAGNOSTIC      HIATAL HERNIA REPAIR  1999    OTHER SURGICAL HISTORY Left [id number]    SPINAL CORD STIMULATOR BATTERY REPLACMENT     Family History   Problem Relation Age of Onset    Diabetes Mother     Heart Disease Mother         cad, aortic aneurysm    Diabetes Father     Heart Disease Father         heart attack, chf    Other Sister         abdominal aneurysm     Social History     Tobacco Use    Smoking status: Never

## 2023-12-02 NOTE — TELEPHONE ENCOUNTER
Message was left on triage voicemail. Called patient phone rang 10 + times and no answer. Ukrainian Justice (2) Forgets Limitations

## 2024-02-19 RX ORDER — MECLIZINE HYDROCHLORIDE 25 MG/1
TABLET ORAL
Qty: 30 TABLET | Refills: 5 | Status: SHIPPED | OUTPATIENT
Start: 2024-02-19

## 2024-03-02 DIAGNOSIS — F33.1 MODERATE EPISODE OF RECURRENT MAJOR DEPRESSIVE DISORDER (HCC): ICD-10-CM

## 2024-03-04 RX ORDER — DULOXETIN HYDROCHLORIDE 60 MG/1
CAPSULE, DELAYED RELEASE ORAL
Qty: 30 CAPSULE | Refills: 2 | Status: SHIPPED | OUTPATIENT
Start: 2024-03-04

## 2024-03-14 DIAGNOSIS — N30.00 ACUTE CYSTITIS WITHOUT HEMATURIA: ICD-10-CM

## 2024-03-14 RX ORDER — BETHANECHOL CHLORIDE 25 MG/1
TABLET ORAL
Qty: 60 TABLET | Refills: 5 | Status: SHIPPED | OUTPATIENT
Start: 2024-03-14

## 2024-03-20 ENCOUNTER — OFFICE VISIT (OUTPATIENT)
Dept: FAMILY MEDICINE CLINIC | Age: 79
End: 2024-03-20
Payer: MEDICARE

## 2024-03-20 VITALS
DIASTOLIC BLOOD PRESSURE: 80 MMHG | TEMPERATURE: 97.4 F | HEART RATE: 67 BPM | SYSTOLIC BLOOD PRESSURE: 126 MMHG | OXYGEN SATURATION: 98 %

## 2024-03-20 DIAGNOSIS — J44.9 CHRONIC OBSTRUCTIVE PULMONARY DISEASE, UNSPECIFIED COPD TYPE (HCC): ICD-10-CM

## 2024-03-20 DIAGNOSIS — R42 VERTIGO: ICD-10-CM

## 2024-03-20 DIAGNOSIS — F33.1 MODERATE EPISODE OF RECURRENT MAJOR DEPRESSIVE DISORDER (HCC): ICD-10-CM

## 2024-03-20 DIAGNOSIS — R07.9 CHEST PAIN, UNSPECIFIED TYPE: Primary | ICD-10-CM

## 2024-03-20 DIAGNOSIS — I48.0 PAROXYSMAL ATRIAL FIBRILLATION (HCC): ICD-10-CM

## 2024-03-20 PROBLEM — J96.02 ACUTE RESPIRATORY FAILURE WITH HYPOXIA AND HYPERCAPNIA (HCC): Status: RESOLVED | Noted: 2021-09-15 | Resolved: 2024-03-20

## 2024-03-20 PROBLEM — J96.01 ACUTE RESPIRATORY FAILURE WITH HYPOXIA AND HYPERCAPNIA (HCC): Status: RESOLVED | Noted: 2021-09-15 | Resolved: 2024-03-20

## 2024-03-20 PROCEDURE — 3079F DIAST BP 80-89 MM HG: CPT | Performed by: FAMILY MEDICINE

## 2024-03-20 PROCEDURE — G8399 PT W/DXA RESULTS DOCUMENT: HCPCS | Performed by: FAMILY MEDICINE

## 2024-03-20 PROCEDURE — 1036F TOBACCO NON-USER: CPT | Performed by: FAMILY MEDICINE

## 2024-03-20 PROCEDURE — G8484 FLU IMMUNIZE NO ADMIN: HCPCS | Performed by: FAMILY MEDICINE

## 2024-03-20 PROCEDURE — 1090F PRES/ABSN URINE INCON ASSESS: CPT | Performed by: FAMILY MEDICINE

## 2024-03-20 PROCEDURE — G8427 DOCREV CUR MEDS BY ELIG CLIN: HCPCS | Performed by: FAMILY MEDICINE

## 2024-03-20 PROCEDURE — 99214 OFFICE O/P EST MOD 30 MIN: CPT | Performed by: FAMILY MEDICINE

## 2024-03-20 PROCEDURE — G8417 CALC BMI ABV UP PARAM F/U: HCPCS | Performed by: FAMILY MEDICINE

## 2024-03-20 PROCEDURE — 1123F ACP DISCUSS/DSCN MKR DOCD: CPT | Performed by: FAMILY MEDICINE

## 2024-03-20 PROCEDURE — 3023F SPIROM DOC REV: CPT | Performed by: FAMILY MEDICINE

## 2024-03-20 PROCEDURE — 3074F SYST BP LT 130 MM HG: CPT | Performed by: FAMILY MEDICINE

## 2024-03-20 ASSESSMENT — PATIENT HEALTH QUESTIONNAIRE - PHQ9
3. TROUBLE FALLING OR STAYING ASLEEP: SEVERAL DAYS
4. FEELING TIRED OR HAVING LITTLE ENERGY: SEVERAL DAYS
SUM OF ALL RESPONSES TO PHQ QUESTIONS 1-9: 9
SUM OF ALL RESPONSES TO PHQ9 QUESTIONS 1 & 2: 2
8. MOVING OR SPEAKING SO SLOWLY THAT OTHER PEOPLE COULD HAVE NOTICED. OR THE OPPOSITE, BEING SO FIGETY OR RESTLESS THAT YOU HAVE BEEN MOVING AROUND A LOT MORE THAN USUAL: SEVERAL DAYS
SUM OF ALL RESPONSES TO PHQ QUESTIONS 1-9: 9
9. THOUGHTS THAT YOU WOULD BE BETTER OFF DEAD, OR OF HURTING YOURSELF: SEVERAL DAYS
5. POOR APPETITE OR OVEREATING: SEVERAL DAYS
1. LITTLE INTEREST OR PLEASURE IN DOING THINGS: SEVERAL DAYS
SUM OF ALL RESPONSES TO PHQ QUESTIONS 1-9: 9
6. FEELING BAD ABOUT YOURSELF - OR THAT YOU ARE A FAILURE OR HAVE LET YOURSELF OR YOUR FAMILY DOWN: SEVERAL DAYS
7. TROUBLE CONCENTRATING ON THINGS, SUCH AS READING THE NEWSPAPER OR WATCHING TELEVISION: SEVERAL DAYS
10. IF YOU CHECKED OFF ANY PROBLEMS, HOW DIFFICULT HAVE THESE PROBLEMS MADE IT FOR YOU TO DO YOUR WORK, TAKE CARE OF THINGS AT HOME, OR GET ALONG WITH OTHER PEOPLE: VERY DIFFICULT
2. FEELING DOWN, DEPRESSED OR HOPELESS: SEVERAL DAYS
SUM OF ALL RESPONSES TO PHQ QUESTIONS 1-9: 8

## 2024-03-20 ASSESSMENT — COLUMBIA-SUICIDE SEVERITY RATING SCALE - C-SSRS
6. HAVE YOU EVER DONE ANYTHING, STARTED TO DO ANYTHING, OR PREPARED TO DO ANYTHING TO END YOUR LIFE?: NO
1. WITHIN THE PAST MONTH, HAVE YOU WISHED YOU WERE DEAD OR WISHED YOU COULD GO TO SLEEP AND NOT WAKE UP?: YES

## 2024-03-20 ASSESSMENT — ENCOUNTER SYMPTOMS
CHEST TIGHTNESS: 1
NAUSEA: 1
SHORTNESS OF BREATH: 1
VOMITING: 0

## 2024-03-20 NOTE — PROGRESS NOTES
sounds: Normal breath sounds. No rales.   Musculoskeletal:         General: Deformity (kyphosis) present.      Cervical back: Normal range of motion and neck supple.   Lymphadenopathy:      Cervical: No cervical adenopathy.   Skin:     General: Skin is warm and dry.   Neurological:      Mental Status: She is alert and oriented to person, place, and time.   Psychiatric:         Mood and Affect: Mood normal.         Behavior: Behavior normal.         ASSESSMENT/PLAN:    Angelia was seen today for follow-up and hypertension.    Diagnoses and all orders for this visit:    Chest pain, unspecified type  Patient has not followed up with cardiology in the last 2 years.  She notes that she has been having episodes of chest pain with shortness of breath and palpitations for the last few months.  -     Pedro Luis Bird MD, Cardiology, [facility]    Chronic obstructive pulmonary disease, unspecified COPD type (HCC)  On no inhalers.    Paroxysmal atrial fibrillation (HCC)  Apparently in sinus rhythm today.    Moderate episode of recurrent major depressive disorder (HCC)  Patient states she has been little more depressed lately as her  is having difficulty ambulating and seems to be getting weaker.  He is followed by neurology and is getting home physical therapy but apparently he is not cooperating with doing his exercises if therapy is not there.    Vertigo  -     [facility] - [facility]        Return in about 4 months (around 7/20/2024).    Please note portions of this note were completed with a voicerecognition program.  Efforts were made to edit the dictations but occasionally words are mis-transcribed.

## 2024-03-29 PROBLEM — R00.2 PALPITATIONS: Status: ACTIVE | Noted: 2024-03-29

## 2024-03-29 PROBLEM — R07.9 CHEST PAIN: Status: ACTIVE | Noted: 2024-03-29

## 2024-03-29 PROBLEM — R06.02 SOB (SHORTNESS OF BREATH): Status: ACTIVE | Noted: 2024-03-29

## 2024-03-29 PROBLEM — I48.91 ATRIAL FIBRILLATION (HCC): Status: ACTIVE | Noted: 2022-05-26

## 2024-04-29 NOTE — ASSESSMENT & PLAN NOTE
eports shortness breath over last several months  COPD, on no inhalers  Follows with Pulmonary  On Lasix 20mg, prn for weight gain or leg swelling prn  Recommend echo

## 2024-04-29 NOTE — PROGRESS NOTES
REFERRING PROVIDER  Bonilla Gonzales Jr., MD     CHIEF COMPLAINT  Chest pain        HPI    Angelia Chand is a 78 y.o. female presenting to the clinic as a referral for chest pain. Medical history reviewed, significant for hypertension, persistent atrial fibrillation on Xarelto, and COPD. She has recent complaints of chest pain, shortness of breath, and dizziness, appears chronic on antivert.     Today, the patient reports that she has been having chest pain, with and without exertion. Duration for a \"couple of months\", couple times per day\", radiates across her chest, describes as \"pressure\". She does report that she also gets \"lightheaded\" with the chest pain.  Does not describe palpitations.  Known AFIB.  Not on BB.  No syncope.  Stable leg edema.  Uses cane to ambulate.  Endorses poor functional capacity.      Past Medical History:   has a past medical history of AVM, Chronic back pain, Depression, Gastritis, History of blood transfusion, History of GI bleed, Hypertension, and Personal history of MRSA (methicillin resistant Staphylococcus aureus).    Surgical History:   has a past surgical history that includes hiatal hernia repair (1999); Endoscopy, colon, diagnostic; Colonoscopy; cervical fusion; back surgery; other surgical history (Left, [id number]); and Abdominal exploration surgery (09/07/2017).     Social History:   reports that she has never smoked. She has never used smokeless tobacco. She reports current alcohol use. She reports that she does not use drugs.     Family History:  family history includes Diabetes in her father and mother; Heart Disease in her father and mother; Other in her sister.     Home Medications:  Prior to Admission medications    Medication Sig Start Date End Date Taking? Authorizing Provider   nitroGLYCERIN (NITROSTAT) 0.4 MG SL tablet Place 1 tablet under the tongue every 5 minutes as needed for Chest pain 4/30/24  Yes Nimco Johnson DO   bethanechol (URECHOLINE) 25 MG

## 2024-04-29 NOTE — ASSESSMENT & PLAN NOTE
Reported history of PAF, currently in NSR  Never felt palpitations,  Resting chest discomfort may be arrhythmia  Recommend 2 week telemetry monitor  Consider replacing amlodipine with BB  On OAC

## 2024-04-29 NOTE — ASSESSMENT & PLAN NOTE
Reports has had palpitations with shortness of breath over the last several months  Will place cardiac event monitor, 2 week

## 2024-04-29 NOTE — ASSESSMENT & PLAN NOTE
Atypical chest pain reported  Unable to achieve adequate functional capacity to ascertain exertional symptoms  Risk factors for CAD  Offered non invasive stress testing as option; unable to walk on treadmill, recommend pharmacologic  RX SL NTG; patient educated on how to administer  Advised to call 911 for severe/persistent symptoms

## 2024-04-29 NOTE — ASSESSMENT & PLAN NOTE
BP today is 114/84  Maintained on amlodipine  Will consider replacing amlodipine with Toprol XL after cardiac testing

## 2024-04-30 ENCOUNTER — OFFICE VISIT (OUTPATIENT)
Dept: CARDIOLOGY CLINIC | Age: 79
End: 2024-04-30
Payer: MEDICARE

## 2024-04-30 VITALS
HEIGHT: 60 IN | SYSTOLIC BLOOD PRESSURE: 114 MMHG | HEART RATE: 81 BPM | BODY MASS INDEX: 33.18 KG/M2 | DIASTOLIC BLOOD PRESSURE: 84 MMHG | WEIGHT: 169 LBS | OXYGEN SATURATION: 95 %

## 2024-04-30 DIAGNOSIS — R07.89 OTHER CHEST PAIN: Primary | ICD-10-CM

## 2024-04-30 DIAGNOSIS — I10 ESSENTIAL HYPERTENSION: ICD-10-CM

## 2024-04-30 DIAGNOSIS — E78.2 MIXED HYPERLIPIDEMIA: ICD-10-CM

## 2024-04-30 DIAGNOSIS — R06.02 SOB (SHORTNESS OF BREATH): ICD-10-CM

## 2024-04-30 DIAGNOSIS — I05.9 MITRAL VALVE DISORDER: ICD-10-CM

## 2024-04-30 DIAGNOSIS — I48.19 PERSISTENT ATRIAL FIBRILLATION (HCC): ICD-10-CM

## 2024-04-30 DIAGNOSIS — R42 DIZZINESS: ICD-10-CM

## 2024-04-30 DIAGNOSIS — R00.2 PALPITATIONS: ICD-10-CM

## 2024-04-30 PROCEDURE — 3074F SYST BP LT 130 MM HG: CPT | Performed by: INTERNAL MEDICINE

## 2024-04-30 PROCEDURE — 1036F TOBACCO NON-USER: CPT | Performed by: INTERNAL MEDICINE

## 2024-04-30 PROCEDURE — 1123F ACP DISCUSS/DSCN MKR DOCD: CPT | Performed by: INTERNAL MEDICINE

## 2024-04-30 PROCEDURE — 99204 OFFICE O/P NEW MOD 45 MIN: CPT | Performed by: INTERNAL MEDICINE

## 2024-04-30 PROCEDURE — G8417 CALC BMI ABV UP PARAM F/U: HCPCS | Performed by: INTERNAL MEDICINE

## 2024-04-30 PROCEDURE — 93000 ELECTROCARDIOGRAM COMPLETE: CPT | Performed by: INTERNAL MEDICINE

## 2024-04-30 PROCEDURE — G8399 PT W/DXA RESULTS DOCUMENT: HCPCS | Performed by: INTERNAL MEDICINE

## 2024-04-30 PROCEDURE — G8427 DOCREV CUR MEDS BY ELIG CLIN: HCPCS | Performed by: INTERNAL MEDICINE

## 2024-04-30 PROCEDURE — 1090F PRES/ABSN URINE INCON ASSESS: CPT | Performed by: INTERNAL MEDICINE

## 2024-04-30 PROCEDURE — 3079F DIAST BP 80-89 MM HG: CPT | Performed by: INTERNAL MEDICINE

## 2024-04-30 RX ORDER — NITROGLYCERIN 0.4 MG/1
0.4 TABLET SUBLINGUAL EVERY 5 MIN PRN
Qty: 25 TABLET | Refills: 3 | Status: SHIPPED | OUTPATIENT
Start: 2024-04-30

## 2024-04-30 ASSESSMENT — ENCOUNTER SYMPTOMS: SHORTNESS OF BREATH: 1

## 2024-04-30 NOTE — PATIENT INSTRUCTIONS
Stress Test    Echocardiogram (Ultrasound of heart)    Cardiac monitor, will place today    Nitroglycerin tablets as needed for severe chest pain    Follow up 1 month

## 2024-05-11 DIAGNOSIS — F33.1 MODERATE EPISODE OF RECURRENT MAJOR DEPRESSIVE DISORDER (HCC): ICD-10-CM

## 2024-05-13 ENCOUNTER — HOSPITAL ENCOUNTER (OUTPATIENT)
Age: 79
Discharge: HOME | End: 2024-05-15
Attending: INTERNAL MEDICINE
Payer: MEDICARE

## 2024-05-13 LAB
ECHO AO ARCH DIAM: 2.4 CM
ECHO AO ASC DIAM: 4.2 CM
ECHO AO ROOT DIAM: 3.5 CM
ECHO AR MAX VEL PISA: 4.1 M/S
ECHO AV AREA PEAK VELOCITY: 1.9 CM2
ECHO AV AREA VTI: 1.9 CM2
ECHO AV MEAN GRADIENT: 12 MMHG
ECHO AV MEAN VELOCITY: 1.5 M/S
ECHO AV PEAK GRADIENT: 21 MMHG
ECHO AV PEAK VELOCITY: 2.3 M/S
ECHO AV REGURGITANT PHT: 386 MS
ECHO AV VELOCITY RATIO: 0.65
ECHO AV VTI: 39.7 CM
ECHO EST RA PRESSURE: 3 MMHG
ECHO LA AREA 2C: 16.1 CM2
ECHO LA AREA 4C: 16.9 CM2
ECHO LA MAJOR AXIS: 5.5 CM
ECHO LA MINOR AXIS: 5.6 CM
ECHO LA VOL BP: 39 ML (ref 22–52)
ECHO LA VOL MOD A2C: 38 ML (ref 22–52)
ECHO LA VOL MOD A4C: 41 ML (ref 22–52)
ECHO LV E' LATERAL VELOCITY: 5 CM/S
ECHO LV E' SEPTAL VELOCITY: 10 CM/S
ECHO LV EDV A2C: 62 ML
ECHO LV EDV A4C: 59 ML
ECHO LV EJECTION FRACTION A2C: 67 %
ECHO LV EJECTION FRACTION A4C: 68 %
ECHO LV EJECTION FRACTION BIPLANE: 65 % (ref 55–100)
ECHO LV ESV A2C: 21 ML
ECHO LV ESV A4C: 19 ML
ECHO LV FRACTIONAL SHORTENING: 29 % (ref 28–44)
ECHO LV INTERNAL DIMENSION DIASTOLIC: 4.1 CM (ref 3.9–5.3)
ECHO LV INTERNAL DIMENSION SYSTOLIC: 2.9 CM
ECHO LV IVSD: 1.3 CM (ref 0.6–0.9)
ECHO LV MASS 2D: 182.5 G (ref 67–162)
ECHO LV POSTERIOR WALL DIASTOLIC: 1.2 CM (ref 0.6–0.9)
ECHO LV RELATIVE WALL THICKNESS RATIO: 0.59
ECHO LVOT AREA: 2.8 CM2
ECHO LVOT AV VTI INDEX: 0.67
ECHO LVOT DIAM: 1.9 CM
ECHO LVOT MEAN GRADIENT: 5 MMHG
ECHO LVOT PEAK GRADIENT: 9 MMHG
ECHO LVOT PEAK VELOCITY: 1.5 M/S
ECHO LVOT SV: 75.7 ML
ECHO LVOT VTI: 26.7 CM
ECHO MV A VELOCITY: 1.78 M/S
ECHO MV AREA VTI: 2.3 CM2
ECHO MV E VELOCITY: 0.85 M/S
ECHO MV E/A RATIO: 0.48
ECHO MV E/E' LATERAL: 17
ECHO MV E/E' RATIO (AVERAGED): 12.75
ECHO MV E/E' SEPTAL: 8.5
ECHO MV LVOT VTI INDEX: 1.22
ECHO MV MAX VELOCITY: 1.9 M/S
ECHO MV MEAN GRADIENT: 5 MMHG
ECHO MV MEAN VELOCITY: 1 M/S
ECHO MV PEAK GRADIENT: 15 MMHG
ECHO MV VTI: 32.7 CM
ECHO PV ACCELERATION TIME (AT): 67 MS
ECHO PV MAX VELOCITY: 0.8 M/S
ECHO PV PEAK GRADIENT: 3 MMHG
ECHO RA AREA 4C: 18.1 CM2
ECHO RA VOLUME: 49 ML
ECHO RIGHT VENTRICULAR SYSTOLIC PRESSURE (RVSP): 27 MMHG
ECHO RV BASAL DIMENSION: 4.3 CM
ECHO RV FREE WALL PEAK S': 14 CM/S
ECHO RV LONGITUDINAL DIMENSION: 6.3 CM
ECHO RV MID DIMENSION: 3.1 CM
ECHO RV TAPSE: 2 CM (ref 1.7–?)
ECHO TV REGURGITANT MAX VELOCITY: 2.44 M/S
ECHO TV REGURGITANT PEAK GRADIENT: 24 MMHG

## 2024-05-13 PROCEDURE — 76376 3D RENDER W/INTRP POSTPROCES: CPT | Performed by: INTERNAL MEDICINE

## 2024-05-13 PROCEDURE — 93306 TTE W/DOPPLER COMPLETE: CPT | Performed by: INTERNAL MEDICINE

## 2024-05-13 PROCEDURE — 93306 TTE W/DOPPLER COMPLETE: CPT

## 2024-05-13 PROCEDURE — 76376 3D RENDER W/INTRP POSTPROCES: CPT

## 2024-05-13 RX ORDER — BUPROPION HYDROCHLORIDE 300 MG/1
TABLET ORAL
Qty: 90 TABLET | Refills: 0 | Status: SHIPPED | OUTPATIENT
Start: 2024-05-13

## 2024-05-13 RX ORDER — DULOXETIN HYDROCHLORIDE 60 MG/1
CAPSULE, DELAYED RELEASE ORAL
Qty: 30 CAPSULE | Refills: 2 | Status: SHIPPED | OUTPATIENT
Start: 2024-05-13

## 2024-05-13 RX ORDER — AMLODIPINE BESYLATE 10 MG/1
TABLET ORAL
Qty: 90 TABLET | Refills: 0 | Status: SHIPPED | OUTPATIENT
Start: 2024-05-13

## 2024-05-13 RX ORDER — MECLIZINE HYDROCHLORIDE 25 MG/1
TABLET ORAL
Qty: 30 TABLET | Refills: 5 | Status: SHIPPED | OUTPATIENT
Start: 2024-05-13

## 2024-05-15 ENCOUNTER — HOSPITAL ENCOUNTER (OUTPATIENT)
Age: 79
Discharge: HOME | End: 2024-05-15
Attending: INTERNAL MEDICINE

## 2024-05-20 ENCOUNTER — OFFICE VISIT (OUTPATIENT)
Dept: FAMILY MEDICINE CLINIC | Age: 79
End: 2024-05-20
Payer: MEDICARE

## 2024-05-20 VITALS — OXYGEN SATURATION: 93 % | HEART RATE: 123 BPM | TEMPERATURE: 97.8 F

## 2024-05-20 DIAGNOSIS — J40 BRONCHITIS: Primary | ICD-10-CM

## 2024-05-20 PROCEDURE — 1123F ACP DISCUSS/DSCN MKR DOCD: CPT | Performed by: STUDENT IN AN ORGANIZED HEALTH CARE EDUCATION/TRAINING PROGRAM

## 2024-05-20 PROCEDURE — 1036F TOBACCO NON-USER: CPT | Performed by: STUDENT IN AN ORGANIZED HEALTH CARE EDUCATION/TRAINING PROGRAM

## 2024-05-20 PROCEDURE — G8417 CALC BMI ABV UP PARAM F/U: HCPCS | Performed by: STUDENT IN AN ORGANIZED HEALTH CARE EDUCATION/TRAINING PROGRAM

## 2024-05-20 PROCEDURE — G8399 PT W/DXA RESULTS DOCUMENT: HCPCS | Performed by: STUDENT IN AN ORGANIZED HEALTH CARE EDUCATION/TRAINING PROGRAM

## 2024-05-20 PROCEDURE — 99213 OFFICE O/P EST LOW 20 MIN: CPT | Performed by: STUDENT IN AN ORGANIZED HEALTH CARE EDUCATION/TRAINING PROGRAM

## 2024-05-20 PROCEDURE — 1090F PRES/ABSN URINE INCON ASSESS: CPT | Performed by: STUDENT IN AN ORGANIZED HEALTH CARE EDUCATION/TRAINING PROGRAM

## 2024-05-20 PROCEDURE — G8428 CUR MEDS NOT DOCUMENT: HCPCS | Performed by: STUDENT IN AN ORGANIZED HEALTH CARE EDUCATION/TRAINING PROGRAM

## 2024-05-20 RX ORDER — PREDNISONE 20 MG/1
20 TABLET ORAL 2 TIMES DAILY
Qty: 10 TABLET | Refills: 0 | Status: SHIPPED | OUTPATIENT
Start: 2024-05-20 | End: 2024-05-25

## 2024-05-20 RX ORDER — DOXYCYCLINE HYCLATE 100 MG
100 TABLET ORAL 2 TIMES DAILY
Qty: 10 TABLET | Refills: 0 | Status: SHIPPED | OUTPATIENT
Start: 2024-05-20 | End: 2024-05-25

## 2024-05-20 NOTE — PROGRESS NOTES
Sick Visit  2024  Patient Name: Angelia Chand  MRN: [id number] : 1945    SUBJECTIVE:     CHIEF COMPLAINT:  Chief Complaint   Patient presents with    Cough     Cough since Friday has tried otc cough medications, patient states she has had black stool since the .        HISTORY OF PRESENT ILLNESS:  She presents today with the following concerns:    Cold Symptoms:  - Symptom onset: Friday  - Fever: No  - Cough: Yes   Productive? No  - Shortness of breath: Yes  - Nasal congestion: No  - Sore throat: Yes  - Ear pain: None  - Headache: Yes  - Body aches: None  - N/V/D: +diarrhea (dark stools)  - At home remedies: ibuprofen, robitussin  - Sick contacts?  has pneumonia    Other: black stool since     Medications:  Current Outpatient Medications   Medication Sig Dispense Refill    doxycycline hyclate (VIBRA-TABS) 100 MG tablet Take 1 tablet by mouth 2 times daily for 5 days 10 tablet 0    predniSONE (DELTASONE) 20 MG tablet Take 1 tablet by mouth 2 times daily for 5 days 10 tablet 0    amLODIPine (NORVASC) 10 MG tablet TAKE ONE TABLET BY MOUTH DAILY 90 tablet 0    buPROPion (WELLBUTRIN XL) 300 MG extended release tablet TAKE ONE TABLET BY MOUTH EVERY MORNING 90 tablet 0    DULoxetine (CYMBALTA) 60 MG extended release capsule TAKE ONE CAPSULE BY MOUTH IN THE MORNING. 30 capsule 2    meclizine (ANTIVERT) 25 MG tablet TAKE ONE TABLET BY MOUTH THREE TIMES A DAY AS NEEDED FOR DIZZINESS 30 tablet 5    nitroGLYCERIN (NITROSTAT) 0.4 MG SL tablet Place 1 tablet under the tongue every 5 minutes as needed for Chest pain 25 tablet 3    bethanechol (URECHOLINE) 25 MG tablet TAKE ONE TABLET BY MOUTH THREE TMES A DAY 60 tablet 5    XARELTO 20 MG TABS tablet TAKE ONE TABLET BY MOUTH DAILY WITH SUPPER 90 tablet 0    rOPINIRole (REQUIP) 0.25 MG tablet TAKE ONE (1) TABLET BY MOUTH NIGHTLY 90 tablet 3    WESTAB ONE 2.5-25-1 MG TABS tablet TAKE ONE TABLET BY MOUTH DAILY 30 tablet 5    ondansetron (ZOFRAN) 4 MG

## 2024-05-29 NOTE — ASSESSMENT & PLAN NOTE
Reported history of PAF, currently in NSR by exam  Reports palpitations, recommend starting Toprol XL (waiting on monitor results, treated for HTN)  On OAC

## 2024-05-29 NOTE — ASSESSMENT & PLAN NOTE
Atypical chest pain without change in quality since last visit  Stress testing reviewed, no ischemia or infarct, normal EF  Discussed with patient  No further testing warranted at this time  Advised to monitor for change in symptoms

## 2024-05-29 NOTE — PROGRESS NOTES
GENERAL CARDIOLOGY    REFERRING PROVIDER  Bonilla Gonzales Jr., MD     CHIEF COMPLAINT  Follow up       HPI    Angelia Chand is a 78 y.o. female presents to the clinic for follow up.  Last visit 4/30/2024 for initial consultation for chest pain.  Patient is accompanied by her son.      Medical history reviewed, significant for hypertension, persistent atrial fibrillation on Xarelto, and COPD.     Today, the patient reports:  Reports no change in symptoms since last visit  Persistent dizziness with standing, no syncope  Chest pain, random, no precipitant, short duration  No leg edema  No reported falls, uses cane    ASSESSMENT AND PLAN  Chest pain  Atypical chest pain without change in quality since last visit  Stress testing reviewed, no ischemia or infarct, normal EF  Discussed with patient  No further testing warranted at this time  Advised to monitor for change in symptoms      SOB (shortness of breath)  No significant dyspnea  Has COPD, not currently on inhalers  Follows with Pulmonary  Echo reviewed with patient    Palpitations  Waiting on telemetry results  Start Toprol    Dizziness  Waiting on telemetry monitor results  Encouraged to monitor BP at home and to stay well hydrated  No worsening of symptoms since last visit  Suspect, in part, mechanical etiology    Mixed hyperlipidemia  Lipid profile reviewed; , HDL 69, LDL 86, Tri 170(9/2019)  Not on a Statin    Essential hypertension  BP today is 138/94  Maintained on amlodipine  Will add Toprol XL 25mg   Advised to monitor BP at home    Atrial fibrillation (HCC)  Reported history of PAF, currently in NSR by exam  Reports palpitations, recommend starting Toprol XL (waiting on monitor results, treated for HTN)  On OAC (labs reviewed, no anemia, normal renal function)    Valvular heart disease  Mitral and Aortic valve disease on recent echo, images personally reviewed  Stenosis, regurgitation noted are not severe, LVEF preserved, LV cavity normal

## 2024-05-29 NOTE — ASSESSMENT & PLAN NOTE
Waiting on telemetry monitor results  Encouraged to monitor BP at home and to stay well hydrated  No worsening of symptoms since last visit  Suspect, in part, mechanical etiology

## 2024-05-29 NOTE — ASSESSMENT & PLAN NOTE
No significant dyspnea  Has COPD, not currently on inhalers  Follows with Pulmonary  Echo reviewed with patient

## 2024-05-29 NOTE — ASSESSMENT & PLAN NOTE
BP today is 138/94  Maintained on amlodipine  Will add Toprol XL 25mg   Advised to monitor BP at home

## 2024-05-29 NOTE — ASSESSMENT & PLAN NOTE
Mitral and Aortic valve disease on recent echo, images personally reviewed  Stenosis, regurgitation noted are not severe, LVEF preserved, LV cavity normal in size  No reported history of rheumatic fever  No concerning symptoms at this time, no acute heart failure (using lasix only as needed)  Discussed natural progression of VHD, recommend observation

## 2024-05-31 ENCOUNTER — HOSPITAL ENCOUNTER (OUTPATIENT)
Age: 79
Discharge: HOME | End: 2024-06-02
Attending: INTERNAL MEDICINE
Payer: MEDICARE

## 2024-05-31 ENCOUNTER — OFFICE VISIT (OUTPATIENT)
Dept: CARDIOLOGY CLINIC | Age: 79
End: 2024-05-31
Payer: MEDICARE

## 2024-05-31 VITALS
DIASTOLIC BLOOD PRESSURE: 94 MMHG | BODY MASS INDEX: 33.95 KG/M2 | HEART RATE: 77 BPM | WEIGHT: 172.9 LBS | OXYGEN SATURATION: 98 % | SYSTOLIC BLOOD PRESSURE: 138 MMHG | HEIGHT: 60 IN

## 2024-05-31 VITALS
HEIGHT: 60 IN | DIASTOLIC BLOOD PRESSURE: 104 MMHG | HEART RATE: 72 BPM | SYSTOLIC BLOOD PRESSURE: 153 MMHG | WEIGHT: 169.09 LBS | BODY MASS INDEX: 33.2 KG/M2

## 2024-05-31 DIAGNOSIS — R00.2 PALPITATIONS: ICD-10-CM

## 2024-05-31 DIAGNOSIS — R06.02 SOB (SHORTNESS OF BREATH): ICD-10-CM

## 2024-05-31 DIAGNOSIS — R42 DIZZINESS: ICD-10-CM

## 2024-05-31 DIAGNOSIS — I10 ESSENTIAL HYPERTENSION: ICD-10-CM

## 2024-05-31 DIAGNOSIS — E78.2 MIXED HYPERLIPIDEMIA: ICD-10-CM

## 2024-05-31 DIAGNOSIS — R07.89 OTHER CHEST PAIN: Primary | ICD-10-CM

## 2024-05-31 DIAGNOSIS — R07.89 OTHER CHEST PAIN: ICD-10-CM

## 2024-05-31 DIAGNOSIS — I38 VALVULAR HEART DISEASE: ICD-10-CM

## 2024-05-31 DIAGNOSIS — I48.0 PAROXYSMAL ATRIAL FIBRILLATION (HCC): ICD-10-CM

## 2024-05-31 LAB
ECHO BSA: 1.8 M2
NUC STRESS EJECTION FRACTION: 75 %
NUC STRESS LV EDV: 76 ML (ref 56–104)
NUC STRESS LV ESV: 19 ML (ref 19–49)
NUC STRESS LV MASS: 127 G
STRESS BASELINE DIAS BP: 104 MMHG
STRESS BASELINE HR: 72 BPM
STRESS BASELINE SYS BP: 153 MMHG
STRESS ESTIMATED WORKLOAD: 1 METS
STRESS O2 SAT REST: 92 %
STRESS PEAK DIAS BP: 78 MMHG
STRESS PEAK SYS BP: 112 MMHG
STRESS PERCENT HR ACHIEVED: 63 %
STRESS POST PEAK HR: 90 BPM
STRESS RATE PRESSURE PRODUCT: NORMAL BPM*MMHG
STRESS TARGET HR: 143 BPM
TID: 1

## 2024-05-31 PROCEDURE — 3080F DIAST BP >= 90 MM HG: CPT | Performed by: INTERNAL MEDICINE

## 2024-05-31 PROCEDURE — 1036F TOBACCO NON-USER: CPT | Performed by: INTERNAL MEDICINE

## 2024-05-31 PROCEDURE — 78452 HT MUSCLE IMAGE SPECT MULT: CPT

## 2024-05-31 PROCEDURE — 3430000000 HC RX DIAGNOSTIC RADIOPHARMACEUTICAL: Performed by: INTERNAL MEDICINE

## 2024-05-31 PROCEDURE — A9502 TC99M TETROFOSMIN: HCPCS | Performed by: INTERNAL MEDICINE

## 2024-05-31 PROCEDURE — 1123F ACP DISCUSS/DSCN MKR DOCD: CPT | Performed by: INTERNAL MEDICINE

## 2024-05-31 PROCEDURE — 6360000002 HC RX W HCPCS: Performed by: INTERNAL MEDICINE

## 2024-05-31 PROCEDURE — 99214 OFFICE O/P EST MOD 30 MIN: CPT | Performed by: INTERNAL MEDICINE

## 2024-05-31 PROCEDURE — G8417 CALC BMI ABV UP PARAM F/U: HCPCS | Performed by: INTERNAL MEDICINE

## 2024-05-31 PROCEDURE — 93017 CV STRESS TEST TRACING ONLY: CPT

## 2024-05-31 PROCEDURE — 1090F PRES/ABSN URINE INCON ASSESS: CPT | Performed by: INTERNAL MEDICINE

## 2024-05-31 PROCEDURE — 3075F SYST BP GE 130 - 139MM HG: CPT | Performed by: INTERNAL MEDICINE

## 2024-05-31 PROCEDURE — G8399 PT W/DXA RESULTS DOCUMENT: HCPCS | Performed by: INTERNAL MEDICINE

## 2024-05-31 PROCEDURE — G8427 DOCREV CUR MEDS BY ELIG CLIN: HCPCS | Performed by: INTERNAL MEDICINE

## 2024-05-31 RX ORDER — REGADENOSON 0.08 MG/ML
0.4 INJECTION, SOLUTION INTRAVENOUS
Status: COMPLETED | OUTPATIENT
Start: 2024-05-31 | End: 2024-05-31

## 2024-05-31 RX ORDER — METOPROLOL SUCCINATE 25 MG/1
25 TABLET, EXTENDED RELEASE ORAL DAILY
Qty: 90 TABLET | Refills: 2 | Status: SHIPPED | OUTPATIENT
Start: 2024-05-31

## 2024-05-31 RX ADMIN — TETROFOSMIN 10.67 MILLICURIE: 1.38 INJECTION, POWDER, LYOPHILIZED, FOR SOLUTION INTRAVENOUS at 08:24

## 2024-05-31 RX ADMIN — TETROFOSMIN 31.5 MILLICURIE: 1.38 INJECTION, POWDER, LYOPHILIZED, FOR SOLUTION INTRAVENOUS at 09:35

## 2024-05-31 RX ADMIN — REGADENOSON 0.4 MG: 0.08 INJECTION, SOLUTION INTRAVENOUS at 09:33

## 2024-05-31 NOTE — PATIENT INSTRUCTIONS
Start Toprol XL 25mg, take in the evening, bedtime    Continue to monitor BP at home, at least twice per day, morning and evening    Follow up in 4 weeks

## 2024-06-10 RX ORDER — MECLIZINE HYDROCHLORIDE 25 MG/1
TABLET ORAL
Qty: 90 TABLET | Refills: 1 | Status: SHIPPED | OUTPATIENT
Start: 2024-06-10

## 2024-07-02 NOTE — PROGRESS NOTES
TEST WITH MYOCARDIAL PERFUSION 05/31/2024 10:52 AM (Final)    Interpretation Summary    Stress Combined Conclusion: The study is negative for myocardial ischemia and infarction. Findings suggest a low risk of cardiac events.    Perfusion Comments: LV perfusion is normal.    Stress Function: Left ventricular function post-stress is normal. Post-stress ejection fraction is 75%. Stress end diastolic volume: 76 mL. Stress end systolic volume: 19 mL. LV mass: 127.0 g.      Stress Test: A pharmacological stress test was performed using regadenoson  (Lexiscan). PO caffeine given as a reversal agent. The patient reported  dizziness, headaches and no chest pain during the stress test. Symptoms began  during stress and ended during recovery.    Signed by: Everett Stephenson MD on 5/31/2024 10:52 AM    CT Result (most recent):  CT HEAD WO CONTRAST     Narrative  EXAM: CT-HEAD W/O CONTRAST.  DATE: 10/12/2022 9:13 PM.    INDICATION: Mental status change, persistent or worsening  COMPARISON: None  TECHNIQUE: Axial CT imaging obtained of the head. Axial images and  multiplanar reformatted images are provided for review. Up-to-date CT  equipment and radiation dose reduction techniques were employed.  CONTRAST: None    FINDINGS:  No acute intracranial hemorrhage, mass effect, or extra-axial collection.  Mild central atrophy. Moderate periventricular and subcortical white  matter hypoattenuation. Small remote lacunar infarct in the right basal  ganglia. The gray-white differentiation is normal.    No acute calvarial abnormalities. Prior cataract removal. The paranasal  sinuses and mastoid air cells are clear.    [IMPRESSION]  No acute intracranial hemorrhage or mass effect.    Mild atrophy and moderate chronic small vessel ischemic change.      Electronically Signed by: Israel Simms MD, 10/12/2022 9:17 PM    7 - Indicated    Cardiac event Monitor  5/2024  NSR, Symptoms with SR, ST  4 Short PAT  HR , average 85    ECG

## 2024-07-02 NOTE — ASSESSMENT & PLAN NOTE
Reported history of PAF, currently in NSR by exam  Recent telemetry monitor without recurrence  Currently asymptomatic since start of Toprol XL   On OAC

## 2024-07-02 NOTE — ASSESSMENT & PLAN NOTE
Resolved, telemetry monitor without complex or sustained arrhythmia  Encouraged to monitor BP at home and to stay well hydrated

## 2024-07-02 NOTE — ASSESSMENT & PLAN NOTE
BP today is 130/94, rechecked 130/94  Maintained on amlodipine, Toprol XL 25mg added last visit  Advised to monitor BP at home

## 2024-07-02 NOTE — ASSESSMENT & PLAN NOTE
Atypical chest pain previously reported has resolved without recurrence  Stress testing reviewed, no ischemia or infarct, normal EF  No further testing warranted at this time  Advised to monitor for change in symptoms

## 2024-07-05 DIAGNOSIS — I48.19 PERSISTENT ATRIAL FIBRILLATION (HCC): ICD-10-CM

## 2024-07-08 ENCOUNTER — OFFICE VISIT (OUTPATIENT)
Dept: CARDIOLOGY CLINIC | Age: 79
End: 2024-07-08
Payer: MEDICARE

## 2024-07-08 VITALS
HEIGHT: 60 IN | SYSTOLIC BLOOD PRESSURE: 130 MMHG | BODY MASS INDEX: 33.63 KG/M2 | OXYGEN SATURATION: 95 % | WEIGHT: 171.3 LBS | HEART RATE: 94 BPM | DIASTOLIC BLOOD PRESSURE: 94 MMHG

## 2024-07-08 DIAGNOSIS — R07.9 CHEST PAIN, UNSPECIFIED TYPE: Primary | ICD-10-CM

## 2024-07-08 DIAGNOSIS — I48.19 PERSISTENT ATRIAL FIBRILLATION (HCC): ICD-10-CM

## 2024-07-08 DIAGNOSIS — R00.2 PALPITATIONS: ICD-10-CM

## 2024-07-08 DIAGNOSIS — I20.89 STABLE ANGINA (HCC): ICD-10-CM

## 2024-07-08 DIAGNOSIS — I38 VALVULAR HEART DISEASE: ICD-10-CM

## 2024-07-08 DIAGNOSIS — I71.21 ANEURYSM OF ASCENDING AORTA WITHOUT RUPTURE (HCC): ICD-10-CM

## 2024-07-08 DIAGNOSIS — R06.02 SOB (SHORTNESS OF BREATH): ICD-10-CM

## 2024-07-08 DIAGNOSIS — R42 DIZZINESS: ICD-10-CM

## 2024-07-08 DIAGNOSIS — I10 ESSENTIAL HYPERTENSION: ICD-10-CM

## 2024-07-08 DIAGNOSIS — E78.2 MIXED HYPERLIPIDEMIA: ICD-10-CM

## 2024-07-08 DIAGNOSIS — R53.83 OTHER FATIGUE: ICD-10-CM

## 2024-07-08 PROCEDURE — G8417 CALC BMI ABV UP PARAM F/U: HCPCS | Performed by: INTERNAL MEDICINE

## 2024-07-08 PROCEDURE — 99214 OFFICE O/P EST MOD 30 MIN: CPT | Performed by: INTERNAL MEDICINE

## 2024-07-08 PROCEDURE — 3074F SYST BP LT 130 MM HG: CPT | Performed by: INTERNAL MEDICINE

## 2024-07-08 PROCEDURE — 1036F TOBACCO NON-USER: CPT | Performed by: INTERNAL MEDICINE

## 2024-07-08 PROCEDURE — 3080F DIAST BP >= 90 MM HG: CPT | Performed by: INTERNAL MEDICINE

## 2024-07-08 PROCEDURE — 1123F ACP DISCUSS/DSCN MKR DOCD: CPT | Performed by: INTERNAL MEDICINE

## 2024-07-08 PROCEDURE — G8399 PT W/DXA RESULTS DOCUMENT: HCPCS | Performed by: INTERNAL MEDICINE

## 2024-07-08 PROCEDURE — G8427 DOCREV CUR MEDS BY ELIG CLIN: HCPCS | Performed by: INTERNAL MEDICINE

## 2024-07-08 PROCEDURE — 1090F PRES/ABSN URINE INCON ASSESS: CPT | Performed by: INTERNAL MEDICINE

## 2024-07-08 RX ORDER — RIVAROXABAN 20 MG/1
TABLET, FILM COATED ORAL
Qty: 90 TABLET | Refills: 0 | Status: SHIPPED | OUTPATIENT
Start: 2024-07-08

## 2024-07-08 NOTE — ASSESSMENT & PLAN NOTE
Worsening of baseline fatigue  Denies change in sleep pattern  Notably on OAC, reports dark stool without melena or hematochezia, hemodynamically stable  Recommend Labs;  CBC, CMP, TSH  Consider sleep apnea evaluation

## 2024-07-30 DIAGNOSIS — F33.1 MODERATE EPISODE OF RECURRENT MAJOR DEPRESSIVE DISORDER (HCC): ICD-10-CM

## 2024-07-30 RX ORDER — AMLODIPINE BESYLATE 10 MG/1
TABLET ORAL
Qty: 90 TABLET | Refills: 0 | OUTPATIENT
Start: 2024-07-30

## 2024-07-30 RX ORDER — BUPROPION HYDROCHLORIDE 300 MG/1
TABLET ORAL
Refills: 0 | OUTPATIENT
Start: 2024-07-30

## 2024-07-30 RX ORDER — DULOXETIN HYDROCHLORIDE 60 MG/1
CAPSULE, DELAYED RELEASE ORAL
Refills: 2 | OUTPATIENT
Start: 2024-07-30

## 2024-08-20 RX ORDER — BUPROPION HYDROCHLORIDE 300 MG/1
TABLET ORAL
Refills: 0 | OUTPATIENT
Start: 2024-08-20

## 2024-11-15 ENCOUNTER — TELEPHONE (OUTPATIENT)
Dept: FAMILY MEDICINE CLINIC | Age: 79
End: 2024-11-15

## 2024-11-15 DIAGNOSIS — R11.0 NAUSEA: Primary | ICD-10-CM

## 2024-11-15 RX ORDER — ONDANSETRON 4 MG/1
4 TABLET, FILM COATED ORAL 3 TIMES DAILY PRN
Qty: 15 TABLET | Refills: 0 | Status: SHIPPED | OUTPATIENT
Start: 2024-11-15

## 2024-11-15 NOTE — TELEPHONE ENCOUNTER
Patient has some nausea and she stated that when she eats it goes straight through her.  Patient has not had any fevers.

## 2024-11-15 NOTE — TELEPHONE ENCOUNTER
Will send over Zofran to help with nausea. If diarrhea is persistent through the weekend she can take some OTC Imodium.

## 2024-11-15 NOTE — TELEPHONE ENCOUNTER
Please see message from patient requesting medication to help with diarrhea and slight pain in the abdomen.  Patient has no transportation at this time to come to the office.

## 2024-11-15 NOTE — TELEPHONE ENCOUNTER
Patient made aware and stated understanding to take imodium if over the weekend diarrhea persist. Patient aware zofran was sent into the pharmacy.

## 2024-11-15 NOTE — TELEPHONE ENCOUNTER
Any fevers?  Typically in regards to the diarrhea it isn't recommended for any antidiarrheals for the first 72 hours.  Any nausea?

## 2024-11-15 NOTE — TELEPHONE ENCOUNTER
Provider out of office     Pt has been experiencing Diarrhea and slight pain in abdomen for the last 2 days            Pt was eager to get seen today but unfortunately doesn't have transportation at this time due to her granddaughter being out of town.        I advised pt that I could put a request in the doctor to prescribe something to help aid and hopefully she could find someone to pick it up      Please call pts with any updates             [facility] [id number] - [place], [place] - [address] [phone] - F [phone]

## 2024-11-20 DIAGNOSIS — N30.00 ACUTE CYSTITIS WITHOUT HEMATURIA: ICD-10-CM

## 2024-11-20 DIAGNOSIS — F33.1 MODERATE EPISODE OF RECURRENT MAJOR DEPRESSIVE DISORDER (HCC): ICD-10-CM

## 2024-11-20 DIAGNOSIS — I48.19 PERSISTENT ATRIAL FIBRILLATION (HCC): ICD-10-CM

## 2024-11-20 DIAGNOSIS — G25.81 RLS (RESTLESS LEGS SYNDROME): ICD-10-CM

## 2024-11-20 RX ORDER — BUPROPION HYDROCHLORIDE 300 MG/1
TABLET ORAL
Qty: 30 TABLET | Refills: 0 | Status: SHIPPED | OUTPATIENT
Start: 2024-11-20

## 2024-11-20 RX ORDER — ROPINIROLE 0.25 MG/1
0.25 TABLET, FILM COATED ORAL NIGHTLY
Qty: 30 TABLET | Refills: 0 | Status: SHIPPED | OUTPATIENT
Start: 2024-11-20

## 2024-11-20 RX ORDER — AMLODIPINE BESYLATE 10 MG/1
10 TABLET ORAL DAILY
Qty: 30 TABLET | Refills: 0 | Status: SHIPPED | OUTPATIENT
Start: 2024-11-20

## 2024-11-20 RX ORDER — MECLIZINE HYDROCHLORIDE 25 MG/1
TABLET ORAL
Qty: 30 TABLET | Refills: 0 | Status: SHIPPED | OUTPATIENT
Start: 2024-11-20

## 2024-11-20 RX ORDER — DULOXETIN HYDROCHLORIDE 60 MG/1
CAPSULE, DELAYED RELEASE ORAL
Qty: 30 CAPSULE | Refills: 0 | Status: SHIPPED | OUTPATIENT
Start: 2024-11-20

## 2024-11-20 RX ORDER — CYANOCOBALAMIN/FOLIC AC/VIT B6 1-2.5-25MG
1 TABLET ORAL DAILY
Qty: 30 TABLET | Refills: 0 | Status: SHIPPED | OUTPATIENT
Start: 2024-11-20

## 2024-11-20 RX ORDER — FUROSEMIDE 20 MG/1
TABLET ORAL
Qty: 30 TABLET | Refills: 0 | Status: SHIPPED | OUTPATIENT
Start: 2024-11-20

## 2024-11-21 NOTE — ASSESSMENT & PLAN NOTE
Echo 5/13/2024 TAA 4.2 cm, mild-moderate AR  Echo 9/24/2021 TAA 4.1 cm, mild AR  Echo 5/13/2024 TAA 4.2 cm,mild-moderate AR  Routine imaging surveillance  Blood pressure is optimized

## 2024-11-21 NOTE — ASSESSMENT & PLAN NOTE
Mitral and Aortic valve disease on recent echo, images personally reviewed  Stenosis, regurgitation noted are not severe, LVEF preserved, LV cavity normal in size  No reported history of rheumatic fever  No exam findings of heart failure (using lasix only as needed)  Discussed natural progression of VHD, recommend observation

## 2024-11-21 NOTE — ASSESSMENT & PLAN NOTE
BP today is normotensive  Maintained on amlodipine, Toprol XL 25mg added   Advised to monitor BP at home

## 2024-11-21 NOTE — PROGRESS NOTES
GENERAL CARDIOLOGY    REFERRING PROVIDER  Bonilla Gonzales Jr., MD     CHIEF COMPLAINT  Follow up          HPI    Angelia Chand is a 79 y.o. female presents for follow up from 7/8/2024. Initial visit 4/30/2024, referred for chest pain.    Medical history reviewed, significant for hypertension, persistent atrial fibrillation on Xarelto, and COPD.     Today, the patient reports:  Reports \"nagging\" central chest discomfort described as an ache, comes and goes better with rest and worse with exertion  States she has sob with chest symptoms  No dysphagia, symptoms not made worse with food  No palpitations, no syncope  Stable bilateral leg swelling  No hematochezia, melena  No new medications  Compliant with OAC    ASSESSMENT AND PLAN  Chest pain  Atypical chest pain previously reported had resolved , Stress testing 5/31/2024, no ischemia or infarct, normal EF  Patient presents with symptoms of chest \"ache\" prompted by exertion and relieved with rest but not consistently  Disscussed options for management; antianginal adjustment, CCTA, LHC  Patient declines LHC as option, she would like to proceed with CCTA  She states she would be able to lay flat for CCTA  Continue antiplatelet, BB  Advised to seek emergent evaluation by calling 911 for severe/persistent pain  Patient expressed understanding    SOB (shortness of breath)  Has COPD, not currently on inhalers  Consider re-evaluation by PCP      Palpitations  No recurrence on Toprol    Dizziness  Resolved, telemetry monitor without complex or sustained arrhythmia  Encouraged to monitor BP at home and to stay well hydrated    Mixed hyperlipidemia  Lipid profile reviewed; , HDL 69, LDL 86, Tri 170 (9/2019)  Not on a Statin    Essential hypertension  BP today is normotensive  Maintained on amlodipine, Toprol XL 25mg added   Advised to monitor BP at home    Atrial fibrillation (HCC)  Reported history of PAF, currently in NSR by exam  Recent telemetry monitor

## 2024-11-21 NOTE — ASSESSMENT & PLAN NOTE
Atypical chest pain previously reported had resolved , Stress testing 5/31/2024, no ischemia or infarct, normal EF  Patient presents with symptoms of chest \"ache\" prompted by exertion and relieved with rest but not consistently  Disscussed options for management; antianginal adjustment, CCTA, LHC  Patient declines [facility] as option, she would like to proceed with CCTA  She states she would be able to lay flat for CCTA  Continue antiplatelet, BB  Advised to seek emergent evaluation by calling 911 for severe/persistent pain  Patient expressed understanding

## 2024-11-22 ENCOUNTER — OFFICE VISIT (OUTPATIENT)
Dept: CARDIOLOGY CLINIC | Age: 79
End: 2024-11-22
Payer: MEDICARE

## 2024-11-22 VITALS
BODY MASS INDEX: 34.75 KG/M2 | HEART RATE: 82 BPM | OXYGEN SATURATION: 94 % | WEIGHT: 177 LBS | DIASTOLIC BLOOD PRESSURE: 82 MMHG | HEIGHT: 60 IN | SYSTOLIC BLOOD PRESSURE: 108 MMHG

## 2024-11-22 DIAGNOSIS — R42 DIZZINESS: ICD-10-CM

## 2024-11-22 DIAGNOSIS — I71.21 ANEURYSM OF ASCENDING AORTA WITHOUT RUPTURE (HCC): ICD-10-CM

## 2024-11-22 DIAGNOSIS — R53.83 OTHER FATIGUE: ICD-10-CM

## 2024-11-22 DIAGNOSIS — E78.2 MIXED HYPERLIPIDEMIA: ICD-10-CM

## 2024-11-22 DIAGNOSIS — R07.9 CHEST PAIN, UNSPECIFIED TYPE: Primary | ICD-10-CM

## 2024-11-22 DIAGNOSIS — R06.02 SOB (SHORTNESS OF BREATH): ICD-10-CM

## 2024-11-22 DIAGNOSIS — R00.2 PALPITATIONS: ICD-10-CM

## 2024-11-22 DIAGNOSIS — I10 ESSENTIAL HYPERTENSION: ICD-10-CM

## 2024-11-22 DIAGNOSIS — I38 VALVULAR HEART DISEASE: ICD-10-CM

## 2024-11-22 PROCEDURE — 1159F MED LIST DOCD IN RCRD: CPT | Performed by: INTERNAL MEDICINE

## 2024-11-22 PROCEDURE — G8427 DOCREV CUR MEDS BY ELIG CLIN: HCPCS | Performed by: INTERNAL MEDICINE

## 2024-11-22 PROCEDURE — 1090F PRES/ABSN URINE INCON ASSESS: CPT | Performed by: INTERNAL MEDICINE

## 2024-11-22 PROCEDURE — 1036F TOBACCO NON-USER: CPT | Performed by: INTERNAL MEDICINE

## 2024-11-22 PROCEDURE — 99214 OFFICE O/P EST MOD 30 MIN: CPT | Performed by: INTERNAL MEDICINE

## 2024-11-22 PROCEDURE — 3079F DIAST BP 80-89 MM HG: CPT | Performed by: INTERNAL MEDICINE

## 2024-11-22 PROCEDURE — G8484 FLU IMMUNIZE NO ADMIN: HCPCS | Performed by: INTERNAL MEDICINE

## 2024-11-22 PROCEDURE — 1123F ACP DISCUSS/DSCN MKR DOCD: CPT | Performed by: INTERNAL MEDICINE

## 2024-11-22 PROCEDURE — 3074F SYST BP LT 130 MM HG: CPT | Performed by: INTERNAL MEDICINE

## 2024-11-22 PROCEDURE — G8399 PT W/DXA RESULTS DOCUMENT: HCPCS | Performed by: INTERNAL MEDICINE

## 2024-11-22 PROCEDURE — G8417 CALC BMI ABV UP PARAM F/U: HCPCS | Performed by: INTERNAL MEDICINE

## 2024-11-22 RX ORDER — NITROGLYCERIN 0.4 MG/1
0.4 TABLET SUBLINGUAL
OUTPATIENT
Start: 2024-11-22 | End: 2024-11-23

## 2024-11-22 RX ORDER — METOPROLOL TARTRATE 1 MG/ML
5 INJECTION, SOLUTION INTRAVENOUS EVERY 5 MIN PRN
OUTPATIENT
Start: 2024-11-22

## 2024-11-22 RX ORDER — SODIUM CHLORIDE 9 MG/ML
INJECTION, SOLUTION INTRAVENOUS PRN
OUTPATIENT
Start: 2024-11-22

## 2024-11-22 RX ORDER — METOPROLOL TARTRATE 50 MG
TABLET ORAL
Qty: 3 TABLET | Refills: 0 | Status: SHIPPED | OUTPATIENT
Start: 2024-11-22

## 2024-11-22 RX ORDER — NITROGLYCERIN 0.4 MG/1
0.8 TABLET SUBLINGUAL
OUTPATIENT
Start: 2024-11-22 | End: 2024-11-23

## 2024-11-22 RX ORDER — SODIUM CHLORIDE 0.9 % (FLUSH) 0.9 %
5-40 SYRINGE (ML) INJECTION EVERY 12 HOURS SCHEDULED
OUTPATIENT
Start: 2024-11-22

## 2024-11-22 RX ORDER — SODIUM CHLORIDE 0.9 % (FLUSH) 0.9 %
5-40 SYRINGE (ML) INJECTION PRN
OUTPATIENT
Start: 2024-11-22

## 2024-11-22 NOTE — PATIENT INSTRUCTIONS
No medication changes.    Call the office for any new, worsening, or concerning symptoms.    Instructions for your cat scan--  Two hours before, check your heart rate:  -Take NO tablets if less than 55.  -Take 1 tablet if it is 55-70.   -Take 2 tablets if it is 71-80.   -Take 3 tablets if it is greater than 80.

## 2024-12-18 DIAGNOSIS — G25.81 RLS (RESTLESS LEGS SYNDROME): ICD-10-CM

## 2024-12-19 RX ORDER — AMLODIPINE BESYLATE 10 MG/1
10 TABLET ORAL DAILY
Qty: 30 TABLET | Refills: 0 | Status: SHIPPED | OUTPATIENT
Start: 2024-12-19

## 2024-12-19 RX ORDER — BUPROPION HYDROCHLORIDE 300 MG/1
TABLET ORAL
Qty: 30 TABLET | Refills: 0 | Status: SHIPPED | OUTPATIENT
Start: 2024-12-19

## 2024-12-19 RX ORDER — FUROSEMIDE 20 MG/1
TABLET ORAL
Qty: 30 TABLET | Refills: 0 | Status: SHIPPED | OUTPATIENT
Start: 2024-12-19

## 2024-12-19 RX ORDER — ROPINIROLE 0.25 MG/1
0.25 TABLET, FILM COATED ORAL NIGHTLY
Qty: 30 TABLET | Refills: 0 | Status: SHIPPED | OUTPATIENT
Start: 2024-12-19

## 2024-12-26 ENCOUNTER — TELEPHONE (OUTPATIENT)
Dept: INTERVENTIONAL RADIOLOGY/VASCULAR | Age: 79
End: 2024-12-26

## 2025-02-07 ENCOUNTER — OFFICE VISIT (OUTPATIENT)
Dept: FAMILY MEDICINE CLINIC | Age: 80
End: 2025-02-07
Payer: COMMERCIAL

## 2025-02-07 VITALS
DIASTOLIC BLOOD PRESSURE: 72 MMHG | OXYGEN SATURATION: 96 % | SYSTOLIC BLOOD PRESSURE: 112 MMHG | HEART RATE: 91 BPM | WEIGHT: 165.8 LBS | BODY MASS INDEX: 32.38 KG/M2

## 2025-02-07 DIAGNOSIS — I48.19 PERSISTENT ATRIAL FIBRILLATION (HCC): ICD-10-CM

## 2025-02-07 DIAGNOSIS — F33.1 MODERATE EPISODE OF RECURRENT MAJOR DEPRESSIVE DISORDER (HCC): ICD-10-CM

## 2025-02-07 DIAGNOSIS — M54.41 CHRONIC BILATERAL LOW BACK PAIN WITH BILATERAL SCIATICA: Primary | ICD-10-CM

## 2025-02-07 DIAGNOSIS — R42 VERTIGO: ICD-10-CM

## 2025-02-07 DIAGNOSIS — Z91.81 AT HIGH RISK FOR FALLS: ICD-10-CM

## 2025-02-07 DIAGNOSIS — J44.9 CHRONIC OBSTRUCTIVE PULMONARY DISEASE, UNSPECIFIED COPD TYPE (HCC): ICD-10-CM

## 2025-02-07 DIAGNOSIS — M54.42 CHRONIC BILATERAL LOW BACK PAIN WITH BILATERAL SCIATICA: Primary | ICD-10-CM

## 2025-02-07 DIAGNOSIS — G89.29 CHRONIC BILATERAL LOW BACK PAIN WITH BILATERAL SCIATICA: Primary | ICD-10-CM

## 2025-02-07 DIAGNOSIS — R11.0 NAUSEA: ICD-10-CM

## 2025-02-07 PROBLEM — F11.20 NARCOTIC DEPENDENCE (HCC): Status: RESOLVED | Noted: 2021-04-07 | Resolved: 2025-02-07

## 2025-02-07 PROCEDURE — 1159F MED LIST DOCD IN RCRD: CPT | Performed by: FAMILY MEDICINE

## 2025-02-07 PROCEDURE — 1123F ACP DISCUSS/DSCN MKR DOCD: CPT | Performed by: FAMILY MEDICINE

## 2025-02-07 PROCEDURE — 3074F SYST BP LT 130 MM HG: CPT | Performed by: FAMILY MEDICINE

## 2025-02-07 PROCEDURE — 99214 OFFICE O/P EST MOD 30 MIN: CPT | Performed by: FAMILY MEDICINE

## 2025-02-07 PROCEDURE — 3078F DIAST BP <80 MM HG: CPT | Performed by: FAMILY MEDICINE

## 2025-02-07 RX ORDER — MECLIZINE HCL 12.5 MG 12.5 MG/1
12.5 TABLET ORAL 3 TIMES DAILY PRN
Qty: 30 TABLET | Refills: 1 | Status: SHIPPED | OUTPATIENT
Start: 2025-02-07 | End: 2025-02-27

## 2025-02-07 RX ORDER — ONDANSETRON 4 MG/1
4 TABLET, FILM COATED ORAL 3 TIMES DAILY PRN
Qty: 30 TABLET | Refills: 0 | Status: SHIPPED | OUTPATIENT
Start: 2025-02-07

## 2025-02-07 SDOH — ECONOMIC STABILITY: FOOD INSECURITY: WITHIN THE PAST 12 MONTHS, YOU WORRIED THAT YOUR FOOD WOULD RUN OUT BEFORE YOU GOT MONEY TO BUY MORE.: NEVER TRUE

## 2025-02-07 SDOH — ECONOMIC STABILITY: FOOD INSECURITY: WITHIN THE PAST 12 MONTHS, THE FOOD YOU BOUGHT JUST DIDN'T LAST AND YOU DIDN'T HAVE MONEY TO GET MORE.: NEVER TRUE

## 2025-02-07 ASSESSMENT — PATIENT HEALTH QUESTIONNAIRE - PHQ9
6. FEELING BAD ABOUT YOURSELF - OR THAT YOU ARE A FAILURE OR HAVE LET YOURSELF OR YOUR FAMILY DOWN: NOT AT ALL
2. FEELING DOWN, DEPRESSED OR HOPELESS: NEARLY EVERY DAY
3. TROUBLE FALLING OR STAYING ASLEEP: NOT AT ALL
5. POOR APPETITE OR OVEREATING: NOT AT ALL
8. MOVING OR SPEAKING SO SLOWLY THAT OTHER PEOPLE COULD HAVE NOTICED. OR THE OPPOSITE, BEING SO FIGETY OR RESTLESS THAT YOU HAVE BEEN MOVING AROUND A LOT MORE THAN USUAL: NOT AT ALL
SUM OF ALL RESPONSES TO PHQ QUESTIONS 1-9: 11
4. FEELING TIRED OR HAVING LITTLE ENERGY: NEARLY EVERY DAY
9. THOUGHTS THAT YOU WOULD BE BETTER OFF DEAD, OR OF HURTING YOURSELF: NOT AT ALL
7. TROUBLE CONCENTRATING ON THINGS, SUCH AS READING THE NEWSPAPER OR WATCHING TELEVISION: NEARLY EVERY DAY
1. LITTLE INTEREST OR PLEASURE IN DOING THINGS: MORE THAN HALF THE DAYS
SUM OF ALL RESPONSES TO PHQ QUESTIONS 1-9: 11
10. IF YOU CHECKED OFF ANY PROBLEMS, HOW DIFFICULT HAVE THESE PROBLEMS MADE IT FOR YOU TO DO YOUR WORK, TAKE CARE OF THINGS AT HOME, OR GET ALONG WITH OTHER PEOPLE: NOT DIFFICULT AT ALL
SUM OF ALL RESPONSES TO PHQ QUESTIONS 1-9: 11
SUM OF ALL RESPONSES TO PHQ9 QUESTIONS 1 & 2: 5
SUM OF ALL RESPONSES TO PHQ QUESTIONS 1-9: 11

## 2025-02-07 ASSESSMENT — COPD QUESTIONNAIRES: COPD: 1

## 2025-02-07 ASSESSMENT — ENCOUNTER SYMPTOMS
RHINORRHEA: 1
NAUSEA: 1
BACK PAIN: 1

## 2025-02-07 NOTE — PROGRESS NOTES
tablet by mouth 3 times daily as needed for Nausea or Vomiting        Return in about 6 months (around 8/7/2025).    Please note portions of this note were completed with a voicerecognition program.  Efforts were made to edit the dictations but occasionally words are mis-transcribed.  On the basis of positive falls risk screening, assessment and plan is as follows: patient declines any further evaluation/treatment for increased falls risk.

## 2025-02-07 NOTE — PATIENT INSTRUCTIONS
[employer] [facility] 211   Speak to a trained professional 24/7 who can connect you to essential community services including food, clothing, transportation, housing, utilities, employment services, childcare, and baby supplies. 211 serves nationwide.  [address] [place] [address].org for resources in [place], [facility], [place] and [facility] in [place]; [place], [place], [place], and [facility] in [place].   [facility].org/resources for resources in [place], [place], [place], [place], [place], [place], [place], [place], [facility], [place], [place], and [facility] in [place].    Non-Emergency Transportation: Non-emergency medical transportation is for Medicaid members who do not have access to free transportation that suits their medical needs and need to be transported to a Medicaid-covered service performed by a Medicaid enrolled provider.   [facility]: [phone]  [facility]: [phone]  [facility]: [phone]  [facility]: [phone]  [facility]: [phone]  [facility]: [phone]. [phone]  [facility]: [phone]   [facility]: [place], [place], [place], [place], [place], [place], and [facility] [phone]  [place]: [phone] for non-managed Medicaid.     Public Transportation Services: Small fee may be associated with service.  [facility] Access: [phone]  [place] [employer] [facility]: [phone]  [facility]: [phone], ext. [phone]  [facility] Transit Services: [phone]  [facility] Transit Services: [phone]  [facility] Transportation: [phone]  [place]- [facility] ([employer]): [phone]      [facility]   What they offer: Transportation Services also offers convenient group shopping trips. Seniors can socialize with their friends as they shop and complete other errands.  Website:  [email or web address]   Phone

## 2025-02-12 DIAGNOSIS — I10 ESSENTIAL HYPERTENSION: ICD-10-CM

## 2025-02-12 DIAGNOSIS — I48.19 PERSISTENT ATRIAL FIBRILLATION (HCC): ICD-10-CM

## 2025-02-12 DIAGNOSIS — I38 VALVULAR HEART DISEASE: ICD-10-CM

## 2025-02-12 LAB
ALBUMIN SERPL-MCNC: 4.3 G/DL (ref 3.4–5)
ALBUMIN/GLOB SERPL: 1.5 {RATIO} (ref 1.1–2.2)
ALP SERPL-CCNC: 113 U/L (ref 40–129)
ALT SERPL-CCNC: 23 U/L (ref 10–40)
ANION GAP SERPL CALCULATED.3IONS-SCNC: 15 MMOL/L (ref 3–16)
AST SERPL-CCNC: 27 U/L (ref 15–37)
BASOPHILS # BLD: 0 K/UL (ref 0–0.2)
BASOPHILS NFR BLD: 0.6 %
BILIRUB SERPL-MCNC: 0.3 MG/DL (ref 0–1)
BUN SERPL-MCNC: 14 MG/DL (ref 7–20)
CALCIUM SERPL-MCNC: 9.4 MG/DL (ref 8.3–10.6)
CHLORIDE SERPL-SCNC: 102 MMOL/L (ref 99–110)
CO2 SERPL-SCNC: 24 MMOL/L (ref 21–32)
CREAT SERPL-MCNC: 1.1 MG/DL (ref 0.6–1.2)
DEPRECATED RDW RBC AUTO: 14.6 % (ref 12.4–15.4)
EOSINOPHIL # BLD: 0.1 K/UL (ref 0–0.6)
EOSINOPHIL NFR BLD: 2 %
GFR SERPLBLD CREATININE-BSD FMLA CKD-EPI: 51 ML/MIN/{1.73_M2}
GLUCOSE SERPL-MCNC: 97 MG/DL (ref 70–99)
HCT VFR BLD AUTO: 36.7 % (ref 36–48)
HGB BLD-MCNC: 11.8 G/DL (ref 12–16)
LYMPHOCYTES # BLD: 0.7 K/UL (ref 1–5.1)
LYMPHOCYTES NFR BLD: 15.6 %
MCH RBC QN AUTO: 30.8 PG (ref 26–34)
MCHC RBC AUTO-ENTMCNC: 32.3 G/DL (ref 31–36)
MCV RBC AUTO: 95.4 FL (ref 80–100)
MONOCYTES # BLD: 0.5 K/UL (ref 0–1.3)
MONOCYTES NFR BLD: 11.2 %
NEUTROPHILS # BLD: 3 K/UL (ref 1.7–7.7)
NEUTROPHILS NFR BLD: 70.6 %
PLATELET # BLD AUTO: 225 K/UL (ref 135–450)
PMV BLD AUTO: 8.3 FL (ref 5–10.5)
POTASSIUM SERPL-SCNC: 4.7 MMOL/L (ref 3.5–5.1)
PROT SERPL-MCNC: 7.1 G/DL (ref 6.4–8.2)
RBC # BLD AUTO: 3.84 M/UL (ref 4–5.2)
SODIUM SERPL-SCNC: 141 MMOL/L (ref 136–145)
TSH SERPL DL<=0.005 MIU/L-ACNC: 2.15 UIU/ML (ref 0.27–4.2)
WBC # BLD AUTO: 4.3 K/UL (ref 4–11)

## 2025-02-13 ENCOUNTER — TELEPHONE (OUTPATIENT)
Dept: INTERVENTIONAL RADIOLOGY/VASCULAR | Age: 80
End: 2025-02-13

## 2025-02-13 NOTE — RESULT ENCOUNTER NOTE
I spoke with Angelia, I informed her of her recent lab results, she reports that she believes she was \"hydrated\" \"I normally drink 6-12 bottles per day\"?   She does report that she is scheduled for her CCTA next Thursday, 2/20/2025

## 2025-03-21 ENCOUNTER — TELEPHONE (OUTPATIENT)
Dept: INTERVENTIONAL RADIOLOGY/VASCULAR | Age: 80
End: 2025-03-21

## 2025-03-21 DIAGNOSIS — G25.81 RLS (RESTLESS LEGS SYNDROME): ICD-10-CM

## 2025-03-24 RX ORDER — ROPINIROLE 0.25 MG/1
0.25 TABLET, FILM COATED ORAL NIGHTLY
Qty: 30 TABLET | Refills: 5 | Status: SHIPPED | OUTPATIENT
Start: 2025-03-24

## 2025-03-28 ENCOUNTER — TELEPHONE (OUTPATIENT)
Dept: CARDIOLOGY CLINIC | Age: 80
End: 2025-03-28

## 2025-03-28 ENCOUNTER — HOSPITAL ENCOUNTER (OUTPATIENT)
Dept: CT IMAGING | Age: 80
Discharge: HOME | End: 2025-03-28
Attending: INTERNAL MEDICINE
Payer: COMMERCIAL

## 2025-03-28 ENCOUNTER — RESULTS FOLLOW-UP (OUTPATIENT)
Dept: CARDIOLOGY CLINIC | Age: 80
End: 2025-03-28

## 2025-03-28 VITALS
BODY MASS INDEX: 34.36 KG/M2 | SYSTOLIC BLOOD PRESSURE: 105 MMHG | OXYGEN SATURATION: 94 % | HEART RATE: 64 BPM | HEIGHT: 60 IN | WEIGHT: 175 LBS | TEMPERATURE: 97.6 F | DIASTOLIC BLOOD PRESSURE: 74 MMHG | RESPIRATION RATE: 20 BRPM

## 2025-03-28 DIAGNOSIS — E78.2 MIXED HYPERLIPIDEMIA: ICD-10-CM

## 2025-03-28 DIAGNOSIS — E78.2 MIXED HYPERLIPIDEMIA: Primary | ICD-10-CM

## 2025-03-28 DIAGNOSIS — R07.9 CHEST PAIN, UNSPECIFIED TYPE: ICD-10-CM

## 2025-03-28 LAB
CREAT SERPL-MCNC: 1 MG/DL (ref 0.6–1.2)
GFR SERPLBLD CREATININE-BSD FMLA CKD-EPI: 58 ML/MIN/{1.73_M2}

## 2025-03-28 PROCEDURE — 6360000004 HC RX CONTRAST MEDICATION: Performed by: INTERNAL MEDICINE

## 2025-03-28 PROCEDURE — 36415 COLL VENOUS BLD VENIPUNCTURE: CPT

## 2025-03-28 PROCEDURE — 6370000000 HC RX 637 (ALT 250 FOR IP): Performed by: INTERNAL MEDICINE

## 2025-03-28 PROCEDURE — 75574 CT ANGIO HRT W/3D IMAGE: CPT

## 2025-03-28 PROCEDURE — 2500000003 HC RX 250 WO HCPCS: Performed by: INTERNAL MEDICINE

## 2025-03-28 PROCEDURE — 82565 ASSAY OF CREATININE: CPT

## 2025-03-28 RX ORDER — NITROGLYCERIN 0.4 MG/1
0.8 TABLET SUBLINGUAL ONCE
Status: COMPLETED | OUTPATIENT
Start: 2025-03-28 | End: 2025-03-28

## 2025-03-28 RX ORDER — METOPROLOL TARTRATE 1 MG/ML
5 INJECTION, SOLUTION INTRAVENOUS EVERY 5 MIN PRN
Status: DISCONTINUED | OUTPATIENT
Start: 2025-03-28 | End: 2025-03-29 | Stop reason: HOSPADM

## 2025-03-28 RX ORDER — IOPAMIDOL 755 MG/ML
85 INJECTION, SOLUTION INTRAVASCULAR
Status: COMPLETED | OUTPATIENT
Start: 2025-03-28 | End: 2025-03-28

## 2025-03-28 RX ADMIN — NITROGLYCERIN 0.8 MG: 0.4 TABLET SUBLINGUAL at 09:15

## 2025-03-28 RX ADMIN — IOPAMIDOL 85 ML: 755 INJECTION, SOLUTION INTRAVENOUS at 09:17

## 2025-03-28 RX ADMIN — METOPROLOL TARTRATE 5 MG: 5 INJECTION INTRAVENOUS at 09:20

## 2025-03-28 RX ADMIN — METOPROLOL TARTRATE 5 MG: 5 INJECTION INTRAVENOUS at 08:59

## 2025-03-28 NOTE — TELEPHONE ENCOUNTER
Angelia returned my phone call. I did review with her CCTA results .  She reports that she is not having any chest pain, \"no different then what I have been having\". I discussed with her that we have not seen her since November 2024 and that she had complained of chest pain then and that is why the CCTA was ordered then. She reports that she Has had it scheduled \"5 times since then but did not have transportation\".   She again reports that the \"pain is the same as I always have had\".   I also informed her that she needs repeat lipids soon so we can determine if with the CCTA results what statin therapy she might need. She is not currently on statin or aspirin therapy. Order was placed.   I also instructed her that weneed to see her for follow up in office.   I also instructed her that if her chest pain becomes worse or any other symptoms she is to proceed to the ED or call 911.  She verbalizes understanding of all.

## 2025-03-28 NOTE — TELEPHONE ENCOUNTER
Please schedule Angelia for a follow up with Dr. Johnson soon.   She has not been seen since November.   Abnormal CCTA     Thanks

## 2025-03-28 NOTE — PROGRESS NOTES
No problems noted. Tolerated scan without issues. IV taken out and patient discharged home with belongings and instructions.

## 2025-03-28 NOTE — RESULT ENCOUNTER NOTE
Discussed with Dr. Johnson, currently there is no documented CAC score, procedure done today.  Reviewed chart , patient last seen OV 11/2024, complaints of chest pain (Chronic) , discussed at that time C, declined , CCTA ordered.   She did have OV with PCP 2/2025, mention of chronic chest pain, no mention in plan.   Tried to call Angelia, no answer, unable to leave message ,  full. Did speak with a son, Jeff, he will try to reach out and have her return call.   She does need repeat lipids, last 5/2022, LDL 86  She is on Xarelto for Afib, she is not on current statin or aspirin therapy.   Order placed for repeat lipid, will try and call again and/or return call.

## 2025-03-29 ENCOUNTER — HOSPITAL ENCOUNTER (OUTPATIENT)
Age: 80
Discharge: HOME | End: 2025-03-29

## 2025-03-29 DIAGNOSIS — E78.2 MIXED HYPERLIPIDEMIA: ICD-10-CM

## 2025-03-29 PROCEDURE — 80061 LIPID PANEL: CPT

## 2025-03-29 PROCEDURE — 36415 COLL VENOUS BLD VENIPUNCTURE: CPT

## 2025-03-30 LAB
CHOLEST SERPL-MCNC: 201 MG/DL (ref 0–199)
HDLC SERPL-MCNC: 61 MG/DL (ref 40–60)
LDLC SERPL CALC-MCNC: 114 MG/DL
TRIGL SERPL-MCNC: 130 MG/DL (ref 0–150)
VLDLC SERPL CALC-MCNC: 26 MG/DL

## 2025-03-31 ENCOUNTER — RESULTS FOLLOW-UP (OUTPATIENT)
Dept: CARDIOLOGY CLINIC | Age: 80
End: 2025-03-31

## 2025-03-31 ENCOUNTER — TELEPHONE (OUTPATIENT)
Dept: CARDIOLOGY CLINIC | Age: 80
End: 2025-03-31

## 2025-03-31 DIAGNOSIS — R07.9 CHEST PAIN, UNSPECIFIED TYPE: ICD-10-CM

## 2025-03-31 DIAGNOSIS — E78.2 MIXED HYPERLIPIDEMIA: Primary | ICD-10-CM

## 2025-03-31 RX ORDER — ASPIRIN 81 MG/1
81 TABLET ORAL DAILY
Qty: 90 TABLET | Refills: 0 | Status: SHIPPED | OUTPATIENT
Start: 2025-03-31

## 2025-03-31 RX ORDER — ROSUVASTATIN CALCIUM 20 MG/1
20 TABLET, COATED ORAL DAILY
Qty: 90 TABLET | Refills: 1 | Status: SHIPPED | OUTPATIENT
Start: 2025-03-31

## 2025-03-31 NOTE — TELEPHONE ENCOUNTER
DES SAEED had to schedule Angelia for 12n appt on 4/9/2025.  She has a grossly abnormal CCTA and needs to be seen sooner than 5/30/25, as was scheduled on Friday.     Dr. Johnson will see her at 12N , she is rounding in the am and we have afternoon office. The patient has transportation issues    Thanks

## 2025-03-31 NOTE — RESULT ENCOUNTER NOTE
I spoke with Angelia and her son Jeff regarding her recent CCTA results. I informed them that the test suggest that there is coronary artery blockage. I discussed the need to see her in office sooner rather than wait til 5/30/25 to discuss C, R/B/A. She is agreeable and her son will bring her 4/9//25 at Noon.   I also informed her of her recent lipid results and instructed her on the need to start Crestor 20mg and 81mg of aspirin daily. Rx sent, she is agreeable. I also instructed her to repeat lipid lab in about 6 weeks, order placed.    She does have what she describes as chest \"tightness\" . She reports that she has had this for \"a long time\". It is with exertion and is relieved by rest.   I also instructed her that if it worsens or does not go away , we recommend that she go to ED or call 911. She verbalized understanding.

## 2025-03-31 NOTE — TELEPHONE ENCOUNTER
I called Jeff again to change the 4/9/25 appointment. We need to cahnge to another day that week at noon.  I did speak with Angelia and she is aware but Jeff is her transportation.   Left return number for Jeff.

## 2025-04-01 ENCOUNTER — TELEPHONE (OUTPATIENT)
Dept: CARDIOLOGY CLINIC | Age: 80
End: 2025-04-01

## 2025-04-01 NOTE — ASSESSMENT & PLAN NOTE
Echo 5/13/2024 TAA 4.2 cm, mild-moderate AR  Echo 9/24/2021 TAA 4.1 cm, mild AR  Echo 5/13/2024 TAA 4.2 cm,mild-moderate AR  Routine imaging surveillance  Blood pressure is optimized  Recommend repeating echocardiogram

## 2025-04-01 NOTE — ASSESSMENT & PLAN NOTE
Reported history of PAF, currently in NSR by exam  Recent telemetry monitor without recurrence  Currently asymptomatic since start of Toprol XL   On OAC     [id number] Score for Atrial Fibrillation Stroke Risk   Risk   Factors  Component Value   C CHF No 0   H HTN Yes 1   A2 Age >= 75 Yes,  (79 y.o.) 2   D DM No 0   S2 Prior Stroke/TIA No 0   V Vascular Disease No 0   A Age 65-74 No,  (79 y.o.) 0   Sc Sex female 1    [id number]  Score  4

## 2025-04-01 NOTE — ASSESSMENT & PLAN NOTE
Has COPD, not currently on inhalers  Consider re-evaluation by PCP  Reports that she has SOB with and without exertion  Recommend echocardiogram, last echo 4/2024

## 2025-04-01 NOTE — ASSESSMENT & PLAN NOTE
Stress testing 5/31/2024, no ischemia or infarct, normal EF  Due to persistent chest discomfort/dyspnea, options for management previously discussed  Patient underwent CCTA, results discussed; LAD and RCA obstruction could not be excluded  Continue antiplatelet, statin, BB  Recommended considering invasive coronary angiography, R/B/A discussed in detail with son present  Son said he wanted to discuss with his Daughter who works with [employer] in [place]  Did not commit to a decision at this time  Recommend seeking emergent medical care by calling 911 for severe/persistent chest pain

## 2025-04-01 NOTE — ASSESSMENT & PLAN NOTE
Lipid profile reviewed; , HDL 69, LDL 86, Tri 170 (9/2019)  Repeated 3/29,   Statin and aspirin started , Crestor 20mg

## 2025-04-01 NOTE — PROGRESS NOTES
GENERAL CARDIOLOGY    REFERRING PROVIDER  Bonilla Gonzales Jr., MD     CHIEF COMPLAINT  Follow up  [facility]          HPI    Angelia Chand is a 79 y.o. female presents for follow up from 11/22/2024. Initial visit 4/30/2024, referred for chest pain.    Medical history reviewed, significant for hypertension, persistent fibrillation on Xarelto, and COPD.     Today, the patient reports:  Accompanied by her Son  She reports that she is here to discuss the CCTA  She reports that she is SOB all the time with walking  She does report \"chest heaviness\" occurs at rest, no exertional chest pain  Reports history of indigestion, denies current symptoms  Denies dysphagia    ASSESSMENT AND PLAN  Chest pain  Stress testing 5/31/2024, no ischemia or infarct, normal EF  Due to persistent chest discomfort/dyspnea, options for management previously discussed  Patient underwent CCTA, results discussed; LAD and RCA obstruction could not be excluded  Continue antiplatelet, statin, BB  Recommended considering invasive coronary angiography, R/B/A discussed in detail with son present  Son said he wanted to discuss with his Daughter who works with [employer] in [place]  Did not commit to a decision at this time  Recommend seeking emergent medical care by calling 911 for severe/persistent chest pain    SOB (shortness of breath)  Has COPD, not currently on inhalers  Consider re-evaluation by PCP  Reports that she has SOB with and without exertion  Recommend echocardiogram, last echo 5/2024      Palpitations  No recurrence on Toprol    Dizziness  Resolved, telemetry monitor without complex or sustained arrhythmia  Encouraged to monitor BP at home and to stay well hydrated    Mixed hyperlipidemia  Lipid profile reviewed; , HDL 69, LDL 86, Tri 170 (9/2019)  Repeated 3/29,   Statin and aspirin started , Crestor 20mg    Essential hypertension  BP today is normotensive  Maintained on amlodipine, Toprol XL 25mg added   Advised to

## 2025-04-01 NOTE — ASSESSMENT & PLAN NOTE
Mitral and Aortic valve disease on recent echo, images personally reviewed  Stenosis, regurgitation noted are not severe, LVEF preserved, LV cavity normal in size  No reported history of rheumatic fever  No exam findings of heart failure (using lasix only as needed)  Discussed natural progression of VHD, recommend repeat Echo

## 2025-04-07 ENCOUNTER — OFFICE VISIT (OUTPATIENT)
Dept: CARDIOLOGY CLINIC | Age: 80
End: 2025-04-07
Payer: COMMERCIAL

## 2025-04-07 VITALS
BODY MASS INDEX: 33.2 KG/M2 | OXYGEN SATURATION: 97 % | DIASTOLIC BLOOD PRESSURE: 82 MMHG | SYSTOLIC BLOOD PRESSURE: 118 MMHG | WEIGHT: 170 LBS | HEART RATE: 96 BPM

## 2025-04-07 DIAGNOSIS — I71.21 ANEURYSM OF ASCENDING AORTA WITHOUT RUPTURE: ICD-10-CM

## 2025-04-07 DIAGNOSIS — I38 VALVULAR HEART DISEASE: ICD-10-CM

## 2025-04-07 DIAGNOSIS — E78.2 MIXED HYPERLIPIDEMIA: ICD-10-CM

## 2025-04-07 DIAGNOSIS — R07.89 OTHER CHEST PAIN: ICD-10-CM

## 2025-04-07 DIAGNOSIS — R06.02 SHORTNESS OF BREATH: ICD-10-CM

## 2025-04-07 DIAGNOSIS — R42 DIZZINESS: ICD-10-CM

## 2025-04-07 DIAGNOSIS — R00.2 PALPITATIONS: ICD-10-CM

## 2025-04-07 DIAGNOSIS — R06.02 SOB (SHORTNESS OF BREATH): Primary | ICD-10-CM

## 2025-04-07 DIAGNOSIS — I48.21 PERMANENT ATRIAL FIBRILLATION (HCC): ICD-10-CM

## 2025-04-07 DIAGNOSIS — I10 ESSENTIAL HYPERTENSION: ICD-10-CM

## 2025-04-07 PROCEDURE — 99214 OFFICE O/P EST MOD 30 MIN: CPT | Performed by: INTERNAL MEDICINE

## 2025-04-07 PROCEDURE — 93000 ELECTROCARDIOGRAM COMPLETE: CPT | Performed by: INTERNAL MEDICINE

## 2025-04-07 PROCEDURE — 1159F MED LIST DOCD IN RCRD: CPT | Performed by: INTERNAL MEDICINE

## 2025-04-07 PROCEDURE — G2211 COMPLEX E/M VISIT ADD ON: HCPCS | Performed by: INTERNAL MEDICINE

## 2025-04-07 PROCEDURE — 3074F SYST BP LT 130 MM HG: CPT | Performed by: INTERNAL MEDICINE

## 2025-04-07 PROCEDURE — 3079F DIAST BP 80-89 MM HG: CPT | Performed by: INTERNAL MEDICINE

## 2025-04-07 PROCEDURE — 1123F ACP DISCUSS/DSCN MKR DOCD: CPT | Performed by: INTERNAL MEDICINE

## 2025-05-12 ENCOUNTER — HOSPITAL ENCOUNTER (OUTPATIENT)
Age: 80
Discharge: HOME | End: 2025-05-14
Attending: INTERNAL MEDICINE
Payer: MEDICARE

## 2025-05-12 ENCOUNTER — RESULTS FOLLOW-UP (OUTPATIENT)
Dept: CARDIOLOGY CLINIC | Age: 80
End: 2025-05-12

## 2025-05-12 VITALS
BODY MASS INDEX: 33.38 KG/M2 | WEIGHT: 170 LBS | SYSTOLIC BLOOD PRESSURE: 148 MMHG | DIASTOLIC BLOOD PRESSURE: 95 MMHG | HEIGHT: 60 IN

## 2025-05-12 DIAGNOSIS — R06.02 SHORTNESS OF BREATH: ICD-10-CM

## 2025-05-12 LAB
ECHO AO ASC DIAM: 4.4 CM
ECHO AO ASCENDING AORTA INDEX: 2.53 CM/M2
ECHO AO ROOT DIAM: 2.7 CM
ECHO AO ROOT INDEX: 1.55 CM/M2
ECHO AR MAX VEL PISA: 3.7 M/S
ECHO AV AREA PEAK VELOCITY: 1.3 CM2
ECHO AV AREA VTI: 1.6 CM2
ECHO AV AREA/BSA PEAK VELOCITY: 0.7 CM2/M2
ECHO AV AREA/BSA VTI: 0.9 CM2/M2
ECHO AV MEAN GRADIENT: 12 MMHG
ECHO AV MEAN VELOCITY: 1.6 M/S
ECHO AV PEAK GRADIENT: 27 MMHG
ECHO AV PEAK VELOCITY: 2.6 M/S
ECHO AV REGURGITANT PHT: 351 MS
ECHO AV VELOCITY RATIO: 0.46
ECHO AV VTI: 49.2 CM
ECHO BSA: 1.81 M2
ECHO LA AREA 2C: 16.8 CM2
ECHO LA AREA 4C: 19.2 CM2
ECHO LA MAJOR AXIS: 6 CM
ECHO LA MINOR AXIS: 5.5 CM
ECHO LA VOL BP: 46 ML (ref 22–52)
ECHO LA VOL MOD A2C: 41 ML (ref 22–52)
ECHO LA VOL MOD A4C: 50 ML (ref 22–52)
ECHO LA VOL/BSA BIPLANE: 26 ML/M2 (ref 16–34)
ECHO LA VOLUME INDEX MOD A2C: 24 ML/M2 (ref 16–34)
ECHO LA VOLUME INDEX MOD A4C: 29 ML/M2 (ref 16–34)
ECHO LV E' LATERAL VELOCITY: 6.31 CM/S
ECHO LV E' SEPTAL VELOCITY: 3.48 CM/S
ECHO LV EDV A2C: 83 ML
ECHO LV EDV A4C: 77 ML
ECHO LV EDV INDEX A4C: 44 ML/M2
ECHO LV EDV NDEX A2C: 48 ML/M2
ECHO LV EJECTION FRACTION 3D: 60 %
ECHO LV EJECTION FRACTION A2C: 57 %
ECHO LV EJECTION FRACTION A4C: 54 %
ECHO LV EJECTION FRACTION BIPLANE: 56 % (ref 55–100)
ECHO LV ESV A2C: 36 ML
ECHO LV ESV A4C: 35 ML
ECHO LV ESV INDEX A2C: 21 ML/M2
ECHO LV ESV INDEX A4C: 20 ML/M2
ECHO LV FRACTIONAL SHORTENING: 50 % (ref 28–44)
ECHO LV INTERNAL DIMENSION DIASTOLE INDEX: 1.95 CM/M2
ECHO LV INTERNAL DIMENSION DIASTOLIC: 3.4 CM (ref 3.9–5.3)
ECHO LV INTERNAL DIMENSION SYSTOLIC INDEX: 0.98 CM/M2
ECHO LV INTERNAL DIMENSION SYSTOLIC: 1.7 CM
ECHO LV IVSD: 1.3 CM (ref 0.6–0.9)
ECHO LV MASS 2D: 147.6 G (ref 67–162)
ECHO LV MASS INDEX 2D: 84.8 G/M2 (ref 43–95)
ECHO LV POSTERIOR WALL DIASTOLIC: 1.3 CM (ref 0.6–0.9)
ECHO LV RELATIVE WALL THICKNESS RATIO: 0.76
ECHO LVOT AREA: 2.8 CM2
ECHO LVOT AV VTI INDEX: 0.57
ECHO LVOT DIAM: 1.9 CM
ECHO LVOT MEAN GRADIENT: 3 MMHG
ECHO LVOT PEAK GRADIENT: 6 MMHG
ECHO LVOT PEAK VELOCITY: 1.2 M/S
ECHO LVOT STROKE VOLUME INDEX: 45.3 ML/M2
ECHO LVOT SV: 78.8 ML
ECHO LVOT VTI: 27.8 CM
ECHO MV A VELOCITY: 1.89 M/S
ECHO MV AREA VTI: 1.7 CM2
ECHO MV E VELOCITY: 1.01 M/S
ECHO MV E/A RATIO: 0.53
ECHO MV E/E' LATERAL: 16.01
ECHO MV E/E' RATIO (AVERAGED): 22.51
ECHO MV E/E' SEPTAL: 29.02
ECHO MV LVOT VTI INDEX: 1.63
ECHO MV MAX VELOCITY: 2.1 M/S
ECHO MV MEAN GRADIENT: 5 MMHG
ECHO MV MEAN VELOCITY: 1.1 M/S
ECHO MV PEAK GRADIENT: 17 MMHG
ECHO MV VTI: 45.2 CM
ECHO PV MAX VELOCITY: 0.9 M/S
ECHO PV PEAK GRADIENT: 3 MMHG
ECHO RA AREA 4C: 9.3 CM2
ECHO RA END SYSTOLIC VOLUME APICAL 4 CHAMBER INDEX BSA: 8 ML/M2
ECHO RA VOLUME: 14 ML
ECHO RV BASAL DIMENSION: 2 CM
ECHO RV FREE WALL PEAK S': 10.3 CM/S
ECHO RV LONGITUDINAL DIMENSION: 5.9 CM
ECHO RV MID DIMENSION: 2 CM
ECHO RV TAPSE: 1.5 CM (ref 1.7–?)
ECHO TV REGURGITANT MAX VELOCITY: 2.25 M/S
ECHO TV REGURGITANT PEAK GRADIENT: 20 MMHG

## 2025-05-12 PROCEDURE — 93306 TTE W/DOPPLER COMPLETE: CPT | Performed by: INTERNAL MEDICINE

## 2025-05-12 PROCEDURE — 93306 TTE W/DOPPLER COMPLETE: CPT

## 2025-05-12 NOTE — RESULT ENCOUNTER NOTE
I spoke with Angelia, I informed her of her recent echocardiogram results. She verbalizes understanding. She does report continued shortness of breath and wants to proceed with C as discussed.

## 2025-05-13 ENCOUNTER — TELEPHONE (OUTPATIENT)
Dept: CARDIOLOGY CLINIC | Age: 80
End: 2025-05-13

## 2025-05-13 DIAGNOSIS — Z01.810 PREOP CARDIOVASCULAR EXAM: ICD-10-CM

## 2025-05-13 DIAGNOSIS — R93.1 ABNORMAL FINDINGS ON DIAGNOSTIC IMAGING OF HEART AND CORONARY CIRCULATION: ICD-10-CM

## 2025-05-13 DIAGNOSIS — R93.89 ABNORMAL COMPUTED TOMOGRAPHY ANGIOGRAPHY (CTA): ICD-10-CM

## 2025-05-13 DIAGNOSIS — R06.02 SOB (SHORTNESS OF BREATH): Primary | ICD-10-CM

## 2025-05-13 DIAGNOSIS — R94.30 ABNORMAL CARDIOVASCULAR FUNCTION: ICD-10-CM

## 2025-05-13 NOTE — RESULT ENCOUNTER NOTE
I spoke with Angelia again today, discussed with her that her SOB could be a blockage, could be related to her valve, or lungs.   She understands the risks,discussed at last OV. She understands instructions for the procedure and understands that she will hold her Xarelto for 48 hours prior to the procedure and she is to hold Lasix the day of.

## 2025-05-13 NOTE — TELEPHONE ENCOUNTER
Please schedule Angelia for a LHC with Dr. Funez at [facility].     I have spoken to her and she is agreeable.    I have placed the orders,  She will need to hold her Xarelto for 48 hours  She will need to hold the Lasix the day of procedure.      Left Heart Cath Patient Pre-procedure Instructions    Do NOT eat or drink anything after midnight morning of procedure    MEDICATIONS:  Your medications have been reviewed. Please follow medication instructions below  It is okay to drink a sip of water with meds morning of procedure  Xarelto-do not take for 48 hours (2 days) prior to procedure  Diuretics- Hold diuretics (water pill) for comfort morning of procedure. Your diuretics to hold: Furosemide (Lasix)    Transportation: Bring someone to drive you home.     Scheduling: Patience GARRETT is our full time procedure . She will call you to schedule your procedure.    Allergies: Do you have an allergy to dye or contrast? No.    Labs: We need to check blood work within 30 days of your procedure (CBC & BMP). Please go to any [facility] lab to get your labs drawn prior to your procedure.

## 2025-05-14 PROBLEM — R93.89 ABNORMAL COMPUTED TOMOGRAPHY ANGIOGRAPHY (CTA): Status: ACTIVE | Noted: 2025-05-13

## 2025-05-14 NOTE — TELEPHONE ENCOUNTER
Date of Procedure: Monday 6/9/25 @ [facility] with Dr. Funez     Time of arrival: 7:30 am     Procedure time: 9:00 am     Called and spoke to Angelia and she is agreeable to date and time. Reviewed and sent Angelia a [employer] message with her procedure date, time and instructions and she verbalized understanding. Encouraged to call with any questions or concerns.     Published on [employer], scheduled on [employer] and e-mailed to cath lab.

## 2025-06-04 NOTE — PRE SEDATION
treatment by providing sedation and analgesia and maximize the potential amnesia.  Patient to meet pre-procedure discharge plan.   Medications Midazolam intravenously and fentanyl intravenously   Appropriate Candidate Yes   Post Procedure Return to same level of care

## 2025-06-04 NOTE — H&P
[facility]  H+P  Consult  OP Visit  FU Visit   CC/HPI   CC Followup visit for cardiac conditions detailed in assessment and plan below.   HPI 79 y.o., female presents to the cath lab for [facility] due to concerns with chest pain and abnormal coronary CTA suggesting obstructive coronary artery disease    Cardiac Hx None   CARDIAC TESTING   Troponin No results found for: \"TROPHS\"   HISTORY/ALLERGY/ROS   MEDHx  has a past medical history of AVM, Cerebral artery occlusion with cerebral infarction (HCC), Chronic back pain, Depression, Gastritis, History of blood transfusion, History of GI bleed, Hyperlipidemia, Hypertension, and Personal history of MRSA (methicillin resistant Staphylococcus aureus).   SURGHx  has a past surgical history that includes hiatal hernia repair (1999); Endoscopy, colon, diagnostic; Colonoscopy; cervical fusion; back surgery; other surgical history (Left, 220222); and Abdominal exploration surgery (09/07/2017).   SOCHx  reports that she has never smoked. She has been exposed to tobacco smoke. She has never used smokeless tobacco. She reports current alcohol use. She reports that she does not use drugs.   FAMHx family history includes Diabetes in her father and mother; Heart Disease in her father and mother; Other in her sister.   ALLERG Penicillins   ROS Full ROS obtained and negative except as mentioned in HPI   MEDICATIONS   Medications reviewed in Epic.   SCRIBE   RN - I, Michaelle Begum RN, am scribing for and in the presence of Israel Funez MD. 6/4/2025 8:57 AM EDT   MD Scarlet Begum is working as scribe for and in presence of me and may have prepopulated components of note with my historical intellectual property (IP) under my supervision.  Any additions to this IP performed in my presence and at my direction. Content and accuracy of this note reviewed by me (Israel Funez MD).   PHYSICAL EXAM   Vitals BP (!) 143/111   Pulse 93   Temp 98.4 °F (36.9 °C) (Infrared)   Resp 16

## 2025-06-09 ENCOUNTER — RESULTS FOLLOW-UP (OUTPATIENT)
Age: 80
End: 2025-06-09

## 2025-06-09 ENCOUNTER — HOSPITAL ENCOUNTER (OUTPATIENT)
Age: 80
Setting detail: OUTPATIENT SURGERY
Discharge: HOME | End: 2025-06-09
Attending: INTERNAL MEDICINE | Admitting: INTERNAL MEDICINE
Payer: MEDICARE

## 2025-06-09 VITALS
OXYGEN SATURATION: 97 % | SYSTOLIC BLOOD PRESSURE: 135 MMHG | TEMPERATURE: 98.4 F | HEART RATE: 80 BPM | HEIGHT: 60 IN | BODY MASS INDEX: 33.38 KG/M2 | DIASTOLIC BLOOD PRESSURE: 92 MMHG | WEIGHT: 170 LBS | RESPIRATION RATE: 16 BRPM

## 2025-06-09 DIAGNOSIS — R06.02 SOB (SHORTNESS OF BREATH): ICD-10-CM

## 2025-06-09 DIAGNOSIS — R93.89 ABNORMAL COMPUTED TOMOGRAPHY ANGIOGRAPHY (CTA): ICD-10-CM

## 2025-06-09 LAB
ANION GAP SERPL CALCULATED.3IONS-SCNC: 13 MMOL/L (ref 3–16)
BUN SERPL-MCNC: 23 MG/DL (ref 7–20)
CALCIUM SERPL-MCNC: 9.2 MG/DL (ref 8.3–10.6)
CHLORIDE SERPL-SCNC: 103 MMOL/L (ref 99–110)
CO2 SERPL-SCNC: 23 MMOL/L (ref 21–32)
CREAT SERPL-MCNC: 1.1 MG/DL (ref 0.6–1.2)
DEPRECATED RDW RBC AUTO: 13.5 % (ref 12.4–15.4)
ECHO BSA: 1.81 M2
EKG ATRIAL RATE: 94 BPM
EKG DIAGNOSIS: NORMAL
EKG P AXIS: 24 DEGREES
EKG P-R INTERVAL: 204 MS
EKG Q-T INTERVAL: 400 MS
EKG QRS DURATION: 152 MS
EKG QTC CALCULATION (BAZETT): 500 MS
EKG R AXIS: 54 DEGREES
EKG T AXIS: -9 DEGREES
EKG VENTRICULAR RATE: 94 BPM
GFR SERPLBLD CREATININE-BSD FMLA CKD-EPI: 51 ML/MIN/{1.73_M2}
GLUCOSE SERPL-MCNC: 103 MG/DL (ref 70–99)
HCT VFR BLD AUTO: 35.9 % (ref 36–48)
HGB BLD-MCNC: 11.8 G/DL (ref 12–16)
MCH RBC QN AUTO: 30.9 PG (ref 26–34)
MCHC RBC AUTO-ENTMCNC: 32.8 G/DL (ref 31–36)
MCV RBC AUTO: 94.1 FL (ref 80–100)
PLATELET # BLD AUTO: 197 K/UL (ref 135–450)
PMV BLD AUTO: 8 FL (ref 5–10.5)
POC ACT LR: 207 SEC
POTASSIUM SERPL-SCNC: 4.5 MMOL/L (ref 3.5–5.1)
RBC # BLD AUTO: 3.81 M/UL (ref 4–5.2)
SODIUM SERPL-SCNC: 139 MMOL/L (ref 136–145)
WBC # BLD AUTO: 4 K/UL (ref 4–11)

## 2025-06-09 PROCEDURE — 6360000002 HC RX W HCPCS: Performed by: INTERNAL MEDICINE

## 2025-06-09 PROCEDURE — C1894 INTRO/SHEATH, NON-LASER: HCPCS | Performed by: INTERNAL MEDICINE

## 2025-06-09 PROCEDURE — C1760 CLOSURE DEV, VASC: HCPCS | Performed by: INTERNAL MEDICINE

## 2025-06-09 PROCEDURE — 2500000003 HC RX 250 WO HCPCS: Performed by: INTERNAL MEDICINE

## 2025-06-09 PROCEDURE — 85027 COMPLETE CBC AUTOMATED: CPT

## 2025-06-09 PROCEDURE — 99152 MOD SED SAME PHYS/QHP 5/>YRS: CPT | Performed by: INTERNAL MEDICINE

## 2025-06-09 PROCEDURE — C1769 GUIDE WIRE: HCPCS | Performed by: INTERNAL MEDICINE

## 2025-06-09 PROCEDURE — 36415 COLL VENOUS BLD VENIPUNCTURE: CPT

## 2025-06-09 PROCEDURE — 80048 BASIC METABOLIC PNL TOTAL CA: CPT

## 2025-06-09 PROCEDURE — 93458 L HRT ARTERY/VENTRICLE ANGIO: CPT | Performed by: INTERNAL MEDICINE

## 2025-06-09 PROCEDURE — 6360000004 HC RX CONTRAST MEDICATION: Performed by: INTERNAL MEDICINE

## 2025-06-09 PROCEDURE — 2709999900 HC NON-CHARGEABLE SUPPLY: Performed by: INTERNAL MEDICINE

## 2025-06-09 PROCEDURE — C1887 CATHETER, GUIDING: HCPCS | Performed by: INTERNAL MEDICINE

## 2025-06-09 PROCEDURE — 93799 UNLISTED CV SVC/PROCEDURE: CPT | Performed by: INTERNAL MEDICINE

## 2025-06-09 PROCEDURE — 7100000010 HC PHASE II RECOVERY - FIRST 15 MIN: Performed by: INTERNAL MEDICINE

## 2025-06-09 PROCEDURE — 93571 IV DOP VEL&/PRESS C FLO 1ST: CPT | Performed by: INTERNAL MEDICINE

## 2025-06-09 PROCEDURE — 7100000011 HC PHASE II RECOVERY - ADDTL 15 MIN: Performed by: INTERNAL MEDICINE

## 2025-06-09 PROCEDURE — 99153 MOD SED SAME PHYS/QHP EA: CPT | Performed by: INTERNAL MEDICINE

## 2025-06-09 PROCEDURE — 93005 ELECTROCARDIOGRAM TRACING: CPT

## 2025-06-09 PROCEDURE — 93452 LEFT HRT CATH W/VENTRCLGRPHY: CPT | Performed by: INTERNAL MEDICINE

## 2025-06-09 PROCEDURE — 85347 COAGULATION TIME ACTIVATED: CPT

## 2025-06-09 RX ORDER — FENTANYL CITRATE 50 UG/ML
INJECTION, SOLUTION INTRAMUSCULAR; INTRAVENOUS PRN
Status: DISCONTINUED | OUTPATIENT
Start: 2025-06-09 | End: 2025-06-09 | Stop reason: HOSPADM

## 2025-06-09 RX ORDER — IOPAMIDOL 755 MG/ML
INJECTION, SOLUTION INTRAVASCULAR PRN
Status: DISCONTINUED | OUTPATIENT
Start: 2025-06-09 | End: 2025-06-09 | Stop reason: HOSPADM

## 2025-06-09 RX ORDER — HEPARIN SODIUM 1000 [USP'U]/ML
INJECTION, SOLUTION INTRAVENOUS; SUBCUTANEOUS PRN
Status: DISCONTINUED | OUTPATIENT
Start: 2025-06-09 | End: 2025-06-09 | Stop reason: HOSPADM

## 2025-06-09 RX ORDER — SODIUM CHLORIDE 0.9 % (FLUSH) 0.9 %
5-40 SYRINGE (ML) INJECTION EVERY 12 HOURS SCHEDULED
Status: DISCONTINUED | OUTPATIENT
Start: 2025-06-09 | End: 2025-06-09 | Stop reason: HOSPADM

## 2025-06-09 RX ORDER — PROTAMINE SULFATE 10 MG/ML
INJECTION, SOLUTION INTRAVENOUS PRN
Status: DISCONTINUED | OUTPATIENT
Start: 2025-06-09 | End: 2025-06-09 | Stop reason: HOSPADM

## 2025-06-09 RX ORDER — SODIUM CHLORIDE 0.9 % (FLUSH) 0.9 %
5-40 SYRINGE (ML) INJECTION PRN
Status: DISCONTINUED | OUTPATIENT
Start: 2025-06-09 | End: 2025-06-09 | Stop reason: HOSPADM

## 2025-06-09 RX ORDER — LIDOCAINE HYDROCHLORIDE 10 MG/ML
INJECTION, SOLUTION INFILTRATION; PERINEURAL PRN
Status: DISCONTINUED | OUTPATIENT
Start: 2025-06-09 | End: 2025-06-09 | Stop reason: HOSPADM

## 2025-06-09 RX ORDER — SODIUM CHLORIDE 9 MG/ML
INJECTION, SOLUTION INTRAVENOUS PRN
Status: DISCONTINUED | OUTPATIENT
Start: 2025-06-09 | End: 2025-06-09 | Stop reason: HOSPADM

## 2025-06-09 RX ORDER — ADENOSINE 3 MG/ML
INJECTION, SOLUTION INTRAVENOUS PRN
Status: DISCONTINUED | OUTPATIENT
Start: 2025-06-09 | End: 2025-06-09 | Stop reason: HOSPADM

## 2025-06-09 RX ORDER — MIDAZOLAM HYDROCHLORIDE 1 MG/ML
INJECTION, SOLUTION INTRAMUSCULAR; INTRAVENOUS PRN
Status: DISCONTINUED | OUTPATIENT
Start: 2025-06-09 | End: 2025-06-09 | Stop reason: HOSPADM

## 2025-06-09 NOTE — DISCHARGE INSTRUCTIONS
Radial Angiogram      Care of your puncture site:  Remove clear bandage 24 hours after the procedure.  May shower in 24 hours  Inspect the site daily and gently clean using soap and water.  Dry thoroughly and apply a Band-Aid.    Normal Observations:  Soreness or tenderness which may last one week.  Mild oozing from the incision site.  Possible bruising that could last 2 weeks.    Activity:  You may resume driving 24 hours following the procedure.  You may resume normal activity in 3 days or after the wound heals.  Avoid lifting more than 10 pounds for 3 days with affected arm.    Nutrition:  Regular diet  Drink at least 8 to 10 glasses of decaffeinated, non-alcoholic fluid for the next 24 hours to flush the x-ray dye used for your angiogram out of your body.    Call your doctor immediately if your condition worsens, for any other concerns, for a follow-up appointment or if you experience any of the following:  Significant bleeding that does not stop after 10 minutes of applying firm pressure on the puncture site.  Increased swelling of the wrist.  Unusual pain, numbness, or tingling of the wrist/arm.   Any signs of infection such as: redness, yellow drainage at the site, swelling or pain.    LEFT HEART CATHETERIZATION    Care of your puncture site:  Remove bandage 24 hours after the procedure.  May shower in 24 hours but do not sit in a bathtub/pool of water for 5 days or until the wound is healed.  Inspect the site daily and gently clean using soap and water while standing in the shower.  Dry thoroughly and apply a Band-Aid that covers the entire site. Do not apply powder or lotion.    Normal Observations:  Soreness or tenderness which may last one week.  Mild oozing from the incision site.  Possible bruising that could last 2 weeks.    Activity:  You may resume driving 24 hours following the procedure.  You may resume normal activity in 5 days or after the wound heals.  Avoid lifting more than 10 pounds for 5

## 2025-06-13 ENCOUNTER — OFFICE VISIT (OUTPATIENT)
Dept: FAMILY MEDICINE CLINIC | Age: 80
End: 2025-06-13
Payer: MEDICARE

## 2025-06-13 VITALS
OXYGEN SATURATION: 96 % | BODY MASS INDEX: 32.3 KG/M2 | DIASTOLIC BLOOD PRESSURE: 90 MMHG | HEART RATE: 95 BPM | WEIGHT: 165.38 LBS | SYSTOLIC BLOOD PRESSURE: 140 MMHG | RESPIRATION RATE: 16 BRPM | TEMPERATURE: 97.5 F

## 2025-06-13 DIAGNOSIS — R30.0 DYSURIA: ICD-10-CM

## 2025-06-13 DIAGNOSIS — N30.00 ACUTE CYSTITIS WITHOUT HEMATURIA: Primary | ICD-10-CM

## 2025-06-13 PROCEDURE — 1123F ACP DISCUSS/DSCN MKR DOCD: CPT | Performed by: NURSE PRACTITIONER

## 2025-06-13 PROCEDURE — 1159F MED LIST DOCD IN RCRD: CPT | Performed by: NURSE PRACTITIONER

## 2025-06-13 PROCEDURE — 99213 OFFICE O/P EST LOW 20 MIN: CPT | Performed by: NURSE PRACTITIONER

## 2025-06-13 PROCEDURE — 3077F SYST BP >= 140 MM HG: CPT | Performed by: NURSE PRACTITIONER

## 2025-06-13 PROCEDURE — 3080F DIAST BP >= 90 MM HG: CPT | Performed by: NURSE PRACTITIONER

## 2025-06-13 PROCEDURE — G2211 COMPLEX E/M VISIT ADD ON: HCPCS | Performed by: NURSE PRACTITIONER

## 2025-06-13 RX ORDER — NITROFURANTOIN 25; 75 MG/1; MG/1
100 CAPSULE ORAL 2 TIMES DAILY
Qty: 10 CAPSULE | Refills: 0 | Status: SHIPPED | OUTPATIENT
Start: 2025-06-13 | End: 2025-06-18

## 2025-06-13 ASSESSMENT — ENCOUNTER SYMPTOMS
COUGH: 0
DIARRHEA: 0
SHORTNESS OF BREATH: 0
NAUSEA: 0
VOMITING: 0

## 2025-06-13 NOTE — PROGRESS NOTES
normal. No respiratory distress.      Breath sounds: Normal breath sounds.   Skin:     General: Skin is warm and dry.   Neurological:      Mental Status: She is alert and oriented to person, place, and time.   Psychiatric:         Thought Content: Thought content normal.         Judgment: Judgment normal.       Assessment/Plan    1. Acute cystitis without hematuria  Patient unable to produce sample in office - sent home with hat/urine cup to return if able.  With symptomology will initiate Macrobid.   - nitrofurantoin, macrocrystal-monohydrate, (MACROBID) 100 MG capsule; Take 1 capsule by mouth 2 times daily for 5 days  Dispense: 10 capsule; Refill: 0    2. Dysuria     Angelia Chand was counseled regarding symptoms of current diagnosis, course and complications of disease if inadequately treated.  Discussed side effects of medications, diagnosis, treatment options, and prognosis along with risks, benefits, complications, and alternatives of treatment including labs, imaging and other studies/treatment targets and goals.  She verbalized understanding of instructions and counseling.    Return if symptoms worsen or fail to improve.    Medical decision making of low complexity.

## 2025-07-22 ENCOUNTER — TELEPHONE (OUTPATIENT)
Dept: FAMILY MEDICINE CLINIC | Age: 80
End: 2025-07-22

## 2025-08-01 RX ORDER — MECLIZINE HYDROCHLORIDE 25 MG/1
TABLET ORAL
Qty: 30 TABLET | Refills: 0 | Status: SHIPPED | OUTPATIENT
Start: 2025-08-01

## 2025-08-01 NOTE — TELEPHONE ENCOUNTER
Patient called in requesting a refill on  meclizine (ANTIVERT) 12.5 MG tablet  to be sent to  [facility] [id number] - [place] [id number] [address]

## 2025-08-06 ENCOUNTER — OFFICE VISIT (OUTPATIENT)
Dept: FAMILY MEDICINE CLINIC | Age: 80
End: 2025-08-06

## 2025-08-06 VITALS
SYSTOLIC BLOOD PRESSURE: 122 MMHG | DIASTOLIC BLOOD PRESSURE: 80 MMHG | TEMPERATURE: 97 F | WEIGHT: 165 LBS | OXYGEN SATURATION: 94 % | BODY MASS INDEX: 32.22 KG/M2 | HEART RATE: 84 BPM

## 2025-08-06 DIAGNOSIS — T78.40XA ALLERGIC REACTION, INITIAL ENCOUNTER: Primary | ICD-10-CM

## 2025-08-06 RX ORDER — CETIRIZINE HYDROCHLORIDE 10 MG/1
10 TABLET ORAL DAILY
Qty: 30 TABLET | Refills: 0 | Status: SHIPPED | OUTPATIENT
Start: 2025-08-06

## 2025-08-06 RX ORDER — TRIAMCINOLONE ACETONIDE 0.25 MG/G
CREAM TOPICAL
Qty: 454 G | Refills: 0 | Status: SHIPPED | OUTPATIENT
Start: 2025-08-06

## 2025-08-06 RX ORDER — PREDNISONE 10 MG/1
TABLET ORAL
Qty: 30 TABLET | Refills: 0 | Status: SHIPPED | OUTPATIENT
Start: 2025-08-06

## 2025-08-06 ASSESSMENT — ENCOUNTER SYMPTOMS
SHORTNESS OF BREATH: 0
CHOKING: 0

## (undated) DEVICE — PINNACLE INTRODUCER SHEATH: Brand: PINNACLE

## (undated) DEVICE — GLIDESHEATH SLENDER ACCESS KIT: Brand: GLIDESHEATH SLENDER

## (undated) DEVICE — CATHETER DIAG 4FR L100CM COR NYL 3 DRC W/O SIDE H RADPQ

## (undated) DEVICE — CATHETER ETER GUID 6FR 100CM 070IN JUDKINS R 4 VASC COR W O

## (undated) DEVICE — KIT AT-X65 ANGIOTOUCH HAND CONTROLLER

## (undated) DEVICE — PERCLOSE™ PROSTYLE™ SUTURE-MEDIATED CLOSURE AND REPAIR SYSTEM: Brand: PERCLOSE™ PROSTYLE™

## (undated) DEVICE — GUIDEWIRE VASC L260CM DIA0.035IN RAD 3MM J TIP L7CM PTFE

## (undated) DEVICE — Device: Brand: NOMOLINE™ LH ADULT NASAL CO2 CANNULA WITH O2 4M

## (undated) DEVICE — RADIFOCUS GLIDEWIRE: Brand: GLIDEWIRE

## (undated) DEVICE — TR BAND RADIAL ARTERY COMPRESSION DEVICE: Brand: TR BAND

## (undated) DEVICE — CATHETER ANGIO 5FR L100CM GRY S STL NYL JL3.5 3 SEG BRAID L

## (undated) DEVICE — PRESSURE GUIDEWIRE: Brand: COMET™ II

## (undated) DEVICE — INTRODUCER SHTH DIA6FR CANN L23CM GRN VASC CATH W/O MINI

## (undated) DEVICE — CATH LAB PACK: Brand: MEDLINE INDUSTRIES, INC.

## (undated) DEVICE — CATHETER ANGIO 4FR L100CM S STL NYL JL5 3 SEG BRAID SFT

## (undated) DEVICE — DRAPE,ANGIO,BRACH,STERILE,38X44: Brand: MEDLINE

## (undated) DEVICE — PAD, DEFIB, ADULT, RADIOTRANS, PHYSIO: Brand: MEDLINE

## (undated) DEVICE — CATHETER DIAG 4FR 110CM 145DEG .035IN GWIRE 12MM STD LOOP